# Patient Record
Sex: MALE | Race: WHITE | NOT HISPANIC OR LATINO | Employment: OTHER | URBAN - METROPOLITAN AREA
[De-identification: names, ages, dates, MRNs, and addresses within clinical notes are randomized per-mention and may not be internally consistent; named-entity substitution may affect disease eponyms.]

---

## 2017-01-13 ENCOUNTER — CLINICAL SUPPORT NO REQUIREMENTS (OUTPATIENT)
Dept: CARDIOLOGY | Facility: CLINIC | Age: 67
End: 2017-01-13

## 2017-01-13 DIAGNOSIS — I47.20 VENTRICULAR TACHYCARDIA (HCC): Primary | ICD-10-CM

## 2017-01-13 PROCEDURE — 93283 PRGRMG EVAL IMPLANTABLE DFB: CPT | Performed by: INTERNAL MEDICINE

## 2017-05-26 ENCOUNTER — OFFICE VISIT (OUTPATIENT)
Dept: CARDIOLOGY | Facility: CLINIC | Age: 67
End: 2017-05-26

## 2017-05-26 ENCOUNTER — CLINICAL SUPPORT NO REQUIREMENTS (OUTPATIENT)
Dept: CARDIOLOGY | Facility: CLINIC | Age: 67
End: 2017-05-26

## 2017-05-26 VITALS — WEIGHT: 196 LBS | BODY MASS INDEX: 25.15 KG/M2 | HEIGHT: 74 IN

## 2017-05-26 DIAGNOSIS — I47.20 VENTRICULAR TACHYCARDIA (HCC): Primary | ICD-10-CM

## 2017-05-26 DIAGNOSIS — I25.810 CORONARY ARTERY DISEASE INVOLVING AUTOLOGOUS VEIN CORONARY BYPASS GRAFT WITHOUT ANGINA PECTORIS: Primary | ICD-10-CM

## 2017-05-26 DIAGNOSIS — I10 ESSENTIAL HYPERTENSION: ICD-10-CM

## 2017-05-26 DIAGNOSIS — I25.810 CORONARY ARTERY DISEASE INVOLVING CORONARY BYPASS GRAFT OF NATIVE HEART WITHOUT ANGINA PECTORIS: ICD-10-CM

## 2017-05-26 DIAGNOSIS — Z95.1 S/P CABG (CORONARY ARTERY BYPASS GRAFT): Primary | ICD-10-CM

## 2017-05-26 PROCEDURE — 93290 INTERROG DEV EVAL ICPMS IP: CPT | Performed by: INTERNAL MEDICINE

## 2017-05-26 PROCEDURE — 99214 OFFICE O/P EST MOD 30 MIN: CPT | Performed by: INTERNAL MEDICINE

## 2017-05-26 PROCEDURE — 93283 PRGRMG EVAL IMPLANTABLE DFB: CPT | Performed by: INTERNAL MEDICINE

## 2017-05-26 PROCEDURE — 93000 ELECTROCARDIOGRAM COMPLETE: CPT | Performed by: INTERNAL MEDICINE

## 2017-05-26 RX ORDER — LISINOPRIL 2.5 MG/1
2.5 TABLET ORAL DAILY
Status: ON HOLD | COMMUNITY
Start: 2017-05-26 | End: 2021-05-10 | Stop reason: SDUPTHER

## 2017-05-26 RX ORDER — DICLOFENAC SODIUM 75 MG/1
75 TABLET, DELAYED RELEASE ORAL AS NEEDED
COMMUNITY
End: 2018-10-19 | Stop reason: HOSPADM

## 2017-05-30 ENCOUNTER — CLINICAL SUPPORT NO REQUIREMENTS (OUTPATIENT)
Dept: CARDIOLOGY | Facility: CLINIC | Age: 67
End: 2017-05-30

## 2017-05-30 DIAGNOSIS — I42.9 CARDIOMYOPATHY (HCC): Primary | ICD-10-CM

## 2017-05-30 DIAGNOSIS — I47.20 VENTRICULAR TACHYCARDIA (HCC): ICD-10-CM

## 2017-10-31 ENCOUNTER — CLINICAL SUPPORT NO REQUIREMENTS (OUTPATIENT)
Dept: CARDIOLOGY | Facility: CLINIC | Age: 67
End: 2017-10-31

## 2017-10-31 DIAGNOSIS — I47.20 VENTRICULAR TACHYCARDIA (HCC): Primary | ICD-10-CM

## 2017-10-31 PROCEDURE — 93296 REM INTERROG EVL PM/IDS: CPT | Performed by: INTERNAL MEDICINE

## 2017-10-31 PROCEDURE — 93297 REM INTERROG DEV EVAL ICPMS: CPT | Performed by: INTERNAL MEDICINE

## 2017-10-31 PROCEDURE — 93295 DEV INTERROG REMOTE 1/2/MLT: CPT | Performed by: INTERNAL MEDICINE

## 2018-02-07 ENCOUNTER — CLINICAL SUPPORT NO REQUIREMENTS (OUTPATIENT)
Dept: CARDIOLOGY | Facility: CLINIC | Age: 68
End: 2018-02-07

## 2018-02-07 DIAGNOSIS — I47.20 VENTRICULAR TACHYCARDIA (HCC): Primary | ICD-10-CM

## 2018-02-07 PROCEDURE — 93297 REM INTERROG DEV EVAL ICPMS: CPT | Performed by: INTERNAL MEDICINE

## 2018-02-07 PROCEDURE — 93296 REM INTERROG EVL PM/IDS: CPT | Performed by: INTERNAL MEDICINE

## 2018-02-07 PROCEDURE — 93295 DEV INTERROG REMOTE 1/2/MLT: CPT | Performed by: INTERNAL MEDICINE

## 2018-05-25 DIAGNOSIS — R94.31 ABNORMAL ECG: Primary | ICD-10-CM

## 2018-05-25 DIAGNOSIS — Z95.1 HX OF CABG: ICD-10-CM

## 2018-06-19 ENCOUNTER — OFFICE VISIT (OUTPATIENT)
Dept: CARDIOLOGY | Facility: CLINIC | Age: 68
End: 2018-06-19

## 2018-06-19 ENCOUNTER — CLINICAL SUPPORT NO REQUIREMENTS (OUTPATIENT)
Dept: CARDIOLOGY | Facility: CLINIC | Age: 68
End: 2018-06-19

## 2018-06-19 ENCOUNTER — HOSPITAL ENCOUNTER (OUTPATIENT)
Dept: CARDIOLOGY | Facility: HOSPITAL | Age: 68
Discharge: HOME | End: 2018-06-19
Attending: INTERNAL MEDICINE | Admitting: INTERNAL MEDICINE

## 2018-06-19 VITALS
HEART RATE: 56 BPM | WEIGHT: 199.4 LBS | DIASTOLIC BLOOD PRESSURE: 68 MMHG | SYSTOLIC BLOOD PRESSURE: 120 MMHG | BODY MASS INDEX: 25.59 KG/M2 | HEIGHT: 74 IN

## 2018-06-19 DIAGNOSIS — E78.49 OTHER HYPERLIPIDEMIA: ICD-10-CM

## 2018-06-19 DIAGNOSIS — Z95.1 S/P CABG (CORONARY ARTERY BYPASS GRAFT): Primary | ICD-10-CM

## 2018-06-19 DIAGNOSIS — R94.31 ABNORMAL ECG: ICD-10-CM

## 2018-06-19 DIAGNOSIS — I47.20 VENTRICULAR TACHYCARDIA (HCC): Primary | ICD-10-CM

## 2018-06-19 DIAGNOSIS — I21.11 ST ELEVATION MYOCARDIAL INFARCTION INVOLVING RIGHT CORONARY ARTERY (HCC): ICD-10-CM

## 2018-06-19 DIAGNOSIS — Z95.1 HX OF CABG: ICD-10-CM

## 2018-06-19 LAB
BH CV NUCLEAR PRIOR STUDY: 3
BH CV STRESS BP STAGE 1: NORMAL
BH CV STRESS BP STAGE 2: NORMAL
BH CV STRESS BP STAGE 3: NORMAL
BH CV STRESS DURATION MIN STAGE 1: 3
BH CV STRESS DURATION MIN STAGE 2: 3
BH CV STRESS DURATION MIN STAGE 3: 3
BH CV STRESS DURATION SEC STAGE 1: 0
BH CV STRESS DURATION SEC STAGE 2: 0
BH CV STRESS DURATION SEC STAGE 3: 0
BH CV STRESS GRADE STAGE 1: 10
BH CV STRESS GRADE STAGE 2: 12
BH CV STRESS GRADE STAGE 3: 14
BH CV STRESS HR STAGE 1: 85
BH CV STRESS HR STAGE 2: 118
BH CV STRESS HR STAGE 3: 148
BH CV STRESS METS STAGE 1: 5
BH CV STRESS METS STAGE 2: 7.5
BH CV STRESS METS STAGE 3: 10
BH CV STRESS PROTOCOL 1: NORMAL
BH CV STRESS RECOVERY BP: NORMAL MMHG
BH CV STRESS RECOVERY HR: 78 BPM
BH CV STRESS SPEED STAGE 1: 1.7
BH CV STRESS SPEED STAGE 2: 2.5
BH CV STRESS SPEED STAGE 3: 3.4
BH CV STRESS STAGE 1: 1
BH CV STRESS STAGE 2: 2
BH CV STRESS STAGE 3: 3
LV EF NUC BP: 40 %
MAXIMAL PREDICTED HEART RATE: 155 BPM
PERCENT MAX PREDICTED HR: 95.48 %
STRESS BASELINE BP: NORMAL MMHG
STRESS BASELINE HR: 50 BPM
STRESS PERCENT HR: 112 %
STRESS POST ESTIMATED WORKLOAD: 10 METS
STRESS POST EXERCISE DUR MIN: 9 MIN
STRESS POST EXERCISE DUR SEC: 0 SEC
STRESS POST PEAK BP: NORMAL MMHG
STRESS POST PEAK HR: 148 BPM
STRESS TARGET HR: 132 BPM

## 2018-06-19 PROCEDURE — 93283 PRGRMG EVAL IMPLANTABLE DFB: CPT | Performed by: INTERNAL MEDICINE

## 2018-06-19 PROCEDURE — 0 TECHNETIUM TETROFOSMIN KIT: Performed by: INTERNAL MEDICINE

## 2018-06-19 PROCEDURE — 93016 CV STRESS TEST SUPVJ ONLY: CPT | Performed by: INTERNAL MEDICINE

## 2018-06-19 PROCEDURE — 78452 HT MUSCLE IMAGE SPECT MULT: CPT | Performed by: INTERNAL MEDICINE

## 2018-06-19 PROCEDURE — 93290 INTERROG DEV EVAL ICPMS IP: CPT | Performed by: INTERNAL MEDICINE

## 2018-06-19 PROCEDURE — 93017 CV STRESS TEST TRACING ONLY: CPT

## 2018-06-19 PROCEDURE — 93018 CV STRESS TEST I&R ONLY: CPT | Performed by: INTERNAL MEDICINE

## 2018-06-19 PROCEDURE — 99214 OFFICE O/P EST MOD 30 MIN: CPT | Performed by: INTERNAL MEDICINE

## 2018-06-19 PROCEDURE — 78452 HT MUSCLE IMAGE SPECT MULT: CPT

## 2018-06-19 PROCEDURE — A9502 TC99M TETROFOSMIN: HCPCS | Performed by: INTERNAL MEDICINE

## 2018-06-19 RX ORDER — ATORVASTATIN CALCIUM 80 MG/1
80 TABLET, FILM COATED ORAL DAILY
Qty: 90 TABLET | Refills: 2 | Status: SHIPPED | OUTPATIENT
Start: 2018-06-19 | End: 2019-02-05 | Stop reason: SDUPTHER

## 2018-06-19 RX ORDER — CLOBETASOL PROPIONATE 0.5 MG/G
CREAM TOPICAL 2 TIMES DAILY
COMMUNITY

## 2018-06-19 RX ORDER — PIMECROLIMUS 10 MG/G
CREAM TOPICAL 2 TIMES DAILY
COMMUNITY

## 2018-06-19 RX ADMIN — TETROFOSMIN 1 DOSE: 1.38 INJECTION, POWDER, LYOPHILIZED, FOR SOLUTION INTRAVENOUS at 09:10

## 2018-06-19 RX ADMIN — TETROFOSMIN 1 DOSE: 1.38 INJECTION, POWDER, LYOPHILIZED, FOR SOLUTION INTRAVENOUS at 10:20

## 2018-06-19 NOTE — PROGRESS NOTES
Date of Office Visit: 2017  Encounter Provider: Andrew Ball MD  Place of Service: [facility] CARDIOLOGY  Patient Name: Alden Pratt  :1950  [id number]    Chief Complaint   Patient presents with   • Coronary Artery Disease   :     HPI: Alden Pratt is a 67 y.o. male   Here for followup. He is a gentleman that had an inferior MI in 2006, had stenting, restenosed, had an ischemic myopathy and has had an ICD in. He ended up undergoing CABG, has done very well since then. He is doing quite well he's not had a chest pain, shortness of breath, PND, orthopnea, or edema,.    He has had PVCs in the past with those are much less.  In general he's doing well      Past Medical History:   Diagnosis Date   • CAD (coronary artery disease)     atheroclerosis   • Cancer     prostate   • Chest pain    • GERD (gastroesophageal reflux disease)    • Hyperlipidemia    • Implantable cardioverter-defibrillator (ICD) discharge    • Myocardial infarction     inferior   • PVC (premature ventricular contraction)    • PVD (peripheral vascular disease)    • Status post phlebectomy     varicose veins   • VT (ventricular tachycardia)        Past Surgical History:   Procedure Laterality Date   • CARDIAC DEFIBRILLATOR PLACEMENT     • CORONARY ARTERY BYPASS GRAFT      stents   • CORONARY STENT PLACEMENT     • MICROAMBULATORY PHLEBECTOMY     • THYROID BIOPSY         Social History     Social History   • Marital status:      Spouse name: N/A   • Number of children: N/A   • Years of education: N/A     Occupational History   • Not on file.     Social History Main Topics   • Smoking status: Never Smoker   • Smokeless tobacco: Not on file   • Alcohol use No   • Drug use: No   • Sexual activity: Not on file     Other Topics Concern   • Not on file     Social History Narrative   • No narrative on file       Family History   Problem Relation Age of Onset   • Coronary artery disease Other    •  Stroke Other    • Hypertension Other        Review of Systems   Constitution: Negative for decreased appetite, fever, malaise/fatigue and weight loss.   HENT: Negative for nosebleeds.    Eyes: Negative for double vision.   Cardiovascular: Negative for chest pain, claudication, cyanosis, dyspnea on exertion, irregular heartbeat, leg swelling, near-syncope, orthopnea, palpitations, paroxysmal nocturnal dyspnea and syncope.   Respiratory: Negative for cough, hemoptysis and shortness of breath.    Hematologic/Lymphatic: Negative for bleeding problem.   Skin: Negative for rash.   Musculoskeletal: Negative for falls and myalgias.   Gastrointestinal: Negative for hematochezia, jaundice, melena, nausea and vomiting.   Genitourinary: Negative for hematuria.   Neurological: Negative for dizziness and seizures.   Psychiatric/Behavioral: Negative for altered mental status and memory loss.       Allergies   Allergen Reactions   • Advil Allergy Sinus [Chlorpheniramine-Pse-Ibuprofen]      A medication not an allergy   • Contrast Dye    • Iodinated Diagnostic Agents    • Sudafed 12 Hour [Pseudoephedrine Hcl Er]          Current Outpatient Prescriptions:   •  aspirin 81 MG chewable tablet, Chew 81 mg daily., Disp: , Rfl:   •  atorvastatin (LIPITOR) 80 MG tablet, Take 80 mg by mouth daily., Disp: , Rfl:   •  cetirizine (ZyrTEC) 10 MG tablet, Take 10 mg by mouth daily., Disp: , Rfl:   •  Cholecalciferol (VITAMIN D3) 5000 UNITS capsule capsule, Take 2,000 Units by mouth Daily., Disp: , Rfl:   •  clobetasol (TEMOVATE) 0.05 % cream, Apply  topically 2 (Two) Times a Day., Disp: , Rfl:   •  diclofenac (VOLTAREN) 75 MG EC tablet, Take 75 mg by mouth As Needed., Disp: , Rfl:   •  ezetimibe (ZETIA) 10 MG tablet, Take 10 mg by mouth daily., Disp: , Rfl:   •  lisinopril (PRINIVIL,ZESTRIL) 2.5 MG tablet, Take 2.5 mg by mouth Daily., Disp: , Rfl:   •  metaxalone (SKELAXIN) 800 MG tablet, Take 800 mg by mouth As Needed for Muscle Spasms., Disp: ,  "Rfl:   •  metoprolol succinate XL (TOPROL-XL) 50 MG 24 hr tablet, Take 50 mg by mouth 2 (two) times a day., Disp: , Rfl:   •  nitroglycerin (NITROSTAT) 0.4 MG SL tablet, Place 0.4 mg under the tongue every 5 (five) minutes as needed for chest pain. Take no more than 3 doses in 15 minutes., Disp: , Rfl:   •  Omega-3 Fatty Acids (OMEGA 3 PO), Take  by mouth., Disp: , Rfl:   •  pimecrolimus (ELIDEL) 1 % cream, Apply  topically 2 (Two) Times a Day., Disp: , Rfl:   •  traMADol (ULTRAM) 50 MG tablet, Take 50 mg by mouth every 6 (six) hours as needed for moderate pain (4-6)., Disp: , Rfl:   No current facility-administered medications for this visit.      Objective:     Vitals:    06/19/18 1149   BP: 120/68   Pulse: 56   Weight: 90.4 kg (199 lb 6.4 oz)   Height: 188 cm (74\")     Body mass index is 25.6 kg/m².    Physical Exam   Constitutional: He is oriented to person, place, and time. He appears well-developed and well-nourished.   HENT:   Head: Normocephalic.   Eyes: No scleral icterus.   Neck: No JVD present. No thyromegaly present.   Cardiovascular: Normal rate, regular rhythm and normal heart sounds.  Exam reveals no gallop and no friction rub.    No murmur heard.  Pulmonary/Chest: Effort normal and breath sounds normal. He has no wheezes. He has no rales.       Abdominal: Soft. There is no hepatosplenomegaly. There is no tenderness.   Musculoskeletal: Normal range of motion. He exhibits no edema.   Lymphadenopathy:     He has no cervical adenopathy.   Neurological: He is alert and oriented to person, place, and time.   Skin: Skin is warm and dry. No rash noted.   Psychiatric: He has a normal mood and affect.       Procedures     Assessment:       Diagnosis Plan   1. S/P CABG (coronary artery bypass graft)     2. ST elevation myocardial infarction involving right coronary artery     3. Other hyperlipidemia            Plan:        He is doing well. I am not going to make any major change.  I did look at his stress test " he went 9 minutes on the treadmill no symptoms no EKG change Cardiolite pictures look like there may be a little bit more lsiha-infarct ischemia which is always difficult to interpret but I think in general he looks good I'm not can make any changes to renew his Lipitor will have him come back and see us in a year home sooner if he has trouble he'll see me in 2 years    Coronary Artery Disease  Assessment  • The patient has no angina    Plan  • Lifestyle modifications discussed include adhering to a heart healthy diet, maintenance of a healthy weight, medication compliance, regular exercise and regular monitoring of cholesterol and blood pressure    Subjective - Objective  • There is a history of past MI  • There is a history of previous coronary artery bypass graft  • There has been a previous stent procedure using MONIK  • Current antiplatelet therapy includes aspirin 81 mg        As always, it has been a pleasure to participate in your patient's care.      Sincerely,       Andrew Ball MD

## 2018-10-18 ENCOUNTER — TELEPHONE (OUTPATIENT)
Dept: CARDIOLOGY | Facility: CLINIC | Age: 68
End: 2018-10-18

## 2018-10-18 ENCOUNTER — HOSPITAL ENCOUNTER (OUTPATIENT)
Facility: HOSPITAL | Age: 68
Setting detail: OBSERVATION
LOS: 1 days | Discharge: HOME | End: 2018-10-19
Attending: INTERNAL MEDICINE | Admitting: INTERNAL MEDICINE

## 2018-10-18 ENCOUNTER — CLINICAL SUPPORT NO REQUIREMENTS (OUTPATIENT)
Dept: CARDIOLOGY | Facility: CLINIC | Age: 68
End: 2018-10-18

## 2018-10-18 ENCOUNTER — HOSPITAL ENCOUNTER (OUTPATIENT)
Dept: CARDIOLOGY | Facility: HOSPITAL | Age: 68
Discharge: HOME | End: 2018-10-18
Attending: INTERNAL MEDICINE

## 2018-10-18 ENCOUNTER — HOSPITAL ENCOUNTER (OUTPATIENT)
Dept: CARDIOLOGY | Facility: HOSPITAL | Age: 68
Discharge: HOME | End: 2018-10-18

## 2018-10-18 VITALS
HEART RATE: 94 BPM | OXYGEN SATURATION: 99 % | WEIGHT: 195 LBS | SYSTOLIC BLOOD PRESSURE: 134 MMHG | DIASTOLIC BLOOD PRESSURE: 83 MMHG | HEIGHT: 72 IN | BODY MASS INDEX: 26.41 KG/M2

## 2018-10-18 VITALS
BODY MASS INDEX: 26.41 KG/M2 | SYSTOLIC BLOOD PRESSURE: 134 MMHG | OXYGEN SATURATION: 99 % | DIASTOLIC BLOOD PRESSURE: 83 MMHG | RESPIRATION RATE: 16 BRPM | HEIGHT: 72 IN | WEIGHT: 195 LBS | HEART RATE: 94 BPM

## 2018-10-18 DIAGNOSIS — R06.02 SHORTNESS OF BREATH: ICD-10-CM

## 2018-10-18 DIAGNOSIS — R94.31 ABNORMAL EKG: ICD-10-CM

## 2018-10-18 DIAGNOSIS — R55 VASOVAGAL SYNCOPE: ICD-10-CM

## 2018-10-18 DIAGNOSIS — R00.2 PALPITATIONS: ICD-10-CM

## 2018-10-18 DIAGNOSIS — I47.20 VENTRICULAR TACHYCARDIA (HCC): Primary | ICD-10-CM

## 2018-10-18 PROBLEM — R07.2 CHEST PAIN, PRECORDIAL: Status: ACTIVE | Noted: 2018-10-18

## 2018-10-18 LAB
ALBUMIN SERPL-MCNC: 4.6 G/DL (ref 3.5–5.2)
ALBUMIN/GLOB SERPL: 1.6 G/DL
ALP SERPL-CCNC: 63 U/L (ref 39–117)
ALT SERPL W P-5'-P-CCNC: 24 U/L (ref 1–41)
ANION GAP SERPL CALCULATED.3IONS-SCNC: 9.6 MMOL/L
AST SERPL-CCNC: 30 U/L (ref 1–40)
BASOPHILS # BLD AUTO: 0.01 10*3/MM3 (ref 0–0.2)
BASOPHILS NFR BLD AUTO: 0.2 % (ref 0–1.5)
BILIRUB SERPL-MCNC: 1.1 MG/DL (ref 0.1–1.2)
BUN BLD-MCNC: 9 MG/DL (ref 8–23)
BUN/CREAT SERPL: 10.7 (ref 7–25)
CALCIUM SPEC-SCNC: 9.5 MG/DL (ref 8.6–10.5)
CHLORIDE SERPL-SCNC: 102 MMOL/L (ref 98–107)
CO2 SERPL-SCNC: 28.4 MMOL/L (ref 22–29)
CREAT BLD-MCNC: 0.84 MG/DL (ref 0.76–1.27)
D DIMER PPP FEU-MCNC: <0.27 MCGFEU/ML (ref 0–0.49)
DEPRECATED RDW RBC AUTO: 41.9 FL (ref 37–54)
EOSINOPHIL # BLD AUTO: 0.05 10*3/MM3 (ref 0–0.7)
EOSINOPHIL NFR BLD AUTO: 1 % (ref 0.3–6.2)
ERYTHROCYTE [DISTWIDTH] IN BLOOD BY AUTOMATED COUNT: 12.5 % (ref 11.5–14.5)
GFR SERPL CREATININE-BSD FRML MDRD: 92 ML/MIN/1.73
GLOBULIN UR ELPH-MCNC: 2.9 GM/DL
GLUCOSE BLD-MCNC: 95 MG/DL (ref 65–99)
HCT VFR BLD AUTO: 46.7 % (ref 40.4–52.2)
HGB BLD-MCNC: 14.6 G/DL (ref 13.7–17.6)
IMM GRANULOCYTES # BLD: 0 10*3/MM3 (ref 0–0.03)
IMM GRANULOCYTES NFR BLD: 0 % (ref 0–0.5)
LYMPHOCYTES # BLD AUTO: 1.01 10*3/MM3 (ref 0.9–4.8)
LYMPHOCYTES NFR BLD AUTO: 20.6 % (ref 19.6–45.3)
MAGNESIUM SERPL-MCNC: 2 MG/DL (ref 1.6–2.4)
MCH RBC QN AUTO: 29 PG (ref 27–32.7)
MCHC RBC AUTO-ENTMCNC: 31.3 G/DL (ref 32.6–36.4)
MCV RBC AUTO: 92.8 FL (ref 79.8–96.2)
MONOCYTES # BLD AUTO: 0.43 10*3/MM3 (ref 0.2–1.2)
MONOCYTES NFR BLD AUTO: 8.8 % (ref 5–12)
NEUTROPHILS # BLD AUTO: 3.41 10*3/MM3 (ref 1.9–8.1)
NEUTROPHILS NFR BLD AUTO: 69.4 % (ref 42.7–76)
NT-PROBNP SERPL-MCNC: 1181 PG/ML (ref 0–900)
PLATELET # BLD AUTO: 113 10*3/MM3 (ref 140–500)
PMV BLD AUTO: 11.6 FL (ref 6–12)
POTASSIUM BLD-SCNC: 4.1 MMOL/L (ref 3.5–5.2)
PROT SERPL-MCNC: 7.5 G/DL (ref 6–8.5)
RBC # BLD AUTO: 5.03 10*6/MM3 (ref 4.6–6)
SODIUM BLD-SCNC: 140 MMOL/L (ref 136–145)
T-UPTAKE NFR SERPL: 1.05 TBI (ref 0.8–1.3)
T4 FREE SERPL-MCNC: 1.17 NG/DL (ref 0.93–1.7)
T4 FREE SERPL-MCNC: 1.17 NG/DL (ref 0.93–1.7)
T4 SERPL-MCNC: 5.09 MCG/DL (ref 4.5–11.7)
TROPONIN T SERPL-MCNC: <0.01 NG/ML (ref 0–0.03)
TSH SERPL DL<=0.05 MIU/L-ACNC: 1.12 MIU/ML (ref 0.27–4.2)
TSH SERPL DL<=0.05 MIU/L-ACNC: 1.12 MIU/ML (ref 0.27–4.2)
WBC NRBC COR # BLD: 4.91 10*3/MM3 (ref 4.5–10.7)

## 2018-10-18 PROCEDURE — 84443 ASSAY THYROID STIM HORMONE: CPT | Performed by: INTERNAL MEDICINE

## 2018-10-18 PROCEDURE — 93010 ELECTROCARDIOGRAM REPORT: CPT | Performed by: INTERNAL MEDICINE

## 2018-10-18 PROCEDURE — 84439 ASSAY OF FREE THYROXINE: CPT | Performed by: INTERNAL MEDICINE

## 2018-10-18 PROCEDURE — 84479 ASSAY OF THYROID (T3 OR T4): CPT | Performed by: INTERNAL MEDICINE

## 2018-10-18 PROCEDURE — 83735 ASSAY OF MAGNESIUM: CPT | Performed by: INTERNAL MEDICINE

## 2018-10-18 PROCEDURE — 85025 COMPLETE CBC W/AUTO DIFF WBC: CPT | Performed by: INTERNAL MEDICINE

## 2018-10-18 PROCEDURE — 25010000002 PERFLUTREN (DEFINITY) 8.476 MG IN SODIUM CHLORIDE 0.9 % 10 ML INJECTION: Performed by: INTERNAL MEDICINE

## 2018-10-18 PROCEDURE — 99223 1ST HOSP IP/OBS HIGH 75: CPT | Performed by: INTERNAL MEDICINE

## 2018-10-18 PROCEDURE — 93005 ELECTROCARDIOGRAM TRACING: CPT | Performed by: INTERNAL MEDICINE

## 2018-10-18 PROCEDURE — 94760 N-INVAS EAR/PLS OXIMETRY 1: CPT

## 2018-10-18 PROCEDURE — 84484 ASSAY OF TROPONIN QUANT: CPT | Performed by: INTERNAL MEDICINE

## 2018-10-18 PROCEDURE — 83880 ASSAY OF NATRIURETIC PEPTIDE: CPT | Performed by: INTERNAL MEDICINE

## 2018-10-18 PROCEDURE — 99222 1ST HOSP IP/OBS MODERATE 55: CPT | Performed by: INTERNAL MEDICINE

## 2018-10-18 PROCEDURE — 36415 COLL VENOUS BLD VENIPUNCTURE: CPT

## 2018-10-18 PROCEDURE — 93306 TTE W/DOPPLER COMPLETE: CPT

## 2018-10-18 PROCEDURE — 93306 TTE W/DOPPLER COMPLETE: CPT | Performed by: INTERNAL MEDICINE

## 2018-10-18 PROCEDURE — 80053 COMPREHEN METABOLIC PANEL: CPT | Performed by: INTERNAL MEDICINE

## 2018-10-18 PROCEDURE — 85379 FIBRIN DEGRADATION QUANT: CPT | Performed by: INTERNAL MEDICINE

## 2018-10-18 PROCEDURE — 93295 DEV INTERROG REMOTE 1/2/MLT: CPT | Performed by: INTERNAL MEDICINE

## 2018-10-18 PROCEDURE — 93296 REM INTERROG EVL PM/IDS: CPT | Performed by: INTERNAL MEDICINE

## 2018-10-18 RX ORDER — CLOBETASOL PROPIONATE 0.5 MG/G
CREAM TOPICAL EVERY 12 HOURS SCHEDULED
Status: DISCONTINUED | OUTPATIENT
Start: 2018-10-18 | End: 2018-10-19 | Stop reason: HOSPADM

## 2018-10-18 RX ORDER — METAXALONE 800 MG/1
800 TABLET ORAL 3 TIMES DAILY PRN
Status: DISCONTINUED | OUTPATIENT
Start: 2018-10-18 | End: 2018-10-19 | Stop reason: HOSPADM

## 2018-10-18 RX ORDER — SODIUM CHLORIDE 0.9 % (FLUSH) 0.9 %
10 SYRINGE (ML) INJECTION AS NEEDED
Status: DISCONTINUED | OUTPATIENT
Start: 2018-10-18 | End: 2018-10-19 | Stop reason: HOSPADM

## 2018-10-18 RX ORDER — NITROGLYCERIN 0.4 MG/1
0.4 TABLET SUBLINGUAL
Status: DISCONTINUED | OUTPATIENT
Start: 2018-10-18 | End: 2018-10-19 | Stop reason: HOSPADM

## 2018-10-18 RX ORDER — SODIUM CHLORIDE 0.9 % (FLUSH) 0.9 %
3 SYRINGE (ML) INJECTION EVERY 12 HOURS SCHEDULED
Status: DISCONTINUED | OUTPATIENT
Start: 2018-10-18 | End: 2018-10-19 | Stop reason: HOSPADM

## 2018-10-18 RX ORDER — METOPROLOL SUCCINATE 100 MG/1
100 TABLET, EXTENDED RELEASE ORAL EVERY 12 HOURS SCHEDULED
Status: DISCONTINUED | OUTPATIENT
Start: 2018-10-18 | End: 2018-10-19 | Stop reason: HOSPADM

## 2018-10-18 RX ORDER — ATORVASTATIN CALCIUM 80 MG/1
80 TABLET, FILM COATED ORAL NIGHTLY
Status: DISCONTINUED | OUTPATIENT
Start: 2018-10-18 | End: 2018-10-19 | Stop reason: HOSPADM

## 2018-10-18 RX ORDER — ACETAMINOPHEN 325 MG/1
650 TABLET ORAL EVERY 4 HOURS PRN
Status: DISCONTINUED | OUTPATIENT
Start: 2018-10-18 | End: 2018-10-19 | Stop reason: HOSPADM

## 2018-10-18 RX ORDER — NITROGLYCERIN 0.4 MG/1
0.4 TABLET SUBLINGUAL
Status: DISCONTINUED | OUTPATIENT
Start: 2018-10-18 | End: 2018-11-12

## 2018-10-18 RX ORDER — TRAMADOL HYDROCHLORIDE 50 MG/1
50 TABLET ORAL EVERY 6 HOURS PRN
Status: DISCONTINUED | OUTPATIENT
Start: 2018-10-18 | End: 2018-10-19 | Stop reason: HOSPADM

## 2018-10-18 RX ORDER — CETIRIZINE HYDROCHLORIDE 10 MG/1
10 TABLET ORAL DAILY
Status: DISCONTINUED | OUTPATIENT
Start: 2018-10-18 | End: 2018-10-19 | Stop reason: HOSPADM

## 2018-10-18 RX ORDER — SODIUM CHLORIDE 0.9 % (FLUSH) 0.9 %
3-10 SYRINGE (ML) INJECTION AS NEEDED
Status: DISCONTINUED | OUTPATIENT
Start: 2018-10-18 | End: 2018-10-19 | Stop reason: HOSPADM

## 2018-10-18 RX ORDER — LISINOPRIL 2.5 MG/1
2.5 TABLET ORAL DAILY
Status: DISCONTINUED | OUTPATIENT
Start: 2018-10-18 | End: 2018-10-19 | Stop reason: HOSPADM

## 2018-10-18 RX ORDER — ASPIRIN 81 MG/1
81 TABLET, CHEWABLE ORAL DAILY
Status: DISCONTINUED | OUTPATIENT
Start: 2018-10-18 | End: 2018-10-19 | Stop reason: HOSPADM

## 2018-10-18 RX ADMIN — Medication 3 ML: at 20:11

## 2018-10-18 RX ADMIN — PERFLUTREN 1.5 ML: 6.52 INJECTION, SUSPENSION INTRAVENOUS at 16:21

## 2018-10-18 RX ADMIN — ATORVASTATIN CALCIUM 80 MG: 80 TABLET, FILM COATED ORAL at 20:11

## 2018-10-18 RX ADMIN — LISINOPRIL 2.5 MG: 2.5 TABLET ORAL at 20:11

## 2018-10-18 RX ADMIN — ASPIRIN 81 MG: 81 TABLET, CHEWABLE ORAL at 20:11

## 2018-10-18 RX ADMIN — METOPROLOL SUCCINATE 100 MG: 100 TABLET, FILM COATED, EXTENDED RELEASE ORAL at 20:12

## 2018-10-18 RX ADMIN — CETIRIZINE HYDROCHLORIDE 10 MG: 10 TABLET, FILM COATED ORAL at 20:11

## 2018-10-18 NOTE — CONSULTS
Date of Hospital Visit: [unfilled]ENC@  Encounter Provider: Robby Ngo MD  Place of Service: [facility] CARDIOLOGY  Patient Name: Alden Pratt  :1950  Referral Provider: Andrew Ball MD    Chief complaint  Syncope    History of Present Illness  The patient is a 67-year-old gentleman who had an episode of syncope today at around noon.  He reports that he was working outside and was having a emotional discussion with his brother when he lost consciousness.  He was bending over to pick at tool off the ground, and the next thing he was aware of was his brother coming to his assistance.  He states he had just a brief fluttering in his chest prior to the incident.  He now feels essentially back at baseline though is somewhat fatigued.  He originally had an inferior ST elevation MI  and subsequently developed an ischemic cardiomyopathy and resulting dual-chamber ICD placement.  He has never before received any therapy.    Past Medical History:   Diagnosis Date   • Arthritis    • CAD (coronary artery disease)     atheroclerosis   • Cancer (CMS/HCC)     prostate   • Chest pain    • CHF (congestive heart failure) (CMS/HCC)    • Elevated cholesterol    • GERD (gastroesophageal reflux disease)    • Hyperlipidemia    • Implantable cardioverter-defibrillator (ICD) discharge    • Myocardial infarction (CMS/HCC)     inferior   • PVC (premature ventricular contraction)    • PVD (peripheral vascular disease) (CMS/HCC)    • Status post phlebectomy     varicose veins   • VT (ventricular tachycardia) (CMS/HCC)        Past Surgical History:   Procedure Laterality Date   • CARDIAC CATHETERIZATION     • CARDIAC DEFIBRILLATOR PLACEMENT     • CORONARY ARTERY BYPASS GRAFT      stents   • CORONARY STENT PLACEMENT     • MICROAMBULATORY PHLEBECTOMY     • THYROID BIOPSY         Facility-Administered Medications Prior to Admission   Medication Dose Route Frequency Provider Last Rate Last Dose    • nitroglycerin (NITROSTAT) SL tablet 0.4 mg  0.4 mg Sublingual Q5 Min PRN Ernestina Rose MD       • sodium chloride 0.9 % flush 10 mL  10 mL Intravenous PRN Ernestina Rose MD         Prescriptions Prior to Admission   Medication Sig Dispense Refill Last Dose   • aspirin 81 MG chewable tablet Chew 81 mg daily.   10/17/2018 at Unknown time   • atorvastatin (LIPITOR) 80 MG tablet Take 1 tablet by mouth Daily. 90 tablet 2 10/17/2018 at Unknown time   • cetirizine (ZyrTEC) 10 MG tablet Take 10 mg by mouth daily.   10/17/2018 at Unknown time   • Cholecalciferol (VITAMIN D3) 5000 UNITS capsule capsule Take 2,000 Units by mouth Every Other Day.   10/17/2018 at Unknown time   • clobetasol (TEMOVATE) 0.05 % cream Apply  topically 2 (Two) Times a Day.   10/17/2018 at Unknown time   • diclofenac (VOLTAREN) 75 MG EC tablet Take 75 mg by mouth As Needed.   Past Week at Unknown time   • ezetimibe (ZETIA) 10 MG tablet Take 10 mg by mouth daily.   10/17/2018 at Unknown time   • lisinopril (PRINIVIL,ZESTRIL) 2.5 MG tablet Take 2.5 mg by mouth Daily.   10/17/2018 at Unknown time   • metaxalone (SKELAXIN) 800 MG tablet Take 800 mg by mouth As Needed for Muscle Spasms.   Past Month at Unknown time   • metoprolol succinate XL (TOPROL-XL) 50 MG 24 hr tablet Take 50 mg by mouth 2 (two) times a day.   10/18/2018 at Unknown time   • Omega-3 Fatty Acids (OMEGA 3 PO) Take  by mouth.   10/18/2018 at Unknown time   • pimecrolimus (ELIDEL) 1 % cream Apply  topically 2 (Two) Times a Day.   10/17/2018 at Unknown time   • traMADol (ULTRAM) 50 MG tablet Take 50 mg by mouth every 6 (six) hours as needed for moderate pain (4-6).   Past Week at Unknown time   • nitroglycerin (NITROSTAT) 0.4 MG SL tablet Place 0.4 mg under the tongue every 5 (five) minutes as needed for chest pain. Take no more than 3 doses in 15 minutes.   Taking       Current Meds  Scheduled Meds:  sodium chloride 3 mL Intravenous Q12H     Continuous Infusions:   PRN  "Meds:.•  acetaminophen  •  nitroglycerin  •  sodium chloride    Allergies as of 10/18/2018 - Reviewed 10/18/2018   Allergen Reaction Noted   • Contrast dye  05/09/2016   • Iodinated diagnostic agents  12/20/2013   • Sudafed 12 hour [pseudoephedrine hcl er]  05/09/2016       Social History     Social History   • Marital status:      Spouse name: N/A   • Number of children: N/A   • Years of education: N/A     Occupational History   • Not on file.     Social History Main Topics   • Smoking status: Never Smoker   • Smokeless tobacco: Not on file   • Alcohol use No   • Drug use: No   • Sexual activity: Defer     Other Topics Concern   • Not on file     Social History Narrative   • No narrative on file       Family History   Problem Relation Age of Onset   • Coronary artery disease Other    • Stroke Other    • Hypertension Other    • Heart disease Father    • Heart failure Father    • Stroke Father        REVIEW OF SYSTEMS:   12 point ROS was performed and is negative except as outlined in HPI       Objective:   Temp:  [98.2 °F (36.8 °C)] 98.2 °F (36.8 °C)  Heart Rate:  [] 110  Resp:  [16] 16  BP: (131-134)/(68-83) 131/68  Body mass index is 25.22 kg/m².  Flowsheet Rows      First Filed Value   Admission Height  188 cm (74\") Documented at 10/18/2018 1649   Admission Weight  89.1 kg (196 lb 6.4 oz) Documented at 10/18/2018 1649        Vitals:    10/18/18 1649   BP: 131/68   Pulse: 110   Resp: 16   Temp: 98.2 °F (36.8 °C)   SpO2: 97%       General Appearance:    Alert, cooperative, in no acute distress, appears younger than stated age    Head:    Normocephalic, without obvious abnormality, scrape noted on right temple    Eyes:            Lids and lashes normal, conjunctivae and sclerae normal, no icterus, no pallor, corneas clear   Ears:    Ears appear intact with no abnormalities noted   Throat:   No oral lesions, no thrush, oral mucosa moist   Neck:   No adenopathy, supple, trachea midline, no thyromegaly, no "   carotid bruit, no JVD   Back:     No kyphosis present, no scoliosis present, no skin lesions, erythema or scars, no tenderness to percussion or palpation, range of motion normal   Lungs:     Clear to auscultation,respirations regular, even and unlabored    Heart:   Irregular rate and rhythm, no discernible murmur    Chest Wall:    No abnormalities observed, ICD in place on left chest well healed    Abdomen:     Normal bowel sounds, no masses, no organomegaly, soft        non-tender, non-distended, no guarding, no rebound  tenderness   Extremities:   Moves all extremities well, no edema, no cyanosis, no redness   Pulses:   Pulses palpable and equal bilaterally. Normal radial, carotid, femoral, dorsalis pedis and posterior tibial pulses bilaterally. Normal abdominal aorta   Skin:  Psychiatric:   No bleeding, bruising or rash    Alert and oriented x 3, normal mood and affect                 Lab Review:      Results from last 7 days  Lab Units 10/18/18  1440   SODIUM mmol/L 140   POTASSIUM mmol/L 4.1   CHLORIDE mmol/L 102   CO2 mmol/L 28.4   BUN mg/dL 9   CREATININE mg/dL 0.84   CALCIUM mg/dL 9.5   BILIRUBIN mg/dL 1.1   ALK PHOS U/L 63   ALT (SGPT) U/L 24   AST (SGOT) U/L 30   GLUCOSE mg/dL 95       Results from last 7 days  Lab Units 10/18/18  1440   TROPONIN T ng/mL <0.010     @LABRCNTbnp@    Results from last 7 days  Lab Units 10/18/18  1440   WBC 10*3/mm3 4.91   HEMOGLOBIN g/dL 14.6   HEMATOCRIT % 46.7   PLATELETS 10*3/mm3 113*           Results from last 7 days  Lab Units 10/18/18  1440   MAGNESIUM mg/dL 2.0     @LABRCNTIP(chol,trig,hdl,ldl)    I personally viewed and interpreted the patient's EKG/Telemetry data  )  Patient Active Problem List   Diagnosis   • Other hyperlipidemia   • ST elevation myocardial infarction involving right coronary artery (CMS/HCC)   • S/P CABG (coronary artery bypass graft)   • Chest pain, precordial       Interrogation of the patient's ICD shows that he received one appropriate shock  for ventricular fibrillation.  During ventricular fibrillation and there was onset of atrial fibrillation although this clearly came after the ventricular fibrillation had begun.  The shock successfully terminated the ventricular fibrillation however he remained in atrial fibrillation.  His device history demonstrates that he has had no atrial fibrillation prior to this event.    Assessment and Plan:  1.  Aborted sudden cardiac death  2.  Ventricular fibrillation  3.  Atrial fibrillation  4.  Ischemic cardiomyopathy  5.  Coronary artery disease    The patient now feels back to baseline though he remains in rate-controlled atrial fibrillation.  I think it is reasonable to observe him overnight and uptitrate his beta blocker as tolerated.  If he has no further ventricular events I would not recommend making any dramatic changes to the patient's therapy as this is his first event in over 12 years of having an ICD.  If he has additional events overnight or the near future he will need to be considered to start an antiarrhythmic medication.  In regards to his atrial fibrillation, as this is not been present before, I hope that it may terminate spontaneously  just with observation.  It is not terminated by tomorrow we can start anticoagulation and prepared for cardioversion.  His CHADS VASC score is high enough that long-term anticoagulation should be considered.  We will follow-up with the patient tomorrow to see if he is converted from atrial fibrillation or if there been any other events.    Robby Ngo MD  10/18/18  6:33 PM.  Time spent in reviewing chart, discussion and examination:

## 2018-10-18 NOTE — PLAN OF CARE
Problem: Patient Care Overview  Goal: Plan of Care Review  Outcome: Ongoing (interventions implemented as appropriate)   10/18/18 1754   Coping/Psychosocial   Plan of Care Reviewed With patient   Plan of Care Review   Progress no change   OTHER   Outcome Summary Patient admitted for monitoring of heart rhythm. Patient currently in afib that goes between the 70s to 120s. Denies any pain. Awaiting call back from cardiology for further orders.      Goal: Individualization and Mutuality  Outcome: Ongoing (interventions implemented as appropriate)    Goal: Discharge Needs Assessment  Outcome: Ongoing (interventions implemented as appropriate)    Goal: Interprofessional Rounds/Family Conf  Outcome: Ongoing (interventions implemented as appropriate)      Problem: Fall Risk (Adult)  Goal: Identify Related Risk Factors and Signs and Symptoms  Outcome: Ongoing (interventions implemented as appropriate)    Goal: Absence of Fall  Outcome: Ongoing (interventions implemented as appropriate)   10/18/18 1754   Fall Risk (Adult)   Absence of Fall making progress toward outcome       Problem: Arrhythmia/Dysrhythmia (Symptomatic) (Adult)  Goal: Signs and Symptoms of Listed Potential Problems Will be Absent, Minimized or Managed (Arrhythmia/Dysrhythmia)  Outcome: Ongoing (interventions implemented as appropriate)   10/18/18 1754   Goal/Outcome Evaluation   Problems Assessed (Arrhythmia/Dysrhythmia) all   Problems Present (Dysrhythmia) none

## 2018-10-18 NOTE — PROGRESS NOTES
Date of Office Visit: 10/18/2018  Encounter Provider: Mare Nova RN  Place of Service: [facility] CARDIOLOGY  Patient Name: Alden Pratt  :1950      Patient ID:  Alden Pratt is a 67 y.o. male is here for VT.           History of Present Illness: ICD discharge    This is a patient who patient of Dr. Ball.  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   He has an AICD.  He has a history of frequent PVCs and a history of ventricular tachycardia in the past.  He's been very stable.      He had a stress nuclear perfusion study done in 2018 which showed no evidence of ischemia and ejection fraction is 40%.  He a lot PVCs at that time.  He's been in fair health in been taking his medications as directed.  He was outside working today.  His brother whom he's been estranged from for 5 years came up.  His brother was telling him his story.  It was really stressing Mr. Pratt.  He got off a stepladder and walked to the sidewalk and had a syncopal episode.  He was in ventricular tachycardia by his device and did get defibrillation.  Afterwards, he was in atrial fibrillation which is new for him.  Prior to this, he had no symptoms other than feeling stressed.     He does feel a fullness in his head.  He has some slight chest tightness.  He doesn't feel dizzy.  Doesn't feels heart racing or skipping.  He's had no recent nausea, vomiting or diarrhea.  He's been taking his medications as directed.  He has a normal mag, CMP and CBC.     He does not smoke.     Past Medical History:   Diagnosis Date   • CAD (coronary artery disease)     atheroclerosis   • Cancer (CMS/HCC)     prostate   • Chest pain    • GERD (gastroesophageal reflux disease)    • Hyperlipidemia    • Implantable cardioverter-defibrillator (ICD) discharge    • Myocardial infarction (CMS/HCC)     inferior   • PVC  (premature ventricular contraction)    • PVD (peripheral vascular disease) (CMS/HCC)    • Status post phlebectomy     varicose veins   • VT (ventricular tachycardia) (CMS/HCC)          Past Surgical History:   Procedure Laterality Date   • CARDIAC DEFIBRILLATOR PLACEMENT     • CORONARY ARTERY BYPASS GRAFT      stents   • CORONARY STENT PLACEMENT     • MICROAMBULATORY PHLEBECTOMY     • THYROID BIOPSY         Current Outpatient Prescriptions on File Prior to Encounter   Medication Sig Dispense Refill   • aspirin 81 MG chewable tablet Chew 81 mg daily.     • atorvastatin (LIPITOR) 80 MG tablet Take 1 tablet by mouth Daily. 90 tablet 2   • cetirizine (ZyrTEC) 10 MG tablet Take 10 mg by mouth daily.     • Cholecalciferol (VITAMIN D3) 5000 UNITS capsule capsule Take 2,000 Units by mouth Daily.     • clobetasol (TEMOVATE) 0.05 % cream Apply  topically 2 (Two) Times a Day.     • diclofenac (VOLTAREN) 75 MG EC tablet Take 75 mg by mouth As Needed.     • ezetimibe (ZETIA) 10 MG tablet Take 10 mg by mouth daily.     • lisinopril (PRINIVIL,ZESTRIL) 2.5 MG tablet Take 2.5 mg by mouth Daily.     • metaxalone (SKELAXIN) 800 MG tablet Take 800 mg by mouth As Needed for Muscle Spasms.     • metoprolol succinate XL (TOPROL-XL) 50 MG 24 hr tablet Take 50 mg by mouth 2 (two) times a day.     • nitroglycerin (NITROSTAT) 0.4 MG SL tablet Place 0.4 mg under the tongue every 5 (five) minutes as needed for chest pain. Take no more than 3 doses in 15 minutes.     • Omega-3 Fatty Acids (OMEGA 3 PO) Take  by mouth.     • pimecrolimus (ELIDEL) 1 % cream Apply  topically 2 (Two) Times a Day.     • traMADol (ULTRAM) 50 MG tablet Take 50 mg by mouth every 6 (six) hours as needed for moderate pain (4-6).       No current facility-administered medications on file prior to encounter.        Social History     Social History   • Marital status:      Spouse name: N/A   • Number of children: N/A   • Years of education: N/A     Occupational  "History   • Not on file.     Social History Main Topics   • Smoking status: Never Smoker   • Smokeless tobacco: Not on file   • Alcohol use No   • Drug use: No   • Sexual activity: Defer     Other Topics Concern   • Not on file     Social History Narrative   • No narrative on file           Review of Systems   Constitution: Negative.   HENT: Negative for congestion.    Eyes: Negative for vision loss in left eye and vision loss in right eye.   Respiratory: Negative.  Negative for cough, hemoptysis, shortness of breath, sleep disturbances due to breathing, snoring, sputum production and wheezing.    Endocrine: Negative.    Hematologic/Lymphatic: Negative.    Skin: Negative for poor wound healing and rash.   Musculoskeletal: Negative for falls, gout, muscle cramps and myalgias.   Gastrointestinal: Negative for abdominal pain, diarrhea, dysphagia, hematemesis, melena, nausea and vomiting.   Neurological: Negative for excessive daytime sleepiness, dizziness, headaches, light-headedness, loss of balance, seizures and vertigo.   Psychiatric/Behavioral: Negative for depression and substance abuse. The patient is not nervous/anxious.        Procedures    ECG 12 Lead  Date/Time: 10/18/2018 3:34 PM  Performed by: BAM HERRERA  Authorized by: BAM HERRERA   Comparison: compared with previous ECG   Comparison to previous ECG: Now a fib  Rhythm: atrial fibrillation  T depression: II, III and aVF  Clinical impression: abnormal ECG                Objective:      Vitals:    10/18/18 1442   BP: 134/83   BP Location: Left arm   Patient Position: Sitting   Pulse: 94   Resp: 16   SpO2: 99%   Weight: 88.5 kg (195 lb)   Height: 182.9 cm (72\")     Body mass index is 26.45 kg/m².    Physical Exam   Constitutional: He is oriented to person, place, and time. He appears well-developed and well-nourished. No distress.   HENT:   Head: Normocephalic and atraumatic.   Eyes: Conjunctivae are normal. No scleral icterus.   Neck: Neck " supple. No JVD present. Carotid bruit is not present. No thyromegaly present.   Cardiovascular: Normal rate, regular rhythm, S1 normal, S2 normal, normal heart sounds and intact distal pulses.   No extrasystoles are present. PMI is not displaced.  Exam reveals no gallop.    No murmur heard.  Pulses:       Carotid pulses are 2+ on the right side, and 2+ on the left side.       Radial pulses are 2+ on the right side, and 2+ on the left side.        Dorsalis pedis pulses are 2+ on the right side, and 2+ on the left side.        Posterior tibial pulses are 2+ on the right side, and 2+ on the left side.   Pulmonary/Chest: Effort normal and breath sounds normal. No respiratory distress. He has no wheezes. He has no rhonchi. He has no rales. He exhibits no tenderness.   Abdominal: Soft. Bowel sounds are normal. He exhibits no distension, no abdominal bruit and no mass. There is no tenderness.   Musculoskeletal: He exhibits no edema or deformity.   Lymphadenopathy:     He has no cervical adenopathy.   Neurological: He is alert and oriented to person, place, and time. No cranial nerve deficit.   Skin: Skin is warm and dry. No rash noted. He is not diaphoretic. No cyanosis. No pallor. Nails show no clubbing.   Psychiatric: He has a normal mood and affect. Judgment normal.   Vitals reviewed.      Lab Review:       Assessment:      Diagnosis Plan   1. Abnormal EKG  Cardiac Monitoring    Vital Signs - Once    Vital Signs - As Needed    Pulse Oximetry    Oxygen Therapy- Nasal Cannula; Titrate for SPO2: 92%, equal to or greater than    Insert Peripheral IV    sodium chloride 0.9 % flush 10 mL    nitroglycerin (NITROSTAT) SL tablet 0.4 mg    NPO Diet    Bathroom Privileges With Assistance    CBC & Differential    Comprehensive Metabolic Panel    Troponin T    D-dimer    proBNP    ECG 12 Lead    Thyroid Panel With TSH    TSH+Free T4    T4, Free    Cardiac Monitoring    Vital Signs - Once    Oxygen Therapy- Nasal Cannula; Titrate for  SPO2: 92%, equal to or greater than    Insert Peripheral IV    NPO Diet    Bathroom Privileges With Assistance    Troponin T    Troponin T    D-dimer    D-dimer    proBNP    proBNP    T4, Free    T4, Free    CBC Auto Differential   2. Shortness of breath  Cardiac Monitoring    Vital Signs - Once    Vital Signs - As Needed    Pulse Oximetry    Oxygen Therapy- Nasal Cannula; Titrate for SPO2: 92%, equal to or greater than    Insert Peripheral IV    sodium chloride 0.9 % flush 10 mL    nitroglycerin (NITROSTAT) SL tablet 0.4 mg    NPO Diet    Bathroom Privileges With Assistance    CBC & Differential    Comprehensive Metabolic Panel    Troponin T    D-dimer    proBNP    ECG 12 Lead    Thyroid Panel With TSH    TSH+Free T4    T4, Free    Cardiac Monitoring    Vital Signs - Once    Oxygen Therapy- Nasal Cannula; Titrate for SPO2: 92%, equal to or greater than    Insert Peripheral IV    NPO Diet    Bathroom Privileges With Assistance    Troponin T    Troponin T    D-dimer    D-dimer    proBNP    proBNP    T4, Free    T4, Free    CBC Auto Differential   3. Palpitations  Cardiac Monitoring    Vital Signs - Once    Vital Signs - As Needed    Pulse Oximetry    Oxygen Therapy- Nasal Cannula; Titrate for SPO2: 92%, equal to or greater than    Insert Peripheral IV    sodium chloride 0.9 % flush 10 mL    nitroglycerin (NITROSTAT) SL tablet 0.4 mg    NPO Diet    Bathroom Privileges With Assistance    CBC & Differential    Comprehensive Metabolic Panel    Troponin T    D-dimer    proBNP    ECG 12 Lead    Thyroid Panel With TSH    TSH+Free T4    T4, Free    Cardiac Monitoring    Vital Signs - Once    Oxygen Therapy- Nasal Cannula; Titrate for SPO2: 92%, equal to or greater than    Insert Peripheral IV    NPO Diet    Bathroom Privileges With Assistance    Troponin T    Troponin T    D-dimer    D-dimer    proBNP    proBNP    T4, Free    T4, Free    CBC Auto Differential   4. Vasovagal syncope  Cardiac Monitoring    Vital Signs - Once     Vital Signs - As Needed    Pulse Oximetry    Oxygen Therapy- Nasal Cannula; Titrate for SPO2: 92%, equal to or greater than    Insert Peripheral IV    sodium chloride 0.9 % flush 10 mL    nitroglycerin (NITROSTAT) SL tablet 0.4 mg    NPO Diet    Bathroom Privileges With Assistance    CBC & Differential    Comprehensive Metabolic Panel    Troponin T    D-dimer    proBNP    ECG 12 Lead    Thyroid Panel With TSH    TSH+Free T4    T4, Free    Cardiac Monitoring    Vital Signs - Once    Oxygen Therapy- Nasal Cannula; Titrate for SPO2: 92%, equal to or greater than    Insert Peripheral IV    NPO Diet    Bathroom Privileges With Assistance    Magnesium    Troponin T    Troponin T    D-dimer    D-dimer    proBNP    proBNP    T4, Free    T4, Free    CBC Auto Differential    Magnesium    Magnesium     1. VT with defibrillation after severe stress with brother.  Now atrial fibrillation.  Admit to hospital.  Check echo here.  2. H/o inferior MI  3. H/o cAD, s/p CABG. Nonischemic stress study 6/2018.      Plan:       Admit.        Coronary Artery Disease  Assessment  • The patient has no angina    Subjective - Objective  • There is a history of previous coronary artery bypass graft  • Current antiplatelet therapy includes aspirin 81 mg      Atrial Fibrillation and Atrial Flutter  Assessment  • The patient has atrial fibrillation-initial episode  • This is non-valvular in etiology  • The patient's CHADS2-VASc score is 3  • A [id number] score of 2 or more is considered a high risk for a thromboembolic event    Plan  • Continue beta blocker for rate control

## 2018-10-18 NOTE — TELEPHONE ENCOUNTER
Patient called. States that he was working outside on a ladder earlier. Had been feeling like he was having a lot of PVCs this morning. His estranged brother he hadn't seen in years came up behind him and startled him. Soon after, he felt lightheaded and kneeled down then lost consciousness for about 20 seconds. He now feels a little lightheaded still and has a headache. He sent a remote transmission which shows that he received a shock x1 for an episode in the VF zone. The shock was successful, however patient went into new onset afib midway through the episode and remains in afib at the time of the remote. Presenting rhythm is Afib with -150bpm irregularly. I spoke to Caron Heck in Dr. Ball's absence. She reviewed the strips then spoke to Dr. Rose who is in [facility] today. They would like patient to go to the [facility] to be evaluated. I spoke to patient and he is agreeable. He is aware not to drive himself and states his wife will drive him. He is also aware that if he starts to feel worse before he gets here, should go to the ER instead.  Strips attached.      Shock:        Presenting strip from remote:

## 2018-10-19 ENCOUNTER — CLINICAL SUPPORT NO REQUIREMENTS (OUTPATIENT)
Dept: CARDIOLOGY | Facility: CLINIC | Age: 68
End: 2018-10-19

## 2018-10-19 VITALS
HEART RATE: 67 BPM | WEIGHT: 196.4 LBS | TEMPERATURE: 97.6 F | OXYGEN SATURATION: 97 % | RESPIRATION RATE: 16 BRPM | HEIGHT: 74 IN | DIASTOLIC BLOOD PRESSURE: 62 MMHG | BODY MASS INDEX: 25.21 KG/M2 | SYSTOLIC BLOOD PRESSURE: 95 MMHG

## 2018-10-19 DIAGNOSIS — I47.20 VENTRICULAR TACHYCARDIA (HCC): Primary | ICD-10-CM

## 2018-10-19 PROBLEM — R94.31 ABNORMAL EKG: Status: ACTIVE | Noted: 2018-10-19

## 2018-10-19 LAB
AORTIC ARCH: 2.8 CM
AORTIC DIMENSIONLESS INDEX: 0.8 (DI)
ASCENDING AORTA: 3.3 CM
BH CV ECHO MEAS - ACS: 2.4 CM
BH CV ECHO MEAS - AO MAX PG (FULL): 1.7 MMHG
BH CV ECHO MEAS - AO MAX PG: 4.3 MMHG
BH CV ECHO MEAS - AO MEAN PG (FULL): 1 MMHG
BH CV ECHO MEAS - AO MEAN PG: 2.7 MMHG
BH CV ECHO MEAS - AO ROOT AREA (BSA CORRECTED): 1.4
BH CV ECHO MEAS - AO ROOT AREA: 6.8 CM^2
BH CV ECHO MEAS - AO ROOT DIAM: 2.9 CM
BH CV ECHO MEAS - AO V2 MAX: 104.2 CM/SEC
BH CV ECHO MEAS - AO V2 MEAN: 78 CM/SEC
BH CV ECHO MEAS - AO V2 VTI: 20.1 CM
BH CV ECHO MEAS - ASC AORTA: 3.3 CM
BH CV ECHO MEAS - AVA(I,A): 3.6 CM^2
BH CV ECHO MEAS - AVA(I,D): 3.6 CM^2
BH CV ECHO MEAS - AVA(V,A): 3.7 CM^2
BH CV ECHO MEAS - AVA(V,D): 3.7 CM^2
BH CV ECHO MEAS - BSA(HAYCOCK): 2.1 M^2
BH CV ECHO MEAS - BSA: 2.1 M^2
BH CV ECHO MEAS - BZI_BMI: 26.4 KILOGRAMS/M^2
BH CV ECHO MEAS - BZI_METRIC_HEIGHT: 182.9 CM
BH CV ECHO MEAS - BZI_METRIC_WEIGHT: 88.5 KG
BH CV ECHO MEAS - EDV(CUBED): 345.6 ML
BH CV ECHO MEAS - EDV(MOD-SP2): 181 ML
BH CV ECHO MEAS - EDV(MOD-SP4): 143 ML
BH CV ECHO MEAS - EDV(TEICH): 256.9 ML
BH CV ECHO MEAS - EF(CUBED): 48 %
BH CV ECHO MEAS - EF(MOD-BP): 41 %
BH CV ECHO MEAS - EF(MOD-SP2): 36.5 %
BH CV ECHO MEAS - EF(MOD-SP4): 41.3 %
BH CV ECHO MEAS - EF(TEICH): 39.1 %
BH CV ECHO MEAS - ESV(CUBED): 179.9 ML
BH CV ECHO MEAS - ESV(MOD-SP2): 115 ML
BH CV ECHO MEAS - ESV(MOD-SP4): 84 ML
BH CV ECHO MEAS - ESV(TEICH): 156.5 ML
BH CV ECHO MEAS - FS: 19.6 %
BH CV ECHO MEAS - IVS/LVPW: 1.1
BH CV ECHO MEAS - IVSD: 1.1 CM
BH CV ECHO MEAS - LV DIASTOLIC VOL/BSA (35-75): 67.8 ML/M^2
BH CV ECHO MEAS - LV MASS(C)D: 340.6 GRAMS
BH CV ECHO MEAS - LV MASS(C)DI: 161.6 GRAMS/M^2
BH CV ECHO MEAS - LV MAX PG: 2.7 MMHG
BH CV ECHO MEAS - LV MEAN PG: 1.6 MMHG
BH CV ECHO MEAS - LV SYSTOLIC VOL/BSA (12-30): 39.9 ML/M^2
BH CV ECHO MEAS - LV V1 MAX: 81.5 CM/SEC
BH CV ECHO MEAS - LV V1 MEAN: 60.6 CM/SEC
BH CV ECHO MEAS - LV V1 VTI: 15.6 CM
BH CV ECHO MEAS - LVIDD: 7 CM
BH CV ECHO MEAS - LVIDS: 5.6 CM
BH CV ECHO MEAS - LVLD AP2: 8.8 CM
BH CV ECHO MEAS - LVLD AP4: 8.2 CM
BH CV ECHO MEAS - LVLS AP2: 7.8 CM
BH CV ECHO MEAS - LVLS AP4: 7.7 CM
BH CV ECHO MEAS - LVOT AREA (M): 4.5 CM^2
BH CV ECHO MEAS - LVOT AREA: 4.7 CM^2
BH CV ECHO MEAS - LVOT DIAM: 2.4 CM
BH CV ECHO MEAS - LVPWD: 1 CM
BH CV ECHO MEAS - MV DEC SLOPE: 464.8 CM/SEC^2
BH CV ECHO MEAS - MV DEC TIME: 0.15 SEC
BH CV ECHO MEAS - MV E MAX VEL: 61.1 CM/SEC
BH CV ECHO MEAS - MV MAX PG: 2.2 MMHG
BH CV ECHO MEAS - MV MEAN PG: 1.3 MMHG
BH CV ECHO MEAS - MV P1/2T MAX VEL: 71.2 CM/SEC
BH CV ECHO MEAS - MV P1/2T: 44.9 MSEC
BH CV ECHO MEAS - MV V2 MAX: 74.7 CM/SEC
BH CV ECHO MEAS - MV V2 MEAN: 52.5 CM/SEC
BH CV ECHO MEAS - MV V2 VTI: 12.4 CM
BH CV ECHO MEAS - MVA P1/2T LCG: 3.1 CM^2
BH CV ECHO MEAS - MVA(P1/2T): 4.9 CM^2
BH CV ECHO MEAS - MVA(VTI): 5.9 CM^2
BH CV ECHO MEAS - PA ACC TIME: 0.1 SEC
BH CV ECHO MEAS - PA MAX PG (FULL): 1.2 MMHG
BH CV ECHO MEAS - PA MAX PG: 2.9 MMHG
BH CV ECHO MEAS - PA PR(ACCEL): 34.6 MMHG
BH CV ECHO MEAS - PA V2 MAX: 85.5 CM/SEC
BH CV ECHO MEAS - PI END-D VEL: 152 CM/SEC
BH CV ECHO MEAS - PULM A REVS DUR: 0.08 SEC
BH CV ECHO MEAS - PULM A REVS VEL: 35.7 CM/SEC
BH CV ECHO MEAS - PULM DIAS VEL: 61.9 CM/SEC
BH CV ECHO MEAS - PULM S/D: 0.49
BH CV ECHO MEAS - PULM SYS VEL: 30.5 CM/SEC
BH CV ECHO MEAS - PVA(V,A): 2.7 CM^2
BH CV ECHO MEAS - PVA(V,D): 2.7 CM^2
BH CV ECHO MEAS - QP/QS: 0.74
BH CV ECHO MEAS - RAP SYSTOLE: 3 MMHG
BH CV ECHO MEAS - RV MAX PG: 1.7 MMHG
BH CV ECHO MEAS - RV MEAN PG: 1 MMHG
BH CV ECHO MEAS - RV V1 MAX: 65 CM/SEC
BH CV ECHO MEAS - RV V1 MEAN: 48.1 CM/SEC
BH CV ECHO MEAS - RV V1 VTI: 15.1 CM
BH CV ECHO MEAS - RVOT AREA: 3.6 CM^2
BH CV ECHO MEAS - RVOT DIAM: 2.1 CM
BH CV ECHO MEAS - RVSP: 16 MMHG
BH CV ECHO MEAS - SI(AO): 65 ML/M^2
BH CV ECHO MEAS - SI(CUBED): 78.7 ML/M^2
BH CV ECHO MEAS - SI(LVOT): 34.7 ML/M^2
BH CV ECHO MEAS - SI(MOD-SP2): 31.3 ML/M^2
BH CV ECHO MEAS - SI(MOD-SP4): 28 ML/M^2
BH CV ECHO MEAS - SI(TEICH): 47.6 ML/M^2
BH CV ECHO MEAS - SUP REN AO DIAM: 2 CM
BH CV ECHO MEAS - SV(AO): 137 ML
BH CV ECHO MEAS - SV(CUBED): 165.8 ML
BH CV ECHO MEAS - SV(LVOT): 73.1 ML
BH CV ECHO MEAS - SV(MOD-SP2): 66 ML
BH CV ECHO MEAS - SV(MOD-SP4): 59 ML
BH CV ECHO MEAS - SV(RVOT): 54.2 ML
BH CV ECHO MEAS - SV(TEICH): 100.4 ML
BH CV ECHO MEAS - TAPSE (>1.6): 1.6 CM2
BH CV ECHO MEAS - TR MAX VEL: 180.5 CM/SEC
BH CV VAS BP RIGHT ARM: NORMAL MMHG
BH CV XLRA - RV BASE: 3.28 CM
BH CV XLRA - TDI S': 7.72 CM/SEC
GLUCOSE BLDC GLUCOMTR-MCNC: 75 MG/DL (ref 70–130)
LEFT ATRIUM VOLUME INDEX: 31 ML/M2
MAXIMAL PREDICTED HEART RATE: 155 BPM
SINUS: 3.45 CM
STJ: 3.24 CM
STRESS TARGET HR: 132 BPM

## 2018-10-19 PROCEDURE — 82962 GLUCOSE BLOOD TEST: CPT

## 2018-10-19 PROCEDURE — 99217 PR OBSERVATION CARE DISCHARGE MANAGEMENT: CPT | Performed by: NURSE PRACTITIONER

## 2018-10-19 PROCEDURE — G0378 HOSPITAL OBSERVATION PER HR: HCPCS

## 2018-10-19 RX ORDER — METOPROLOL SUCCINATE 100 MG/1
100 TABLET, EXTENDED RELEASE ORAL EVERY 12 HOURS SCHEDULED
Qty: 60 TABLET | Refills: 5
Start: 2018-10-19 | End: 2019-02-26

## 2018-10-19 RX ADMIN — Medication 3 ML: at 08:30

## 2018-10-19 RX ADMIN — METOPROLOL SUCCINATE 100 MG: 100 TABLET, FILM COATED, EXTENDED RELEASE ORAL at 08:30

## 2018-10-19 NOTE — PLAN OF CARE
Problem: Patient Care Overview  Goal: Individualization and Mutuality  Outcome: Outcome(s) achieved Date Met: 10/19/18    Goal: Discharge Needs Assessment  Outcome: Outcome(s) achieved Date Met: 10/19/18   10/19/18 1700   Discharge Needs Assessment   Readmission Within the Last 30 Days no previous admission in last 30 days   Patient/Family Anticipates Transition to home;home with family   Patient/Family Anticipated Services at Transition none   Transportation Concerns car, none   Anticipated Changes Related to Illness none   Equipment Needed After Discharge none   Offered/Gave Vendor List no   Disability   Equipment Currently Used at Home none     Goal: Interprofessional Rounds/Family Conf  Outcome: Outcome(s) achieved Date Met: 10/19/18

## 2018-10-19 NOTE — DISCHARGE SUMMARY
DISCHARGE NOTE    Patient Name: Alden Pratt  Age/Sex: 67 y.o. male  : 1950  MRN: [id number]    Date of Discharge:  10/19/2018   Date of Admit: 10/18/2018  Encounter Provider: BRANDON Lentz  Place of Service: [facility] CARDIOLOGY  Patient Care Team:  Jhoan Quezada MD as PCP - General    Subjective:     Discharge Diagnosis:    Chest pain, precordial    Abnormal EKG      Hospital Course:     67-year-old patient of Dr. Ball's who had a syncopal episode yesterday afternoon.  He was working outside and was having a emotional discussion with his brother when he lost consciousness.  He was bending over to pick at tool off the ground, and the next thing he was aware of was his brother coming to his assistance.  He states he had just a brief fluttering in his chest prior to the incident. His ICD confirmed and appropriate shock for VF which sent an alert to our pacemaker center and he was noted to be in afib w/CVR after the shock so he was sent to the [facility] for evaluation. He was seen by Dr. Rose there and complained of some slight chest tightness. He had an while in the [facility] and was admitted to the hospital for further evaluation and treatment. He was seen by Dr. Ngo for evaluation of his ICD and treatment of his afib. He has felt fine during his admission but has remained in afib with controlled rate. His metoprolol was increased and he was monitored overnight. He had no further ventricular arrhythmias so he is felt stable for discharge. He has been started on apixaban for AC. He may spontaneously convert to SR but if not should consider cardioversion in the near future so will have him follow up with Dr. Ball or Caron Heck PA-C in 1 week. He is not sure he can tolerate the higher dose of beta blocker so a new prescription was not sent as he says he has  plenty of 50 mg tabs for now.        Vital Signs  Temp:  [97.4 °F (36.3 °C)-98.2 °F (36.8 °C)] 97.6 °F (36.4 °C)  Heart Rate:  [] 67  Resp:  [16] 16  BP: ()/(53-73) 95/62    Intake/Output Summary (Last 24 hours) at 10/19/18 1612  Last data filed at 10/19/18 1245   Gross per 24 hour   Intake              840 ml   Output                0 ml   Net              840 ml       Physical Exam:    General Appearance: No acute distress, well developed and well nourished.   Eyes: Conjunctiva and lids: No erythema, swelling, or discharge. Sclera non-icteric.   HENT: Atraumatic, normocephalic. External eyes, ears, and nose normal.   Respiratory: No signs of respiratory distress. Respiration rhythm and depth normal.   Clear to auscultation. No rales, crackles, rhonchi, or wheezing auscultated.   Cardiovascular:  Jugular Venous Pressure: Normal  Heart Rate and Rhythm: Irregularly, irregular. Heart Sounds: Normal S1 and S2. No S3 or S4 noted.  Murmurs: No murmurs noted. No rubs, thrills, or gallops.   Arterial Pulses: Posterior tibialis and dorsalis pedis pulses normal.   Lower Extremities: No edema noted.  Gastrointestinal:  Abdomen soft, non-distended, non-tender.  Musculoskeletal: Normal movement of extremities  Skin: Warm and dry.   Psychiatric: Patient alert and oriented to person, place, and time. Speech and behavior appropriate. Normal mood and affect.    Labs:     Results from last 7 days  Lab Units 10/18/18  1440   SODIUM mmol/L 140   POTASSIUM mmol/L 4.1   CHLORIDE mmol/L 102   CO2 mmol/L 28.4   BUN mg/dL 9   CREATININE mg/dL 0.84   GLUCOSE mg/dL 95   CALCIUM mg/dL 9.5   AST (SGOT) U/L 30   ALT (SGPT) U/L 24       Results from last 7 days  Lab Units 10/18/18  1440   TROPONIN T ng/mL <0.010       Results from last 7 days  Lab Units 10/18/18  1440   WBC 10*3/mm3 4.91   HEMOGLOBIN g/dL 14.6   HEMATOCRIT % 46.7   PLATELETS 10*3/mm3 113*           Results from last 7 days  Lab Units 10/18/18  1440   MAGNESIUM mg/dL  2.0           Results from last 7 days  Lab Units 10/18/18  1440   PROBNP pg/mL 1,181.0*       Results from last 7 days  Lab Units 10/18/18  1440   TSH mIU/mL 1.120  1.120       Discharge Diet:    Dietary Orders     Start     Ordered    10/18/18 1825  Diet Regular; Cardiac, Consistent Carbohydrate  Diet Effective Now     Question Answer Comment   Diet Texture / Consistency Regular    Common Modifiers Cardiac    Common Modifiers Consistent Carbohydrate        10/18/18 1825            Activity at Discharge:  as tolerated.     Discharge Medications     Discharge Medications      New Medications      Instructions Start Date   apixaban 5 MG tablet tablet  Commonly known as:  ELIQUIS   5 mg, Oral, Every 12 Hours Scheduled      apixaban 5 MG tablet tablet  Commonly known as:  ELIQUIS   5 mg, Oral, Every 12 Hours Scheduled         Changes to Medications      Instructions Start Date   metoprolol succinate  MG 24 hr tablet  Commonly known as:  TOPROL-XL  What changed:  · medication strength  · how much to take  · when to take this   100 mg, Oral, Every 12 Hours Scheduled         Continue These Medications      Instructions Start Date   aspirin 81 MG chewable tablet   81 mg, Oral, Daily      atorvastatin 80 MG tablet  Commonly known as:  LIPITOR   80 mg, Oral, Daily      cetirizine 10 MG tablet  Commonly known as:  zyrTEC   10 mg, Oral, Daily      clobetasol 0.05 % cream  Commonly known as:  TEMOVATE   Topical, 2 Times Daily      lisinopril 2.5 MG tablet  Commonly known as:  PRINIVIL,ZESTRIL   2.5 mg, Oral, Daily      metaxalone 800 MG tablet  Commonly known as:  SKELAXIN   800 mg, Oral, As Needed      nitroglycerin 0.4 MG SL tablet  Commonly known as:  NITROSTAT   0.4 mg, Sublingual, Every 5 Minutes PRN, Take no more than 3 doses in 15 minutes.       OMEGA 3 PO   Oral      pimecrolimus 1 % cream  Commonly known as:  ELIDEL   Topical, 2 Times Daily      traMADol 50 MG tablet  Commonly known as:  ULTRAM   50 mg, Oral,  Every 6 Hours PRN      vitamin D3 5000 units capsule capsule   2,000 Units, Oral, Every Other Day      ZETIA 10 MG tablet  Generic drug:  ezetimibe   10 mg, Oral, Daily         Stop These Medications    diclofenac 75 MG EC tablet  Commonly known as:  VOLTAREN            Discharge disposition: home    Follow-up Appointments  Follow-up Information     Jhoan Quezada MD Follow up.    Specialty:  Family Medicine  Contact information:  [address] [facility] [address] [phone]             Andrew Ball MD Follow up in 1 week(s).    Specialty:  Cardiology  Why:  or Caron Heck PA-C  Contact information:  [address] [facility] [address] [address] [phone]                 Future Appointments  Date Time Provider Department Center   10/26/2018 10:00 AM MANUEL IN OFFICE, AN [facility] None   6/5/2019 11:00 AM Caron Heck PA [facility] None         Linda Adkins, BRANDON  10/19/18  4:12 PM

## 2018-10-19 NOTE — PLAN OF CARE
Problem: Patient Care Overview  Goal: Plan of Care Review  Outcome: Ongoing (interventions implemented as appropriate)   10/19/18 0600   Coping/Psychosocial   Plan of Care Reviewed With patient   Plan of Care Review   Progress improving   OTHER   Outcome Summary Vitals stable. Pt remains in afib, POD. No falls. No c/o pain. Possible d/c this AM. Pt resting comfortably. Monitoring closely.        Problem: Fall Risk (Adult)  Goal: Identify Related Risk Factors and Signs and Symptoms  Outcome: Outcome(s) achieved Date Met: 10/19/18   10/19/18 0600   Fall Risk (Adult)   Related Risk Factors (Fall Risk) sleep pattern alteration;environment unfamiliar;slippery/uneven surfaces   Signs and Symptoms (Fall Risk) presence of risk factors     Goal: Absence of Fall  Outcome: Ongoing (interventions implemented as appropriate)   10/19/18 0600   Fall Risk (Adult)   Absence of Fall making progress toward outcome       Problem: Arrhythmia/Dysrhythmia (Symptomatic) (Adult)  Goal: Signs and Symptoms of Listed Potential Problems Will be Absent, Minimized or Managed (Arrhythmia/Dysrhythmia)  Outcome: Ongoing (interventions implemented as appropriate)   10/19/18 0600   Goal/Outcome Evaluation   Problems Assessed (Arrhythmia/Dysrhythmia) all   Problems Present (Dysrhythmia) electrophysiologic conduction defect;situational response

## 2018-10-30 ENCOUNTER — OFFICE VISIT (OUTPATIENT)
Dept: CARDIOLOGY | Facility: CLINIC | Age: 68
End: 2018-10-30

## 2018-10-30 ENCOUNTER — CLINICAL SUPPORT NO REQUIREMENTS (OUTPATIENT)
Dept: CARDIOLOGY | Facility: CLINIC | Age: 68
End: 2018-10-30

## 2018-10-30 VITALS
HEART RATE: 54 BPM | DIASTOLIC BLOOD PRESSURE: 84 MMHG | SYSTOLIC BLOOD PRESSURE: 120 MMHG | HEIGHT: 74 IN | WEIGHT: 197 LBS | BODY MASS INDEX: 25.28 KG/M2 | OXYGEN SATURATION: 100 %

## 2018-10-30 DIAGNOSIS — I48.19 PERSISTENT ATRIAL FIBRILLATION (HCC): ICD-10-CM

## 2018-10-30 DIAGNOSIS — I25.5 ISCHEMIC CARDIOMYOPATHY: ICD-10-CM

## 2018-10-30 DIAGNOSIS — I47.20 VENTRICULAR TACHYCARDIA (HCC): Primary | ICD-10-CM

## 2018-10-30 DIAGNOSIS — I25.10 CORONARY ARTERY DISEASE INVOLVING NATIVE CORONARY ARTERY OF NATIVE HEART WITHOUT ANGINA PECTORIS: Primary | ICD-10-CM

## 2018-10-30 PROBLEM — E78.5 HYPERLIPIDEMIA WITH TARGET LDL LESS THAN 70: Status: ACTIVE | Noted: 2018-06-19

## 2018-10-30 PROBLEM — Z86.69 HISTORY OF GUILLAIN-BARRE SYNDROME: Status: ACTIVE | Noted: 2018-10-05

## 2018-10-30 PROBLEM — K62.7 RADIATION PROCTITIS: Status: ACTIVE | Noted: 2018-02-22

## 2018-10-30 PROBLEM — K21.9 ACID REFLUX: Status: ACTIVE | Noted: 2018-10-30

## 2018-10-30 PROBLEM — R07.9 CHEST PAIN: Status: ACTIVE | Noted: 2018-10-18

## 2018-10-30 PROCEDURE — 93000 ELECTROCARDIOGRAM COMPLETE: CPT | Performed by: PHYSICIAN ASSISTANT

## 2018-10-30 PROCEDURE — 93283 PRGRMG EVAL IMPLANTABLE DFB: CPT | Performed by: INTERNAL MEDICINE

## 2018-10-30 PROCEDURE — 99214 OFFICE O/P EST MOD 30 MIN: CPT | Performed by: PHYSICIAN ASSISTANT

## 2018-10-30 PROCEDURE — 93290 INTERROG DEV EVAL ICPMS IP: CPT | Performed by: INTERNAL MEDICINE

## 2018-10-30 RX ORDER — OMEPRAZOLE 40 MG/1
40 CAPSULE, DELAYED RELEASE ORAL
COMMUNITY
Start: 2018-10-05

## 2018-10-30 NOTE — PROGRESS NOTES
Date of Office Visit: 10/30/2018  Encounter Provider: BILL Rubio  Place of Service: [facility] CARDIOLOGY  Patient Name: Alden Pratt  :1950    Chief Complaint   Patient presents with   • Loss of Consciousness     1 week hospital follow up   :     HPI: Alden Pratt is a 67 y.o. male, new to me, who presents today for follow-up.  Old records have been obtained and reviewed by me.  He is a patient of Dr. Ball's with a past cardiac history significant for coronary artery disease status post CABG and ischemic cardiomyopathy status post ICD placement.  On 10/18/2018 he was admitted to the hospital after having a syncopal episode the day before.  He called our office on the morning of his admission with complaints of the syncopal episode, and his ICD confirmed that he was shocked for VF.  After his arrest and subsequent shock, he went into atrial fibrillation.  I sent him to our [facility], and he was ultimately admitted to the hospital for further evaluation.  His metoprolol was titrated up and he was started on Eliquis.  He was evaluated by Dr. palmer.  An echocardiogram showed an EF in the range of 26-30%, severely decreased LV systolic function, severely dilated left ventricular cavity size, and no significant valvular abnormalities.  He was discharged home on 10/19/2018 in stable condition and is here today for follow-up.  Recommendations were to consider cardioversion in the future if he did not convert to sinus rhythm on his own.  He did have his pacemaker interrogated today.  He has remained in atrial fibrillation since his shock on 10/18/2018.  His underlying ventricular response is slow in the 30s to 60s, he is ventricularly paced 45% of the time.  There have been no new events.   Since he's been home he's been doing well.  He has noticed some of his PVCs.  He has some shortness of breath when climbing the stairs, but this is improving.  Overall he feels  like he's doing fairly well.  His blood pressure at home has been in the 90s to low 100s systolic, he is asymptomatic with this.  He has not had any bleeding issues on the Eliquis.    Past Medical History:   Diagnosis Date   • Arthritis    • CAD (coronary artery disease)     atheroclerosis   • Cancer (CMS/[facility])     prostate   • Chest pain    • CHF (congestive heart failure) (CMS/[facility])    • Elevated cholesterol    • GERD (gastroesophageal reflux disease)    • Hyperlipidemia    • Implantable cardioverter-defibrillator (ICD) discharge    • Myocardial infarction (CMS/[facility])     inferior   • PVC (premature ventricular contraction)    • PVD (peripheral vascular disease) (CMS/[facility])    • Status post phlebectomy     varicose veins   • Thyroid nodule 7/18/2014   • VT (ventricular tachycardia) (CMS/[facility])        Past Surgical History:   Procedure Laterality Date   • CARDIAC CATHETERIZATION     • CARDIAC DEFIBRILLATOR PLACEMENT     • CORONARY ARTERY BYPASS GRAFT      stents   • CORONARY STENT PLACEMENT     • MICROAMBULATORY PHLEBECTOMY     • THYROID BIOPSY         Social History     Social History   • Marital status:      Spouse name: N/A   • Number of children: N/A   • Years of education: N/A     Occupational History   • Not on file.     Social History Main Topics   • Smoking status: Never Smoker   • Smokeless tobacco: Never Used   • Alcohol use No   • Drug use: No   • Sexual activity: Defer     Other Topics Concern   • Not on file     Social History Narrative   • No narrative on file       Family History   Problem Relation Age of Onset   • Coronary artery disease Other    • Stroke Other    • Hypertension Other    • Heart disease Father    • Heart failure Father    • Stroke Father    • No Known Problems Maternal Grandmother    • No Known Problems Maternal Grandfather    • No Known Problems Paternal Grandmother    • No Known Problems Paternal Grandfather        Review of Systems   Constitution: Negative for chills, fever and  malaise/fatigue.   Cardiovascular: Positive for dyspnea on exertion and palpitations. Negative for chest pain, leg swelling, near-syncope, orthopnea, paroxysmal nocturnal dyspnea and syncope.   Respiratory: Negative for cough and shortness of breath.    Musculoskeletal: Negative for joint pain, joint swelling and myalgias.   Gastrointestinal: Negative for abdominal pain, diarrhea, melena, nausea and vomiting.   Genitourinary: Negative for frequency and hematuria.   Neurological: Negative for light-headedness, numbness, paresthesias and seizures.   Allergic/Immunologic: Negative.    All other systems reviewed and are negative.      Allergies   Allergen Reactions   • Contrast Dye    • Iodinated Diagnostic Agents    • Sudafed 12 Hour [Pseudoephedrine Hcl Er]    • Pseudoephedrine Palpitations         Current Outpatient Prescriptions:   •  apixaban (ELIQUIS) 5 MG tablet tablet, Take 1 tablet by mouth Every 12 (Twelve) Hours., Disp: 180 tablet, Rfl: 3  •  aspirin 81 MG chewable tablet, Chew 81 mg daily., Disp: , Rfl:   •  atorvastatin (LIPITOR) 80 MG tablet, Take 1 tablet by mouth Daily., Disp: 90 tablet, Rfl: 2  •  cetirizine (ZyrTEC) 10 MG tablet, Take 10 mg by mouth As Needed., Disp: , Rfl:   •  Cholecalciferol (VITAMIN D3) 5000 UNITS capsule capsule, Take 2,000 Units by mouth Every Other Day., Disp: , Rfl:   •  clobetasol (TEMOVATE) 0.05 % cream, Apply  topically 2 (Two) Times a Day., Disp: , Rfl:   •  ezetimibe (ZETIA) 10 MG tablet, Take 10 mg by mouth daily., Disp: , Rfl:   •  lisinopril (PRINIVIL,ZESTRIL) 2.5 MG tablet, Take 2.5 mg by mouth Daily., Disp: , Rfl:   •  metaxalone (SKELAXIN) 800 MG tablet, Take 800 mg by mouth As Needed for Muscle Spasms., Disp: , Rfl:   •  metoprolol succinate XL (TOPROL-XL) 100 MG 24 hr tablet, Take 1 tablet by mouth Every 12 (Twelve) Hours., Disp: 60 tablet, Rfl: 5  •  omeprazole (priLOSEC) 40 MG capsule, Take 40 mg by mouth., Disp: , Rfl:   •  pimecrolimus (ELIDEL) 1 % cream, Apply  " topically 2 (Two) Times a Day., Disp: , Rfl:   •  traMADol (ULTRAM) 50 MG tablet, Take 50 mg by mouth every 6 (six) hours as needed for moderate pain (4-6)., Disp: , Rfl:     Current Facility-Administered Medications:   •  nitroglycerin (NITROSTAT) SL tablet 0.4 mg, 0.4 mg, Sublingual, Q5 Min PRN, Ernestina Rose MD      Objective:     Vitals:    10/30/18 0924 10/30/18 1007   BP: 114/80 120/84   BP Location: Right arm Left arm   Pulse: 54    SpO2: 100%    Weight: 89.4 kg (197 lb)    Height: 188 cm (74\")      Body mass index is 25.29 kg/m².    PHYSICAL EXAM:    Physical Exam   Constitutional: He is oriented to person, place, and time. He appears well-developed and well-nourished. No distress.   HENT:   Head: Normocephalic and atraumatic.   Eyes: Pupils are equal, round, and reactive to light.   Neck: No JVD present. No thyromegaly present.   Cardiovascular: Normal rate, normal heart sounds and intact distal pulses.  An irregular rhythm present.   No murmur heard.  Pulmonary/Chest: Effort normal and breath sounds normal. No respiratory distress. He has no rales.   Abdominal: Soft. Bowel sounds are normal. He exhibits no distension and no ascites. There is no splenomegaly or hepatomegaly. There is no tenderness.   Musculoskeletal: Normal range of motion. He exhibits no edema.   Neurological: He is alert and oriented to person, place, and time.   Skin: Skin is warm and dry. He is not diaphoretic. No erythema.   Psychiatric: He has a normal mood and affect. His behavior is normal. Judgment normal.         ECG 12 Lead  Date/Time: 10/30/2018 10:21 AM  Performed by: JOSLYN WEBER  Authorized by: JOSLYN WEBER   Comparison: compared with previous ECG from 10/18/2018  Similar to previous ECG  Rhythm: atrial fibrillation  Ectopy: PVCs  BPM: 67  Clinical impression: abnormal ECG  Comments: Indication: Atrial fibrillation.  He did have his ICD interrogated today.  He has remained in atrial fibrillation            "   Assessment:       Diagnosis Plan   1. Coronary artery disease involving native coronary artery of native heart without angina pectoris  ECG 12 Lead   2. Ischemic cardiomyopathy  ECG 12 Lead   3. Persistent atrial fibrillation (CMS/HCC)  ECG 12 Lead    Cardioversion External in Cardiology Department     Orders Placed This Encounter   Procedures   • Cardioversion External in Cardiology Department     Standing Status:   Future     Standing Expiration Date:   10/30/2019     Order Specific Question:   What type of sedation does the patient require?     Answer:   Moderate Sedation     Order Specific Question:   At which hospital location will this be performed?     Answer:   [place]     Order Specific Question:   Is this procedure to be performed in the PACU?     Answer:   Yes     Order Specific Question:   Reason for Exam:     Answer:   [email or web address]   • ECG 12 Lead     This order was created via procedure documentation          Plan:   Overall I think he's stable and doing well.  He is in atrial fibrillation today.  He is anticoagulated with Eliquis and not having any bleeding issues.  He is currently taking Toprol-XL 75 mg in the morning and 100 mg in the evening.  He is going to increase this to 100 mg twice daily, which is what we have recommended.  He has been in atrial fibrillation since his shock on 10/18/2018.  I did discuss the plan of care with Dr. Britton,  I am going to refer him for a cardioversion after 3 weeks of anticoagulation.  His EF was down a little bit when he was in the hospital, and I think that this could be from the atrial fibrillation as well as the shock that he received the day prior.  We will need to recheck this at some point in the future, preferably when he is in sinus rhythm.  He is on a good medical regimen.  I'm going to have him go ahead and make an appointment to come and see Dr. Ball in the middle to end of November.         1.  Coronary Artery Disease  Assessment  • The patient has  no angina    Plan  • Lifestyle modifications discussed include adhering to a heart healthy diet, medication compliance and regular exercise    Subjective - Objective  • There is a history of previous coronary artery bypass graft  • Current antiplatelet therapy includes aspirin 81 mg    2.  Atrial Fibrillation and Atrial Flutter  Assessment  • The patient has persistent atrial fibrillation  • This is non-valvular in etiology  • The patient's CHADS2-VASc score is 3  • A [id number] score of 2 or more is considered a high risk for a thromboembolic event  • Apixaban prescribed    Plan  • Continue in atrial fibrillation with rate control  • Continue apixaban for antithrombotic therapy, bleeding issues discussed  • Continue beta blocker for rhythm control  • Continue beta blocker for rate control    3.  Heart Failure  Assessment  • NYHA class I - There is no limitation of physical activity. Physical activity does not cause fatigue, palpitations or shortness of breath.  • ACE inhibitor prescribed  • Beta blocker prescribed  • The most recent ejection fraction is 36%  • Left ventricular function is moderately reduced by qualitative assessment    Plan  • The patient has received heart failure education on the following topics: dietary sodium restriction, medication instructions, physical activity and weight monitoring  • The heart failure care plan was discussed with the patient today including: continuing the current program    Subjective/Objective    • Physical exam findings negative for rales, peripheral edema, elevated JVP, hepatomegaly and ascites.  • The patient has an ICD implant        As always, it has been a pleasure to participate in your patient's care.      Sincerely,         Caron Heck PA-C

## 2018-10-31 ENCOUNTER — CLINICAL SUPPORT NO REQUIREMENTS (OUTPATIENT)
Dept: CARDIOLOGY | Facility: CLINIC | Age: 68
End: 2018-10-31

## 2018-10-31 DIAGNOSIS — I48.19 PERSISTENT ATRIAL FIBRILLATION (HCC): ICD-10-CM

## 2018-10-31 DIAGNOSIS — I47.20 VENTRICULAR TACHYCARDIA (HCC): ICD-10-CM

## 2018-10-31 DIAGNOSIS — I25.5 ISCHEMIC CARDIOMYOPATHY: Primary | ICD-10-CM

## 2018-11-12 ENCOUNTER — ANESTHESIA EVENT (OUTPATIENT)
Dept: POSTOP/PACU | Facility: HOSPITAL | Age: 68
End: 2018-11-12

## 2018-11-12 ENCOUNTER — HOSPITAL ENCOUNTER (OUTPATIENT)
Dept: POSTOP/PACU | Facility: HOSPITAL | Age: 68
Discharge: HOME | End: 2018-11-12
Admitting: INTERNAL MEDICINE

## 2018-11-12 ENCOUNTER — ANESTHESIA (OUTPATIENT)
Dept: POSTOP/PACU | Facility: HOSPITAL | Age: 68
End: 2018-11-12

## 2018-11-12 VITALS
OXYGEN SATURATION: 98 % | TEMPERATURE: 98 F | SYSTOLIC BLOOD PRESSURE: 119 MMHG | RESPIRATION RATE: 16 BRPM | DIASTOLIC BLOOD PRESSURE: 79 MMHG | HEART RATE: 71 BPM

## 2018-11-12 VITALS — SYSTOLIC BLOOD PRESSURE: 143 MMHG | OXYGEN SATURATION: 100 % | DIASTOLIC BLOOD PRESSURE: 88 MMHG

## 2018-11-12 DIAGNOSIS — I48.19 PERSISTENT ATRIAL FIBRILLATION (HCC): ICD-10-CM

## 2018-11-12 LAB
ANION GAP SERPL CALCULATED.3IONS-SCNC: 10.4 MMOL/L
BUN BLD-MCNC: 10 MG/DL (ref 8–23)
BUN/CREAT SERPL: 13.9 (ref 7–25)
CALCIUM SPEC-SCNC: 8.9 MG/DL (ref 8.6–10.5)
CHLORIDE SERPL-SCNC: 106 MMOL/L (ref 98–107)
CO2 SERPL-SCNC: 26.6 MMOL/L (ref 22–29)
CREAT BLD-MCNC: 0.72 MG/DL (ref 0.76–1.27)
GFR SERPL CREATININE-BSD FRML MDRD: 109 ML/MIN/1.73
GLUCOSE BLD-MCNC: 83 MG/DL (ref 65–99)
POTASSIUM BLD-SCNC: 4.2 MMOL/L (ref 3.5–5.2)
SODIUM BLD-SCNC: 143 MMOL/L (ref 136–145)

## 2018-11-12 PROCEDURE — 93010 ELECTROCARDIOGRAM REPORT: CPT | Performed by: INTERNAL MEDICINE

## 2018-11-12 PROCEDURE — S0260 H&P FOR SURGERY: HCPCS | Performed by: INTERNAL MEDICINE

## 2018-11-12 PROCEDURE — 92960 CARDIOVERSION ELECTRIC EXT: CPT

## 2018-11-12 PROCEDURE — 93005 ELECTROCARDIOGRAM TRACING: CPT | Performed by: INTERNAL MEDICINE

## 2018-11-12 PROCEDURE — 80048 BASIC METABOLIC PNL TOTAL CA: CPT | Performed by: INTERNAL MEDICINE

## 2018-11-12 PROCEDURE — 92960 CARDIOVERSION ELECTRIC EXT: CPT | Performed by: INTERNAL MEDICINE

## 2018-11-12 PROCEDURE — 25010000002 PROPOFOL 10 MG/ML EMULSION: Performed by: NURSE ANESTHETIST, CERTIFIED REGISTERED

## 2018-11-12 RX ORDER — MIDAZOLAM HYDROCHLORIDE 1 MG/ML
1 INJECTION INTRAMUSCULAR; INTRAVENOUS
Status: CANCELLED | OUTPATIENT
Start: 2019-01-26

## 2018-11-12 RX ORDER — MIDAZOLAM HYDROCHLORIDE 1 MG/ML
2 INJECTION INTRAMUSCULAR; INTRAVENOUS
Status: CANCELLED | OUTPATIENT
Start: 2019-01-26

## 2018-11-12 RX ORDER — PROMETHAZINE HYDROCHLORIDE 25 MG/ML
12.5 INJECTION, SOLUTION INTRAMUSCULAR; INTRAVENOUS ONCE AS NEEDED
Status: DISCONTINUED | OUTPATIENT
Start: 2018-11-12 | End: 2018-11-13 | Stop reason: HOSPADM

## 2018-11-12 RX ORDER — SODIUM CHLORIDE 0.9 % (FLUSH) 0.9 %
3 SYRINGE (ML) INJECTION EVERY 12 HOURS SCHEDULED
Status: CANCELLED | OUTPATIENT
Start: 2019-01-26

## 2018-11-12 RX ORDER — SODIUM CHLORIDE, SODIUM LACTATE, POTASSIUM CHLORIDE, CALCIUM CHLORIDE 600; 310; 30; 20 MG/100ML; MG/100ML; MG/100ML; MG/100ML
INJECTION, SOLUTION INTRAVENOUS CONTINUOUS PRN
Status: DISCONTINUED | OUTPATIENT
Start: 2018-11-12 | End: 2018-11-12 | Stop reason: SURG

## 2018-11-12 RX ORDER — LIDOCAINE HYDROCHLORIDE 10 MG/ML
INJECTION, SOLUTION INFILTRATION; PERINEURAL AS NEEDED
Status: DISCONTINUED | OUTPATIENT
Start: 2018-11-12 | End: 2018-11-12 | Stop reason: SURG

## 2018-11-12 RX ORDER — SODIUM CHLORIDE, SODIUM LACTATE, POTASSIUM CHLORIDE, CALCIUM CHLORIDE 600; 310; 30; 20 MG/100ML; MG/100ML; MG/100ML; MG/100ML
9 INJECTION, SOLUTION INTRAVENOUS CONTINUOUS PRN
Status: CANCELLED | OUTPATIENT
Start: 2019-01-26

## 2018-11-12 RX ORDER — PROPOFOL 10 MG/ML
VIAL (ML) INTRAVENOUS AS NEEDED
Status: DISCONTINUED | OUTPATIENT
Start: 2018-11-12 | End: 2018-11-12 | Stop reason: SURG

## 2018-11-12 RX ORDER — PROMETHAZINE HYDROCHLORIDE 25 MG/1
25 TABLET ORAL ONCE AS NEEDED
Status: DISCONTINUED | OUTPATIENT
Start: 2018-11-12 | End: 2018-11-13 | Stop reason: HOSPADM

## 2018-11-12 RX ORDER — FAMOTIDINE 10 MG/ML
20 INJECTION, SOLUTION INTRAVENOUS
Status: CANCELLED | OUTPATIENT
Start: 2019-01-26

## 2018-11-12 RX ORDER — SODIUM CHLORIDE 0.9 % (FLUSH) 0.9 %
3-10 SYRINGE (ML) INJECTION AS NEEDED
Status: CANCELLED | OUTPATIENT
Start: 2019-01-26

## 2018-11-12 RX ORDER — PROMETHAZINE HYDROCHLORIDE 25 MG/1
25 SUPPOSITORY RECTAL ONCE AS NEEDED
Status: DISCONTINUED | OUTPATIENT
Start: 2018-11-12 | End: 2018-11-13 | Stop reason: HOSPADM

## 2018-11-12 RX ADMIN — SODIUM CHLORIDE, POTASSIUM CHLORIDE, SODIUM LACTATE AND CALCIUM CHLORIDE: 600; 310; 30; 20 INJECTION, SOLUTION INTRAVENOUS at 07:52

## 2018-11-12 RX ADMIN — PROPOFOL 10 MG: 10 INJECTION, EMULSION INTRAVENOUS at 07:56

## 2018-11-12 RX ADMIN — PROPOFOL 50 MG: 10 INJECTION, EMULSION INTRAVENOUS at 07:53

## 2018-11-12 RX ADMIN — LIDOCAINE HYDROCHLORIDE 80 ML: 10 INJECTION, SOLUTION INFILTRATION; PERINEURAL at 07:53

## 2018-11-12 NOTE — ANESTHESIA POSTPROCEDURE EVALUATION
Patient: Alden Pratt    Procedure Summary     Date:  11/12/18 Room / Location:  [facility] PACU    Anesthesia Start:  0751 Anesthesia Stop:  0800    Procedure:  CARDIOVERSION EXTERNAL IN CARDIOLOGY DEPARTMENT Diagnosis:       Persistent atrial fibrillation (CMS/HCC)      (a.fib)    Scheduled Providers:  Andrew Ball MD Provider:  Shreyas Curtis MD    Anesthesia Type:  MAC ASA Status:  3          Anesthesia Type: MAC  Last vitals  BP   119/79 (11/12/18 0912)   Temp   36.7 °C (98 °F) (11/12/18 0628)   Pulse   71 (11/12/18 0912)   Resp   16 (11/12/18 0912)     SpO2   98 % (11/12/18 0912)     Post Anesthesia Care and Evaluation    Patient location during evaluation: PACU  Patient participation: complete - patient participated  Level of consciousness: awake  Pain score: 1  Pain management: adequate  Airway patency: patent  Anesthetic complications: No anesthetic complications  PONV Status: none  Cardiovascular status: acceptable  Respiratory status: acceptable  Hydration status: acceptable

## 2018-11-12 NOTE — ANESTHESIA PREPROCEDURE EVALUATION
Anesthesia Evaluation     Patient summary reviewed   NPO Solid Status: > 8 hours             Airway   Mallampati: I  No difficulty expected  Dental      Pulmonary    Cardiovascular     ECG reviewed  Rhythm: irregular    (+) pacemaker ICD, CAD, CABG, dysrhythmias, hyperlipidemia,       Neuro/Psych  GI/Hepatic/Renal/Endo      Musculoskeletal     Abdominal    Substance History      OB/GYN          Other                        Anesthesia Plan    ASA 3     MAC     Anesthetic plan, all risks, benefits, and alternatives have been provided, discussed and informed consent has been obtained with: patient.  Use of blood products discussed with patient .

## 2018-11-12 NOTE — H&P
Date of Hospital Visit: 18  Encounter Provider: Andrew Ball MD  Place of Service: [facility] CARDIOLOGY  Patient Name: Alden Pratt  :1950  [id number]      Chief complaint: Atrial fibrillation      History of Present Illness:This is a longstanding patient of mine with severe ischemic cardiomyopathy who has had an ICD in place a number of years ago.  He never has had any issues.  About a month ago, he had an episode of sudden cardiac death where his defibrillator appropriately discharged.  After that, he was in atrial fibrillation and has persisted in that.  We have anticoagulated him and he has been therapeutic now for a month and is electively brought in for cardioversion.  He otherwise has been doing well.  There has not really been any big change.  He had seen the electrophysiology service.          Past Medical History:   Diagnosis Date   • Arthritis    • CAD (coronary artery disease)     atheroclerosis   • Cancer (CMS/HCC)     prostate   • Chest pain    • CHF (congestive heart failure) (CMS/HCC)    • Elevated cholesterol    • GERD (gastroesophageal reflux disease)    • Hyperlipidemia    • Implantable cardioverter-defibrillator (ICD) discharge    • Myocardial infarction (CMS/HCC)     inferior   • PVC (premature ventricular contraction)    • PVD (peripheral vascular disease) (CMS/HCC)    • Status post phlebectomy     varicose veins   • Thyroid nodule 2014   • VT (ventricular tachycardia) (CMS/HCC)          Past Surgical History:   Procedure Laterality Date   • CARDIAC CATHETERIZATION     • CARDIAC DEFIBRILLATOR PLACEMENT     • CORONARY ARTERY BYPASS GRAFT      stents   • CORONARY STENT PLACEMENT     • MICROAMBULATORY PHLEBECTOMY     • THYROID BIOPSY           (Not in a hospital admission)    Current Meds  Current Outpatient Medications on File Prior to Encounter   Medication Sig Dispense Refill   • apixaban (ELIQUIS) 5 MG tablet tablet Take 1 tablet by  mouth Every 12 (Twelve) Hours. 180 tablet 3   • aspirin 81 MG chewable tablet Chew 81 mg daily.     • atorvastatin (LIPITOR) 80 MG tablet Take 1 tablet by mouth Daily. 90 tablet 2   • cetirizine (ZyrTEC) 10 MG tablet Take 10 mg by mouth As Needed.     • Cholecalciferol (VITAMIN D3) 5000 UNITS capsule capsule Take 2,000 Units by mouth Every Other Day.     • clobetasol (TEMOVATE) 0.05 % cream Apply  topically 2 (Two) Times a Day.     • ezetimibe (ZETIA) 10 MG tablet Take 10 mg by mouth daily.     • lisinopril (PRINIVIL,ZESTRIL) 2.5 MG tablet Take 2.5 mg by mouth Daily.     • metaxalone (SKELAXIN) 800 MG tablet Take 800 mg by mouth As Needed for Muscle Spasms.     • metoprolol succinate XL (TOPROL-XL) 100 MG 24 hr tablet Take 1 tablet by mouth Every 12 (Twelve) Hours. 60 tablet 5   • omeprazole (priLOSEC) 40 MG capsule Take 40 mg by mouth.     • pimecrolimus (ELIDEL) 1 % cream Apply  topically 2 (Two) Times a Day.     • traMADol (ULTRAM) 50 MG tablet Take 50 mg by mouth every 6 (six) hours as needed for moderate pain (4-6).       Current Facility-Administered Medications on File Prior to Encounter   Medication Dose Route Frequency Provider Last Rate Last Dose   • [DISCONTINUED] nitroglycerin (NITROSTAT) SL tablet 0.4 mg  0.4 mg Sublingual Q5 Min PRN Ernestina Rose MD             Social History     Socioeconomic History   • Marital status:      Spouse name: Not on file   • Number of children: Not on file   • Years of education: Not on file   • Highest education level: Not on file   Social Needs   • Financial resource strain: Not on file   • Food insecurity - worry: Not on file   • Food insecurity - inability: Not on file   • Transportation needs - medical: Not on file   • Transportation needs - non-medical: Not on file   Occupational History   • Not on file   Tobacco Use   • Smoking status: Never Smoker   • Smokeless tobacco: Never Used   Substance and Sexual Activity   • Alcohol use: No   • Drug use: No    • Sexual activity: Defer   Other Topics Concern   • Not on file   Social History Narrative   • Not on file       Family Hx: Non-contributory      REVIEW OF SYSTEMS:   ROS was performed and is negative except as outlined in HPI     REVIEW OF SYSTEMS:   CONSTITUTIONAL: No weight loss, fever, chills, weakness or fatigue.   HEENT: Eyes: No visual loss, blurred vision, double vision or yellow sclerae. Ears, Nose, Throat: No hearing loss, sneezing, congestion, runny nose or sore throat.   SKIN: No rash or itching.     RESPIRATORY: No shortness of breath, hemoptysis, cough or sputum.   GASTROINTESTINAL: No anorexia, nausea, vomiting or diarrhea. No abdominal pain, bright red blood per rectum or melena.  NEUROLOGICAL: No headache, dizziness, syncope, paralysis, numbness or tingling in the extremities.  MUSCULOSKELETAL: No muscle, back pain, joint pain or stiffness.   HEMATOLOGIC: No anemia, bleeding or bruising.   LYMPHATICS: No enlarged nodes.  PSYCHIATRIC: No history of depression, anxiety, hallucinations.   ENDOCRINOLOGIC: No reports of sweating, cold or heat intolerance. No polyuria or polydipsia.        Objective:     Vitals:    11/12/18 0628   BP: 121/82   BP Location: Left arm   Patient Position: Lying   Pulse: 57   Resp: 16   Temp: 98 °F (36.7 °C)   TempSrc: Oral   SpO2: 100%     There is no height or weight on file to calculate BMI.      General Appearance:    Alert, oriented x 3, in no acute distress   Head:    Normocephalic, without obvious abnormality, atraumatic   Ears:    Ears appear intact with no abnormalities noted   Throat:   No oral lesions, dentition good   Neck:   No adenopathy, supple, trachea midline, no thyromegaly, no carotid bruit, no JVD   Lungs:    Breath sounds are equal and  clear to auscultation    Heart:   Normal S1 and S2, RRR, no murmur/gallop or rub   Abdomen:    Normal bowel sounds, obese, soft non-tender, non-distended, no organomegaly, no guarding   Extremities:   Moves all  extremities well, no edema, no cyanosis, no redness   Pulses:   Pulses palpable and equal bilaterally. Normal radial pulses   Skin:   No bleeding, bruising or rash   Lymph nodes:   No palpable adenopathy     I personally viewed and interpreted the patient's EKG/Telemetry data    Assessment:  There are no hospital problems to display for this patient.        Plan:He is now being brought in for elective cardioversion of his atrial fibrillation to sinus rhythm.  We are going to do this internally through his device and he has been therapeutic on his anticoagulation.  I discussed this with the patient and his family and they agree and understand.

## 2018-11-21 ENCOUNTER — OFFICE VISIT (OUTPATIENT)
Dept: CARDIOLOGY | Facility: CLINIC | Age: 68
End: 2018-11-21

## 2018-11-21 VITALS
BODY MASS INDEX: 25.03 KG/M2 | HEIGHT: 74 IN | SYSTOLIC BLOOD PRESSURE: 118 MMHG | WEIGHT: 195 LBS | OXYGEN SATURATION: 100 % | DIASTOLIC BLOOD PRESSURE: 80 MMHG | HEART RATE: 69 BPM

## 2018-11-21 DIAGNOSIS — I25.5 ISCHEMIC CARDIOMYOPATHY: ICD-10-CM

## 2018-11-21 DIAGNOSIS — I25.10 CORONARY ARTERY DISEASE INVOLVING NATIVE CORONARY ARTERY OF NATIVE HEART WITHOUT ANGINA PECTORIS: Primary | ICD-10-CM

## 2018-11-21 DIAGNOSIS — I48.19 PERSISTENT ATRIAL FIBRILLATION (HCC): ICD-10-CM

## 2018-11-21 PROCEDURE — 99213 OFFICE O/P EST LOW 20 MIN: CPT | Performed by: PHYSICIAN ASSISTANT

## 2018-11-21 PROCEDURE — 93000 ELECTROCARDIOGRAM COMPLETE: CPT | Performed by: PHYSICIAN ASSISTANT

## 2018-11-21 NOTE — PROGRESS NOTES
Date of Office Visit: 2018  Encounter Provider: BILL Rubio  Place of Service: [facility] CARDIOLOGY  Patient Name: Alden Pratt  :1950    Chief Complaint   Patient presents with   • Atrial Fibrillation     1 week hospital follow up   :     HPI: Alden Pratt is a 68 y.o. male who presents today for follow-up.  Old records have been obtained and reviewed by me.   He is a patient of Dr. Ball's with a past cardiac history significant for coronary artery disease status post CABG and ischemic cardiomyopathy status post ICD placement.  On 10/18/2018 he was admitted to the hospital after having a syncopal episode the day before.  He called our office on the morning of his admission with complaints of the syncopal episode, and his ICD confirmed that he was shocked for VF.  After his arrest and subsequent shock, he went into atrial fibrillation.  I sent him to our [facility], and he was ultimately admitted to the hospital for further evaluation.  His metoprolol was titrated up and he was started on Eliquis.  He was evaluated by Dr. Britton.  An echocardiogram showed an EF in the range of 26-30%, severely decreased LV systolic function, severely dilated left ventricular cavity size, and no significant valvular abnormalities.  He was discharged home on 10/19/2018 in stable condition.  Recommendations were to consider cardioversion in the future if he did not convert to sinus rhythm on his own.  He did have his pacemaker interrogated when he followed up with me after his hospital discharge.  He had remained in atrial fibrillation since his shock on 10/18/2018.  His underlying ventricular response was slow in the 30s to 60s, he is ventricularly paced 45% of the time.  There had been no new events.  When I saw him on 10/30/2018 he was doing well.  I set him up for cardioversion, which was successful on 2018.  He is here today for follow-up.   Since his  cardioversion he has slowly been doing better.  He was a little fatigued and short of breath the first day after, however now he is much better.  He is no longer short of breath when he climbs the stairs.  He denies any chest pain, edema, dizziness, or syncope.  He has some lightheadedness, as well as some occasional palpitations from PVCs.      Past Medical History:   Diagnosis Date   • Arthritis    • CAD (coronary artery disease)     atheroclerosis   • Cancer (CMS/[facility])     prostate   • Chest pain    • CHF (congestive heart failure) (CMS/[facility])    • Elevated cholesterol    • GERD (gastroesophageal reflux disease)    • Hyperlipidemia    • Implantable cardioverter-defibrillator (ICD) discharge    • Myocardial infarction (CMS/[facility])     inferior   • PVC (premature ventricular contraction)    • PVD (peripheral vascular disease) (CMS/[facility])    • Status post phlebectomy     varicose veins   • Thyroid nodule 7/18/2014   • VT (ventricular tachycardia) (CMS/[facility])        Past Surgical History:   Procedure Laterality Date   • CARDIAC CATHETERIZATION     • CARDIAC DEFIBRILLATOR PLACEMENT     • CORONARY ARTERY BYPASS GRAFT      stents   • CORONARY STENT PLACEMENT     • MICROAMBULATORY PHLEBECTOMY     • THYROID BIOPSY         Social History     Socioeconomic History   • Marital status:      Spouse name: Not on file   • Number of children: Not on file   • Years of education: Not on file   • Highest education level: Not on file   Social Needs   • Financial resource strain: Not on file   • Food insecurity - worry: Not on file   • Food insecurity - inability: Not on file   • Transportation needs - medical: Not on file   • Transportation needs - non-medical: Not on file   Occupational History   • Not on file   Tobacco Use   • Smoking status: Never Smoker   • Smokeless tobacco: Never Used   Substance and Sexual Activity   • Alcohol use: No   • Drug use: No   • Sexual activity: Defer   Other Topics Concern   • Not on file   Social  History Narrative   • Not on file       Family History   Problem Relation Age of Onset   • Coronary artery disease Other    • Stroke Other    • Hypertension Other    • Heart disease Father    • Heart failure Father    • Stroke Father    • No Known Problems Maternal Grandmother    • No Known Problems Maternal Grandfather    • No Known Problems Paternal Grandmother    • No Known Problems Paternal Grandfather        Review of Systems   Constitution: Negative for chills, fever and malaise/fatigue.   Cardiovascular: Positive for palpitations. Negative for chest pain, dyspnea on exertion, leg swelling, near-syncope, orthopnea, paroxysmal nocturnal dyspnea and syncope.   Respiratory: Negative for cough and shortness of breath.    Musculoskeletal: Negative for joint pain, joint swelling and myalgias.   Gastrointestinal: Negative for abdominal pain, diarrhea, melena, nausea and vomiting.   Genitourinary: Negative for frequency and hematuria.   Neurological: Positive for light-headedness. Negative for numbness, paresthesias and seizures.   Allergic/Immunologic: Negative.    All other systems reviewed and are negative.      Allergies   Allergen Reactions   • Contrast Dye    • Iodinated Diagnostic Agents    • Sudafed 12 Hour [Pseudoephedrine Hcl Er]    • Pseudoephedrine Palpitations         Current Outpatient Medications:   •  apixaban (ELIQUIS) 5 MG tablet tablet, Take 1 tablet by mouth Every 12 (Twelve) Hours., Disp: 180 tablet, Rfl: 3  •  atorvastatin (LIPITOR) 80 MG tablet, Take 1 tablet by mouth Daily., Disp: 90 tablet, Rfl: 2  •  cetirizine (ZyrTEC) 10 MG tablet, Take 10 mg by mouth As Needed., Disp: , Rfl:   •  Cholecalciferol (VITAMIN D3) 5000 UNITS capsule capsule, Take 2,000 Units by mouth Every Other Day., Disp: , Rfl:   •  clobetasol (TEMOVATE) 0.05 % cream, Apply  topically 2 (Two) Times a Day., Disp: , Rfl:   •  ezetimibe (ZETIA) 10 MG tablet, Take 10 mg by mouth daily., Disp: , Rfl:   •  lisinopril  "(PRINIVIL,ZESTRIL) 2.5 MG tablet, Take 2.5 mg by mouth Daily., Disp: , Rfl:   •  metaxalone (SKELAXIN) 800 MG tablet, Take 800 mg by mouth As Needed for Muscle Spasms., Disp: , Rfl:   •  metoprolol succinate XL (TOPROL-XL) 100 MG 24 hr tablet, Take 1 tablet by mouth Every 12 (Twelve) Hours., Disp: 60 tablet, Rfl: 5  •  omeprazole (priLOSEC) 40 MG capsule, Take 40 mg by mouth., Disp: , Rfl:   •  pimecrolimus (ELIDEL) 1 % cream, Apply  topically 2 (Two) Times a Day., Disp: , Rfl:   •  traMADol (ULTRAM) 50 MG tablet, Take 50 mg by mouth every 6 (six) hours as needed for moderate pain (4-6)., Disp: , Rfl:       Objective:     Vitals:    11/21/18 1029 11/21/18 1032   BP: 118/62 118/80   BP Location: Right arm Left arm   Pulse: 69    SpO2: 100%    Weight: 88.5 kg (195 lb)    Height: 188 cm (74\")      Body mass index is 25.04 kg/m².    PHYSICAL EXAM:    Physical Exam   Constitutional: He is oriented to person, place, and time. He appears well-developed and well-nourished. No distress.   HENT:   Head: Normocephalic and atraumatic.   Eyes: Pupils are equal, round, and reactive to light.   Neck: No JVD present. No thyromegaly present.   Cardiovascular: Normal rate, regular rhythm, normal heart sounds and intact distal pulses.   No murmur heard.  Pulmonary/Chest: Effort normal and breath sounds normal. No respiratory distress. He has no rales.   Abdominal: Soft. Bowel sounds are normal. He exhibits no distension and no ascites. There is no splenomegaly or hepatomegaly. There is no tenderness.   Musculoskeletal: Normal range of motion. He exhibits no edema.   Neurological: He is alert and oriented to person, place, and time.   Skin: Skin is warm and dry. He is not diaphoretic. No erythema.   Psychiatric: He has a normal mood and affect. His behavior is normal. Judgment normal.         ECG 12 Lead  Date/Time: 11/21/2018 10:44 AM  Performed by: Caron Heck PA  Authorized by: Caron Heck PA   Comparison: compared with " previous ECG from 11/12/2018  Similar to previous ECG  Rhythm: paced  Ectopy: PVCs  T depression: III and aVF  Clinical impression: abnormal ECG  Comments: Indication: Paroxysmal atrial fibrillation.              Assessment:       Diagnosis Plan   1. Coronary artery disease involving native coronary artery of native heart without angina pectoris  ECG 12 Lead   2. Ischemic cardiomyopathy  ECG 12 Lead    Adult Transthoracic Echo Complete W/ Cont if Necessary Per Protocol   3. Persistent atrial fibrillation (CMS/HCC)  ECG 12 Lead     Orders Placed This Encounter   Procedures   • ECG 12 Lead     This order was created via procedure documentation   • Adult Transthoracic Echo Complete W/ Cont if Necessary Per Protocol     Standing Status:   Future     Order Specific Question:   Reason for exam?     Answer:   Heart Failure, Cardiomyopathy, or Sytemic or Pulmonary Hypertension     Order Specific Question:   Hypertension, Heart Failure, or Cardiomyopathy specification?     Answer:   Known Cardiomyopathy     Order Specific Question:   Change in clinical status, cardiac exam, or medical therapy?     Answer:   Yes          Plan:       1.  Coronary Artery Disease  Assessment  • The patient has no angina    Plan  • Lifestyle modifications discussed include adhering to a heart healthy diet, medication compliance, regular exercise and regular monitoring of cholesterol and blood pressure    Subjective - Objective  • There is a history of previous coronary artery bypass graft  • He has no chest pain.  He is doing well.  Continue current medical regimen.    2.  Atrial Fibrillation and Atrial Flutter  Assessment  • The patient has paroxysmal atrial fibrillation  • This is non-valvular in etiology  • The patient's CHADS2-VASc score is 3  • A [id number] score of 2 or more is considered a high risk for a thromboembolic event  • Apixaban prescribed    Plan  • Attempt to maintain sinus rhythm  • Continue apixaban for antithrombotic  therapy, bleeding issues discussed  • Continue beta blocker for rhythm control  • Continue beta blocker for rate control    Subjective - Objective  • The patient underwent cardioversion   • He went into atrial fibrillation after his ICD shocked him.  We started him on Eliquis and then cardioverted him last week.  He is currently being atrially paced.  He feels much better.  I'm going to keep him on Eliquis.  He is having some lightheadedness, and I'm going to decrease his Toprol-XL down to 75 mg twice daily.    3.  Heart Failure  Assessment  • NYHA class I - There is no limitation of physical activity. Physical activity does not cause fatigue, palpitations or shortness of breath.  • ACE inhibitor prescribed  • Beta blocker prescribed  • The most recent ejection fraction is 30%  • Left ventricular function is moderately reduced by qualitative assessment    Plan  • The patient has received heart failure education on the following topics: dietary sodium restriction, medication instructions and physical activity  • The heart failure care plan was discussed with the patient today including: continuing the current program    Subjective/Objective    • Physical exam findings negative for rales, peripheral edema, elevated JVP, hepatomegaly and ascites.  • The patient has an ICD implant  • Overall he's doing well.  Back in 2014 his EF was around 40-45%.  He had an ICD shock and went into atrial fibrillation, an echocardiogram at that time showed his EF to be around 26-30%.  We have felt that this reduction in his EF was because of his atrial fibrillation and his ICD shock.  He is back in sinus rhythm and feeling better.  I am going to recheck an echocardiogram in 3 months when he comes to see Dr. Ball.  I've asked him to get back and exercising.  He is on a good medical regimen and actually a little lightheaded, his blood pressures at home have been in the low 100s.  I'm going to decrease his Toprol-XL from 100 mg twice  daily down to 75 mg twice daily.    He will follow-up with Dr. Ball in 3 months with an echocardiogram the same day.  As always, it has been a pleasure to participate in your patient's care.      Sincerely,         Caron Heck PA-C

## 2019-02-06 RX ORDER — ATORVASTATIN CALCIUM 80 MG/1
TABLET, FILM COATED ORAL
Qty: 90 TABLET | Refills: 1 | Status: SHIPPED | OUTPATIENT
Start: 2019-02-06 | End: 2019-02-26 | Stop reason: SDUPTHER

## 2019-02-19 ENCOUNTER — CLINICAL SUPPORT NO REQUIREMENTS (OUTPATIENT)
Dept: CARDIOLOGY | Facility: CLINIC | Age: 69
End: 2019-02-19

## 2019-02-19 ENCOUNTER — TELEPHONE (OUTPATIENT)
Dept: CARDIOLOGY | Facility: CLINIC | Age: 69
End: 2019-02-19

## 2019-02-19 DIAGNOSIS — I25.5 ISCHEMIC CARDIOMYOPATHY: Primary | ICD-10-CM

## 2019-02-19 PROCEDURE — 93295 DEV INTERROG REMOTE 1/2/MLT: CPT | Performed by: INTERNAL MEDICINE

## 2019-02-19 PROCEDURE — 93296 REM INTERROG EVL PM/IDS: CPT | Performed by: INTERNAL MEDICINE

## 2019-02-19 NOTE — TELEPHONE ENCOUNTER
Remote dual chamber ICD check received. Since patient's cardioversion on 11/12/18, atrial pacing percentage 85%. He does not have rate response on. Current mode is MVP (AAI <-->DDD) at a lower rate limit of 70. Ok to turn on rate response at next clinic?

## 2019-02-26 ENCOUNTER — OFFICE VISIT (OUTPATIENT)
Dept: CARDIOLOGY | Facility: CLINIC | Age: 69
End: 2019-02-26

## 2019-02-26 ENCOUNTER — HOSPITAL ENCOUNTER (OUTPATIENT)
Dept: CARDIOLOGY | Facility: HOSPITAL | Age: 69
Discharge: HOME | End: 2019-02-26
Admitting: PHYSICIAN ASSISTANT

## 2019-02-26 VITALS
HEIGHT: 73 IN | DIASTOLIC BLOOD PRESSURE: 82 MMHG | HEART RATE: 70 BPM | BODY MASS INDEX: 25.84 KG/M2 | WEIGHT: 195 LBS | SYSTOLIC BLOOD PRESSURE: 116 MMHG

## 2019-02-26 VITALS
BODY MASS INDEX: 25.28 KG/M2 | WEIGHT: 197 LBS | SYSTOLIC BLOOD PRESSURE: 118 MMHG | HEART RATE: 74 BPM | DIASTOLIC BLOOD PRESSURE: 80 MMHG | HEIGHT: 74 IN

## 2019-02-26 DIAGNOSIS — I25.5 ISCHEMIC CARDIOMYOPATHY: ICD-10-CM

## 2019-02-26 DIAGNOSIS — I21.11 ST ELEVATION MYOCARDIAL INFARCTION INVOLVING RIGHT CORONARY ARTERY (HCC): Primary | ICD-10-CM

## 2019-02-26 DIAGNOSIS — I25.10 CORONARY ARTERY DISEASE INVOLVING NATIVE CORONARY ARTERY OF NATIVE HEART WITHOUT ANGINA PECTORIS: ICD-10-CM

## 2019-02-26 DIAGNOSIS — I49.3 PVC (PREMATURE VENTRICULAR CONTRACTION): ICD-10-CM

## 2019-02-26 PROBLEM — I48.19 PERSISTENT ATRIAL FIBRILLATION (HCC): Status: RESOLVED | Noted: 2018-10-30 | Resolved: 2019-02-26

## 2019-02-26 PROCEDURE — 93306 TTE W/DOPPLER COMPLETE: CPT

## 2019-02-26 PROCEDURE — 25010000002 PERFLUTREN (DEFINITY) 8.476 MG IN SODIUM CHLORIDE 0.9 % 10 ML INJECTION: Performed by: PHYSICIAN ASSISTANT

## 2019-02-26 PROCEDURE — 93000 ELECTROCARDIOGRAM COMPLETE: CPT | Performed by: INTERNAL MEDICINE

## 2019-02-26 PROCEDURE — 93306 TTE W/DOPPLER COMPLETE: CPT | Performed by: INTERNAL MEDICINE

## 2019-02-26 PROCEDURE — 99214 OFFICE O/P EST MOD 30 MIN: CPT | Performed by: INTERNAL MEDICINE

## 2019-02-26 RX ORDER — METOPROLOL SUCCINATE 25 MG/1
25 TABLET, EXTENDED RELEASE ORAL 2 TIMES DAILY
Qty: 180 TABLET | Refills: 2 | Status: SHIPPED | OUTPATIENT
Start: 2019-02-26 | End: 2021-03-08

## 2019-02-26 RX ORDER — METOPROLOL SUCCINATE 50 MG/1
50 TABLET, EXTENDED RELEASE ORAL 2 TIMES DAILY
Qty: 180 TABLET | Refills: 2 | Status: SHIPPED | OUTPATIENT
Start: 2019-02-26 | End: 2021-03-08

## 2019-02-26 RX ORDER — ATORVASTATIN CALCIUM 80 MG/1
80 TABLET, FILM COATED ORAL DAILY
Qty: 90 TABLET | Refills: 2 | Status: SHIPPED | OUTPATIENT
Start: 2019-02-26

## 2019-02-26 RX ORDER — METOPROLOL SUCCINATE 50 MG/1
50 TABLET, EXTENDED RELEASE ORAL 2 TIMES DAILY
COMMUNITY
End: 2019-02-26 | Stop reason: SDUPTHER

## 2019-02-26 RX ADMIN — PERFLUTREN 1.5 ML: 6.52 INJECTION, SUSPENSION INTRAVENOUS at 14:32

## 2019-02-26 NOTE — PROGRESS NOTES
Date of Office Visit: 2017  Encounter Provider: Andrew Ball MD  Place of Service: [facility] CARDIOLOGY  Patient Name: Alden Pratt  :1950  [id number]    Chief Complaint   Patient presents with   • Atrial Fibrillation   :     HPI: Alden Pratt is a 68 y.o. male   Here for followup. He is a gentleman that had an inferior MI in , had stenting, restenosed, had an ischemic myopathy and has had an ICD in. He ended up undergoing CABG. he is here and doing well he does not have any chest pain shortness of breath PND orthopnea edema.  He had a reunion with his estranged brother in the fall was a real shock to him and actually it led to him being shocked by his ICD for the first time ever and it was appropriate however it shocked him in the A. fib and so we had to deal with that we anticoagulate him get him cardioverted he has been doing fine since then no other symptoms and is back to his normal self    Past Medical History:   Diagnosis Date   • Arthritis    • CAD (coronary artery disease)     atheroclerosis   • Cancer (CMS/HCC)     prostate   • Chest pain    • CHF (congestive heart failure) (CMS/HCC)    • Elevated cholesterol    • GERD (gastroesophageal reflux disease)    • Hyperlipidemia    • Implantable cardioverter-defibrillator (ICD) discharge    • Myocardial infarction (CMS/HCC)     inferior   • PVC (premature ventricular contraction)    • PVD (peripheral vascular disease) (CMS/HCC)    • Status post phlebectomy     varicose veins   • Thyroid nodule 2014   • VT (ventricular tachycardia) (CMS/HCC)        Past Surgical History:   Procedure Laterality Date   • CARDIAC CATHETERIZATION     • CARDIAC DEFIBRILLATOR PLACEMENT     • CORONARY ARTERY BYPASS GRAFT      stents   • CORONARY STENT PLACEMENT     • MICROAMBULATORY PHLEBECTOMY     • THYROID BIOPSY         Social History     Socioeconomic History   • Marital status:      Spouse name: Not on file    • Number of children: Not on file   • Years of education: Not on file   • Highest education level: Not on file   Social Needs   • Financial resource strain: Not on file   • Food insecurity - worry: Not on file   • Food insecurity - inability: Not on file   • Transportation needs - medical: Not on file   • Transportation needs - non-medical: Not on file   Occupational History   • Not on file   Tobacco Use   • Smoking status: Never Smoker   • Smokeless tobacco: Never Used   Substance and Sexual Activity   • Alcohol use: No   • Drug use: No   • Sexual activity: Defer   Other Topics Concern   • Not on file   Social History Narrative   • Not on file       Family History   Problem Relation Age of Onset   • Coronary artery disease Other    • Stroke Other    • Hypertension Other    • Heart disease Father    • Heart failure Father    • Stroke Father    • No Known Problems Maternal Grandmother    • No Known Problems Maternal Grandfather    • No Known Problems Paternal Grandmother    • No Known Problems Paternal Grandfather        Review of Systems   Constitution: Negative for decreased appetite, fever, malaise/fatigue and weight loss.   HENT: Negative for nosebleeds.    Eyes: Negative for double vision.   Cardiovascular: Negative for chest pain, claudication, cyanosis, dyspnea on exertion, irregular heartbeat, leg swelling, near-syncope, orthopnea, palpitations, paroxysmal nocturnal dyspnea and syncope.   Respiratory: Negative for cough, hemoptysis and shortness of breath.    Hematologic/Lymphatic: Negative for bleeding problem.   Skin: Negative for rash.   Musculoskeletal: Negative for falls and myalgias.   Gastrointestinal: Negative for hematochezia, jaundice, melena, nausea and vomiting.   Genitourinary: Negative for hematuria.   Neurological: Negative for dizziness and seizures.   Psychiatric/Behavioral: Negative for altered mental status and memory loss.       Allergies   Allergen Reactions   • Contrast Dye    •  "Iodinated Diagnostic Agents    • Sudafed 12 Hour [Pseudoephedrine Hcl Er]    • Pseudoephedrine Palpitations         Current Outpatient Medications:   •  cetirizine (ZyrTEC) 10 MG tablet, Take 10 mg by mouth As Needed., Disp: , Rfl:   •  Cholecalciferol (VITAMIN D3) 5000 UNITS capsule capsule, Take 2,000 Units by mouth Every Other Day., Disp: , Rfl:   •  clobetasol (TEMOVATE) 0.05 % cream, Apply  topically 2 (Two) Times a Day., Disp: , Rfl:   •  ezetimibe (ZETIA) 10 MG tablet, Take 10 mg by mouth daily., Disp: , Rfl:   •  lisinopril (PRINIVIL,ZESTRIL) 2.5 MG tablet, Take 2.5 mg by mouth Daily., Disp: , Rfl:   •  metaxalone (SKELAXIN) 800 MG tablet, Take 800 mg by mouth As Needed for Muscle Spasms., Disp: , Rfl:   •  Metoprolol Succinate 25 MG capsule extended-release 24 hour sprinkle, Take  by mouth 2 (Two) Times a Day., Disp: , Rfl:   •  metoprolol succinate XL (TOPROL-XL) 50 MG 24 hr tablet, Take 50 mg by mouth 2 (Two) Times a Day., Disp: , Rfl:   •  omeprazole (priLOSEC) 40 MG capsule, Take 40 mg by mouth., Disp: , Rfl:   •  pimecrolimus (ELIDEL) 1 % cream, Apply  topically 2 (Two) Times a Day., Disp: , Rfl:   •  traMADol (ULTRAM) 50 MG tablet, Take 50 mg by mouth every 6 (six) hours as needed for moderate pain (4-6)., Disp: , Rfl:   •  aspirin 81 MG tablet, Take 1 tablet by mouth Daily., Disp: 30 tablet, Rfl: 11  •  atorvastatin (LIPITOR) 80 MG tablet, Take 1 tablet by mouth Daily. 200001, Disp: 90 tablet, Rfl: 2  No current facility-administered medications for this visit.      Objective:     Vitals:    02/26/19 1432   BP: 118/80   Pulse: 74   Weight: 89.4 kg (197 lb)   Height: 188 cm (74\")     Body mass index is 25.29 kg/m².    Physical Exam   Constitutional: He is oriented to person, place, and time. He appears well-developed and well-nourished.   HENT:   Head: Normocephalic.   Eyes: No scleral icterus.   Neck: No JVD present. No thyromegaly present.   Cardiovascular: Normal rate, regular rhythm and normal " heart sounds. Exam reveals no gallop and no friction rub.   No murmur heard.  Pulmonary/Chest: Effort normal and breath sounds normal. He has no wheezes. He has no rales.       Abdominal: Soft. There is no hepatosplenomegaly. There is no tenderness.   Musculoskeletal: Normal range of motion. He exhibits no edema.   Lymphadenopathy:     He has no cervical adenopathy.   Neurological: He is alert and oriented to person, place, and time.   Skin: Skin is warm and dry. No rash noted.   Psychiatric: He has a normal mood and affect.         ECG 12 Lead  Date/Time: 2/26/2019 3:12 PM  Performed by: Andrew Ball MD  Authorized by: Andrew Ball MD   Rhythm: paced  Ectopy: unifocal PVCs  Other findings: non-specific ST-T wave changes    Clinical impression: abnormal EKG             Assessment:       Diagnosis Plan   1. ST elevation myocardial infarction involving right coronary artery (CMS/HCC)     2. Coronary artery disease involving native coronary artery of native heart without angina pectoris     3. PVC (premature ventricular contraction)     4. Ischemic cardiomyopathy            Plan:       He is doing well. His echo here today, to me, looks about like it used to. He has an inferior wall motion abnormality, his EF is down a little bit probably in the 40s. He is on the right medications for this. I am not going to make any changes. I am going to stop his Eliquis, I do not think he needs to take it as it is related to his ICD shock. I am going to have him come back and see me in a year and see BILL Mcdonough, in 06/2019.        Coronary Artery Disease  Assessment  • The patient has no angina    Plan  • Lifestyle modifications discussed include adhering to a heart healthy diet, maintenance of a healthy weight, medication compliance, regular exercise and regular monitoring of cholesterol and blood pressure    Subjective - Objective  • There is a history of past MI  • There is a history of previous coronary artery  bypass graft  • There has been a previous stent procedure using MONIK  • Current antiplatelet therapy includes aspirin 81 mg        As always, it has been a pleasure to participate in your patient's care.      Sincerely,       Andrew Ball MD

## 2019-02-27 LAB
ASCENDING AORTA: 3.5 CM
BH CV ECHO MEAS - ACS: 2.1 CM
BH CV ECHO MEAS - AO MAX PG (FULL): 2 MMHG
BH CV ECHO MEAS - AO MAX PG: 4.1 MMHG
BH CV ECHO MEAS - AO MEAN PG (FULL): 1.4 MMHG
BH CV ECHO MEAS - AO MEAN PG: 2.6 MMHG
BH CV ECHO MEAS - AO ROOT AREA (BSA CORRECTED): 1.7
BH CV ECHO MEAS - AO ROOT AREA: 10.7 CM^2
BH CV ECHO MEAS - AO ROOT DIAM: 3.7 CM
BH CV ECHO MEAS - AO V2 MAX: 100.8 CM/SEC
BH CV ECHO MEAS - AO V2 MEAN: 77.1 CM/SEC
BH CV ECHO MEAS - AO V2 VTI: 22.8 CM
BH CV ECHO MEAS - AVA(I,A): 2.1 CM^2
BH CV ECHO MEAS - AVA(I,D): 2.1 CM^2
BH CV ECHO MEAS - AVA(V,A): 2.2 CM^2
BH CV ECHO MEAS - AVA(V,D): 2.2 CM^2
BH CV ECHO MEAS - BSA(HAYCOCK): 2.2 M^2
BH CV ECHO MEAS - BSA: 2.1 M^2
BH CV ECHO MEAS - BZI_BMI: 25 KILOGRAMS/M^2
BH CV ECHO MEAS - BZI_METRIC_HEIGHT: 188 CM
BH CV ECHO MEAS - BZI_METRIC_WEIGHT: 88.5 KG
BH CV ECHO MEAS - EDV(MOD-SP2): 155 ML
BH CV ECHO MEAS - EDV(MOD-SP4): 171 ML
BH CV ECHO MEAS - EDV(TEICH): 165.1 ML
BH CV ECHO MEAS - EF(CUBED): 53.5 %
BH CV ECHO MEAS - EF(MOD-BP): 49 %
BH CV ECHO MEAS - EF(MOD-SP2): 41.9 %
BH CV ECHO MEAS - EF(MOD-SP4): 56.7 %
BH CV ECHO MEAS - EF(TEICH): 44.7 %
BH CV ECHO MEAS - ESV(MOD-SP2): 90 ML
BH CV ECHO MEAS - ESV(MOD-SP4): 74 ML
BH CV ECHO MEAS - ESV(TEICH): 91.3 ML
BH CV ECHO MEAS - FS: 22.5 %
BH CV ECHO MEAS - IVS/LVPW: 0.87
BH CV ECHO MEAS - IVSD: 0.81 CM
BH CV ECHO MEAS - LAT PEAK E' VEL: 9 CM/SEC
BH CV ECHO MEAS - LV DIASTOLIC VOL/BSA (35-75): 79.5 ML/M^2
BH CV ECHO MEAS - LV MASS(C)D: 194.9 GRAMS
BH CV ECHO MEAS - LV MASS(C)DI: 90.7 GRAMS/M^2
BH CV ECHO MEAS - LV MAX PG: 2.1 MMHG
BH CV ECHO MEAS - LV MEAN PG: 1.1 MMHG
BH CV ECHO MEAS - LV SYSTOLIC VOL/BSA (12-30): 34.4 ML/M^2
BH CV ECHO MEAS - LV V1 MAX: 72.3 CM/SEC
BH CV ECHO MEAS - LV V1 MEAN: 50.5 CM/SEC
BH CV ECHO MEAS - LV V1 VTI: 15.8 CM
BH CV ECHO MEAS - LVIDD: 5.8 CM
BH CV ECHO MEAS - LVIDS: 4.5 CM
BH CV ECHO MEAS - LVLD AP2: 7.4 CM
BH CV ECHO MEAS - LVLD AP4: 8.5 CM
BH CV ECHO MEAS - LVLS AP2: 7.2 CM
BH CV ECHO MEAS - LVLS AP4: 7 CM
BH CV ECHO MEAS - LVOT AREA (M): 3.1 CM^2
BH CV ECHO MEAS - LVOT AREA: 3.1 CM^2
BH CV ECHO MEAS - LVOT DIAM: 2 CM
BH CV ECHO MEAS - LVPWD: 0.94 CM
BH CV ECHO MEAS - MED PEAK E' VEL: 8 CM/SEC
BH CV ECHO MEAS - MR MAX PG: 39.3 MMHG
BH CV ECHO MEAS - MR MAX VEL: 313.6 CM/SEC
BH CV ECHO MEAS - MV A DUR: 0.15 SEC
BH CV ECHO MEAS - MV A MAX VEL: 78.6 CM/SEC
BH CV ECHO MEAS - MV DEC SLOPE: 140.6 CM/SEC^2
BH CV ECHO MEAS - MV DEC TIME: 0.33 SEC
BH CV ECHO MEAS - MV E MAX VEL: 46.6 CM/SEC
BH CV ECHO MEAS - MV E/A: 0.59
BH CV ECHO MEAS - MV MAX PG: 1.8 MMHG
BH CV ECHO MEAS - MV MEAN PG: 0.78 MMHG
BH CV ECHO MEAS - MV P1/2T MAX VEL: 47.5 CM/SEC
BH CV ECHO MEAS - MV P1/2T: 99 MSEC
BH CV ECHO MEAS - MV V2 MAX: 67 CM/SEC
BH CV ECHO MEAS - MV V2 MEAN: 41.7 CM/SEC
BH CV ECHO MEAS - MV V2 VTI: 17.2 CM
BH CV ECHO MEAS - MVA P1/2T LCG: 4.6 CM^2
BH CV ECHO MEAS - MVA(P1/2T): 2.2 CM^2
BH CV ECHO MEAS - MVA(VTI): 2.8 CM^2
BH CV ECHO MEAS - PA ACC TIME: 0.19 SEC
BH CV ECHO MEAS - PA MAX PG (FULL): 2.6 MMHG
BH CV ECHO MEAS - PA MAX PG: 3.2 MMHG
BH CV ECHO MEAS - PA PR(ACCEL): -5 MMHG
BH CV ECHO MEAS - PA V2 MAX: 89.6 CM/SEC
BH CV ECHO MEAS - PULM A REVS DUR: 0.11 SEC
BH CV ECHO MEAS - PULM A REVS VEL: 19.5 CM/SEC
BH CV ECHO MEAS - PULM DIAS VEL: 45.2 CM/SEC
BH CV ECHO MEAS - PULM S/D: 0.82
BH CV ECHO MEAS - PULM SYS VEL: 37.1 CM/SEC
BH CV ECHO MEAS - PVA(V,A): 2.1 CM^2
BH CV ECHO MEAS - PVA(V,D): 2.1 CM^2
BH CV ECHO MEAS - QP/QS: 1
BH CV ECHO MEAS - RAP SYSTOLE: 3 MMHG
BH CV ECHO MEAS - RV MAX PG: 0.6 MMHG
BH CV ECHO MEAS - RV MEAN PG: 0.36 MMHG
BH CV ECHO MEAS - RV V1 MAX: 38.8 CM/SEC
BH CV ECHO MEAS - RV V1 MEAN: 28.4 CM/SEC
BH CV ECHO MEAS - RV V1 VTI: 10.4 CM
BH CV ECHO MEAS - RVOT AREA: 4.8 CM^2
BH CV ECHO MEAS - RVOT DIAM: 2.5 CM
BH CV ECHO MEAS - SI(AO): 113.8 ML/M^2
BH CV ECHO MEAS - SI(CUBED): 48 ML/M^2
BH CV ECHO MEAS - SI(LVOT): 22.7 ML/M^2
BH CV ECHO MEAS - SI(MOD-SP2): 30.2 ML/M^2
BH CV ECHO MEAS - SI(MOD-SP4): 45.1 ML/M^2
BH CV ECHO MEAS - SI(TEICH): 34.3 ML/M^2
BH CV ECHO MEAS - SUP REN AO DIAM: 2 CM
BH CV ECHO MEAS - SV(AO): 244.7 ML
BH CV ECHO MEAS - SV(CUBED): 103.2 ML
BH CV ECHO MEAS - SV(LVOT): 48.8 ML
BH CV ECHO MEAS - SV(MOD-SP2): 65 ML
BH CV ECHO MEAS - SV(MOD-SP4): 97 ML
BH CV ECHO MEAS - SV(RVOT): 49.9 ML
BH CV ECHO MEAS - SV(TEICH): 73.8 ML
BH CV ECHO MEAS - TAPSE (>1.6): 1.9 CM2
BH CV ECHO MEASUREMENTS AVERAGE E/E' RATIO: 5.48
BH CV XLRA - RV BASE: 3.8 CM
BH CV XLRA - TDI S': 9 CM/SEC
LEFT ATRIUM VOLUME INDEX: 39 ML/M2
MAXIMAL PREDICTED HEART RATE: 154 BPM
SINUS: 3.4 CM
STJ: 2.6 CM
STRESS TARGET HR: 131 BPM

## 2019-06-05 ENCOUNTER — OFFICE VISIT (OUTPATIENT)
Dept: CARDIOLOGY | Facility: CLINIC | Age: 69
End: 2019-06-05

## 2019-06-05 ENCOUNTER — CLINICAL SUPPORT NO REQUIREMENTS (OUTPATIENT)
Dept: CARDIOLOGY | Facility: CLINIC | Age: 69
End: 2019-06-05

## 2019-06-05 VITALS
WEIGHT: 193 LBS | HEART RATE: 71 BPM | SYSTOLIC BLOOD PRESSURE: 128 MMHG | DIASTOLIC BLOOD PRESSURE: 78 MMHG | BODY MASS INDEX: 24.77 KG/M2 | OXYGEN SATURATION: 98 % | HEIGHT: 74 IN

## 2019-06-05 DIAGNOSIS — I25.5 ISCHEMIC CARDIOMYOPATHY: ICD-10-CM

## 2019-06-05 DIAGNOSIS — I25.10 CORONARY ARTERY DISEASE INVOLVING NATIVE CORONARY ARTERY OF NATIVE HEART WITHOUT ANGINA PECTORIS: Primary | ICD-10-CM

## 2019-06-05 DIAGNOSIS — I25.5 ISCHEMIC CARDIOMYOPATHY: Primary | ICD-10-CM

## 2019-06-05 DIAGNOSIS — I47.20 VENTRICULAR TACHYCARDIA (HCC): ICD-10-CM

## 2019-06-05 PROCEDURE — 93283 PRGRMG EVAL IMPLANTABLE DFB: CPT | Performed by: INTERNAL MEDICINE

## 2019-06-05 PROCEDURE — 93000 ELECTROCARDIOGRAM COMPLETE: CPT | Performed by: PHYSICIAN ASSISTANT

## 2019-06-05 PROCEDURE — 99213 OFFICE O/P EST LOW 20 MIN: CPT | Performed by: PHYSICIAN ASSISTANT

## 2019-06-05 PROCEDURE — 93290 INTERROG DEV EVAL ICPMS IP: CPT | Performed by: INTERNAL MEDICINE

## 2019-06-05 RX ORDER — FLUTICASONE PROPIONATE 50 MCG
1 SPRAY, SUSPENSION (ML) NASAL DAILY
COMMUNITY
Start: 2019-02-20 | End: 2020-02-20

## 2019-06-05 RX ORDER — CHLORAL HYDRATE 500 MG
1000 CAPSULE ORAL DAILY
COMMUNITY
End: 2021-04-08 | Stop reason: HOSPADM

## 2019-06-05 NOTE — PROGRESS NOTES
Date of Office Visit: 2019  Encounter Provider: BILL Rubio  Place of Service: [facility] CARDIOLOGY  Patient Name: Alden Pratt  :1950    Chief Complaint   Patient presents with   • Coronary Artery Disease     1 year follow up   :     HPI: Alden Pratt is a 68 y.o. male who presents today for follow-up.  Old records have been obtained and reviewed by me.  He is a patient of Dr. Gardner with an extensive cardiac history.  He has coronary disease and is status post CABG.  He has ischemic cardiomyopathy and has an ICD placed.  He had a VF arrest and was shocked by his ICD, he went into atrial fibrillation.  This was in 2018.  At that time his EF was in the range of 26 to 30%.  We ended up cardioverting him to sinus rhythm.  We had him on Eliquis but this was discontinued in February of this year by Dr. Ball.  His last echocardiogram was also in February of this year, it showed improvement of his LV function up to 41 to 45%.  When he saw Dr. Ball on 2019 he was doing well and back to his baseline.  Recommendations were to follow-up in 1 year.  He did have his pacemaker interrogated today.  There have been no events and no atrial fibrillation.   Since he was last in our office he is been doing well.  He did have 2 episodes of chest pain.  On the second episode he took a Tums and the pain was relieved within a few minutes.  He has not had any since.  He was able to cut the grass yesterday by walking behind a self propelled lawnmower without any difficulty.  This took at least 2 hours.  He denies any anginal pain, shortness of breath, edema, dizziness, or syncope.  He does have a history of reflux and has taken Prilosec in the past, prior to his most recent episode of chest pain he had not had his Prilosec for several days.      Past Medical History:   Diagnosis Date   • Arthritis    • CAD (coronary artery disease)     atheroclerosis   •  Cancer (CMS/HCC)     prostate   • Chest pain    • CHF (congestive heart failure) (CMS/HCC)    • Elevated cholesterol    • GERD (gastroesophageal reflux disease)    • Hyperlipidemia    • Implantable cardioverter-defibrillator (ICD) discharge    • Myocardial infarction (CMS/HCC)     inferior   • PVC (premature ventricular contraction)    • PVD (peripheral vascular disease) (CMS/HCC)    • Status post phlebectomy     varicose veins   • Thyroid nodule 7/18/2014   • VT (ventricular tachycardia) (CMS/[facility])        Past Surgical History:   Procedure Laterality Date   • CARDIAC CATHETERIZATION     • CARDIAC DEFIBRILLATOR PLACEMENT     • CORONARY ARTERY BYPASS GRAFT      stents   • CORONARY STENT PLACEMENT     • MICROAMBULATORY PHLEBECTOMY     • THYROID BIOPSY         Social History     Socioeconomic History   • Marital status:      Spouse name: Not on file   • Number of children: Not on file   • Years of education: Not on file   • Highest education level: Not on file   Tobacco Use   • Smoking status: Never Smoker   • Smokeless tobacco: Never Used   Substance and Sexual Activity   • Alcohol use: No   • Drug use: No   • Sexual activity: Defer       Family History   Problem Relation Age of Onset   • Coronary artery disease Other    • Stroke Other    • Hypertension Other    • Heart disease Father    • Heart failure Father    • Stroke Father    • No Known Problems Maternal Grandmother    • No Known Problems Maternal Grandfather    • No Known Problems Paternal Grandmother    • No Known Problems Paternal Grandfather        Review of Systems   Constitution: Negative for chills, fever and malaise/fatigue.   Cardiovascular: Negative for chest pain, dyspnea on exertion, leg swelling, near-syncope, orthopnea, palpitations, paroxysmal nocturnal dyspnea and syncope.   Respiratory: Negative for cough and shortness of breath.    Musculoskeletal: Negative for joint pain, joint swelling and myalgias.   Gastrointestinal: Positive for  heartburn. Negative for abdominal pain, diarrhea, melena, nausea and vomiting.   Genitourinary: Negative for frequency and hematuria.   Neurological: Negative for light-headedness, numbness, paresthesias and seizures.   Allergic/Immunologic: Negative.    All other systems reviewed and are negative.      Allergies   Allergen Reactions   • Contrast Dye    • Iodinated Diagnostic Agents    • Sudafed 12 Hour [Pseudoephedrine Hcl Er]    • Pseudoephedrine Palpitations         Current Outpatient Medications:   •  aspirin 81 MG tablet, Take 1 tablet by mouth Daily., Disp: 30 tablet, Rfl: 11  •  atorvastatin (LIPITOR) 80 MG tablet, Take 1 tablet by mouth Daily. 200001, Disp: 90 tablet, Rfl: 2  •  cetirizine (ZyrTEC) 10 MG tablet, Take 10 mg by mouth As Needed., Disp: , Rfl:   •  Cholecalciferol (VITAMIN D3) 5000 UNITS capsule capsule, Take 2,000 Units by mouth Every Other Day., Disp: , Rfl:   •  clobetasol (TEMOVATE) 0.05 % cream, Apply  topically 2 (Two) Times a Day., Disp: , Rfl:   •  ezetimibe (ZETIA) 10 MG tablet, Take 10 mg by mouth daily., Disp: , Rfl:   •  fluticasone (FLONASE) 50 MCG/ACT nasal spray, 1 spray into the nostril(s) as directed by provider Daily., Disp: , Rfl:   •  lisinopril (PRINIVIL,ZESTRIL) 2.5 MG tablet, Take 2.5 mg by mouth Daily., Disp: , Rfl:   •  metaxalone (SKELAXIN) 800 MG tablet, Take 800 mg by mouth As Needed for Muscle Spasms., Disp: , Rfl:   •  metoprolol succinate XL (TOPROL-XL) 25 MG 24 hr tablet, Take 1 tablet by mouth 2 (Two) Times a Day., Disp: 180 tablet, Rfl: 2  •  metoprolol succinate XL (TOPROL-XL) 50 MG 24 hr tablet, Take 1 tablet by mouth 2 (Two) Times a Day., Disp: 180 tablet, Rfl: 2  •  Minoxidil 5 % foam, Apply  topically., Disp: , Rfl:   •  Omega-3 Fatty Acids (FISH OIL) 1000 MG capsule capsule, Take 1,000 mg by mouth Daily., Disp: , Rfl:   •  omeprazole (priLOSEC) 40 MG capsule, Take 40 mg by mouth., Disp: , Rfl:   •  pimecrolimus (ELIDEL) 1 % cream, Apply  topically 2 (Two)  "Times a Day., Disp: , Rfl:       Objective:     Vitals:    06/05/19 1114 06/05/19 1127   BP: 122/78 128/78   BP Location: Right arm Left arm   Pulse: 71    SpO2: 98%    Weight: 87.5 kg (193 lb)    Height: 188 cm (74\")      Body mass index is 24.78 kg/m².    PHYSICAL EXAM:    Physical Exam   Constitutional: He is oriented to person, place, and time. He appears well-developed and well-nourished. No distress.   HENT:   Head: Normocephalic and atraumatic.   Eyes: Pupils are equal, round, and reactive to light.   Neck: No JVD present. No thyromegaly present.   Cardiovascular: Normal rate, regular rhythm, normal heart sounds and intact distal pulses.   No murmur heard.  Pulmonary/Chest: Effort normal and breath sounds normal. No respiratory distress. He has no rales.   Abdominal: Soft. Bowel sounds are normal. He exhibits no distension and no ascites. There is no splenomegaly or hepatomegaly. There is no tenderness.   Musculoskeletal: Normal range of motion. He exhibits no edema.   Neurological: He is alert and oriented to person, place, and time.   Skin: Skin is warm and dry. He is not diaphoretic. No erythema.   Psychiatric: He has a normal mood and affect. His behavior is normal. Judgment normal.         ECG 12 Lead  Date/Time: 6/5/2019 11:35 AM  Performed by: Caron Heck PA  Authorized by: Caron Heck PA   Comparison: compared with previous ECG from 2/26/2019  Similar to previous ECG  Rhythm: paced  BPM: 73  Conduction: non-specific intraventricular conduction delay  T inversion: II, III and aVF    Clinical impression: abnormal EKG  Comments: Indication: Coronary disease              Assessment:       Diagnosis Plan   1. Coronary artery disease involving native coronary artery of native heart without angina pectoris  ECG 12 Lead   2. Ischemic cardiomyopathy       Orders Placed This Encounter   Procedures   • ECG 12 Lead     This order was created via procedure documentation          Plan:       Overall he " is doing well.  I think his episode of chest pain yesterday was his reflux as it was quickly relieved with Tums.  He is able to do a lot of yard work without any difficulty.  He is not really exercising like he should and have asked him to start walking.  He does eat a heart healthy diet.  He is on a good regimen for his heart failure.  I am not going to make any changes, and he will follow-up with Dr. Ball for his appointment in March.    Coronary Artery Disease  Assessment  • The patient has no angina    Plan  • Lifestyle modifications discussed include adhering to a heart healthy diet, regular exercise and regular monitoring of cholesterol and blood pressure    Subjective - Objective  • There is a history of past MI  • There is a history of previous coronary artery bypass graft  • Current antiplatelet therapy includes aspirin 81 mg    Heart Failure  Assessment  • NYHA class I - There is no limitation of physical activity. Physical activity does not cause fatigue, palpitations or shortness of breath.  • ACE inhibitor prescribed  • Beta blocker prescribed  • The most recent ejection fraction is 45%  • Left ventricular function is mildly reduced by qualitative assessment    Plan  • The patient has received heart failure education on the following topics: physical activity  • The heart failure care plan was discussed with the patient today including: continuing the current program    Subjective/Objective    • Physical exam findings negative for rales, peripheral edema, elevated JVP, hepatomegaly and ascites.  • The patient has an ICD implant            As always, it has been a pleasure to participate in your patient's care.      Sincerely,         Caron Heck PA-C

## 2019-09-11 ENCOUNTER — CLINICAL SUPPORT NO REQUIREMENTS (OUTPATIENT)
Dept: CARDIOLOGY | Facility: CLINIC | Age: 69
End: 2019-09-11

## 2019-09-11 DIAGNOSIS — I25.5 ISCHEMIC CARDIOMYOPATHY: Primary | ICD-10-CM

## 2019-09-11 PROCEDURE — 93296 REM INTERROG EVL PM/IDS: CPT | Performed by: INTERNAL MEDICINE

## 2019-09-11 PROCEDURE — 93295 DEV INTERROG REMOTE 1/2/MLT: CPT | Performed by: INTERNAL MEDICINE

## 2019-12-19 ENCOUNTER — CLINICAL SUPPORT NO REQUIREMENTS (OUTPATIENT)
Dept: CARDIOLOGY | Facility: CLINIC | Age: 69
End: 2019-12-19

## 2019-12-19 DIAGNOSIS — I25.5 ISCHEMIC CARDIOMYOPATHY: Primary | ICD-10-CM

## 2019-12-19 PROCEDURE — 93290 INTERROG DEV EVAL ICPMS IP: CPT | Performed by: INTERNAL MEDICINE

## 2019-12-19 PROCEDURE — 93283 PRGRMG EVAL IMPLANTABLE DFB: CPT | Performed by: INTERNAL MEDICINE

## 2020-03-25 ENCOUNTER — CLINICAL SUPPORT NO REQUIREMENTS (OUTPATIENT)
Dept: CARDIOLOGY | Facility: CLINIC | Age: 70
End: 2020-03-25

## 2020-03-25 DIAGNOSIS — I25.5 ISCHEMIC CARDIOMYOPATHY: Primary | ICD-10-CM

## 2020-03-25 PROCEDURE — 93295 DEV INTERROG REMOTE 1/2/MLT: CPT | Performed by: INTERNAL MEDICINE

## 2020-03-25 PROCEDURE — 93296 REM INTERROG EVL PM/IDS: CPT | Performed by: INTERNAL MEDICINE

## 2020-03-25 PROCEDURE — 93297 REM INTERROG DEV EVAL ICPMS: CPT | Performed by: INTERNAL MEDICINE

## 2020-03-31 ENCOUNTER — TELEPHONE (OUTPATIENT)
Dept: CARDIOLOGY | Facility: CLINIC | Age: 70
End: 2020-03-31

## 2020-03-31 NOTE — TELEPHONE ENCOUNTER
Patient calling concerned about taking lisinopril and chances of him with the coronavirus    [phone]

## 2020-04-06 ENCOUNTER — OFFICE VISIT (OUTPATIENT)
Dept: CARDIOLOGY | Facility: CLINIC | Age: 70
End: 2020-04-06

## 2020-04-06 VITALS
WEIGHT: 195 LBS | HEIGHT: 74 IN | DIASTOLIC BLOOD PRESSURE: 72 MMHG | SYSTOLIC BLOOD PRESSURE: 118 MMHG | BODY MASS INDEX: 25.03 KG/M2

## 2020-04-06 DIAGNOSIS — I25.2 HISTORY OF MI (MYOCARDIAL INFARCTION): Primary | ICD-10-CM

## 2020-04-06 DIAGNOSIS — I25.5 ISCHEMIC CARDIOMYOPATHY: ICD-10-CM

## 2020-04-06 DIAGNOSIS — Z95.1 S/P CABG (CORONARY ARTERY BYPASS GRAFT): ICD-10-CM

## 2020-04-06 DIAGNOSIS — I49.3 PVC (PREMATURE VENTRICULAR CONTRACTION): ICD-10-CM

## 2020-04-06 PROCEDURE — 99213 OFFICE O/P EST LOW 20 MIN: CPT | Performed by: INTERNAL MEDICINE

## 2020-04-06 RX ORDER — NITROGLYCERIN 0.4 MG/1
TABLET SUBLINGUAL
Qty: 100 TABLET | Refills: 11 | Status: SHIPPED | OUTPATIENT
Start: 2020-04-06

## 2020-04-06 NOTE — PROGRESS NOTES
Jonathan is a 69-year-old gentleman he had an MI in 2006.  At that time we did an angioplasty and stenting to his RCA but he had other significant disease his stents ended up renarrowing down he had an ischemic myopathy also and had an ICD placed we did a bypass on him and a LIMA to his LAD a vein to his distal LAD a vein to an OM 1 a vein to the PDA vein to the KIERAN a vein to the RV branch.  He is done really pretty well he has had chronic PVCs even before the infarct that persisted some.  He had pretty emotionally dramatic found's fall with his brother he ended up having VF and got a shock from his defibrillator.  The shocked knocked him into A. fib with anticoagulate him and then cardiovert him out a lot and has not had any more A. fib since then and has not had any more shocks since then we did increase his metoprolol little bit to 75 mg twice a day.  He has been doing very well since then    He has not had any shortness of breath PND orthopnea edema.  He does describe occasionally the sensation on the right side of his chest like in his right 2 or 3 inches over from his sternal they can last for 5 to 20 minutes not necessarily related to activity sometimes related to stress.  He has some nitro at home but they are old and he has not taken any for this.  Otherwise he is been doing well he is cutting the grass and doing everything without limitation.  Little bit lightheaded on the increased dose of metoprolol sometimes when he stands up.    Not sure if what he is describing is angina or not I am been a sending him a new prescription for nitro to have home I have asked him to pay attention to it.  He is an  and is pretty in tune with what is going on with his body.  I am to have him come back immediately months and see how he doing I told him if things get worse to let us know before that.    I did do gentle coronavirus precaution recommendations.  This was a telemedicine visit I have spent 20 minutes on the  phone with him and 10 minutes of charting.

## 2020-07-20 RX ORDER — DICLOFENAC SODIUM 75 MG/1
75 TABLET, DELAYED RELEASE ORAL 2 TIMES DAILY
COMMUNITY
End: 2021-06-29

## 2020-07-22 ENCOUNTER — CLINICAL SUPPORT NO REQUIREMENTS (OUTPATIENT)
Dept: CARDIOLOGY | Facility: CLINIC | Age: 70
End: 2020-07-22

## 2020-07-22 ENCOUNTER — OFFICE VISIT (OUTPATIENT)
Dept: CARDIOLOGY | Facility: CLINIC | Age: 70
End: 2020-07-22

## 2020-07-22 VITALS
HEIGHT: 74 IN | SYSTOLIC BLOOD PRESSURE: 118 MMHG | DIASTOLIC BLOOD PRESSURE: 80 MMHG | BODY MASS INDEX: 25.1 KG/M2 | WEIGHT: 195.6 LBS | HEART RATE: 74 BPM

## 2020-07-22 DIAGNOSIS — I21.11 ST ELEVATION MYOCARDIAL INFARCTION INVOLVING RIGHT CORONARY ARTERY (HCC): Primary | ICD-10-CM

## 2020-07-22 DIAGNOSIS — I25.10 CORONARY ARTERY DISEASE INVOLVING NATIVE CORONARY ARTERY OF NATIVE HEART WITHOUT ANGINA PECTORIS: ICD-10-CM

## 2020-07-22 DIAGNOSIS — Z95.1 S/P CABG (CORONARY ARTERY BYPASS GRAFT): ICD-10-CM

## 2020-07-22 DIAGNOSIS — I47.20 VENTRICULAR TACHYCARDIA (HCC): Primary | ICD-10-CM

## 2020-07-22 PROCEDURE — 99214 OFFICE O/P EST MOD 30 MIN: CPT | Performed by: INTERNAL MEDICINE

## 2020-07-22 PROCEDURE — 93000 ELECTROCARDIOGRAM COMPLETE: CPT | Performed by: INTERNAL MEDICINE

## 2020-07-22 PROCEDURE — 93283 PRGRMG EVAL IMPLANTABLE DFB: CPT | Performed by: INTERNAL MEDICINE

## 2020-07-22 NOTE — PROGRESS NOTES
Date of Office Visit: 20  Encounter Provider: Andrew Ball MD  Place of Service: [facility] CARDIOLOGY  Patient Name: Alden Pratt  :1950  [id number]    Chief Complaint   Patient presents with   • Coronary Artery Disease   :     HPI: Alden Pratt is a 69 y.o. male  he had an MI in .  At that time we did an angioplasty and stenting to his RCA but he had other significant disease his stents ended up renarrowing down he had an ischemic myopathy also and had an ICD placed we did a bypass on him and a LIMA to his LAD a vein to his distal LAD a vein to an OM 1 a vein to the PDA vein to the KIERAN a vein to the RV branch.  He is done really pretty well he has had chronic PVCs even before the infarct that persisted some.  He had pretty emotionally dramatic found's fall with his brother he ended up having VF and got a shock from his defibrillator.  The shocked knocked him into A. fib with anticoagulate him and then cardiovert him out a lot and has not had any more A. fib since then and has not had any more shocks since then we did increase his metoprolol little bit to 75 mg twice a day.  He has been doing well centralized did a video visit with him  Chest pain shortness of breath PND orthopnea edema syncope or palpitations he was having some atypical chest pain and that went away    Past Medical History:   Diagnosis Date   • Arthritis    • CAD (coronary artery disease)     atheroclerosis   • Cancer (CMS/HCC)     prostate   • Chest pain    • CHF (congestive heart failure) (CMS/[facility])    • Elevated cholesterol    • GERD (gastroesophageal reflux disease)    • Hyperlipidemia    • Implantable cardioverter-defibrillator (ICD) discharge    • Myocardial infarction (CMS/HCC)     inferior   • PVC (premature ventricular contraction)    • PVD (peripheral vascular disease) (CMS/[facility])    • Status post phlebectomy     varicose veins   • Thyroid nodule 2014   • VT (ventricular  tachycardia) (CMS/HCC)        Past Surgical History:   Procedure Laterality Date   • CARDIAC CATHETERIZATION     • CARDIAC DEFIBRILLATOR PLACEMENT     • CORONARY ARTERY BYPASS GRAFT      stents   • CORONARY STENT PLACEMENT     • MICROAMBULATORY PHLEBECTOMY     • THYROID BIOPSY         Social History     Socioeconomic History   • Marital status:      Spouse name: Not on file   • Number of children: Not on file   • Years of education: Not on file   • Highest education level: Not on file   Tobacco Use   • Smoking status: Never Smoker   • Smokeless tobacco: Never Used   Substance and Sexual Activity   • Alcohol use: Yes     Comment: rare   • Drug use: No   • Sexual activity: Defer       Family History   Problem Relation Age of Onset   • Coronary artery disease Other    • Stroke Other    • Hypertension Other    • Heart disease Father    • Heart failure Father    • Stroke Father    • No Known Problems Maternal Grandmother    • No Known Problems Maternal Grandfather    • No Known Problems Paternal Grandmother    • No Known Problems Paternal Grandfather        Review of Systems   Constitution: Negative for decreased appetite, fever, malaise/fatigue and weight loss.   HENT: Negative for nosebleeds.    Eyes: Negative for double vision.   Cardiovascular: Negative for chest pain, claudication, cyanosis, dyspnea on exertion, irregular heartbeat, leg swelling, near-syncope, orthopnea, palpitations, paroxysmal nocturnal dyspnea and syncope.   Respiratory: Negative for cough, hemoptysis and shortness of breath.    Hematologic/Lymphatic: Negative for bleeding problem.   Skin: Negative for rash.   Musculoskeletal: Negative for falls and myalgias.   Gastrointestinal: Negative for hematochezia, jaundice, melena, nausea and vomiting.   Genitourinary: Negative for hematuria.   Neurological: Negative for dizziness and seizures.   Psychiatric/Behavioral: Negative for altered mental status and memory loss.       Allergies   Allergen  Reactions   • Contrast Dye    • Iodinated Diagnostic Agents    • Sudafed 12 Hour [Pseudoephedrine Hcl Er]    • Pseudoephedrine Palpitations         Current Outpatient Medications:   •  aspirin 81 MG tablet, Take 1 tablet by mouth Daily., Disp: 30 tablet, Rfl: 11  •  atorvastatin (LIPITOR) 80 MG tablet, Take 1 tablet by mouth Daily. 200001, Disp: 90 tablet, Rfl: 2  •  cetirizine (ZyrTEC) 10 MG tablet, Take 10 mg by mouth As Needed., Disp: , Rfl:   •  Cholecalciferol (VITAMIN D3) 5000 UNITS capsule capsule, Take 2,000 Units by mouth Every Other Day., Disp: , Rfl:   •  clobetasol (TEMOVATE) 0.05 % cream, Apply  topically 2 (Two) Times a Day., Disp: , Rfl:   •  diclofenac (VOLTAREN) 75 MG EC tablet, Take 75 mg by mouth 2 (Two) Times a Day., Disp: , Rfl:   •  ezetimibe (ZETIA) 10 MG tablet, Take 10 mg by mouth daily., Disp: , Rfl:   •  fluocinonide (LIDEX) 0.05 % cream, Apply  topically to the appropriate area as directed 2 (Two) Times a Day., Disp: , Rfl:   •  Fluticasone Furoate 50 MCG/ACT aerosol powder , Inhale., Disp: , Rfl:   •  lisinopril (PRINIVIL,ZESTRIL) 2.5 MG tablet, Take 2.5 mg by mouth Daily., Disp: , Rfl:   •  metaxalone (SKELAXIN) 800 MG tablet, Take 800 mg by mouth As Needed for Muscle Spasms., Disp: , Rfl:   •  metoprolol succinate XL (TOPROL-XL) 25 MG 24 hr tablet, Take 1 tablet by mouth 2 (Two) Times a Day., Disp: 180 tablet, Rfl: 2  •  metoprolol succinate XL (TOPROL-XL) 50 MG 24 hr tablet, Take 1 tablet by mouth 2 (Two) Times a Day., Disp: 180 tablet, Rfl: 2  •  Minoxidil 5 % foam, Apply  topically., Disp: , Rfl:   •  nitroglycerin (NITROSTAT) 0.4 MG SL tablet, 1 under the tongue as needed for angina, may repeat q5mins for up three doses, Disp: 100 tablet, Rfl: 11  •  Omega-3 Fatty Acids (FISH OIL) 1000 MG capsule capsule, Take 1,000 mg by mouth Daily., Disp: , Rfl:   •  omeprazole (priLOSEC) 40 MG capsule, Take 40 mg by mouth., Disp: , Rfl:   •  pimecrolimus (ELIDEL) 1 % cream, Apply  topically 2  "(Two) Times a Day., Disp: , Rfl:       Objective:     Vitals:    07/22/20 1339   BP: 118/80   BP Location: Left arm   Pulse: 74   Weight: 88.7 kg (195 lb 9.6 oz)   Height: 188 cm (74\")     Body mass index is 25.11 kg/m².    Physical Exam   Constitutional: He is oriented to person, place, and time. He appears well-developed and well-nourished.   HENT:   Head: Normocephalic.   Eyes: No scleral icterus.   Neck: No JVD present. No thyromegaly present.   Cardiovascular: Normal rate, regular rhythm and normal heart sounds. Exam reveals no gallop and no friction rub.   No murmur heard.  Pulmonary/Chest: Effort normal and breath sounds normal. He has no wheezes. He has no rales.       Abdominal: Soft. There is no hepatosplenomegaly. There is no tenderness.   Musculoskeletal: Normal range of motion. He exhibits no edema.   Lymphadenopathy:     He has no cervical adenopathy.   Neurological: He is alert and oriented to person, place, and time.   Skin: Skin is warm and dry. No rash noted.   Psychiatric: He has a normal mood and affect.         ECG 12 Lead  Date/Time: 7/22/2020 2:23 PM  Performed by: Andrew Ball MD  Authorized by: Andrew Ball MD   Comparison: compared with previous ECG   Similar to previous ECG  Rhythm: sinus rhythm  Q waves: II, III and aVF    T inversion: II, III and aVF    Clinical impression: abnormal EKG             Assessment:       Diagnosis Plan   1. ST elevation myocardial infarction involving right coronary artery (CMS/HCC)     2. Coronary artery disease involving native coronary artery of native heart without angina pectoris     3. S/P CABG (coronary artery bypass graft)            Plan:       He is doing well his blood pressure is good he is on good medical therapy is asymptomatic we will keep him on his same medications plan on having him come back and see me in a year sooner if he has trouble    As always, it has been a pleasure to participate in your patient's care.      Sincerely,       "       Andrew Ball MD

## 2020-09-10 ENCOUNTER — OFFICE VISIT (OUTPATIENT)
Dept: ORTHOPEDIC SURGERY | Facility: CLINIC | Age: 70
End: 2020-09-10

## 2020-09-10 VITALS — WEIGHT: 195 LBS | HEIGHT: 74 IN | TEMPERATURE: 98.6 F | BODY MASS INDEX: 25.03 KG/M2

## 2020-09-10 DIAGNOSIS — M79.671 RIGHT FOOT PAIN: Primary | ICD-10-CM

## 2020-09-10 DIAGNOSIS — M77.41 METATARSALGIA OF RIGHT FOOT: ICD-10-CM

## 2020-09-10 DIAGNOSIS — M19.071 ARTHRITIS OF FIRST METATARSOPHALANGEAL (MTP) JOINT OF RIGHT FOOT: ICD-10-CM

## 2020-09-10 DIAGNOSIS — S93.401S SPRAIN OF RIGHT ANKLE, UNSPECIFIED LIGAMENT, SEQUELA: ICD-10-CM

## 2020-09-10 PROCEDURE — 73630 X-RAY EXAM OF FOOT: CPT | Performed by: ORTHOPAEDIC SURGERY

## 2020-09-10 PROCEDURE — 73600 X-RAY EXAM OF ANKLE: CPT | Performed by: ORTHOPAEDIC SURGERY

## 2020-09-10 PROCEDURE — 99204 OFFICE O/P NEW MOD 45 MIN: CPT | Performed by: ORTHOPAEDIC SURGERY

## 2020-09-10 RX ORDER — FLUTICASONE PROPIONATE 50 MCG
1 SPRAY, SUSPENSION (ML) NASAL 2 TIMES DAILY PRN
COMMUNITY
Start: 2019-06-10

## 2020-09-10 NOTE — PROGRESS NOTES
"   New Patient Complaint      Patient: Alden Pratt  YOB: 1950 69 y.o. male  Medical Record Number: [id number]    Chief Complaints: My foot and ankle hurt    History of Present Illness: Patient sustained an injury on 7/3/2020 when he missed a step at the bottom of his stairs came down and flexed his toes and injured his ankle.  Also injured both knees which are now better.  He subsequently saw Dr. Kelly and x-rays made marked so there was no fracture.  He now reports moderate to severe intermittent stabbing aching pain mainly in the forefoot around the first second and third toes.  He gets intermittent discomfort in the plantar medial aspect of the great toe as well as a persistent area of intermittent discomfort and fullness in the plantar aspect of the second more so than third toe with some intermittent burning feelings.    Overall he said the pain is more under the plantar medial aspect of the great toe more so than any pain in the toes and overall does seem to be improving.    Also gets mild aching pain in the anterolateral aspect of the ankle and had a history 50 years ago of a severe sprain and has always been somewhat chronically larger than the other but was without complaints of pain in the ankle prior to this most recent injury.        He has been using Voltaren 75 mg \"infrequently\"    HPI    Allergies:   Allergies   Allergen Reactions   • Contrast Dye    • Iodinated Diagnostic Agents    • Sudafed 12 Hour [Pseudoephedrine Hcl Er]    • Pseudoephedrine Palpitations       Medications:   Current Outpatient Medications on File Prior to Visit   Medication Sig   • aspirin 81 MG tablet Take 1 tablet by mouth Daily.   • atorvastatin (LIPITOR) 80 MG tablet Take 1 tablet by mouth Daily. 200001   • cetirizine (ZyrTEC) 10 MG tablet Take 10 mg by mouth As Needed.   • Cholecalciferol (VITAMIN D3) 5000 UNITS capsule capsule Take 2,000 Units by mouth Every Other Day.   • clobetasol (TEMOVATE) 0.05 % " cream Apply  topically 2 (Two) Times a Day.   • diclofenac (VOLTAREN) 75 MG EC tablet Take 75 mg by mouth 2 (Two) Times a Day.   • ezetimibe (ZETIA) 10 MG tablet Take 10 mg by mouth daily.   • fluocinonide (LIDEX) 0.05 % cream Apply  topically to the appropriate area as directed 2 (Two) Times a Day.   • fluticasone (FLONASE) 50 MCG/ACT nasal spray 1 spray into the nostril(s) as directed by provider Daily.   • Fluticasone Furoate 50 MCG/ACT aerosol powder  Inhale.   • lisinopril (PRINIVIL,ZESTRIL) 2.5 MG tablet Take 2.5 mg by mouth Daily.   • metaxalone (SKELAXIN) 800 MG tablet Take 800 mg by mouth As Needed for Muscle Spasms.   • metoprolol succinate XL (TOPROL-XL) 25 MG 24 hr tablet Take 1 tablet by mouth 2 (Two) Times a Day.   • metoprolol succinate XL (TOPROL-XL) 50 MG 24 hr tablet Take 1 tablet by mouth 2 (Two) Times a Day.   • Minoxidil 5 % foam Apply  topically.   • nitroglycerin (NITROSTAT) 0.4 MG SL tablet 1 under the tongue as needed for angina, may repeat q5mins for up three doses   • Omega-3 Fatty Acids (FISH OIL) 1000 MG capsule capsule Take 1,000 mg by mouth Daily.   • omeprazole (priLOSEC) 40 MG capsule Take 40 mg by mouth.   • pimecrolimus (ELIDEL) 1 % cream Apply  topically 2 (Two) Times a Day.     No current facility-administered medications on file prior to visit.        Past Medical History:   Diagnosis Date   • Arthritis    • CAD (coronary artery disease)     atheroclerosis   • Cancer (CMS/HCC)     prostate   • Chest pain    • CHF (congestive heart failure) (CMS/HCC)    • Elevated cholesterol    • GERD (gastroesophageal reflux disease)    • Hyperlipidemia    • Implantable cardioverter-defibrillator (ICD) discharge    • Myocardial infarction (CMS/HCC)     inferior   • PVC (premature ventricular contraction)    • PVD (peripheral vascular disease) (CMS/HCC)    • Status post phlebectomy     varicose veins   • Thyroid nodule 7/18/2014   • VT (ventricular tachycardia) (CMS/HCC)      Past Surgical  "History:   Procedure Laterality Date   • CARDIAC CATHETERIZATION     • CARDIAC DEFIBRILLATOR PLACEMENT     • CORONARY ARTERY BYPASS GRAFT      stents   • CORONARY STENT PLACEMENT     • MICROAMBULATORY PHLEBECTOMY     • THYROID BIOPSY       Social History     Occupational History   • Not on file   Tobacco Use   • Smoking status: Never Smoker   • Smokeless tobacco: Never Used   Substance and Sexual Activity   • Alcohol use: Yes     Comment: rare   • Drug use: No   • Sexual activity: Defer      Social History     Social History Narrative   • Not on file     Family History   Problem Relation Age of Onset   • Coronary artery disease Other    • Stroke Other    • Hypertension Other    • Heart disease Father    • Heart failure Father    • Stroke Father    • No Known Problems Maternal Grandmother    • No Known Problems Maternal Grandfather    • No Known Problems Paternal Grandmother    • No Known Problems Paternal Grandfather        Review of Systems: 14 point review of systems performed, positive pertinent findings identified in HPI. All remaining systems negative except ringing in the ears and palpitations    Review of Systems      Physical Exam:   Vitals:    09/10/20 1326   Temp: 98.6 °F (37 °C)   TempSrc: Temporal   Weight: 88.5 kg (195 lb)   Height: 188 cm (74.02\")   PainSc:   6   PainLoc: Foot     Physical Exam   Constitutional: pleasant, well developed   Eyes: sclera non icteric  Hearing : adequate for exam  Cardiovascular: palpable pulses in right foot, right calf/ thigh NT without sign of DVT  Respiratoy: breathing unlabored   Neurological: grossly sensate to LT throughout right LE  Psychiatric: oriented with normal mood and affect.   Lymphatic: No palpable popliteal lymphadenopathy right LE  Skin: intact throughout right leg/foot  Musculoskeletal: Right ankle shows mild discomfort with anterolateral ligamentous structures but no gross instability or exacerbation of pain with range of motion.  Right foot shows very " slight discomfort at most 2-3 out of 10 on the plantar medial aspect of the great toe.  There is some area of fullness under the second more so than third toe with minimal if any discomfort to palpation there or at the metatarsal heads..  He was able to flex and extend the toes  well but had some chronic appearing cock-up deformity.  No irritability or instability to the second or third MTP joint.  Physical Exam  Ortho Exam    Radiology: 3 views the right foot including lateral view of the ankle as well as 2 views of the right ankle ordered evaluate pain and alignment reviewed and compared to x-rays of the foot the patient brought in from 7/9/2020.  Ankle x-rays show talus to be well-seated within the mortise there is chronic appearing ossification around the syndesmosis consistent with prior injury but no acute findings.  Foot x-rays do not show any obvious fracture along the toes or metatarsals there is arthritic change at the first MTP joint with a small cyst in the lateral aspect of the first metatarsal head.    Assessment/Plan: 1.  Right forefoot injury with metatarsalgia and some residual fullness under the plantar aspect of the second and third toes with some mild discomfort in the plantar medial great toe.  2.  Right ankle previous sprain with slight exacerbation  3.  Right first MTP arthritis    We discussed treatment options and he cannot have an MRI and we discussed various pad such as a dancer's pad and/or hammertoe splints but he decided to hold off on as it does seem to be improving and was encouraged that we did not see any obvious fractures and seems to be mainly soft tissue injury but no see any damage to the flexor mechanisms    He will give this some more time and if anything persist or worsen let me know and we could try some different shoe inserts straps etc. but nothing I see that I would do from an operative standpoint.  He appreciated the visit and I will see him back as needed

## 2021-03-08 ENCOUNTER — TELEPHONE (OUTPATIENT)
Dept: CARDIOLOGY | Facility: CLINIC | Age: 71
End: 2021-03-08

## 2021-03-08 DIAGNOSIS — C61 PROSTATE CANCER (HCC): ICD-10-CM

## 2021-03-08 RX ORDER — METOPROLOL SUCCINATE 100 MG/1
100 TABLET, EXTENDED RELEASE ORAL DAILY
Qty: 60 TABLET | Refills: 11 | Status: SHIPPED | OUTPATIENT
Start: 2021-03-08 | End: 2021-03-24

## 2021-03-08 NOTE — TELEPHONE ENCOUNTER
From remote check today. AT/AF burden 0.5%. 6 AT/AF on counters, 5 of them <1min, the other one is in progress since 3/7/21 @ 23:34. I called pt. He woke up feeling flushed this morning, ran errands then felt palpitation and nausea after that, also some SOB with exertion. Presenting rhythm AF/ 60's. Strip below. Pt wants to know if he needs to be back on the Eliquis. He currently only takes baby ASA.    Also, 1 NST-VT on 2/4/21, 7 beats, avg V 171bpm

## 2021-03-08 NOTE — TELEPHONE ENCOUNTER
Called patient about his device interrogation.  He is in A. fib he thinks he has been in it for few days.  He had some Eliquis at home and took it I sent him a new prescription to his pharmacy and I am getting increase his metoprolol to 100 mg twice a day.  He has had to come in and get an appointment with us this week.

## 2021-03-09 ENCOUNTER — OFFICE VISIT (OUTPATIENT)
Dept: CARDIOLOGY | Facility: CLINIC | Age: 71
End: 2021-03-09

## 2021-03-09 VITALS — HEIGHT: 74 IN | BODY MASS INDEX: 25.04 KG/M2

## 2021-03-09 DIAGNOSIS — E78.5 HYPERLIPIDEMIA WITH TARGET LDL LESS THAN 70: ICD-10-CM

## 2021-03-09 DIAGNOSIS — R06.83 SNORING: Primary | ICD-10-CM

## 2021-03-09 DIAGNOSIS — I49.3 PVC (PREMATURE VENTRICULAR CONTRACTION): ICD-10-CM

## 2021-03-09 DIAGNOSIS — I25.5 ISCHEMIC CARDIOMYOPATHY: ICD-10-CM

## 2021-03-09 DIAGNOSIS — Z95.1 S/P CABG (CORONARY ARTERY BYPASS GRAFT): ICD-10-CM

## 2021-03-09 DIAGNOSIS — I25.10 CORONARY ARTERY DISEASE INVOLVING NATIVE CORONARY ARTERY OF NATIVE HEART WITHOUT ANGINA PECTORIS: ICD-10-CM

## 2021-03-09 DIAGNOSIS — I25.2 HISTORY OF MI (MYOCARDIAL INFARCTION): ICD-10-CM

## 2021-03-09 DIAGNOSIS — I48.0 PAROXYSMAL A-FIB (HCC): ICD-10-CM

## 2021-03-09 PROCEDURE — 99214 OFFICE O/P EST MOD 30 MIN: CPT | Performed by: NURSE PRACTITIONER

## 2021-03-09 PROCEDURE — 93000 ELECTROCARDIOGRAM COMPLETE: CPT | Performed by: NURSE PRACTITIONER

## 2021-03-09 NOTE — PROGRESS NOTES
Date of Office Visit: 2021  Encounter Provider: BRANDON Pizarro  Place of Service: [facility]  Patient Name: Alden Pratt  :1950    Chief Complaint   Patient presents with   • Atrial Fibrillation   :     HPI: Alden Haider is a 70-year-old male who is a patient of Dr. Ball and is new to me today.  He had an MI in  and had a stent to his RCA.  He had other significant disease his stents ended up renarrowing and he has an ischemic cardiomyopathy and had an ICD placed.  He then ended up having bypass with a LIMA to the LAD, vein graft to distal LAD, vein graft to OM1, vein graft to PDA, vein graft to posterior lateral artery and vein graft to the RV branch.  He is done really well and had some PVCs.  He had a pretty emotional dramatic fall he ended up having V. fib and got shocks from his defibrillator which shocked him into A. fib in the past.  He was put on anticoagulation and got cardioverted.  Since then he had not had any more A. fib he was maintained on metoprolol 75 mg twice a day and his anticoagulation had been stopped.  He had a video visit with Dr. Ball in July of last year and was doing well.  Patient called last week stating that he felt like he had gone back into A. fib I increase his beta-blockers to 100 mg metoprolol succinate and put him back on Eliquis.  He comes in today for evaluation.    His heart rate is much better controlled he is having some palpitations.  But for the most part it is much better.  He also is a little short of breath with activity.  He denies any chest pain.  He had an echo in  that was unchanged from prior, his stress test in 2018 showed an old infarct but no new areas of ischemia, his recent thyroid testing in 2020 was stable.  His blood pressures are well controlled.  He does admit to some snoring at night and afternoon napping.    Previous testing and notes have been reviewed  by me.   Past Medical History:   Diagnosis Date   • Arthritis    • CAD (coronary artery disease)     atheroclerosis   • Cancer (CMS/HCC)     prostate   • Chest pain    • CHF (congestive heart failure) (CMS/HCC)    • Elevated cholesterol    • GERD (gastroesophageal reflux disease)    • Hyperlipidemia    • Implantable cardioverter-defibrillator (ICD) discharge    • Myocardial infarction (CMS/HCC)     inferior   • PVC (premature ventricular contraction)    • PVD (peripheral vascular disease) (CMS/HCC)    • Status post phlebectomy     varicose veins   • Thyroid nodule 7/18/2014   • VT (ventricular tachycardia) (CMS/[facility])        Past Surgical History:   Procedure Laterality Date   • CARDIAC CATHETERIZATION     • CARDIAC DEFIBRILLATOR PLACEMENT     • CORONARY ARTERY BYPASS GRAFT      stents   • CORONARY STENT PLACEMENT     • MICROAMBULATORY PHLEBECTOMY     • THYROID BIOPSY         Social History     Socioeconomic History   • Marital status:      Spouse name: Not on file   • Number of children: Not on file   • Years of education: Not on file   • Highest education level: Not on file   Tobacco Use   • Smoking status: Never Smoker   • Smokeless tobacco: Never Used   Substance and Sexual Activity   • Alcohol use: Yes     Comment: rare   • Drug use: No   • Sexual activity: Defer       Family History   Problem Relation Age of Onset   • Coronary artery disease Other    • Stroke Other    • Hypertension Other    • Heart disease Father    • Heart failure Father    • Stroke Father    • No Known Problems Maternal Grandmother    • No Known Problems Maternal Grandfather    • No Known Problems Paternal Grandmother    • No Known Problems Paternal Grandfather        Review of Systems   Constitutional: Negative for diaphoresis and malaise/fatigue.   Cardiovascular: Positive for dyspnea on exertion and palpitations. Negative for chest pain, claudication, irregular heartbeat, leg swelling, near-syncope, orthopnea, paroxysmal nocturnal  dyspnea and syncope.   Respiratory: Negative for cough, shortness of breath and sleep disturbances due to breathing.    Musculoskeletal: Negative for falls.   Neurological: Negative for dizziness and weakness.   Psychiatric/Behavioral: Negative for altered mental status and substance abuse.       Allergies   Allergen Reactions   • Contrast Dye    • Iodinated Diagnostic Agents    • Sudafed 12 Hour [Pseudoephedrine Hcl Er]    • Pseudoephedrine Palpitations         Current Outpatient Medications:   •  apixaban (ELIQUIS) 5 MG tablet tablet, Take 1 tablet by mouth Every 12 (Twelve) Hours., Disp: 60 tablet, Rfl: 11  •  aspirin 81 MG tablet, Take 1 tablet by mouth Daily., Disp: 30 tablet, Rfl: 11  •  atorvastatin (LIPITOR) 80 MG tablet, Take 1 tablet by mouth Daily. 200001, Disp: 90 tablet, Rfl: 2  •  Cholecalciferol (VITAMIN D3) 5000 UNITS capsule capsule, Take 2,000 Units by mouth Every Other Day., Disp: , Rfl:   •  clobetasol (TEMOVATE) 0.05 % cream, Apply  topically 2 (Two) Times a Day., Disp: , Rfl:   •  diclofenac (VOLTAREN) 75 MG EC tablet, Take 75 mg by mouth 2 (Two) Times a Day., Disp: , Rfl:   •  ezetimibe (ZETIA) 10 MG tablet, Take 10 mg by mouth daily., Disp: , Rfl:   •  fluocinonide (LIDEX) 0.05 % cream, Apply  topically to the appropriate area as directed 2 (Two) Times a Day., Disp: , Rfl:   •  fluticasone (FLONASE) 50 MCG/ACT nasal spray, 1 spray into the nostril(s) as directed by provider Daily., Disp: , Rfl:   •  Fluticasone Furoate 50 MCG/ACT aerosol powder , Inhale., Disp: , Rfl:   •  lisinopril (PRINIVIL,ZESTRIL) 2.5 MG tablet, Take 2.5 mg by mouth Daily., Disp: , Rfl:   •  metaxalone (SKELAXIN) 800 MG tablet, Take 800 mg by mouth As Needed for Muscle Spasms., Disp: , Rfl:   •  metoprolol succinate XL (Toprol XL) 100 MG 24 hr tablet, Take 1 tablet by mouth Daily., Disp: 60 tablet, Rfl: 11  •  Minoxidil 5 % foam, Apply  topically., Disp: , Rfl:   •  nitroglycerin (NITROSTAT) 0.4 MG SL tablet, 1 under the  "tongue as needed for angina, may repeat q5mins for up three doses, Disp: 100 tablet, Rfl: 11  •  Omega-3 Fatty Acids (FISH OIL) 1000 MG capsule capsule, Take 1,000 mg by mouth Daily., Disp: , Rfl:   •  omeprazole (priLOSEC) 40 MG capsule, Take 40 mg by mouth., Disp: , Rfl:   •  pimecrolimus (ELIDEL) 1 % cream, Apply  topically 2 (Two) Times a Day., Disp: , Rfl:   •  Triamcinolone Acetonide (KENALOG IJ), Inject  as directed., Disp: , Rfl:   •  cetirizine (ZyrTEC) 10 MG tablet, Take 10 mg by mouth As Needed., Disp: , Rfl:       Objective:     Vitals:    03/09/21 1432   Height: 188 cm (74\")     Body mass index is 25.04 kg/m².    PHYSICAL EXAM:    Constitutional:       General: Not in acute distress.     Appearance: Normal appearance. Well-developed.   Eyes:      Pupils: Pupils are equal, round, and reactive to light.   HENT:      Head: Normocephalic.   Neck:      Vascular: No carotid bruit or JVD.   Pulmonary:      Effort: Pulmonary effort is normal. No tachypnea.      Breath sounds: Normal breath sounds. No wheezing. No rales.   Cardiovascular:      Normal rate. Irregularly irregular rhythm.      No gallop.   Pulses:     Intact distal pulses.   Abdominal:      General: Bowel sounds are normal.      Palpations: Abdomen is soft.      Tenderness: There is no abdominal tenderness.   Musculoskeletal: Normal range of motion.      Cervical back: Normal range of motion and neck supple. No edema. Skin:     General: Skin is warm and dry.   Neurological:      Mental Status: Alert and oriented to person, place, and time.           ECG 12 Lead    Date/Time: 3/9/2021 3:47 PM  Performed by: Vanessa Echavarria APRN  Authorized by: Vanessa Echavarria APRN   Comparison: compared with previous ECG   Rhythm: atrial fibrillation  Rate: normal  QRS axis: normal  Pacing: ventricular pacing  Clinical impression: abnormal EKG              Assessment:       Diagnosis Plan   1. Snoring  Ambulatory Referral to Sleep Medicine   2. History of MI " (myocardial infarction)     3. S/P CABG (coronary artery bypass graft)     4. Ischemic cardiomyopathy     5. Hyperlipidemia with target LDL less than 70     6. Coronary artery disease involving native coronary artery of native heart without angina pectoris     7. Paroxysmal A-fib (CMS/HCC)  Cardioversion External in Cardiology Department    ECG 12 Lead   8. PVC (premature ventricular contraction)       Orders Placed This Encounter   Procedures   • Ambulatory Referral to Sleep Medicine     Referral Priority:   Routine     Referral Type:   Consultation     Referral Reason:   Specialty Services Required     Referred to Provider:   Prakash Dooley MD     Requested Specialty:   Sleep Medicine     Number of Visits Requested:   1   • Cardioversion External in Cardiology Department     Standing Status:   Future     Standing Expiration Date:   3/9/2022     Order Specific Question:   What type of sedation does the patient require?     Answer:   Moderate Sedation     Order Specific Question:   At which hospital location will this be performed?     Answer:   [place]     Order Specific Question:   Reason for Exam:     Answer:   atrial fibrillation   • ECG 12 Lead     This order was created via procedure documentation          Plan:       Working to keep him rate controlled on metoprolol succinate 100 mg twice a day.  He is anticoagulated with Eliquis.  He has had some paroxysmal episodes over the last week and has been in A. fib for the last 48 hours.  Organ to do is scheduled to come back in a month after being anticoagulated for cardioversion.  I am also going to refer him to a sleep specialist for sleep study.    I spent 30minutes preparing, obtaining and reviewing patient's history and previous testing, seeing the patient and examination, counseling and educating and coordinating care.       Your medication list          Accurate as of March 9, 2021  3:58 PM. If you have any questions, ask your nurse or doctor.             CONTINUE taking these medications      Instructions Last Dose Given Next Dose Due   apixaban 5 MG tablet tablet  Commonly known as: ELIQUIS      Take 1 tablet by mouth Every 12 (Twelve) Hours.       aspirin 81 MG tablet      Take 1 tablet by mouth Daily.       atorvastatin 80 MG tablet  Commonly known as: LIPITOR      Take 1 tablet by mouth Daily. 200001       cetirizine 10 MG tablet  Commonly known as: zyrTEC      Take 10 mg by mouth As Needed.       clobetasol 0.05 % cream  Commonly known as: TEMOVATE      Apply  topically 2 (Two) Times a Day.       diclofenac 75 MG EC tablet  Commonly known as: VOLTAREN      Take 75 mg by mouth 2 (Two) Times a Day.       fish oil 1000 MG capsule capsule      Take 1,000 mg by mouth Daily.       fluocinonide 0.05 % cream  Commonly known as: LIDEX      Apply  topically to the appropriate area as directed 2 (Two) Times a Day.       fluticasone 50 MCG/ACT nasal spray  Commonly known as: FLONASE      1 spray into the nostril(s) as directed by provider Daily.       Fluticasone Furoate 50 MCG/ACT aerosol powder       Inhale.       KENALOG IJ      Inject  as directed.       lisinopril 2.5 MG tablet  Commonly known as: PRINIVIL,ZESTRIL      Take 2.5 mg by mouth Daily.       metaxalone 800 MG tablet  Commonly known as: SKELAXIN      Take 800 mg by mouth As Needed for Muscle Spasms.       metoprolol succinate  MG 24 hr tablet  Commonly known as: Toprol XL      Take 1 tablet by mouth Daily.       Minoxidil 5 % foam      Apply  topically.       nitroglycerin 0.4 MG SL tablet  Commonly known as: NITROSTAT      1 under the tongue as needed for angina, may repeat q5mins for up three doses       omeprazole 40 MG capsule  Commonly known as: priLOSEC      Take 40 mg by mouth.       pimecrolimus 1 % cream  Commonly known as: ELIDEL      Apply  topically 2 (Two) Times a Day.       vitamin D3 125 MCG (5000 UT) capsule capsule      Take 2,000 Units by mouth Every Other Day.       Zetia 10 MG  tablet  Generic drug: ezetimibe      Take 10 mg by mouth daily.                As always, it has been a pleasure to participate in your patient's care.      Sincerely,     Vanessa TAYLOR

## 2021-03-10 ENCOUNTER — TRANSCRIBE ORDERS (OUTPATIENT)
Dept: SLEEP MEDICINE | Facility: HOSPITAL | Age: 71
End: 2021-03-10

## 2021-03-10 DIAGNOSIS — Z01.818 OTHER SPECIFIED PRE-OPERATIVE EXAMINATION: Primary | ICD-10-CM

## 2021-03-23 ENCOUNTER — TELEPHONE (OUTPATIENT)
Dept: CARDIOLOGY | Facility: CLINIC | Age: 71
End: 2021-03-23

## 2021-03-23 NOTE — TELEPHONE ENCOUNTER
Roman calling from pharmacy to verify if metoprolol was supposed to be qd or bid. Please resend.    DA

## 2021-03-24 RX ORDER — METOPROLOL SUCCINATE 100 MG/1
100 TABLET, EXTENDED RELEASE ORAL 2 TIMES DAILY
Qty: 60 TABLET | Refills: 11 | Status: SHIPPED | OUTPATIENT
Start: 2021-03-24 | End: 2022-04-06 | Stop reason: DRUGHIGH

## 2021-04-03 ENCOUNTER — LAB (OUTPATIENT)
Dept: LAB | Facility: HOSPITAL | Age: 71
End: 2021-04-03

## 2021-04-03 DIAGNOSIS — Z01.818 OTHER SPECIFIED PRE-OPERATIVE EXAMINATION: ICD-10-CM

## 2021-04-03 PROCEDURE — U0005 INFEC AGEN DETEC AMPLI PROBE: HCPCS

## 2021-04-03 PROCEDURE — C9803 HOPD COVID-19 SPEC COLLECT: HCPCS

## 2021-04-03 PROCEDURE — U0004 COV-19 TEST NON-CDC HGH THRU: HCPCS

## 2021-04-05 ENCOUNTER — TRANSCRIBE ORDERS (OUTPATIENT)
Dept: SLEEP MEDICINE | Facility: HOSPITAL | Age: 71
End: 2021-04-05

## 2021-04-05 LAB — SARS-COV-2 RNA RESP QL NAA+PROBE: NOT DETECTED

## 2021-04-06 ENCOUNTER — APPOINTMENT (OUTPATIENT)
Dept: POSTOP/PACU | Facility: HOSPITAL | Age: 71
End: 2021-04-06

## 2021-04-08 ENCOUNTER — HOSPITAL ENCOUNTER (OUTPATIENT)
Dept: POSTOP/PACU | Facility: HOSPITAL | Age: 71
Discharge: HOME | End: 2021-04-08

## 2021-04-08 ENCOUNTER — ANESTHESIA EVENT (OUTPATIENT)
Dept: POSTOP/PACU | Facility: HOSPITAL | Age: 71
End: 2021-04-08

## 2021-04-08 ENCOUNTER — ANESTHESIA (OUTPATIENT)
Dept: POSTOP/PACU | Facility: HOSPITAL | Age: 71
End: 2021-04-08

## 2021-04-08 VITALS
HEART RATE: 67 BPM | RESPIRATION RATE: 18 BRPM | OXYGEN SATURATION: 100 % | DIASTOLIC BLOOD PRESSURE: 71 MMHG | SYSTOLIC BLOOD PRESSURE: 104 MMHG | TEMPERATURE: 97.6 F

## 2021-04-08 VITALS — OXYGEN SATURATION: 100 % | SYSTOLIC BLOOD PRESSURE: 98 MMHG | DIASTOLIC BLOOD PRESSURE: 67 MMHG

## 2021-04-08 DIAGNOSIS — I48.0 PAROXYSMAL A-FIB (HCC): ICD-10-CM

## 2021-04-08 LAB
ANION GAP SERPL CALCULATED.3IONS-SCNC: 6.5 MMOL/L (ref 5–15)
BUN SERPL-MCNC: 11 MG/DL (ref 8–23)
BUN/CREAT SERPL: 16.4 (ref 7–25)
CALCIUM SPEC-SCNC: 8.5 MG/DL (ref 8.6–10.5)
CHLORIDE SERPL-SCNC: 107 MMOL/L (ref 98–107)
CO2 SERPL-SCNC: 27.5 MMOL/L (ref 22–29)
CREAT SERPL-MCNC: 0.67 MG/DL (ref 0.76–1.27)
GFR SERPL CREATININE-BSD FRML MDRD: 118 ML/MIN/1.73
GLUCOSE SERPL-MCNC: 80 MG/DL (ref 65–99)
POTASSIUM SERPL-SCNC: 3.8 MMOL/L (ref 3.5–5.2)
QT INTERVAL: 408 MS
QT INTERVAL: 464 MS
SARS-COV-2 RNA RESP QL NAA+PROBE: NOT DETECTED
SODIUM SERPL-SCNC: 141 MMOL/L (ref 136–145)

## 2021-04-08 PROCEDURE — U0005 INFEC AGEN DETEC AMPLI PROBE: HCPCS | Performed by: INTERNAL MEDICINE

## 2021-04-08 PROCEDURE — S0260 H&P FOR SURGERY: HCPCS | Performed by: INTERNAL MEDICINE

## 2021-04-08 PROCEDURE — 92960 CARDIOVERSION ELECTRIC EXT: CPT | Performed by: INTERNAL MEDICINE

## 2021-04-08 PROCEDURE — 93010 ELECTROCARDIOGRAM REPORT: CPT | Performed by: INTERNAL MEDICINE

## 2021-04-08 PROCEDURE — 93005 ELECTROCARDIOGRAM TRACING: CPT | Performed by: INTERNAL MEDICINE

## 2021-04-08 PROCEDURE — 92960 CARDIOVERSION ELECTRIC EXT: CPT

## 2021-04-08 PROCEDURE — 25010000002 PROPOFOL 10 MG/ML EMULSION: Performed by: NURSE ANESTHETIST, CERTIFIED REGISTERED

## 2021-04-08 PROCEDURE — C9803 HOPD COVID-19 SPEC COLLECT: HCPCS | Performed by: INTERNAL MEDICINE

## 2021-04-08 PROCEDURE — U0003 INFECTIOUS AGENT DETECTION BY NUCLEIC ACID (DNA OR RNA); SEVERE ACUTE RESPIRATORY SYNDROME CORONAVIRUS 2 (SARS-COV-2) (CORONAVIRUS DISEASE [COVID-19]), AMPLIFIED PROBE TECHNIQUE, MAKING USE OF HIGH THROUGHPUT TECHNOLOGIES AS DESCRIBED BY CMS-2020-01-R: HCPCS | Performed by: INTERNAL MEDICINE

## 2021-04-08 PROCEDURE — 80048 BASIC METABOLIC PNL TOTAL CA: CPT | Performed by: INTERNAL MEDICINE

## 2021-04-08 RX ORDER — MIDAZOLAM HYDROCHLORIDE 1 MG/ML
1 INJECTION INTRAMUSCULAR; INTRAVENOUS
Status: CANCELLED | OUTPATIENT
Start: 2021-04-08

## 2021-04-08 RX ORDER — SODIUM CHLORIDE 9 MG/ML
INJECTION, SOLUTION INTRAVENOUS CONTINUOUS PRN
Status: DISCONTINUED | OUTPATIENT
Start: 2021-04-08 | End: 2021-04-08 | Stop reason: SURG

## 2021-04-08 RX ORDER — SODIUM CHLORIDE 0.9 % (FLUSH) 0.9 %
10 SYRINGE (ML) INJECTION EVERY 12 HOURS SCHEDULED
Status: CANCELLED | OUTPATIENT
Start: 2021-04-08

## 2021-04-08 RX ORDER — FAMOTIDINE 10 MG/ML
20 INJECTION, SOLUTION INTRAVENOUS
Status: CANCELLED | OUTPATIENT
Start: 2021-04-08

## 2021-04-08 RX ORDER — PROPOFOL 10 MG/ML
VIAL (ML) INTRAVENOUS AS NEEDED
Status: DISCONTINUED | OUTPATIENT
Start: 2021-04-08 | End: 2021-04-08 | Stop reason: SURG

## 2021-04-08 RX ORDER — SODIUM CHLORIDE, SODIUM LACTATE, POTASSIUM CHLORIDE, CALCIUM CHLORIDE 600; 310; 30; 20 MG/100ML; MG/100ML; MG/100ML; MG/100ML
9 INJECTION, SOLUTION INTRAVENOUS CONTINUOUS PRN
Status: CANCELLED | OUTPATIENT
Start: 2021-04-08

## 2021-04-08 RX ORDER — SODIUM CHLORIDE 0.9 % (FLUSH) 0.9 %
10 SYRINGE (ML) INJECTION AS NEEDED
Status: CANCELLED | OUTPATIENT
Start: 2021-04-08

## 2021-04-08 RX ORDER — SODIUM CHLORIDE, SODIUM LACTATE, POTASSIUM CHLORIDE, CALCIUM CHLORIDE 600; 310; 30; 20 MG/100ML; MG/100ML; MG/100ML; MG/100ML
INJECTION, SOLUTION INTRAVENOUS CONTINUOUS PRN
Status: DISCONTINUED | OUTPATIENT
Start: 2021-04-08 | End: 2021-04-08 | Stop reason: SURG

## 2021-04-08 RX ORDER — MIDAZOLAM HYDROCHLORIDE 1 MG/ML
0.5 INJECTION INTRAMUSCULAR; INTRAVENOUS
Status: CANCELLED | OUTPATIENT
Start: 2021-04-08

## 2021-04-08 RX ADMIN — PROPOFOL 20 MG: 10 INJECTION, EMULSION INTRAVENOUS at 07:25

## 2021-04-08 RX ADMIN — PROPOFOL 100 MG: 10 INJECTION, EMULSION INTRAVENOUS at 07:20

## 2021-04-08 RX ADMIN — SODIUM CHLORIDE: 9 INJECTION, SOLUTION INTRAVENOUS at 07:17

## 2021-04-08 RX ADMIN — PROPOFOL 30 MG: 10 INJECTION, EMULSION INTRAVENOUS at 07:21

## 2021-04-08 NOTE — H&P
Date of Office Visit: 2021  Encounter Provider: BRANDON Pizarro  Place of Service: [facility] CARDIOLOGY  Patient Name: Alden Pratt  :1950         Chief Complaint   Patient presents with   • Atrial Fibrillation   :      HPI: Alden Haider is a 70-year-old male who is a patient of Dr. Ball and is new to me today.  He had an MI in  and had a stent to his RCA.  He had other significant disease his stents ended up renarrowing and he has an ischemic cardiomyopathy and had an ICD placed.  He then ended up having bypass with a LIMA to the LAD, vein graft to distal LAD, vein graft to OM1, vein graft to PDA, vein graft to posterior lateral artery and vein graft to the RV branch.  He is done really well and had some PVCs.  He had a pretty emotional dramatic fall he ended up having V. fib and got shocks from his defibrillator which shocked him into A. fib in the past.  He was put on anticoagulation and got cardioverted.  Since then he had not had any more A. fib he was maintained on metoprolol 75 mg twice a day and his anticoagulation had been stopped.  He had a video visit with Dr. Ball in July of last year and was doing well.  Patient called last week stating that he felt like he had gone back into A. fib I increase his beta-blockers to 100 mg metoprolol succinate and put him back on Eliquis.  He comes in today for evaluation.     His heart rate is much better controlled he is having some palpitations.  But for the most part it is much better.  He also is a little short of breath with activity.  He denies any chest pain.  He had an echo in  that was unchanged from prior, his stress test in 2018 showed an old infarct but no new areas of ischemia, his recent thyroid testing in 2020 was stable.  His blood pressures are well controlled.  He does admit to some snoring at night and afternoon napping.     Previous testing and notes have been  reviewed by me.   Medical History        Past Medical History:   Diagnosis Date   • Arthritis     • CAD (coronary artery disease)       atheroclerosis   • Cancer (CMS/HCC)       prostate   • Chest pain     • CHF (congestive heart failure) (CMS/HCC)     • Elevated cholesterol     • GERD (gastroesophageal reflux disease)     • Hyperlipidemia     • Implantable cardioverter-defibrillator (ICD) discharge     • Myocardial infarction (CMS/HCC)       inferior   • PVC (premature ventricular contraction)     • PVD (peripheral vascular disease) (CMS/HCC)     • Status post phlebectomy       varicose veins   • Thyroid nodule 7/18/2014   • VT (ventricular tachycardia) (CMS/[facility])              Surgical History         Past Surgical History:   Procedure Laterality Date   • CARDIAC CATHETERIZATION       • CARDIAC DEFIBRILLATOR PLACEMENT       • CORONARY ARTERY BYPASS GRAFT         stents   • CORONARY STENT PLACEMENT       • MICROAMBULATORY PHLEBECTOMY       • THYROID BIOPSY                Social History   Social History            Socioeconomic History   • Marital status:        Spouse name: Not on file   • Number of children: Not on file   • Years of education: Not on file   • Highest education level: Not on file   Tobacco Use   • Smoking status: Never Smoker   • Smokeless tobacco: Never Used   Substance and Sexual Activity   • Alcohol use: Yes       Comment: rare   • Drug use: No   • Sexual activity: Defer                  Family History   Problem Relation Age of Onset   • Coronary artery disease Other     • Stroke Other     • Hypertension Other     • Heart disease Father     • Heart failure Father     • Stroke Father     • No Known Problems Maternal Grandmother     • No Known Problems Maternal Grandfather     • No Known Problems Paternal Grandmother     • No Known Problems Paternal Grandfather           Review of Systems   Constitutional: Negative for diaphoresis and malaise/fatigue.   Cardiovascular: Positive for dyspnea on  exertion and palpitations. Negative for chest pain, claudication, irregular heartbeat, leg swelling, near-syncope, orthopnea, paroxysmal nocturnal dyspnea and syncope.   Respiratory: Negative for cough, shortness of breath and sleep disturbances due to breathing.    Musculoskeletal: Negative for falls.   Neurological: Negative for dizziness and weakness.   Psychiatric/Behavioral: Negative for altered mental status and substance abuse.              Allergies   Allergen Reactions   • Contrast Dye     • Iodinated Diagnostic Agents     • Sudafed 12 Hour [Pseudoephedrine Hcl Er]     • Pseudoephedrine Palpitations            Current Outpatient Medications:   •  apixaban (ELIQUIS) 5 MG tablet tablet, Take 1 tablet by mouth Every 12 (Twelve) Hours., Disp: 60 tablet, Rfl: 11  •  aspirin 81 MG tablet, Take 1 tablet by mouth Daily., Disp: 30 tablet, Rfl: 11  •  atorvastatin (LIPITOR) 80 MG tablet, Take 1 tablet by mouth Daily. 200001, Disp: 90 tablet, Rfl: 2  •  Cholecalciferol (VITAMIN D3) 5000 UNITS capsule capsule, Take 2,000 Units by mouth Every Other Day., Disp: , Rfl:   •  clobetasol (TEMOVATE) 0.05 % cream, Apply  topically 2 (Two) Times a Day., Disp: , Rfl:   •  diclofenac (VOLTAREN) 75 MG EC tablet, Take 75 mg by mouth 2 (Two) Times a Day., Disp: , Rfl:   •  ezetimibe (ZETIA) 10 MG tablet, Take 10 mg by mouth daily., Disp: , Rfl:   •  fluocinonide (LIDEX) 0.05 % cream, Apply  topically to the appropriate area as directed 2 (Two) Times a Day., Disp: , Rfl:   •  fluticasone (FLONASE) 50 MCG/ACT nasal spray, 1 spray into the nostril(s) as directed by provider Daily., Disp: , Rfl:   •  Fluticasone Furoate 50 MCG/ACT aerosol powder , Inhale., Disp: , Rfl:   •  lisinopril (PRINIVIL,ZESTRIL) 2.5 MG tablet, Take 2.5 mg by mouth Daily., Disp: , Rfl:   •  metaxalone (SKELAXIN) 800 MG tablet, Take 800 mg by mouth As Needed for Muscle Spasms., Disp: , Rfl:   •  metoprolol succinate XL (Toprol XL) 100 MG 24 hr tablet, Take 1  "tablet by mouth Daily., Disp: 60 tablet, Rfl: 11  •  Minoxidil 5 % foam, Apply  topically., Disp: , Rfl:   •  nitroglycerin (NITROSTAT) 0.4 MG SL tablet, 1 under the tongue as needed for angina, may repeat q5mins for up three doses, Disp: 100 tablet, Rfl: 11  •  Omega-3 Fatty Acids (FISH OIL) 1000 MG capsule capsule, Take 1,000 mg by mouth Daily., Disp: , Rfl:   •  omeprazole (priLOSEC) 40 MG capsule, Take 40 mg by mouth., Disp: , Rfl:   •  pimecrolimus (ELIDEL) 1 % cream, Apply  topically 2 (Two) Times a Day., Disp: , Rfl:   •  Triamcinolone Acetonide (KENALOG IJ), Inject  as directed., Disp: , Rfl:   •  cetirizine (ZyrTEC) 10 MG tablet, Take 10 mg by mouth As Needed., Disp: , Rfl:       Objective:      Vitals       Vitals:     03/09/21 1432   Height: 188 cm (74\")         Body mass index is 25.04 kg/m².     PHYSICAL EXAM:     Constitutional:       General: Not in acute distress.     Appearance: Normal appearance. Well-developed.   Eyes:      Pupils: Pupils are equal, round, and reactive to light.   HENT:      Head: Normocephalic.   Neck:      Vascular: No carotid bruit or JVD.   Pulmonary:      Effort: Pulmonary effort is normal. No tachypnea.      Breath sounds: Normal breath sounds. No wheezing. No rales.   Cardiovascular:      Normal rate. Irregularly irregular rhythm.      No gallop.   Pulses:     Intact distal pulses.   Abdominal:      General: Bowel sounds are normal.      Palpations: Abdomen is soft.      Tenderness: There is no abdominal tenderness.   Musculoskeletal: Normal range of motion.      Cervical back: Normal range of motion and neck supple. No edema. Skin:     General: Skin is warm and dry.   Neurological:      Mental Status: Alert and oriented to person, place, and time.             ECG 12 Lead     Date/Time: 3/9/2021 3:47 PM  Performed by: Vanessa Echavarria APRN  Authorized by: Vanessa Echavarria APRN   Comparison: compared with previous ECG   Rhythm: atrial fibrillation  Rate: normal  QRS " axis: normal  Pacing: ventricular pacing  Clinical impression: abnormal EKG                Assessment:        Diagnosis Plan   1. Snoring  Ambulatory Referral to Sleep Medicine   2. History of MI (myocardial infarction)      3. S/P CABG (coronary artery bypass graft)      4. Ischemic cardiomyopathy      5. Hyperlipidemia with target LDL less than 70      6. Coronary artery disease involving native coronary artery of native heart without angina pectoris      7. Paroxysmal A-fib (CMS/HCC)  Cardioversion External in Cardiology Department     ECG 12 Lead   8. PVC (premature ventricular contraction)               Orders Placed This Encounter   Procedures   • Ambulatory Referral to Sleep Medicine       Referral Priority:   Routine       Referral Type:   Consultation       Referral Reason:   Specialty Services Required       Referred to Provider:   Prakash Dooley MD       Requested Specialty:   Sleep Medicine       Number of Visits Requested:   1   • Cardioversion External in Cardiology Department       Standing Status:   Future       Standing Expiration Date:   3/9/2022       Order Specific Question:   What type of sedation does the patient require?       Answer:   Moderate Sedation       Order Specific Question:   At which hospital location will this be performed?       Answer:   [place]       Order Specific Question:   Reason for Exam:       Answer:   atrial fibrillation   • ECG 12 Lead       This order was created via procedure documentation            Plan:       Working to keep him rate controlled on metoprolol succinate 100 mg twice a day.  He is anticoagulated with Eliquis.  He has had some paroxysmal episodes over the last week and has been in A. fib for the last 48 hours.  Organ to do is scheduled to come back in a month after being anticoagulated for cardioversion.  I am also going to refer him to a sleep specialist for sleep study.     He came in with symptomatic paroxysmal A. fib a month ago has been  anticoagulated and compliant with his medications now brought for elective cardioversion we will have to interrogate his device afterward.  I think we may asked the arrhythmia service to see him he is too young for amiodarone and may need sotalol.  We will cardiovert him today and see how he does I have spoken with him and his family about it they agree and understand

## 2021-04-08 NOTE — ANESTHESIA POSTPROCEDURE EVALUATION
Patient: Alden Pratt    Procedure Summary     Date: 04/08/21 Room / Location: [facility] PACU    Anesthesia Start: 0717 Anesthesia Stop: 0727    Procedure: CARDIOVERSION EXTERNAL IN CARDIOLOGY DEPARTMENT Diagnosis:       Paroxysmal A-fib (CMS/HCC)      (atrial fibrillation)    Scheduled Providers: Andrew Ball MD Provider: Shreyas Curtis MD    Anesthesia Type: MAC ASA Status: 3          Anesthesia Type: MAC    Vitals  Vitals Value Taken Time   /67 04/08/21 0750   Temp     Pulse 70 04/08/21 0755   Resp 16 04/08/21 0750   SpO2 96 % 04/08/21 0755   Vitals shown include unvalidated device data.        Post Anesthesia Care and Evaluation    Patient location during evaluation: bedside  Patient participation: complete - patient participated  Level of consciousness: awake and alert  Pain management: adequate  Airway patency: patent  Anesthetic complications: No anesthetic complications    Cardiovascular status: acceptable  Respiratory status: acceptable  Hydration status: acceptable    Comments: /71   Pulse 67   Temp 36.4 °C (97.6 °F) (Oral)   Resp 18   SpO2 100%

## 2021-04-08 NOTE — ANESTHESIA PREPROCEDURE EVALUATION
Anesthesia Evaluation     Patient summary reviewed   NPO Solid Status: > 8 hours             Airway   Mallampati: I  Neck ROM: full  No difficulty expected  Dental      Pulmonary    Cardiovascular     ECG reviewed  Rhythm: irregular    (+) pacemaker ICD, pacemaker, CABG 3-6 Months, dysrhythmias Atrial Fib,       Neuro/Psych  GI/Hepatic/Renal/Endo      Musculoskeletal     Abdominal    Substance History      OB/GYN          Other                        Anesthesia Plan    ASA 3     MAC       Anesthetic plan, all risks, benefits, and alternatives have been provided, discussed and informed consent has been obtained with: patient.  Use of blood products discussed with patient .

## 2021-04-20 PROCEDURE — 93295 DEV INTERROG REMOTE 1/2/MLT: CPT | Performed by: INTERNAL MEDICINE

## 2021-04-20 PROCEDURE — 93296 REM INTERROG EVL PM/IDS: CPT | Performed by: INTERNAL MEDICINE

## 2021-04-22 ENCOUNTER — OFFICE VISIT (OUTPATIENT)
Dept: CARDIOLOGY | Facility: CLINIC | Age: 71
End: 2021-04-22

## 2021-04-22 VITALS
RESPIRATION RATE: 16 BRPM | WEIGHT: 196 LBS | HEART RATE: 84 BPM | HEIGHT: 74 IN | OXYGEN SATURATION: 99 % | SYSTOLIC BLOOD PRESSURE: 116 MMHG | BODY MASS INDEX: 25.15 KG/M2 | DIASTOLIC BLOOD PRESSURE: 78 MMHG

## 2021-04-22 DIAGNOSIS — I48.19 PERSISTENT ATRIAL FIBRILLATION (HCC): Primary | ICD-10-CM

## 2021-04-22 PROBLEM — I48.0 PAROXYSMAL A-FIB (HCC): Status: RESOLVED | Noted: 2021-03-09 | Resolved: 2021-04-22

## 2021-04-22 PROCEDURE — 93000 ELECTROCARDIOGRAM COMPLETE: CPT | Performed by: INTERNAL MEDICINE

## 2021-04-22 PROCEDURE — 99214 OFFICE O/P EST MOD 30 MIN: CPT | Performed by: INTERNAL MEDICINE

## 2021-04-22 NOTE — PROGRESS NOTES
Date of Office Visit: 2021  Encounter Provider: Robby Ngo MD  Place of Service: [facility] CARDIOLOGY  Patient Name: Alden Pratt  :1950    Chief Complaint   Patient presents with   • Atrial Fibrillation     2 week follow up from [facility] CABG, MI, PAF, PVC, CAD   :     HPI: Alden Pratt is a 70 y.o. male who presents today for follow-up for persistent atrial fibrillation.     Ischemic cardiomyopathy, dual chamber ICD implanted in .     The patient has persistent atrial fibrillation with previous episode in 2018. Interestingly, at that time he also had SCD from VT.  He was in atrial fibrillation for about month before cardioversion.     He had not any more episode until 3/7.  He work-up with severe palpitations.  He had associated fatigue.  Device interrogation confirmed persistent fib.  He was cardioverted again on .     He has felt fine since cardioversion.  No events.      He is very worried about stroke, as his father had a stroke, due to atrial fibrillation.             Past Medical History:   Diagnosis Date   • Arthritis    • CAD (coronary artery disease)     atheroclerosis   • Cancer (CMS/HCC)     prostate   • Chest pain    • CHF (congestive heart failure) (CMS/HCC)    • Elevated cholesterol    • GERD (gastroesophageal reflux disease)    • Hyperlipidemia    • Implantable cardioverter-defibrillator (ICD) discharge    • Myocardial infarction (CMS/HCC)     inferior   • PVC (premature ventricular contraction)    • PVD (peripheral vascular disease) (CMS/HCC)    • Status post phlebectomy     varicose veins   • Thyroid nodule 2014   • VT (ventricular tachycardia) (CMS/HCC)        Past Surgical History:   Procedure Laterality Date   • CARDIAC CATHETERIZATION     • CARDIAC DEFIBRILLATOR PLACEMENT     • CORONARY ARTERY BYPASS GRAFT      stents   • CORONARY STENT PLACEMENT     • MICROAMBULATORY PHLEBECTOMY     • THYROID BIOPSY         Social History      Socioeconomic History   • Marital status:      Spouse name: Not on file   • Number of children: Not on file   • Years of education: Not on file   • Highest education level: Not on file   Tobacco Use   • Smoking status: Never Smoker   • Smokeless tobacco: Never Used   Substance and Sexual Activity   • Alcohol use: Yes     Comment: rare   • Drug use: No   • Sexual activity: Defer       Family History   Problem Relation Age of Onset   • Coronary artery disease Other    • Stroke Other    • Hypertension Other    • Heart disease Father    • Heart failure Father    • Stroke Father    • No Known Problems Maternal Grandmother    • No Known Problems Maternal Grandfather    • No Known Problems Paternal Grandmother    • No Known Problems Paternal Grandfather        Review of Systems   Constitutional: Negative.   Cardiovascular: Positive for palpitations.   Respiratory: Negative.    Gastrointestinal: Negative.        Allergies   Allergen Reactions   • Contrast Dye    • Iodinated Diagnostic Agents    • Sudafed 12 Hour [Pseudoephedrine Hcl Er]    • Pseudoephedrine Palpitations         Current Outpatient Medications:   •  apixaban (ELIQUIS) 5 MG tablet tablet, Take 1 tablet by mouth Every 12 (Twelve) Hours., Disp: 60 tablet, Rfl: 11  •  atorvastatin (LIPITOR) 80 MG tablet, Take 1 tablet by mouth Daily. 200001, Disp: 90 tablet, Rfl: 2  •  cetirizine (ZyrTEC) 10 MG tablet, Take 10 mg by mouth As Needed. Only Occasionally will use, Disp: , Rfl:   •  Cholecalciferol (VITAMIN D3) 5000 UNITS capsule capsule, Take 2,000 Units by mouth Every Other Day., Disp: , Rfl:   •  clobetasol (TEMOVATE) 0.05 % cream, Apply  topically 2 (Two) Times a Day., Disp: , Rfl:   •  diclofenac (VOLTAREN) 75 MG EC tablet, Take 75 mg by mouth 2 (Two) Times a Day. Takes very rarely -- maybe once a week., Disp: , Rfl:   •  ezetimibe (ZETIA) 10 MG tablet, Take 10 mg by mouth daily., Disp: , Rfl:   •  fluocinonide (LIDEX) 0.05 % cream, Apply  topically to  "the appropriate area as directed 2 (Two) Times a Day., Disp: , Rfl:   •  fluticasone (FLONASE) 50 MCG/ACT nasal spray, 1 spray into the nostril(s) as directed by provider Daily., Disp: , Rfl:   •  Fluticasone Furoate 50 MCG/ACT aerosol powder , Inhale., Disp: , Rfl:   •  lisinopril (PRINIVIL,ZESTRIL) 2.5 MG tablet, Take 2.5 mg by mouth Daily., Disp: , Rfl:   •  metoprolol succinate XL (Toprol XL) 100 MG 24 hr tablet, Take 1 tablet by mouth 2 (two) times a day., Disp: 60 tablet, Rfl: 11  •  Minoxidil 5 % foam, Apply  topically., Disp: , Rfl:   •  nitroglycerin (NITROSTAT) 0.4 MG SL tablet, 1 under the tongue as needed for angina, may repeat q5mins for up three doses, Disp: 100 tablet, Rfl: 11  •  omeprazole (priLOSEC) 40 MG capsule, Take 40 mg by mouth., Disp: , Rfl:   •  pimecrolimus (ELIDEL) 1 % cream, Apply  topically 2 (Two) Times a Day., Disp: , Rfl:   •  dronedarone (MULTAQ) 400 MG tablet, Take 1 tablet by mouth 2 (Two) Times a Day With Meals., Disp: 60 tablet, Rfl: 3  •  metaxalone (SKELAXIN) 800 MG tablet, Take 800 mg by mouth As Needed for Muscle Spasms., Disp: , Rfl:   •  Triamcinolone Acetonide (KENALOG IJ), Inject  as directed., Disp: , Rfl:       Objective:     Vitals:    04/22/21 1342   BP: 116/78   BP Location: Right arm   Patient Position: Lying   Cuff Size: Adult   Pulse: 84   Resp: 16   SpO2: 99%   Weight: 88.9 kg (196 lb)   Height: 188 cm (74\")     Body mass index is 25.16 kg/m².    PHYSICAL EXAM:    Vitals and nursing note reviewed.   Constitutional:       Appearance: Healthy appearance.   HENT:      Head: Normocephalic.    Mouth/Throat:      Pharynx: Oropharynx is clear.   Pulmonary:      Effort: Pulmonary effort is normal.   Cardiovascular:      Normal rate. Regular rhythm.   Neurological:      Mental Status: Alert, oriented to person, place, and time and oriented to person, place and time.   Psychiatric:         Attention and Perception: Attention and perception normal.         Mood and Affect: " Mood normal.         Behavior: Behavior is cooperative.             ECG 12 Lead    Date/Time: 4/22/2021 3:25 PM  Performed by: Robby Ngo MD  Authorized by: Robby Ngo MD   Comparison: compared with previous ECG from 4/8/2021  Comparison to previous ECG: Atrial paced rhythm has replaced atrial fibrillation.   Rhythm: paced  Ectopy: infrequent PVCs        I reviewed his 4/8 BMP.  His Cr was normal.     I reviewed 3/21 office note       Assessment:       Diagnosis Plan   1. Persistent atrial fibrillation (CMS/HCC)            Plan:       The patient has recurrent moderate to severely symptomatic persistent atrial fibrillation with two prior cardioversion. I think we should pursue rhythm control.  History of CAD, so can't use 1c.  Will start dronedarone 400 mg BID.  If he has further episodes, will consider catheter ablation.  Return in 3 months for follow-up of atrial fibrillation.      Continue eliquis 5 mg BID.     As always, it has been a pleasure to participate in your patient's care.      Sincerely,         Robby Ngo MD

## 2021-04-29 ENCOUNTER — APPOINTMENT (OUTPATIENT)
Dept: SLEEP MEDICINE | Facility: HOSPITAL | Age: 71
End: 2021-04-29

## 2021-05-05 ENCOUNTER — TRANSCRIBE ORDERS (OUTPATIENT)
Dept: CARDIOLOGY | Facility: CLINIC | Age: 71
End: 2021-05-05

## 2021-05-05 ENCOUNTER — LAB (OUTPATIENT)
Dept: LAB | Facility: HOSPITAL | Age: 71
End: 2021-05-05

## 2021-05-05 ENCOUNTER — OFFICE VISIT (OUTPATIENT)
Dept: CARDIOLOGY | Facility: CLINIC | Age: 71
End: 2021-05-05

## 2021-05-05 ENCOUNTER — TRANSCRIBE ORDERS (OUTPATIENT)
Dept: SLEEP MEDICINE | Facility: HOSPITAL | Age: 71
End: 2021-05-05

## 2021-05-05 VITALS
BODY MASS INDEX: 24.9 KG/M2 | DIASTOLIC BLOOD PRESSURE: 78 MMHG | HEIGHT: 74 IN | WEIGHT: 194 LBS | SYSTOLIC BLOOD PRESSURE: 118 MMHG | HEART RATE: 78 BPM

## 2021-05-05 DIAGNOSIS — I20.0 UNSTABLE ANGINA PECTORIS (HCC): ICD-10-CM

## 2021-05-05 DIAGNOSIS — Z13.6 SCREENING FOR CARDIOVASCULAR CONDITION: ICD-10-CM

## 2021-05-05 DIAGNOSIS — I21.11 ST ELEVATION MYOCARDIAL INFARCTION INVOLVING RIGHT CORONARY ARTERY (HCC): Primary | ICD-10-CM

## 2021-05-05 DIAGNOSIS — Z01.818 OTHER SPECIFIED PRE-OPERATIVE EXAMINATION: Primary | ICD-10-CM

## 2021-05-05 DIAGNOSIS — I48.0 PAROXYSMAL ATRIAL FIBRILLATION (HCC): ICD-10-CM

## 2021-05-05 DIAGNOSIS — I25.5 ISCHEMIC CARDIOMYOPATHY: ICD-10-CM

## 2021-05-05 DIAGNOSIS — Z01.810 PREPROCEDURAL CARDIOVASCULAR EXAMINATION: ICD-10-CM

## 2021-05-05 DIAGNOSIS — R06.09 DYSPNEA ON EXERTION: ICD-10-CM

## 2021-05-05 DIAGNOSIS — Z13.6 SCREENING FOR CARDIOVASCULAR CONDITION: Primary | ICD-10-CM

## 2021-05-05 DIAGNOSIS — Z95.1 S/P CABG (CORONARY ARTERY BYPASS GRAFT): ICD-10-CM

## 2021-05-05 LAB
ANION GAP SERPL CALCULATED.3IONS-SCNC: 7.1 MMOL/L (ref 5–15)
BASOPHILS # BLD AUTO: 0.02 10*3/MM3 (ref 0–0.2)
BASOPHILS NFR BLD AUTO: 0.5 % (ref 0–1.5)
BUN SERPL-MCNC: 10 MG/DL (ref 8–23)
BUN/CREAT SERPL: 13.5 (ref 7–25)
CALCIUM SPEC-SCNC: 8.7 MG/DL (ref 8.6–10.5)
CHLORIDE SERPL-SCNC: 105 MMOL/L (ref 98–107)
CO2 SERPL-SCNC: 27.9 MMOL/L (ref 22–29)
CREAT SERPL-MCNC: 0.74 MG/DL (ref 0.76–1.27)
DEPRECATED RDW RBC AUTO: 40.3 FL (ref 37–54)
EOSINOPHIL # BLD AUTO: 0.05 10*3/MM3 (ref 0–0.4)
EOSINOPHIL NFR BLD AUTO: 1.2 % (ref 0.3–6.2)
ERYTHROCYTE [DISTWIDTH] IN BLOOD BY AUTOMATED COUNT: 13.7 % (ref 12.3–15.4)
GFR SERPL CREATININE-BSD FRML MDRD: 105 ML/MIN/1.73
GLUCOSE SERPL-MCNC: 84 MG/DL (ref 65–99)
HCT VFR BLD AUTO: 38.1 % (ref 37.5–51)
HGB BLD-MCNC: 11.9 G/DL (ref 13–17.7)
IMM GRANULOCYTES # BLD AUTO: 0.01 10*3/MM3 (ref 0–0.05)
IMM GRANULOCYTES NFR BLD AUTO: 0.2 % (ref 0–0.5)
LYMPHOCYTES # BLD AUTO: 1.26 10*3/MM3 (ref 0.7–3.1)
LYMPHOCYTES NFR BLD AUTO: 29.8 % (ref 19.6–45.3)
MCH RBC QN AUTO: 25.4 PG (ref 26.6–33)
MCHC RBC AUTO-ENTMCNC: 31.2 G/DL (ref 31.5–35.7)
MCV RBC AUTO: 81.2 FL (ref 79–97)
MONOCYTES # BLD AUTO: 0.42 10*3/MM3 (ref 0.1–0.9)
MONOCYTES NFR BLD AUTO: 9.9 % (ref 5–12)
NEUTROPHILS NFR BLD AUTO: 2.47 10*3/MM3 (ref 1.7–7)
NEUTROPHILS NFR BLD AUTO: 58.4 % (ref 42.7–76)
NRBC BLD AUTO-RTO: 0 /100 WBC (ref 0–0.2)
PLATELET # BLD AUTO: 143 10*3/MM3 (ref 140–450)
PMV BLD AUTO: 11.9 FL (ref 6–12)
POTASSIUM SERPL-SCNC: 4.5 MMOL/L (ref 3.5–5.2)
RBC # BLD AUTO: 4.69 10*6/MM3 (ref 4.14–5.8)
SODIUM SERPL-SCNC: 140 MMOL/L (ref 136–145)
WBC # BLD AUTO: 4.23 10*3/MM3 (ref 3.4–10.8)

## 2021-05-05 PROCEDURE — 93000 ELECTROCARDIOGRAM COMPLETE: CPT | Performed by: INTERNAL MEDICINE

## 2021-05-05 PROCEDURE — 85025 COMPLETE CBC W/AUTO DIFF WBC: CPT

## 2021-05-05 PROCEDURE — 36415 COLL VENOUS BLD VENIPUNCTURE: CPT

## 2021-05-05 PROCEDURE — 80048 BASIC METABOLIC PNL TOTAL CA: CPT

## 2021-05-05 PROCEDURE — 99214 OFFICE O/P EST MOD 30 MIN: CPT | Performed by: INTERNAL MEDICINE

## 2021-05-05 RX ORDER — CIMETIDINE 300 MG/1
TABLET, FILM COATED ORAL
Qty: 3 TABLET | Refills: 0 | Status: ON HOLD | OUTPATIENT
Start: 2021-05-05 | End: 2021-08-02

## 2021-05-05 RX ORDER — PREDNISONE 20 MG/1
TABLET ORAL
Qty: 6 TABLET | Refills: 0 | Status: SHIPPED | OUTPATIENT
Start: 2021-05-05 | End: 2021-06-29

## 2021-05-05 NOTE — PROGRESS NOTES
Date of Office Visit: 21  Encounter Provider: Andrew Ball MD  Place of Service: [facility] CARDIOLOGY  Patient Name: Alden Pratt  :1950  [id number]    Chief Complaint   Patient presents with   • Coronary Artery Disease   :     HPI: Alden Pratt is a 70 y.o. male  he had an MI in .  At that time we did an angioplasty and stenting to his RCA but he had other significant disease his stents ended up renarrowing down he had an ischemic myopathy also and had an ICD placed we did a bypass on him and a LIMA to his LAD a vein to his distal LAD a vein to an OM 1 a vein to the PDA vein to the KIERAN a vein to the RV branch.  He is done really pretty well he has had chronic PVCs even before the infarct that persisted some.  He had pretty emotionally dramatic reunion with his brother he ended up having VF and got a shock from his defibrillator.  The shocked knocked him into A. fib with anticoagulate him and then cardiovert him out.  So earlier in the month he went into atrial fibrillation again and did not feel good we cardioverted him out of it he is actually seen the arrhythmia service they put him on Multaq.  He does not feel any better he felt a little bit better after he was cardioverted but really he is describing that if he tries to exert himself he has heaviness in his chest and shortness of breath he denies PND orthopnea edema syncope palpitations.  He is not having bleeding difficulty.  He is a little bit nauseated since the Multaq but it really sounds like he is having angina    Past Medical History:   Diagnosis Date   • Arthritis    • CAD (coronary artery disease)     atheroclerosis   • Cancer (CMS/[facility])     prostate   • Chest pain    • CHF (congestive heart failure) (CMS/[facility])    • Elevated cholesterol    • GERD (gastroesophageal reflux disease)    • Hyperlipidemia    • Implantable cardioverter-defibrillator (ICD) discharge    • Myocardial infarction  (CMS/HCC)     inferior   • PVC (premature ventricular contraction)    • PVD (peripheral vascular disease) (CMS/HCC)    • Status post phlebectomy     varicose veins   • Thyroid nodule 7/18/2014   • VT (ventricular tachycardia) (CMS/HCC)        Past Surgical History:   Procedure Laterality Date   • CARDIAC CATHETERIZATION     • CARDIAC DEFIBRILLATOR PLACEMENT     • CORONARY ARTERY BYPASS GRAFT      stents   • CORONARY STENT PLACEMENT     • MICROAMBULATORY PHLEBECTOMY     • THYROID BIOPSY         Social History     Socioeconomic History   • Marital status:      Spouse name: Not on file   • Number of children: Not on file   • Years of education: Not on file   • Highest education level: Not on file   Tobacco Use   • Smoking status: Never Smoker   • Smokeless tobacco: Never Used   Substance and Sexual Activity   • Alcohol use: Yes     Comment: rare   • Drug use: No   • Sexual activity: Defer       Family History   Problem Relation Age of Onset   • Coronary artery disease Other    • Stroke Other    • Hypertension Other    • Heart disease Father    • Heart failure Father    • Stroke Father    • No Known Problems Maternal Grandmother    • No Known Problems Maternal Grandfather    • No Known Problems Paternal Grandmother    • No Known Problems Paternal Grandfather        Review of Systems   Constitutional: Negative for decreased appetite, fever, malaise/fatigue and weight loss.   HENT: Negative for nosebleeds.    Eyes: Negative for double vision.   Cardiovascular: Negative for chest pain, claudication, cyanosis, dyspnea on exertion, irregular heartbeat, leg swelling, near-syncope, orthopnea, palpitations, paroxysmal nocturnal dyspnea and syncope.   Respiratory: Negative for cough, hemoptysis and shortness of breath.    Hematologic/Lymphatic: Negative for bleeding problem.   Skin: Negative for rash.   Musculoskeletal: Negative for falls and myalgias.   Gastrointestinal: Negative for hematochezia, jaundice, melena,  nausea and vomiting.   Genitourinary: Negative for hematuria.   Neurological: Negative for dizziness and seizures.   Psychiatric/Behavioral: Negative for altered mental status and memory loss.       Allergies   Allergen Reactions   • Contrast Dye    • Iodinated Diagnostic Agents    • Sudafed 12 Hour [Pseudoephedrine Hcl Er]    • Pseudoephedrine Palpitations         Current Outpatient Medications:   •  apixaban (ELIQUIS) 5 MG tablet tablet, Take 1 tablet by mouth Every 12 (Twelve) Hours., Disp: 60 tablet, Rfl: 11  •  atorvastatin (LIPITOR) 80 MG tablet, Take 1 tablet by mouth Daily. 200001, Disp: 90 tablet, Rfl: 2  •  cetirizine (ZyrTEC) 10 MG tablet, Take 10 mg by mouth As Needed. Only Occasionally will use, Disp: , Rfl:   •  Cholecalciferol (VITAMIN D3) 5000 UNITS capsule capsule, Take 2,000 Units by mouth Every Other Day., Disp: , Rfl:   •  clobetasol (TEMOVATE) 0.05 % cream, Apply  topically 2 (Two) Times a Day., Disp: , Rfl:   •  diclofenac (VOLTAREN) 75 MG EC tablet, Take 75 mg by mouth 2 (Two) Times a Day. Takes very rarely -- maybe once a week., Disp: , Rfl:   •  dronedarone (MULTAQ) 400 MG tablet, Take 1 tablet by mouth 2 (Two) Times a Day With Meals., Disp: 180 tablet, Rfl: 0  •  ezetimibe (ZETIA) 10 MG tablet, Take 10 mg by mouth daily., Disp: , Rfl:   •  fluocinonide (LIDEX) 0.05 % cream, Apply  topically to the appropriate area as directed 2 (Two) Times a Day., Disp: , Rfl:   •  fluticasone (FLONASE) 50 MCG/ACT nasal spray, 1 spray into the nostril(s) as directed by provider Daily., Disp: , Rfl:   •  lisinopril (PRINIVIL,ZESTRIL) 2.5 MG tablet, Take 2.5 mg by mouth Daily., Disp: , Rfl:   •  metaxalone (SKELAXIN) 800 MG tablet, Take 800 mg by mouth As Needed for Muscle Spasms., Disp: , Rfl:   •  metoprolol succinate XL (Toprol XL) 100 MG 24 hr tablet, Take 1 tablet by mouth 2 (two) times a day., Disp: 60 tablet, Rfl: 11  •  Minoxidil 5 % foam, Apply  topically., Disp: , Rfl:   •  nitroglycerin (NITROSTAT)  "0.4 MG SL tablet, 1 under the tongue as needed for angina, may repeat q5mins for up three doses, Disp: 100 tablet, Rfl: 11  •  omeprazole (priLOSEC) 40 MG capsule, Take 40 mg by mouth., Disp: , Rfl:   •  pimecrolimus (ELIDEL) 1 % cream, Apply  topically 2 (Two) Times a Day., Disp: , Rfl:   •  Fluticasone Furoate 50 MCG/ACT aerosol powder , Inhale., Disp: , Rfl:   •  Triamcinolone Acetonide (KENALOG IJ), Inject  as directed., Disp: , Rfl:       Objective:     Vitals:    05/05/21 1422   BP: 118/78   Pulse: 78   Weight: 88 kg (194 lb)   Height: 188 cm (74\")     Body mass index is 24.91 kg/m².    Physical Exam  Constitutional:       Appearance: He is well-developed.   HENT:      Head: Normocephalic.   Eyes:      General: No scleral icterus.  Neck:      Thyroid: No thyromegaly.      Vascular: No JVD.   Cardiovascular:      Rate and Rhythm: Normal rate and regular rhythm.      Heart sounds: Normal heart sounds. No murmur heard.   No friction rub. No gallop.    Pulmonary:      Effort: Pulmonary effort is normal.      Breath sounds: Normal breath sounds. No wheezing or rales.   Chest:       Abdominal:      Palpations: Abdomen is soft.      Tenderness: There is no abdominal tenderness.   Musculoskeletal:         General: Normal range of motion.   Lymphadenopathy:      Cervical: No cervical adenopathy.   Skin:     General: Skin is warm and dry.      Findings: No rash.   Neurological:      Mental Status: He is alert and oriented to person, place, and time.           ECG 12 Lead    Date/Time: 5/5/2021 3:35 PM  Performed by: Andrew Ball MD  Authorized by: Andrew Ball MD   Comparison: compared with previous ECG   Similar to previous ECG  Rhythm: paced  Ectopy: unifocal PVCs  Other findings: non-specific ST-T wave changes    Clinical impression: abnormal EKG             Assessment:       Diagnosis Plan   1. ST elevation myocardial infarction involving right coronary artery (CMS/HCC)  Case Request Cath Lab: Left Heart " Cath   2. S/P CABG (coronary artery bypass graft)  Case Request Cath Lab: Left Heart Cath   3. Ischemic cardiomyopathy  Case Request Cath Lab: Left Heart Cath   4. Paroxysmal atrial fibrillation (CMS/HCC)  Case Request Cath Lab: Left Heart Cath   5. Dyspnea on exertion  Case Request Cath Lab: Left Heart Cath   6. Unstable angina pectoris (CMS/HCC)  Case Request Cath Lab: Left Heart Cath          Plan:       So I am concerned that he is got new onset of angina and that since he is not any better after we put him in sinus rhythm and he is in sinus rhythm today I think we need to look at other things going on with him I am going to set him up to get a left heart catheter be from his left wrist he had a CABG 15 years ago we have never looked at it since further decisions will be based on the findings at the time of the cath but this would be considered in a unstable angina.  His medical therapy is pretty good we will get a hold his Eliquis 2 days before the cath and then after the cath will get it discussed with the arrhythmia service about further treatment    As always, it has been a pleasure to participate in your patient's care.      Sincerely,       Andrew Ball MD

## 2021-05-07 ENCOUNTER — LAB (OUTPATIENT)
Dept: LAB | Facility: HOSPITAL | Age: 71
End: 2021-05-07

## 2021-05-07 DIAGNOSIS — Z01.818 OTHER SPECIFIED PRE-OPERATIVE EXAMINATION: ICD-10-CM

## 2021-05-07 PROCEDURE — U0004 COV-19 TEST NON-CDC HGH THRU: HCPCS

## 2021-05-07 PROCEDURE — U0005 INFEC AGEN DETEC AMPLI PROBE: HCPCS

## 2021-05-07 PROCEDURE — C9803 HOPD COVID-19 SPEC COLLECT: HCPCS

## 2021-05-08 LAB — SARS-COV-2 ORF1AB RESP QL NAA+PROBE: NOT DETECTED

## 2021-05-10 ENCOUNTER — HOSPITAL ENCOUNTER (OUTPATIENT)
Facility: HOSPITAL | Age: 71
Setting detail: HOSPITAL OUTPATIENT SURGERY
Discharge: HOME | End: 2021-05-10
Attending: INTERNAL MEDICINE | Admitting: INTERNAL MEDICINE

## 2021-05-10 VITALS
TEMPERATURE: 98.2 F | DIASTOLIC BLOOD PRESSURE: 62 MMHG | RESPIRATION RATE: 18 BRPM | HEART RATE: 76 BPM | OXYGEN SATURATION: 96 % | WEIGHT: 190 LBS | SYSTOLIC BLOOD PRESSURE: 114 MMHG | HEIGHT: 74 IN | BODY MASS INDEX: 24.38 KG/M2

## 2021-05-10 DIAGNOSIS — R06.09 DYSPNEA ON EXERTION: ICD-10-CM

## 2021-05-10 DIAGNOSIS — I21.11 ST ELEVATION MYOCARDIAL INFARCTION INVOLVING RIGHT CORONARY ARTERY (HCC): ICD-10-CM

## 2021-05-10 DIAGNOSIS — I20.0 UNSTABLE ANGINA PECTORIS (HCC): ICD-10-CM

## 2021-05-10 DIAGNOSIS — I48.0 PAROXYSMAL ATRIAL FIBRILLATION (HCC): ICD-10-CM

## 2021-05-10 DIAGNOSIS — Z95.1 S/P CABG (CORONARY ARTERY BYPASS GRAFT): ICD-10-CM

## 2021-05-10 DIAGNOSIS — I25.5 ISCHEMIC CARDIOMYOPATHY: ICD-10-CM

## 2021-05-10 PROCEDURE — C1769 GUIDE WIRE: HCPCS | Performed by: INTERNAL MEDICINE

## 2021-05-10 PROCEDURE — 25010000002 FENTANYL CITRATE (PF) 100 MCG/2ML SOLUTION: Performed by: INTERNAL MEDICINE

## 2021-05-10 PROCEDURE — 25010000002 HEPARIN (PORCINE) PER 1000 UNITS: Performed by: INTERNAL MEDICINE

## 2021-05-10 PROCEDURE — 0 IOPAMIDOL PER 1 ML: Performed by: INTERNAL MEDICINE

## 2021-05-10 PROCEDURE — 25010000002 MIDAZOLAM PER 1 MG: Performed by: INTERNAL MEDICINE

## 2021-05-10 PROCEDURE — 99153 MOD SED SAME PHYS/QHP EA: CPT | Performed by: INTERNAL MEDICINE

## 2021-05-10 PROCEDURE — C1894 INTRO/SHEATH, NON-LASER: HCPCS | Performed by: INTERNAL MEDICINE

## 2021-05-10 PROCEDURE — 93459 L HRT ART/GRFT ANGIO: CPT | Performed by: INTERNAL MEDICINE

## 2021-05-10 PROCEDURE — 99152 MOD SED SAME PHYS/QHP 5/>YRS: CPT | Performed by: INTERNAL MEDICINE

## 2021-05-10 RX ORDER — SODIUM CHLORIDE 0.9 % (FLUSH) 0.9 %
3 SYRINGE (ML) INJECTION EVERY 12 HOURS SCHEDULED
Status: DISCONTINUED | OUTPATIENT
Start: 2021-05-10 | End: 2021-05-10 | Stop reason: HOSPADM

## 2021-05-10 RX ORDER — FENTANYL CITRATE 50 UG/ML
INJECTION, SOLUTION INTRAMUSCULAR; INTRAVENOUS AS NEEDED
Status: DISCONTINUED | OUTPATIENT
Start: 2021-05-10 | End: 2021-05-10 | Stop reason: HOSPADM

## 2021-05-10 RX ORDER — SODIUM CHLORIDE 9 MG/ML
75 INJECTION, SOLUTION INTRAVENOUS CONTINUOUS
Status: DISCONTINUED | OUTPATIENT
Start: 2021-05-10 | End: 2021-05-10 | Stop reason: HOSPADM

## 2021-05-10 RX ORDER — SODIUM CHLORIDE 0.9 % (FLUSH) 0.9 %
10 SYRINGE (ML) INJECTION AS NEEDED
Status: DISCONTINUED | OUTPATIENT
Start: 2021-05-10 | End: 2021-05-10 | Stop reason: HOSPADM

## 2021-05-10 RX ORDER — EMPAGLIFLOZIN 10 MG/1
10 TABLET, FILM COATED ORAL DAILY
Qty: 30 TABLET | Refills: 6 | Status: SHIPPED | OUTPATIENT
Start: 2021-05-10 | End: 2021-06-18 | Stop reason: SDUPTHER

## 2021-05-10 RX ORDER — LIDOCAINE HYDROCHLORIDE 20 MG/ML
INJECTION, SOLUTION INFILTRATION; PERINEURAL AS NEEDED
Status: DISCONTINUED | OUTPATIENT
Start: 2021-05-10 | End: 2021-05-10 | Stop reason: HOSPADM

## 2021-05-10 RX ORDER — ACETAMINOPHEN 325 MG/1
650 TABLET ORAL EVERY 4 HOURS PRN
Status: DISCONTINUED | OUTPATIENT
Start: 2021-05-10 | End: 2021-05-10 | Stop reason: HOSPADM

## 2021-05-10 RX ORDER — LISINOPRIL 5 MG/1
5 TABLET ORAL DAILY
Qty: 90 TABLET | Refills: 2 | Status: SHIPPED | OUTPATIENT
Start: 2021-05-10 | End: 2021-06-29 | Stop reason: ALTCHOICE

## 2021-05-10 RX ORDER — MIDAZOLAM HYDROCHLORIDE 1 MG/ML
INJECTION INTRAMUSCULAR; INTRAVENOUS AS NEEDED
Status: DISCONTINUED | OUTPATIENT
Start: 2021-05-10 | End: 2021-05-10 | Stop reason: HOSPADM

## 2021-05-10 RX ADMIN — SODIUM CHLORIDE 75 ML/HR: 9 INJECTION, SOLUTION INTRAVENOUS at 07:39

## 2021-05-11 ENCOUNTER — TELEPHONE (OUTPATIENT)
Dept: CARDIOLOGY | Facility: CLINIC | Age: 71
End: 2021-05-11

## 2021-05-11 NOTE — TELEPHONE ENCOUNTER
Please call patient with lab results from 5/5, per patient request. [phone]     Thank you    Kostas DENISE

## 2021-05-18 ENCOUNTER — TELEPHONE (OUTPATIENT)
Dept: CARDIAC REHAB | Facility: HOSPITAL | Age: 71
End: 2021-05-18

## 2021-05-18 NOTE — TELEPHONE ENCOUNTER
Second phone call to pt to schedule cardiac rehab. Pt says he is trying to decide whether to attend our program or to attend at [facility]. Pt says he went [facility]. Pt related his heart history. Says once he has toured both programs he will make a decision and schedule his appt. I reviewed program benefits, goals, and length of time in program. He has our contact number and  Shanika's number.

## 2021-05-25 ENCOUNTER — TELEPHONE (OUTPATIENT)
Dept: CARDIOLOGY | Facility: HOSPITAL | Age: 71
End: 2021-05-25

## 2021-05-25 NOTE — TELEPHONE ENCOUNTER
Pt called to cancel his appt. tomorrow.  He  said he has decided to go to the [facility] and [facility] as they are closer to where he lives. He asked if he should let Dr. Ball know.  I told him we can pass it along, but he can also let Dr. Ball know so the [facility] can keep Dr. Ball informed of his progress.  I asked him to please call us if he changes his mind or we can be of service in the future.  Understanding and agreement verbalized.    Luciana Moise RN  [facility]

## 2021-06-07 ENCOUNTER — TRANSCRIBE ORDERS (OUTPATIENT)
Dept: CARDIAC REHAB | Facility: HOSPITAL | Age: 71
End: 2021-06-07

## 2021-06-07 DIAGNOSIS — I21.11 STEMI INVOLVING RIGHT CORONARY ARTERY (HCC): Primary | ICD-10-CM

## 2021-06-11 ENCOUNTER — OFFICE VISIT (OUTPATIENT)
Dept: CARDIOLOGY | Facility: CLINIC | Age: 71
End: 2021-06-11

## 2021-06-11 VITALS
DIASTOLIC BLOOD PRESSURE: 68 MMHG | HEART RATE: 70 BPM | BODY MASS INDEX: 24.77 KG/M2 | WEIGHT: 193 LBS | HEIGHT: 74 IN | SYSTOLIC BLOOD PRESSURE: 110 MMHG

## 2021-06-11 DIAGNOSIS — I21.11 ST ELEVATION MYOCARDIAL INFARCTION INVOLVING RIGHT CORONARY ARTERY (HCC): Primary | ICD-10-CM

## 2021-06-11 DIAGNOSIS — I49.3 PVC (PREMATURE VENTRICULAR CONTRACTION): ICD-10-CM

## 2021-06-11 DIAGNOSIS — Z95.1 S/P CABG (CORONARY ARTERY BYPASS GRAFT): ICD-10-CM

## 2021-06-11 DIAGNOSIS — I25.5 ISCHEMIC CARDIOMYOPATHY: ICD-10-CM

## 2021-06-11 PROCEDURE — 99214 OFFICE O/P EST MOD 30 MIN: CPT | Performed by: INTERNAL MEDICINE

## 2021-06-11 PROCEDURE — 93000 ELECTROCARDIOGRAM COMPLETE: CPT | Performed by: INTERNAL MEDICINE

## 2021-06-11 RX ORDER — ASPIRIN 81 MG/1
81 TABLET ORAL DAILY
COMMUNITY

## 2021-06-11 NOTE — PROGRESS NOTES
Date of Office Visit: 21  Encounter Provider: Andrew Ball MD  Place of Service: [facility] CARDIOLOGY  Patient Name: Alden Pratt  :1950  [id number]    Chief Complaint   Patient presents with   • Coronary Artery Disease   :     HPI: Alden Pratt is a 70 y.o. male  he had an MI in .  At that time we did an angioplasty and stenting to his RCA but he had other significant disease his stents ended up renarrowing down he had an ischemic myopathy also and had an ICD placed we did a bypass on him and a LIMA to his LAD a vein to his distal LAD a vein to an OM 1 a vein to the PDA vein to the KIERAN a vein to the RV branch.  He is done really pretty well he has had chronic PVCs even before the infarct that persisted some.  He had pretty emotionally dramatic reunion with his brother he ended up having VF and got a shock from his defibrillator.  The shocked knocked him into A. fib with anticoagulate him and then cardiovert him out.  So earlier in the month he went into atrial fibrillation again and did not feel good we cardioverted him out of it he is actually seen the arrhythmia service they put him on Multaq.  He does not feel any better.    We were concerned that he was having angina and so we cathed him and May 2021 and this showed 6 of 6 bypasses were patent severe cardiomyopathy we added some Jardiance to his regimen now he is here for follow-up.  He has followed up with the [facility] heart failure clinic.  This is recommended to him by his daughter and son-in-law.  They gave him some Farxiga samples and also doubled his lisinopril and then he had a hypotensive episode and had to go back to his regular dose of lisinopril and they stopped the Farxiga so we really have not moved forward.  He otherwise does not really change his chest pain that he was having is almost evaporated he supposed to start rehab soon.      Past Medical History:   Diagnosis Date    • Arthritis    • CAD (coronary artery disease)     atheroclerosis   • Cancer (CMS/HCC)     prostate   • Chest pain    • CHF (congestive heart failure) (CMS/[facility])    • Elevated cholesterol    • GERD (gastroesophageal reflux disease)    • Hyperlipidemia    • Implantable cardioverter-defibrillator (ICD) discharge    • Myocardial infarction (CMS/HCC)     inferior   • PVC (premature ventricular contraction)    • PVD (peripheral vascular disease) (CMS/[facility])    • Status post phlebectomy     varicose veins   • Thyroid nodule 7/18/2014   • VT (ventricular tachycardia) (CMS/[facility])        Past Surgical History:   Procedure Laterality Date   • CARDIAC CATHETERIZATION     • CARDIAC CATHETERIZATION Left 5/10/2021    Procedure: Left Heart Cath;  Surgeon: Andrew Ball MD;  [facility];  Service: Cardiology;  Laterality: Left;   • CARDIAC CATHETERIZATION N/A 5/10/2021    Procedure: Coronary angiography;  Surgeon: Andrew Ball MD;  [facility];  Service: Cardiology;  Laterality: N/A;   • CARDIAC CATHETERIZATION N/A 5/10/2021    Procedure: Left ventriculography;  Surgeon: Andrew Ball MD;  [facility];  Service: Cardiology;  Laterality: N/A;   • CARDIAC CATHETERIZATION  5/10/2021    Procedure: Saphenous Vein Graft;  Surgeon: Andrew Ball MD;  [facility];  Service: Cardiology;;   • CARDIAC DEFIBRILLATOR PLACEMENT     • CORONARY ARTERY BYPASS GRAFT      stents   • CORONARY STENT PLACEMENT     • HAND SURGERY Left    • KNEE SURGERY Left    • MICROAMBULATORY PHLEBECTOMY     • SHOULDER SURGERY Right    • THYROID BIOPSY     • VARICOSE VEIN SURGERY         Social History     Socioeconomic History   • Marital status:      Spouse name: Not on file   • Number of children: Not on file   • Years of education: Not on file   • Highest education level: Not on file   Tobacco Use   • Smoking status: Never Smoker   •  Smokeless tobacco: Never Used   Substance and Sexual Activity   • Alcohol use: Yes     Comment: rare   • Drug use: No   • Sexual activity: Defer       Family History   Problem Relation Age of Onset   • Coronary artery disease Other    • Stroke Other    • Hypertension Other    • Heart disease Father    • Heart failure Father    • Stroke Father    • No Known Problems Maternal Grandmother    • No Known Problems Maternal Grandfather    • No Known Problems Paternal Grandmother    • No Known Problems Paternal Grandfather        Review of Systems   Constitutional: Negative for decreased appetite, fever, malaise/fatigue and weight loss.   HENT: Negative for nosebleeds.    Eyes: Negative for double vision.   Cardiovascular: Negative for chest pain, claudication, cyanosis, dyspnea on exertion, irregular heartbeat, leg swelling, near-syncope, orthopnea, palpitations, paroxysmal nocturnal dyspnea and syncope.   Respiratory: Negative for cough, hemoptysis and shortness of breath.    Hematologic/Lymphatic: Negative for bleeding problem.   Skin: Negative for rash.   Musculoskeletal: Negative for falls and myalgias.   Gastrointestinal: Negative for hematochezia, jaundice, melena, nausea and vomiting.   Genitourinary: Negative for hematuria.   Neurological: Negative for dizziness and seizures.   Psychiatric/Behavioral: Negative for altered mental status and memory loss.       Allergies   Allergen Reactions   • Contrast Dye Hives   • Iodinated Diagnostic Agents    • Sudafed 12 Hour [Pseudoephedrine Hcl Er]    • Pseudoephedrine Palpitations         Current Outpatient Medications:   •  apixaban (ELIQUIS) 5 MG tablet tablet, Take 1 tablet by mouth Every 12 (Twelve) Hours., Disp: 60 tablet, Rfl: 11  •  aspirin 81 MG EC tablet, Take 81 mg by mouth Daily., Disp: , Rfl:   •  atorvastatin (LIPITOR) 80 MG tablet, Take 1 tablet by mouth Daily. 200001, Disp: 90 tablet, Rfl: 2  •  cetirizine (ZyrTEC) 10 MG tablet, Take 10 mg by mouth As Needed.  Only Occasionally will use, Disp: , Rfl:   •  Cholecalciferol (VITAMIN D3) 5000 UNITS capsule capsule, Take 2,000 Units by mouth Every Other Day., Disp: , Rfl:   •  clobetasol (TEMOVATE) 0.05 % cream, Apply  topically 2 (Two) Times a Day., Disp: , Rfl:   •  diclofenac (VOLTAREN) 75 MG EC tablet, Take 75 mg by mouth 2 (Two) Times a Day. Takes very rarely -- maybe once a week., Disp: , Rfl:   •  Empagliflozin (Jardiance) 10 MG tablet, Take 10 mg by mouth Daily., Disp: 30 tablet, Rfl: 6  •  ezetimibe (ZETIA) 10 MG tablet, Take 10 mg by mouth daily., Disp: , Rfl:   •  fluocinonide (LIDEX) 0.05 % cream, Apply  topically to the appropriate area as directed 2 (Two) Times a Day., Disp: , Rfl:   •  fluticasone (FLONASE) 50 MCG/ACT nasal spray, 1 spray into the nostril(s) as directed by provider Daily., Disp: , Rfl:   •  lisinopril (PRINIVIL,ZESTRIL) 5 MG tablet, Take 1 tablet by mouth Daily., Disp: 90 tablet, Rfl: 2  •  metaxalone (SKELAXIN) 800 MG tablet, Take 800 mg by mouth As Needed for Muscle Spasms., Disp: , Rfl:   •  metoprolol succinate XL (Toprol XL) 100 MG 24 hr tablet, Take 1 tablet by mouth 2 (two) times a day., Disp: 60 tablet, Rfl: 11  •  Minoxidil 5 % foam, Apply  topically 2 (two) times a day., Disp: , Rfl:   •  nitroglycerin (NITROSTAT) 0.4 MG SL tablet, 1 under the tongue as needed for angina, may repeat q5mins for up three doses, Disp: 100 tablet, Rfl: 11  •  omeprazole (priLOSEC) 40 MG capsule, Take 40 mg by mouth., Disp: , Rfl:   •  pimecrolimus (ELIDEL) 1 % cream, Apply  topically 2 (Two) Times a Day., Disp: , Rfl:   •  cimetidine (TAGAMET) 300 MG tablet, On the day before your procedure - Take 1 tablet at 9am, take 1 tablet at 3pm, and 1 tablet at 9pm, Disp: 3 tablet, Rfl: 0  •  diphenhydrAMINE (BENADRYL) 50 MG tablet, On the day before your procedure - Take 1 tablet at 9am, take 1 tablet at 3pm, and 1 tablet at 9pm, Disp: 3 tablet, Rfl: 0  •  predniSONE (DELTASONE) 20 MG tablet, On the day before your  "procedure - Take 2 tablets at 9am, take 2 tablets at 3pm, and 2 tablets at 9pm, Disp: 6 tablet, Rfl: 0  •  Triamcinolone Acetonide (KENALOG IJ), Inject  as directed., Disp: , Rfl:       Objective:     Vitals:    06/11/21 1449   BP: 110/68   Pulse: 70   Weight: 87.5 kg (193 lb)   Height: 188 cm (74\")     Body mass index is 24.78 kg/m².    Physical Exam  Constitutional:       Appearance: He is well-developed.   HENT:      Head: Normocephalic.   Eyes:      General: No scleral icterus.  Neck:      Thyroid: No thyromegaly.      Vascular: No JVD.   Cardiovascular:      Rate and Rhythm: Normal rate and regular rhythm.      Heart sounds: Normal heart sounds. No murmur heard.   No friction rub. No gallop.    Pulmonary:      Effort: Pulmonary effort is normal.      Breath sounds: Normal breath sounds. No wheezing or rales.   Chest:       Abdominal:      Palpations: Abdomen is soft.      Tenderness: There is no abdominal tenderness.   Musculoskeletal:         General: Normal range of motion.   Lymphadenopathy:      Cervical: No cervical adenopathy.   Skin:     General: Skin is warm and dry.      Findings: No rash.   Neurological:      Mental Status: He is alert and oriented to person, place, and time.           ECG 12 Lead    Date/Time: 6/11/2021 3:38 PM  Performed by: Andrew Ball MD  Authorized by: Andrew Ball MD   Rhythm: paced  Other findings: non-specific ST-T wave changes    Clinical impression: abnormal EKG             Assessment:       Diagnosis Plan   1. ST elevation myocardial infarction involving right coronary artery (CMS/HCC)     2. S/P CABG (coronary artery bypass graft)     3. Ischemic cardiomyopathy     4. PVC (premature ventricular contraction)            Plan:       Well has a little bit complex the [facility] heart failure docs increase his lisinopril he got orthostatic with that sore back on the regular dose the pharmacy could cause him a lot of money but Jardiance does not for some reason I " guess they felt Farxiga was better but not really commenced and certainly there have been no head-to-head trials.  At this point he is about to start cardiac rehab I would start rehab I would start on the Jardiance because [facility] can [facility] and then I would follow-up with the heart failure docs and then I would reecho his heart.  I would have him come back and see me in 3 months sooner if he has trouble    As always, it has been a pleasure to participate in your patient's care.      Sincerely,       Andrew Ball MD

## 2021-06-14 ENCOUNTER — TELEPHONE (OUTPATIENT)
Dept: CARDIOLOGY | Facility: CLINIC | Age: 71
End: 2021-06-14

## 2021-06-14 NOTE — TELEPHONE ENCOUNTER
Patient sent a remote pacemaker check. He was just seen in office Friday. Said he woke up today feeling very poor.  Sent check and shows he is back in Afib.      Please advise.

## 2021-06-15 NOTE — TELEPHONE ENCOUNTER
Pt notified and verbalized understanding    He would feel better if he came and for a bp machine check.  He is going to call back to schedule.    thanks  Esther Langston RN  Triage nurse

## 2021-06-15 NOTE — TELEPHONE ENCOUNTER
Dr Ball,  Pt is calling in to let you know that his am his bp prior to his meds was 88/51 hr 72 this is per his machine.  His wife took a manual bp and got 88/50.  He said he is SOA and lightheaded.    Cardiac meds  Lisinopril 5mg daily- he held due to low bp  Metoprolol succinate 100mg BID- he took 75mg instead of the full 100mg this am.  I have advised him to hold for sbp <90 and call our office next time.      Pt bp now 93/48 hr 72    Looks like you are trying to set up an ablation for this man.      He is insistent that his machine is accurate,  It has never been checked in our office and stated he has checked his machine against his wife's manual checks.    Does he need a medication adjustment?  Thanks  Esther Langston RN  Triage nurse

## 2021-06-16 ENCOUNTER — TELEPHONE (OUTPATIENT)
Dept: CARDIOLOGY | Facility: CLINIC | Age: 71
End: 2021-06-16

## 2021-06-16 NOTE — TELEPHONE ENCOUNTER
Per Bethanie Cope. [phone], PA for Jardiance 10mg qd is approved. She is faxing approval to me.   I called the pharmacy [phone], opt 1 to let them know.  They need the approval # and expiration date before they can fill.    I will call them back once I get the approval.      lamberto

## 2021-06-17 ENCOUNTER — OFFICE VISIT (OUTPATIENT)
Dept: CARDIAC REHAB | Facility: HOSPITAL | Age: 71
End: 2021-06-17

## 2021-06-17 DIAGNOSIS — I50.22 CHRONIC SYSTOLIC CONGESTIVE HEART FAILURE (HCC): Primary | ICD-10-CM

## 2021-06-17 PROCEDURE — 93797 PHYS/QHP OP CAR RHAB WO ECG: CPT

## 2021-06-17 NOTE — TELEPHONE ENCOUNTER
Tatiana,    I faxed the PA approval from [employer] to [facility]. I spoke w/ the pharmacy and they gave me two fax #'s, both of which I sent the PA to. [phone] & [phone].    I called the pt and left a vm letting him know approval was faxed to [facility] and he should be able to call them and make arrangements for pick-up.    Thank you,    Kiana Corrales, CMA

## 2021-06-18 RX ORDER — EMPAGLIFLOZIN 10 MG/1
10 TABLET, FILM COATED ORAL DAILY
Qty: 90 TABLET | Refills: 2 | Status: SHIPPED | OUTPATIENT
Start: 2021-06-18

## 2021-06-21 ENCOUNTER — TREATMENT (OUTPATIENT)
Dept: CARDIAC REHAB | Facility: HOSPITAL | Age: 71
End: 2021-06-21

## 2021-06-21 DIAGNOSIS — I50.22 CHRONIC SYSTOLIC CONGESTIVE HEART FAILURE (HCC): Primary | ICD-10-CM

## 2021-06-21 PROCEDURE — 93798 PHYS/QHP OP CAR RHAB W/ECG: CPT

## 2021-06-23 ENCOUNTER — TREATMENT (OUTPATIENT)
Dept: CARDIAC REHAB | Facility: HOSPITAL | Age: 71
End: 2021-06-23

## 2021-06-23 DIAGNOSIS — I50.22 CHRONIC SYSTOLIC CONGESTIVE HEART FAILURE (HCC): Primary | ICD-10-CM

## 2021-06-23 PROCEDURE — 93798 PHYS/QHP OP CAR RHAB W/ECG: CPT

## 2021-06-24 ENCOUNTER — APPOINTMENT (OUTPATIENT)
Dept: SLEEP MEDICINE | Facility: HOSPITAL | Age: 71
End: 2021-06-24

## 2021-06-25 ENCOUNTER — TREATMENT (OUTPATIENT)
Dept: CARDIAC REHAB | Facility: HOSPITAL | Age: 71
End: 2021-06-25

## 2021-06-25 DIAGNOSIS — I50.22 CHRONIC SYSTOLIC CONGESTIVE HEART FAILURE (HCC): Primary | ICD-10-CM

## 2021-06-25 PROCEDURE — 93798 PHYS/QHP OP CAR RHAB W/ECG: CPT

## 2021-06-28 ENCOUNTER — TREATMENT (OUTPATIENT)
Dept: CARDIAC REHAB | Facility: HOSPITAL | Age: 71
End: 2021-06-28

## 2021-06-28 DIAGNOSIS — I50.22 CHRONIC SYSTOLIC CONGESTIVE HEART FAILURE (HCC): Primary | ICD-10-CM

## 2021-06-28 PROCEDURE — 93798 PHYS/QHP OP CAR RHAB W/ECG: CPT

## 2021-06-29 ENCOUNTER — CLINICAL SUPPORT NO REQUIREMENTS (OUTPATIENT)
Dept: CARDIOLOGY | Facility: CLINIC | Age: 71
End: 2021-06-29

## 2021-06-29 ENCOUNTER — OFFICE VISIT (OUTPATIENT)
Dept: CARDIOLOGY | Facility: CLINIC | Age: 71
End: 2021-06-29

## 2021-06-29 VITALS
SYSTOLIC BLOOD PRESSURE: 118 MMHG | HEART RATE: 71 BPM | WEIGHT: 194.4 LBS | BODY MASS INDEX: 24.95 KG/M2 | HEIGHT: 74 IN | DIASTOLIC BLOOD PRESSURE: 74 MMHG

## 2021-06-29 DIAGNOSIS — I47.20 VENTRICULAR TACHYCARDIA (HCC): ICD-10-CM

## 2021-06-29 DIAGNOSIS — I48.0 PAROXYSMAL ATRIAL FIBRILLATION (HCC): Primary | ICD-10-CM

## 2021-06-29 DIAGNOSIS — I21.11 ST ELEVATION MYOCARDIAL INFARCTION INVOLVING RIGHT CORONARY ARTERY (HCC): ICD-10-CM

## 2021-06-29 DIAGNOSIS — I25.5 ISCHEMIC CARDIOMYOPATHY: Primary | ICD-10-CM

## 2021-06-29 DIAGNOSIS — I48.0 PAROXYSMAL ATRIAL FIBRILLATION (HCC): ICD-10-CM

## 2021-06-29 PROCEDURE — 99214 OFFICE O/P EST MOD 30 MIN: CPT | Performed by: INTERNAL MEDICINE

## 2021-06-29 PROCEDURE — 93283 PRGRMG EVAL IMPLANTABLE DFB: CPT | Performed by: INTERNAL MEDICINE

## 2021-06-29 PROCEDURE — 93290 INTERROG DEV EVAL ICPMS IP: CPT | Performed by: INTERNAL MEDICINE

## 2021-06-29 PROCEDURE — 93000 ELECTROCARDIOGRAM COMPLETE: CPT | Performed by: INTERNAL MEDICINE

## 2021-06-29 NOTE — PROGRESS NOTES
Date of Office Visit: 2021  Encounter Provider: Robby Ngo MD  Place of Service: [facility] CARDIOLOGY  Patient Name: Alden Pratt III  :1950    Chief Complaint   Patient presents with   • persistent AFIB   :     HPI: Alden Pratt III is a 70 y.o. male who presents today for persistent atrial fibrillation.    Patient with a history of coronary artery disease, prior VF with a appropriate ICD shock, persistent atrial fibrillation.  He has been previously cardioverted twice.  He was treated with dronedarone, but had nausea, and his EF seem to have declined so we stopped the therapy.    He developed recurrent atrial fibrillation after a few weeks.  He can identify exactly when it started.  By ICD interrogation he has been in atrial fibrillation since 4.    He has associated symptoms of increased fatigue.          Past Medical History:   Diagnosis Date   • Arthritis    • CAD (coronary artery disease)     atheroclerosis   • Cancer (CMS/HCC)     prostate   • Chest pain    • CHF (congestive heart failure) (CMS/HCC)    • Elevated cholesterol    • GERD (gastroesophageal reflux disease)    • Hyperlipidemia    • Implantable cardioverter-defibrillator (ICD) discharge    • Myocardial infarction (CMS/HCC)     inferior   • PVC (premature ventricular contraction)    • PVD (peripheral vascular disease) (CMS/HCC)    • Status post phlebectomy     varicose veins   • Thyroid nodule 2014   • VT (ventricular tachycardia) (CMS/HCC)        Past Surgical History:   Procedure Laterality Date   • CARDIAC CATHETERIZATION     • CARDIAC CATHETERIZATION Left 5/10/2021    Procedure: Left Heart Cath;  Surgeon: Andrew Ball MD;  Location:  [facility];  Service: Cardiology;  Laterality: Left;   • CARDIAC CATHETERIZATION N/A 5/10/2021    Procedure: Coronary angiography;  Surgeon: Andrew Ball MD;  Location:  [facility];  Service: Cardiology;   Laterality: N/A;   • CARDIAC CATHETERIZATION N/A 5/10/2021    Procedure: Left ventriculography;  Surgeon: Andrew Ball MD;  Location:  [facility];  Service: Cardiology;  Laterality: N/A;   • CARDIAC CATHETERIZATION  5/10/2021    Procedure: Saphenous Vein Graft;  Surgeon: Andrew Ball MD;  Location:  [facility];  Service: Cardiology;;   • CARDIAC DEFIBRILLATOR PLACEMENT     • CORONARY ARTERY BYPASS GRAFT      stents   • CORONARY STENT PLACEMENT     • HAND SURGERY Left    • KNEE SURGERY Left    • MICROAMBULATORY PHLEBECTOMY     • SHOULDER SURGERY Right    • THYROID BIOPSY     • VARICOSE VEIN SURGERY         Social History     Socioeconomic History   • Marital status:      Spouse name: Not on file   • Number of children: Not on file   • Years of education: Not on file   • Highest education level: Not on file   Tobacco Use   • Smoking status: Never Smoker   • Smokeless tobacco: Never Used   Vaping Use   • Vaping Use: Never used   Substance and Sexual Activity   • Alcohol use: Yes     Comment: rare   • Drug use: No   • Sexual activity: Defer       Family History   Problem Relation Age of Onset   • Coronary artery disease Other    • Stroke Other    • Hypertension Other    • Heart disease Father    • Heart failure Father    • Stroke Father    • No Known Problems Maternal Grandmother    • No Known Problems Maternal Grandfather    • No Known Problems Paternal Grandmother    • No Known Problems Paternal Grandfather        Review of Systems   Constitutional: Positive for malaise/fatigue.   Cardiovascular: Negative.    Respiratory: Negative.    Gastrointestinal: Negative.        Allergies   Allergen Reactions   • Contrast Dye Hives   • Iodinated Diagnostic Agents    • Sudafed 12 Hour [Pseudoephedrine Hcl Er]    • Pseudoephedrine Palpitations         Current Outpatient Medications:   •  apixaban (ELIQUIS) 5 MG tablet tablet, Take 1 tablet by mouth Every 12 (Twelve) Hours.,  Disp: 60 tablet, Rfl: 11  •  aspirin 81 MG EC tablet, Take 81 mg by mouth Daily., Disp: , Rfl:   •  atorvastatin (LIPITOR) 80 MG tablet, Take 1 tablet by mouth Daily. 200001, Disp: 90 tablet, Rfl: 2  •  cetirizine (ZyrTEC) 10 MG tablet, Take 10 mg by mouth As Needed. Only Occasionally will use, Disp: , Rfl:   •  Cholecalciferol (VITAMIN D3) 5000 UNITS capsule capsule, Take 2,000 Units by mouth Every Other Day., Disp: , Rfl:   •  cimetidine (TAGAMET) 300 MG tablet, On the day before your procedure - Take 1 tablet at 9am, take 1 tablet at 3pm, and 1 tablet at 9pm, Disp: 3 tablet, Rfl: 0  •  clobetasol (TEMOVATE) 0.05 % cream, Apply  topically 2 (Two) Times a Day., Disp: , Rfl:   •  diphenhydrAMINE (BENADRYL) 50 MG tablet, On the day before your procedure - Take 1 tablet at 9am, take 1 tablet at 3pm, and 1 tablet at 9pm, Disp: 3 tablet, Rfl: 0  •  Empagliflozin (Jardiance) 10 MG tablet, Take 10 mg by mouth Daily., Disp: 90 tablet, Rfl: 2  •  ezetimibe (ZETIA) 10 MG tablet, Take 10 mg by mouth daily., Disp: , Rfl:   •  fluocinonide (LIDEX) 0.05 % cream, Apply  topically to the appropriate area as directed 2 (Two) Times a Day., Disp: , Rfl:   •  fluticasone (FLONASE) 50 MCG/ACT nasal spray, 1 spray into the nostril(s) as directed by provider Daily., Disp: , Rfl:   •  metaxalone (SKELAXIN) 800 MG tablet, Take 800 mg by mouth As Needed for Muscle Spasms., Disp: , Rfl:   •  metoprolol succinate XL (Toprol XL) 100 MG 24 hr tablet, Take 1 tablet by mouth 2 (two) times a day., Disp: 60 tablet, Rfl: 11  •  Minoxidil 5 % foam, Apply  topically 2 (two) times a day., Disp: , Rfl:   •  nitroglycerin (NITROSTAT) 0.4 MG SL tablet, 1 under the tongue as needed for angina, may repeat q5mins for up three doses, Disp: 100 tablet, Rfl: 11  •  omeprazole (priLOSEC) 40 MG capsule, Take 40 mg by mouth., Disp: , Rfl:   •  pimecrolimus (ELIDEL) 1 % cream, Apply  topically 2 (Two) Times a Day., Disp: , Rfl:   •  Triamcinolone Acetonide (KENALOG  "IJ), Inject  as directed., Disp: , Rfl:   •  diclofenac (VOLTAREN) 75 MG EC tablet, Take 75 mg by mouth 2 (Two) Times a Day. Takes very rarely -- maybe once a week., Disp: , Rfl:       Objective:     Vitals:    06/29/21 1118   BP: 118/74   Pulse: 71   Weight: 88.2 kg (194 lb 6.4 oz)   Height: 188 cm (74\")     Body mass index is 24.96 kg/m².    PHYSICAL EXAM:    Vitals and nursing note reviewed.   Constitutional:       Appearance: Normal and healthy appearance.   Cardiovascular:      PMI at left midclavicular line. Normal rate.   Edema:     Peripheral edema absent.   Neurological:      Mental Status: Alert, oriented to person, place, and time and oriented to person, place and time.   Psychiatric:         Attention and Perception: Attention and perception normal.             ECG 12 Lead    Date/Time: 6/29/2021 4:24 PM  Performed by: Robby Ngo MD  Authorized by: Robby Ngo MD   Comparison: compared with previous ECG from 6/11/2021  Rhythm: atrial fibrillation and paced        I reviewed his 5/21 lab work.  His creatinine was normal.    I reviewed his 5/10/2021 heart catheterization.  No interview notable coronary disease.      Assessment:       Diagnosis Plan   1. Paroxysmal atrial fibrillation (CMS/HCC)     2. ST elevation myocardial infarction involving right coronary artery (CMS/HCC)            Plan:       Patient has symptomatic persistent atrial fibrillation.  He had nausea with dronedarone, as well as heart failure.  However, soon after stopping that he relapsed into atrial fibrillation.  I discussed with him that my recommendation was to proceed with pulmonary vein isolation procedure.  We discussed the risk versus benefit of the procedure.  He is reluctant due to the risk of stroke.  We discussed the potential benefit in patients with heart failure with reduced ejection fraction.  He is going to consider the procedure.  As an alternative we discussed treatment with amiodarone.  We " discussed the risks associated with long-term amiodarone treatment, the need for monitoring, and inferior efficacy compared to ablation.    He is going to consider it, and let me know after he completes cardiac rehab.    As always, it has been a pleasure to participate in your patient's care.      Sincerely,         Robby Ngo MD

## 2021-06-30 ENCOUNTER — TREATMENT (OUTPATIENT)
Dept: CARDIAC REHAB | Facility: HOSPITAL | Age: 71
End: 2021-06-30

## 2021-06-30 DIAGNOSIS — I50.22 CHRONIC SYSTOLIC CONGESTIVE HEART FAILURE (HCC): Primary | ICD-10-CM

## 2021-06-30 PROCEDURE — 93798 PHYS/QHP OP CAR RHAB W/ECG: CPT

## 2021-07-02 ENCOUNTER — TREATMENT (OUTPATIENT)
Dept: CARDIAC REHAB | Facility: HOSPITAL | Age: 71
End: 2021-07-02

## 2021-07-02 DIAGNOSIS — I50.22 CHRONIC SYSTOLIC CONGESTIVE HEART FAILURE (HCC): Primary | ICD-10-CM

## 2021-07-02 PROCEDURE — 93798 PHYS/QHP OP CAR RHAB W/ECG: CPT

## 2021-07-06 ENCOUNTER — TELEPHONE (OUTPATIENT)
Dept: CARDIOLOGY | Facility: CLINIC | Age: 71
End: 2021-07-06

## 2021-07-06 DIAGNOSIS — I48.19 ATRIAL FIBRILLATION, PERSISTENT (HCC): Primary | ICD-10-CM

## 2021-07-07 ENCOUNTER — TELEPHONE (OUTPATIENT)
Dept: CARDIAC REHAB | Facility: HOSPITAL | Age: 71
End: 2021-07-07

## 2021-07-07 ENCOUNTER — TREATMENT (OUTPATIENT)
Dept: CARDIAC REHAB | Facility: HOSPITAL | Age: 71
End: 2021-07-07

## 2021-07-07 DIAGNOSIS — I50.22 CHRONIC SYSTOLIC CONGESTIVE HEART FAILURE (HCC): Primary | ICD-10-CM

## 2021-07-07 PROCEDURE — 93798 PHYS/QHP OP CAR RHAB W/ECG: CPT

## 2021-07-07 NOTE — TELEPHONE ENCOUNTER
Patient had his first couplet while in Cardiac Rehab today. Occurred while walking on the treadmill; pt was asymptomatic and reported taking all medications as prescribed. Pt notified of ectopy. Thank you.

## 2021-07-08 PROBLEM — I48.19 ATRIAL FIBRILLATION, PERSISTENT: Status: ACTIVE | Noted: 2021-07-08

## 2021-07-09 ENCOUNTER — TRANSCRIBE ORDERS (OUTPATIENT)
Dept: CARDIOLOGY | Facility: CLINIC | Age: 71
End: 2021-07-09

## 2021-07-09 ENCOUNTER — APPOINTMENT (OUTPATIENT)
Dept: CARDIAC REHAB | Facility: HOSPITAL | Age: 71
End: 2021-07-09

## 2021-07-09 DIAGNOSIS — Z01.810 PREPROCEDURAL CARDIOVASCULAR EXAMINATION: ICD-10-CM

## 2021-07-09 DIAGNOSIS — Z01.818 OTHER SPECIFIED PRE-OPERATIVE EXAMINATION: Primary | ICD-10-CM

## 2021-07-09 DIAGNOSIS — Z13.6 SCREENING FOR CARDIOVASCULAR CONDITION: Primary | ICD-10-CM

## 2021-07-09 DIAGNOSIS — I48.19 ATRIAL FIBRILLATION, PERSISTENT (HCC): ICD-10-CM

## 2021-07-12 ENCOUNTER — TREATMENT (OUTPATIENT)
Dept: CARDIAC REHAB | Facility: HOSPITAL | Age: 71
End: 2021-07-12

## 2021-07-12 DIAGNOSIS — I50.22 CHRONIC SYSTOLIC CONGESTIVE HEART FAILURE (HCC): Primary | ICD-10-CM

## 2021-07-12 PROCEDURE — 93798 PHYS/QHP OP CAR RHAB W/ECG: CPT

## 2021-07-14 ENCOUNTER — TREATMENT (OUTPATIENT)
Dept: CARDIAC REHAB | Facility: HOSPITAL | Age: 71
End: 2021-07-14

## 2021-07-14 DIAGNOSIS — I50.22 CHRONIC SYSTOLIC CONGESTIVE HEART FAILURE (HCC): Primary | ICD-10-CM

## 2021-07-14 PROCEDURE — 93798 PHYS/QHP OP CAR RHAB W/ECG: CPT

## 2021-07-16 ENCOUNTER — TREATMENT (OUTPATIENT)
Dept: CARDIAC REHAB | Facility: HOSPITAL | Age: 71
End: 2021-07-16

## 2021-07-16 DIAGNOSIS — I50.22 CHRONIC SYSTOLIC CONGESTIVE HEART FAILURE (HCC): Primary | ICD-10-CM

## 2021-07-16 PROCEDURE — 93798 PHYS/QHP OP CAR RHAB W/ECG: CPT

## 2021-07-19 ENCOUNTER — TREATMENT (OUTPATIENT)
Dept: CARDIAC REHAB | Facility: HOSPITAL | Age: 71
End: 2021-07-19

## 2021-07-19 DIAGNOSIS — I50.22 CHRONIC SYSTOLIC CONGESTIVE HEART FAILURE (HCC): Primary | ICD-10-CM

## 2021-07-19 PROCEDURE — 93798 PHYS/QHP OP CAR RHAB W/ECG: CPT

## 2021-07-20 PROCEDURE — 93296 REM INTERROG EVL PM/IDS: CPT | Performed by: INTERNAL MEDICINE

## 2021-07-20 PROCEDURE — 93295 DEV INTERROG REMOTE 1/2/MLT: CPT | Performed by: INTERNAL MEDICINE

## 2021-07-21 ENCOUNTER — TREATMENT (OUTPATIENT)
Dept: CARDIAC REHAB | Facility: HOSPITAL | Age: 71
End: 2021-07-21

## 2021-07-21 DIAGNOSIS — I50.22 CHRONIC SYSTOLIC CONGESTIVE HEART FAILURE (HCC): Primary | ICD-10-CM

## 2021-07-21 PROCEDURE — 93798 PHYS/QHP OP CAR RHAB W/ECG: CPT

## 2021-07-21 NOTE — PROGRESS NOTES
In the next few weeks you may receive a Press Ganey survey regarding your most recent clinic visit with us.  Please take a few moments out of your day to accurately evaluate your visit.  We strive to provide you with the best medical care and hope you are happy with our services 100% of the time.  We consider any rating lower than a 9/10 unacceptable. If you believe you would rate our clinic less than this please let us know how we can improve.  Again, thank you for your time and we look forward to your next visit.           We want to give the best care possible. If you receive a Press Ganey survey from our office, please take the time to fill out the survey and return it in the envelope provided. Your feedback helps us know how we are doing and we really appreciate it. Thank you.       See session report

## 2021-07-23 ENCOUNTER — TREATMENT (OUTPATIENT)
Dept: CARDIAC REHAB | Facility: HOSPITAL | Age: 71
End: 2021-07-23

## 2021-07-23 DIAGNOSIS — I50.22 CHRONIC SYSTOLIC CONGESTIVE HEART FAILURE (HCC): Primary | ICD-10-CM

## 2021-07-23 PROCEDURE — 93798 PHYS/QHP OP CAR RHAB W/ECG: CPT

## 2021-07-23 NOTE — CONSULTS
Met with patient and discussed stress and stress mgt. Defined stress and discussed why it can be a problem for the body. Discussed ways pt is managing stress and explored ways for pt to manage stress.

## 2021-07-26 ENCOUNTER — TREATMENT (OUTPATIENT)
Dept: CARDIAC REHAB | Facility: HOSPITAL | Age: 71
End: 2021-07-26

## 2021-07-26 DIAGNOSIS — I50.22 CHRONIC SYSTOLIC CONGESTIVE HEART FAILURE (HCC): Primary | ICD-10-CM

## 2021-07-26 PROCEDURE — 93798 PHYS/QHP OP CAR RHAB W/ECG: CPT

## 2021-07-28 ENCOUNTER — TREATMENT (OUTPATIENT)
Dept: CARDIAC REHAB | Facility: HOSPITAL | Age: 71
End: 2021-07-28

## 2021-07-28 DIAGNOSIS — I50.22 CHRONIC SYSTOLIC CONGESTIVE HEART FAILURE (HCC): ICD-10-CM

## 2021-07-28 DIAGNOSIS — I48.19 ATRIAL FIBRILLATION, PERSISTENT (HCC): Primary | ICD-10-CM

## 2021-07-28 PROCEDURE — 93798 PHYS/QHP OP CAR RHAB W/ECG: CPT

## 2021-07-28 PROCEDURE — 93797 PHYS/QHP OP CAR RHAB WO ECG: CPT

## 2021-07-30 ENCOUNTER — LAB (OUTPATIENT)
Dept: LAB | Facility: HOSPITAL | Age: 71
End: 2021-07-30

## 2021-07-30 ENCOUNTER — TRANSCRIBE ORDERS (OUTPATIENT)
Dept: SLEEP MEDICINE | Facility: HOSPITAL | Age: 71
End: 2021-07-30

## 2021-07-30 ENCOUNTER — TREATMENT (OUTPATIENT)
Dept: CARDIAC REHAB | Facility: HOSPITAL | Age: 71
End: 2021-07-30

## 2021-07-30 DIAGNOSIS — I50.22 CHRONIC SYSTOLIC CONGESTIVE HEART FAILURE (HCC): Primary | ICD-10-CM

## 2021-07-30 DIAGNOSIS — Z13.6 SCREENING FOR CARDIOVASCULAR CONDITION: ICD-10-CM

## 2021-07-30 DIAGNOSIS — Z01.810 PREPROCEDURAL CARDIOVASCULAR EXAMINATION: ICD-10-CM

## 2021-07-30 DIAGNOSIS — Z01.818 PRE-OP TESTING: Primary | ICD-10-CM

## 2021-07-30 DIAGNOSIS — I48.19 ATRIAL FIBRILLATION, PERSISTENT (HCC): ICD-10-CM

## 2021-07-30 DIAGNOSIS — Z01.818 PRE-OP TESTING: ICD-10-CM

## 2021-07-30 LAB
ANION GAP SERPL CALCULATED.3IONS-SCNC: 7.9 MMOL/L (ref 5–15)
BASOPHILS # BLD AUTO: 0.03 10*3/MM3 (ref 0–0.2)
BASOPHILS NFR BLD AUTO: 0.6 % (ref 0–1.5)
BUN SERPL-MCNC: 12 MG/DL (ref 8–23)
BUN/CREAT SERPL: 14.3 (ref 7–25)
CALCIUM SPEC-SCNC: 9.1 MG/DL (ref 8.6–10.5)
CHLORIDE SERPL-SCNC: 104 MMOL/L (ref 98–107)
CO2 SERPL-SCNC: 27.1 MMOL/L (ref 22–29)
CREAT SERPL-MCNC: 0.84 MG/DL (ref 0.76–1.27)
DEPRECATED RDW RBC AUTO: 43 FL (ref 37–54)
EOSINOPHIL # BLD AUTO: 0.09 10*3/MM3 (ref 0–0.4)
EOSINOPHIL NFR BLD AUTO: 1.7 % (ref 0.3–6.2)
ERYTHROCYTE [DISTWIDTH] IN BLOOD BY AUTOMATED COUNT: 14.3 % (ref 12.3–15.4)
GFR SERPL CREATININE-BSD FRML MDRD: 91 ML/MIN/1.73
GLUCOSE SERPL-MCNC: 85 MG/DL (ref 65–99)
HCT VFR BLD AUTO: 41.3 % (ref 37.5–51)
HGB BLD-MCNC: 11.9 G/DL (ref 13–17.7)
IMM GRANULOCYTES # BLD AUTO: 0.01 10*3/MM3 (ref 0–0.05)
IMM GRANULOCYTES NFR BLD AUTO: 0.2 % (ref 0–0.5)
LYMPHOCYTES # BLD AUTO: 1 10*3/MM3 (ref 0.7–3.1)
LYMPHOCYTES NFR BLD AUTO: 18.6 % (ref 19.6–45.3)
MCH RBC QN AUTO: 23.8 PG (ref 26.6–33)
MCHC RBC AUTO-ENTMCNC: 28.8 G/DL (ref 31.5–35.7)
MCV RBC AUTO: 82.8 FL (ref 79–97)
MONOCYTES # BLD AUTO: 0.46 10*3/MM3 (ref 0.1–0.9)
MONOCYTES NFR BLD AUTO: 8.5 % (ref 5–12)
NEUTROPHILS NFR BLD AUTO: 3.8 10*3/MM3 (ref 1.7–7)
NEUTROPHILS NFR BLD AUTO: 70.4 % (ref 42.7–76)
NRBC BLD AUTO-RTO: 0 /100 WBC (ref 0–0.2)
PLATELET # BLD AUTO: 127 10*3/MM3 (ref 140–450)
PMV BLD AUTO: 12 FL (ref 6–12)
POTASSIUM SERPL-SCNC: 5 MMOL/L (ref 3.5–5.2)
RBC # BLD AUTO: 4.99 10*6/MM3 (ref 4.14–5.8)
SARS-COV-2 ORF1AB RESP QL NAA+PROBE: NOT DETECTED
SODIUM SERPL-SCNC: 139 MMOL/L (ref 136–145)
WBC # BLD AUTO: 5.39 10*3/MM3 (ref 3.4–10.8)

## 2021-07-30 PROCEDURE — 36415 COLL VENOUS BLD VENIPUNCTURE: CPT

## 2021-07-30 PROCEDURE — U0005 INFEC AGEN DETEC AMPLI PROBE: HCPCS

## 2021-07-30 PROCEDURE — 93798 PHYS/QHP OP CAR RHAB W/ECG: CPT

## 2021-07-30 PROCEDURE — 80048 BASIC METABOLIC PNL TOTAL CA: CPT

## 2021-07-30 PROCEDURE — U0004 COV-19 TEST NON-CDC HGH THRU: HCPCS

## 2021-07-30 PROCEDURE — 85025 COMPLETE CBC W/AUTO DIFF WBC: CPT

## 2021-07-30 PROCEDURE — C9803 HOPD COVID-19 SPEC COLLECT: HCPCS

## 2021-07-31 PROCEDURE — G2066 INTER DEVC REMOTE 30D: HCPCS | Performed by: INTERNAL MEDICINE

## 2021-07-31 PROCEDURE — 93297 REM INTERROG DEV EVAL ICPMS: CPT | Performed by: INTERNAL MEDICINE

## 2021-08-02 ENCOUNTER — ANESTHESIA EVENT (OUTPATIENT)
Dept: CARDIOLOGY | Facility: HOSPITAL | Age: 71
End: 2021-08-02

## 2021-08-02 ENCOUNTER — ANESTHESIA (OUTPATIENT)
Dept: CARDIOLOGY | Facility: HOSPITAL | Age: 71
End: 2021-08-02

## 2021-08-02 ENCOUNTER — HOSPITAL ENCOUNTER (OUTPATIENT)
Facility: HOSPITAL | Age: 71
Discharge: HOME | End: 2021-08-03
Attending: INTERNAL MEDICINE | Admitting: INTERNAL MEDICINE

## 2021-08-02 ENCOUNTER — APPOINTMENT (OUTPATIENT)
Dept: CARDIAC REHAB | Facility: HOSPITAL | Age: 71
End: 2021-08-02

## 2021-08-02 DIAGNOSIS — I48.19 ATRIAL FIBRILLATION, PERSISTENT (HCC): ICD-10-CM

## 2021-08-02 LAB
ACT BLD: 268 SECONDS (ref 82–152)
ACT BLD: 290 SECONDS (ref 82–152)
ACT BLD: 312 SECONDS (ref 82–152)
ACT BLD: 334 SECONDS (ref 82–152)
ACT BLD: 367 SECONDS (ref 82–152)
GLUCOSE BLDC GLUCOMTR-MCNC: 160 MG/DL (ref 70–130)
GLUCOSE BLDC GLUCOMTR-MCNC: 78 MG/DL (ref 70–130)
QT INTERVAL: 448 MS
QT INTERVAL: 460 MS

## 2021-08-02 PROCEDURE — 25010000002 PROTAMINE SULFATE PER 10 MG: Performed by: INTERNAL MEDICINE

## 2021-08-02 PROCEDURE — 25010000002 HEPARIN (PORCINE) PER 1000 UNITS: Performed by: INTERNAL MEDICINE

## 2021-08-02 PROCEDURE — C1759 CATH, INTRA ECHOCARDIOGRAPHY: HCPCS | Performed by: INTERNAL MEDICINE

## 2021-08-02 PROCEDURE — 93656 COMPRE EP EVAL ABLTJ ATR FIB: CPT | Performed by: INTERNAL MEDICINE

## 2021-08-02 PROCEDURE — 25010000002 ONDANSETRON PER 1 MG: Performed by: NURSE ANESTHETIST, CERTIFIED REGISTERED

## 2021-08-02 PROCEDURE — 93623 PRGRMD STIMJ&PACG IV RX NFS: CPT | Performed by: INTERNAL MEDICINE

## 2021-08-02 PROCEDURE — 93005 ELECTROCARDIOGRAM TRACING: CPT | Performed by: INTERNAL MEDICINE

## 2021-08-02 PROCEDURE — 25010000002 MIDAZOLAM PER 1 MG: Performed by: ANESTHESIOLOGY

## 2021-08-02 PROCEDURE — C1730 CATH, EP, 19 OR FEW ELECT: HCPCS | Performed by: INTERNAL MEDICINE

## 2021-08-02 PROCEDURE — C1893 INTRO/SHEATH, FIXED,NON-PEEL: HCPCS | Performed by: INTERNAL MEDICINE

## 2021-08-02 PROCEDURE — 93010 ELECTROCARDIOGRAM REPORT: CPT | Performed by: INTERNAL MEDICINE

## 2021-08-02 PROCEDURE — C1732 CATH, EP, DIAG/ABL, 3D/VECT: HCPCS | Performed by: INTERNAL MEDICINE

## 2021-08-02 PROCEDURE — 93662 INTRACARDIAC ECG (ICE): CPT | Performed by: INTERNAL MEDICINE

## 2021-08-02 PROCEDURE — 85347 COAGULATION TIME ACTIVATED: CPT

## 2021-08-02 PROCEDURE — 25010000002 PROPOFOL 10 MG/ML EMULSION: Performed by: NURSE ANESTHETIST, CERTIFIED REGISTERED

## 2021-08-02 PROCEDURE — C1760 CLOSURE DEV, VASC: HCPCS | Performed by: INTERNAL MEDICINE

## 2021-08-02 PROCEDURE — C1894 INTRO/SHEATH, NON-LASER: HCPCS | Performed by: INTERNAL MEDICINE

## 2021-08-02 PROCEDURE — 93613 INTRACARDIAC EPHYS 3D MAPG: CPT | Performed by: INTERNAL MEDICINE

## 2021-08-02 PROCEDURE — 25010000002 PHENYLEPHRINE PER 1 ML: Performed by: NURSE ANESTHETIST, CERTIFIED REGISTERED

## 2021-08-02 PROCEDURE — 25010000002 DEXAMETHASONE PER 1 MG: Performed by: NURSE ANESTHETIST, CERTIFIED REGISTERED

## 2021-08-02 PROCEDURE — 25010000002 SUCCINYLCHOLINE PER 20 MG: Performed by: NURSE ANESTHETIST, CERTIFIED REGISTERED

## 2021-08-02 PROCEDURE — 82962 GLUCOSE BLOOD TEST: CPT

## 2021-08-02 RX ORDER — DIPHENHYDRAMINE HCL 25 MG
25 CAPSULE ORAL
Status: DISCONTINUED | OUTPATIENT
Start: 2021-08-02 | End: 2021-08-02 | Stop reason: HOSPADM

## 2021-08-02 RX ORDER — SODIUM CHLORIDE 9 MG/ML
75 INJECTION, SOLUTION INTRAVENOUS CONTINUOUS
Status: DISCONTINUED | OUTPATIENT
Start: 2021-08-02 | End: 2021-08-03 | Stop reason: HOSPADM

## 2021-08-02 RX ORDER — PROMETHAZINE HYDROCHLORIDE 25 MG/1
25 SUPPOSITORY RECTAL ONCE AS NEEDED
Status: DISCONTINUED | OUTPATIENT
Start: 2021-08-02 | End: 2021-08-02 | Stop reason: HOSPADM

## 2021-08-02 RX ORDER — ONDANSETRON 2 MG/ML
4 INJECTION INTRAMUSCULAR; INTRAVENOUS ONCE AS NEEDED
Status: DISCONTINUED | OUTPATIENT
Start: 2021-08-02 | End: 2021-08-02 | Stop reason: HOSPADM

## 2021-08-02 RX ORDER — LISINOPRIL 2.5 MG/1
2.5 TABLET ORAL DAILY
Status: DISCONTINUED | OUTPATIENT
Start: 2021-08-02 | End: 2021-08-03 | Stop reason: HOSPADM

## 2021-08-02 RX ORDER — HYDRALAZINE HYDROCHLORIDE 20 MG/ML
5 INJECTION INTRAMUSCULAR; INTRAVENOUS
Status: DISCONTINUED | OUTPATIENT
Start: 2021-08-02 | End: 2021-08-02 | Stop reason: HOSPADM

## 2021-08-02 RX ORDER — NALOXONE HCL 0.4 MG/ML
0.4 VIAL (ML) INJECTION
Status: DISCONTINUED | OUTPATIENT
Start: 2021-08-02 | End: 2021-08-03 | Stop reason: HOSPADM

## 2021-08-02 RX ORDER — PROPOFOL 10 MG/ML
VIAL (ML) INTRAVENOUS AS NEEDED
Status: DISCONTINUED | OUTPATIENT
Start: 2021-08-02 | End: 2021-08-02 | Stop reason: SURG

## 2021-08-02 RX ORDER — FENTANYL CITRATE 50 UG/ML
50 INJECTION, SOLUTION INTRAMUSCULAR; INTRAVENOUS
Status: DISCONTINUED | OUTPATIENT
Start: 2021-08-02 | End: 2021-08-02 | Stop reason: HOSPADM

## 2021-08-02 RX ORDER — HYDROCODONE BITARTRATE AND ACETAMINOPHEN 7.5; 325 MG/1; MG/1
1 TABLET ORAL ONCE AS NEEDED
Status: DISCONTINUED | OUTPATIENT
Start: 2021-08-02 | End: 2021-08-02 | Stop reason: HOSPADM

## 2021-08-02 RX ORDER — ONDANSETRON 2 MG/ML
INJECTION INTRAMUSCULAR; INTRAVENOUS AS NEEDED
Status: DISCONTINUED | OUTPATIENT
Start: 2021-08-02 | End: 2021-08-02 | Stop reason: SURG

## 2021-08-02 RX ORDER — LISINOPRIL 2.5 MG/1
2.5 TABLET ORAL 2 TIMES DAILY
COMMUNITY
End: 2022-05-05

## 2021-08-02 RX ORDER — ASPIRIN 81 MG/1
81 TABLET ORAL DAILY
Status: DISCONTINUED | OUTPATIENT
Start: 2021-08-02 | End: 2021-08-03 | Stop reason: HOSPADM

## 2021-08-02 RX ORDER — IBUPROFEN 600 MG/1
600 TABLET ORAL ONCE AS NEEDED
Status: DISCONTINUED | OUTPATIENT
Start: 2021-08-02 | End: 2021-08-02 | Stop reason: HOSPADM

## 2021-08-02 RX ORDER — SUCCINYLCHOLINE CHLORIDE 20 MG/ML
INJECTION INTRAMUSCULAR; INTRAVENOUS AS NEEDED
Status: DISCONTINUED | OUTPATIENT
Start: 2021-08-02 | End: 2021-08-02 | Stop reason: SURG

## 2021-08-02 RX ORDER — FUROSEMIDE 20 MG/1
20 TABLET ORAL DAILY PRN
COMMUNITY

## 2021-08-02 RX ORDER — LIDOCAINE HYDROCHLORIDE 10 MG/ML
0.5 INJECTION, SOLUTION EPIDURAL; INFILTRATION; INTRACAUDAL; PERINEURAL ONCE AS NEEDED
Status: DISCONTINUED | OUTPATIENT
Start: 2021-08-02 | End: 2021-08-03 | Stop reason: HOSPADM

## 2021-08-02 RX ORDER — SODIUM CHLORIDE, SODIUM LACTATE, POTASSIUM CHLORIDE, CALCIUM CHLORIDE 600; 310; 30; 20 MG/100ML; MG/100ML; MG/100ML; MG/100ML
9 INJECTION, SOLUTION INTRAVENOUS CONTINUOUS
Status: DISCONTINUED | OUTPATIENT
Start: 2021-08-02 | End: 2021-08-03 | Stop reason: HOSPADM

## 2021-08-02 RX ORDER — PROMETHAZINE HYDROCHLORIDE 12.5 MG/1
25 TABLET ORAL ONCE AS NEEDED
Status: DISCONTINUED | OUTPATIENT
Start: 2021-08-02 | End: 2021-08-02 | Stop reason: HOSPADM

## 2021-08-02 RX ORDER — FUROSEMIDE 20 MG/1
20 TABLET ORAL DAILY
Status: DISCONTINUED | OUTPATIENT
Start: 2021-08-02 | End: 2021-08-03 | Stop reason: HOSPADM

## 2021-08-02 RX ORDER — FLUTICASONE PROPIONATE 50 MCG
1 SPRAY, SUSPENSION (ML) NASAL DAILY
Status: DISCONTINUED | OUTPATIENT
Start: 2021-08-02 | End: 2021-08-03 | Stop reason: HOSPADM

## 2021-08-02 RX ORDER — MIDAZOLAM HYDROCHLORIDE 1 MG/ML
0.5 INJECTION INTRAMUSCULAR; INTRAVENOUS
Status: DISCONTINUED | OUTPATIENT
Start: 2021-08-02 | End: 2021-08-02 | Stop reason: HOSPADM

## 2021-08-02 RX ORDER — PROTAMINE SULFATE 10 MG/ML
INJECTION, SOLUTION INTRAVENOUS AS NEEDED
Status: DISCONTINUED | OUTPATIENT
Start: 2021-08-02 | End: 2021-08-02 | Stop reason: HOSPADM

## 2021-08-02 RX ORDER — ALBUTEROL SULFATE 2.5 MG/3ML
2.5 SOLUTION RESPIRATORY (INHALATION) ONCE AS NEEDED
Status: DISCONTINUED | OUTPATIENT
Start: 2021-08-02 | End: 2021-08-03 | Stop reason: HOSPADM

## 2021-08-02 RX ORDER — DEXAMETHASONE SODIUM PHOSPHATE 10 MG/ML
INJECTION INTRAMUSCULAR; INTRAVENOUS AS NEEDED
Status: DISCONTINUED | OUTPATIENT
Start: 2021-08-02 | End: 2021-08-02 | Stop reason: SURG

## 2021-08-02 RX ORDER — FLUMAZENIL 0.1 MG/ML
0.2 INJECTION INTRAVENOUS AS NEEDED
Status: DISCONTINUED | OUTPATIENT
Start: 2021-08-02 | End: 2021-08-02 | Stop reason: HOSPADM

## 2021-08-02 RX ORDER — METOPROLOL SUCCINATE 50 MG/1
100 TABLET, EXTENDED RELEASE ORAL
Status: DISCONTINUED | OUTPATIENT
Start: 2021-08-02 | End: 2021-08-03 | Stop reason: HOSPADM

## 2021-08-02 RX ORDER — SODIUM CHLORIDE 0.9 % (FLUSH) 0.9 %
3-10 SYRINGE (ML) INJECTION AS NEEDED
Status: DISCONTINUED | OUTPATIENT
Start: 2021-08-02 | End: 2021-08-03 | Stop reason: HOSPADM

## 2021-08-02 RX ORDER — PANTOPRAZOLE SODIUM 40 MG/1
40 TABLET, DELAYED RELEASE ORAL EVERY MORNING
Status: DISCONTINUED | OUTPATIENT
Start: 2021-08-03 | End: 2021-08-03 | Stop reason: HOSPADM

## 2021-08-02 RX ORDER — SODIUM CHLORIDE 0.9 % (FLUSH) 0.9 %
3 SYRINGE (ML) INJECTION EVERY 12 HOURS SCHEDULED
Status: DISCONTINUED | OUTPATIENT
Start: 2021-08-02 | End: 2021-08-02

## 2021-08-02 RX ORDER — NALOXONE HCL 0.4 MG/ML
0.2 VIAL (ML) INJECTION AS NEEDED
Status: DISCONTINUED | OUTPATIENT
Start: 2021-08-02 | End: 2021-08-02 | Stop reason: HOSPADM

## 2021-08-02 RX ORDER — FAMOTIDINE 10 MG/ML
20 INJECTION, SOLUTION INTRAVENOUS ONCE
Status: COMPLETED | OUTPATIENT
Start: 2021-08-02 | End: 2021-08-02

## 2021-08-02 RX ORDER — HYDROMORPHONE HYDROCHLORIDE 1 MG/ML
0.5 INJECTION, SOLUTION INTRAMUSCULAR; INTRAVENOUS; SUBCUTANEOUS
Status: DISCONTINUED | OUTPATIENT
Start: 2021-08-02 | End: 2021-08-03 | Stop reason: HOSPADM

## 2021-08-02 RX ORDER — HEPARIN SODIUM 1000 [USP'U]/ML
INJECTION, SOLUTION INTRAVENOUS; SUBCUTANEOUS AS NEEDED
Status: DISCONTINUED | OUTPATIENT
Start: 2021-08-02 | End: 2021-08-02 | Stop reason: HOSPADM

## 2021-08-02 RX ORDER — GLYCOPYRROLATE 0.2 MG/ML
INJECTION INTRAMUSCULAR; INTRAVENOUS AS NEEDED
Status: DISCONTINUED | OUTPATIENT
Start: 2021-08-02 | End: 2021-08-02 | Stop reason: SURG

## 2021-08-02 RX ORDER — FENTANYL CITRATE 50 UG/ML
50 INJECTION, SOLUTION INTRAMUSCULAR; INTRAVENOUS
Status: DISCONTINUED | OUTPATIENT
Start: 2021-08-02 | End: 2021-08-03 | Stop reason: HOSPADM

## 2021-08-02 RX ORDER — SODIUM CHLORIDE 0.9 % (FLUSH) 0.9 %
10 SYRINGE (ML) INJECTION AS NEEDED
Status: DISCONTINUED | OUTPATIENT
Start: 2021-08-02 | End: 2021-08-02

## 2021-08-02 RX ORDER — ACETAMINOPHEN 325 MG/1
650 TABLET ORAL EVERY 4 HOURS PRN
Status: DISCONTINUED | OUTPATIENT
Start: 2021-08-02 | End: 2021-08-03 | Stop reason: HOSPADM

## 2021-08-02 RX ORDER — OXYCODONE AND ACETAMINOPHEN 10; 325 MG/1; MG/1
1 TABLET ORAL EVERY 4 HOURS PRN
Status: DISCONTINUED | OUTPATIENT
Start: 2021-08-02 | End: 2021-08-02 | Stop reason: HOSPADM

## 2021-08-02 RX ORDER — ROCURONIUM BROMIDE 10 MG/ML
INJECTION, SOLUTION INTRAVENOUS AS NEEDED
Status: DISCONTINUED | OUTPATIENT
Start: 2021-08-02 | End: 2021-08-02 | Stop reason: SURG

## 2021-08-02 RX ORDER — HEPARIN SODIUM 10000 [USP'U]/100ML
INJECTION, SOLUTION INTRAVENOUS CONTINUOUS PRN
Status: DISCONTINUED | OUTPATIENT
Start: 2021-08-02 | End: 2021-08-02 | Stop reason: HOSPADM

## 2021-08-02 RX ORDER — DIPHENHYDRAMINE HYDROCHLORIDE 50 MG/ML
12.5 INJECTION INTRAMUSCULAR; INTRAVENOUS
Status: DISCONTINUED | OUTPATIENT
Start: 2021-08-02 | End: 2021-08-02 | Stop reason: HOSPADM

## 2021-08-02 RX ORDER — HYDROCODONE BITARTRATE AND ACETAMINOPHEN 5; 325 MG/1; MG/1
1 TABLET ORAL EVERY 4 HOURS PRN
Status: DISCONTINUED | OUTPATIENT
Start: 2021-08-02 | End: 2021-08-03 | Stop reason: HOSPADM

## 2021-08-02 RX ORDER — HYDROMORPHONE HYDROCHLORIDE 1 MG/ML
0.25 INJECTION, SOLUTION INTRAMUSCULAR; INTRAVENOUS; SUBCUTANEOUS
Status: DISCONTINUED | OUTPATIENT
Start: 2021-08-02 | End: 2021-08-02 | Stop reason: HOSPADM

## 2021-08-02 RX ORDER — METAXALONE 800 MG/1
800 TABLET ORAL AS NEEDED
Status: DISCONTINUED | OUTPATIENT
Start: 2021-08-02 | End: 2021-08-03 | Stop reason: HOSPADM

## 2021-08-02 RX ORDER — ATORVASTATIN CALCIUM 80 MG/1
80 TABLET, FILM COATED ORAL DAILY
Status: DISCONTINUED | OUTPATIENT
Start: 2021-08-02 | End: 2021-08-03 | Stop reason: HOSPADM

## 2021-08-02 RX ORDER — LIDOCAINE HYDROCHLORIDE 20 MG/ML
INJECTION, SOLUTION INFILTRATION; PERINEURAL AS NEEDED
Status: DISCONTINUED | OUTPATIENT
Start: 2021-08-02 | End: 2021-08-02 | Stop reason: SURG

## 2021-08-02 RX ORDER — LABETALOL HYDROCHLORIDE 5 MG/ML
5 INJECTION, SOLUTION INTRAVENOUS
Status: DISCONTINUED | OUTPATIENT
Start: 2021-08-02 | End: 2021-08-02 | Stop reason: HOSPADM

## 2021-08-02 RX ORDER — SODIUM CHLORIDE 0.9 % (FLUSH) 0.9 %
3 SYRINGE (ML) INJECTION EVERY 12 HOURS SCHEDULED
Status: DISCONTINUED | OUTPATIENT
Start: 2021-08-02 | End: 2021-08-03 | Stop reason: HOSPADM

## 2021-08-02 RX ORDER — ACETAMINOPHEN 650 MG/1
650 SUPPOSITORY RECTAL EVERY 4 HOURS PRN
Status: DISCONTINUED | OUTPATIENT
Start: 2021-08-02 | End: 2021-08-03 | Stop reason: HOSPADM

## 2021-08-02 RX ADMIN — PHENYLEPHRINE HYDROCHLORIDE 100 MCG: 10 INJECTION INTRAVENOUS at 15:30

## 2021-08-02 RX ADMIN — SODIUM CHLORIDE, PRESERVATIVE FREE 3 ML: 5 INJECTION INTRAVENOUS at 21:19

## 2021-08-02 RX ADMIN — ATORVASTATIN CALCIUM 80 MG: 80 TABLET, FILM COATED ORAL at 21:19

## 2021-08-02 RX ADMIN — PHENYLEPHRINE HYDROCHLORIDE 100 MCG: 10 INJECTION INTRAVENOUS at 13:02

## 2021-08-02 RX ADMIN — METOPROLOL SUCCINATE 100 MG: 50 TABLET, EXTENDED RELEASE ORAL at 21:19

## 2021-08-02 RX ADMIN — PHENYLEPHRINE HYDROCHLORIDE 100 MCG: 10 INJECTION INTRAVENOUS at 14:04

## 2021-08-02 RX ADMIN — SUCCINYLCHOLINE CHLORIDE 140 MG: 20 INJECTION, SOLUTION INTRAMUSCULAR; INTRAVENOUS; PARENTERAL at 12:42

## 2021-08-02 RX ADMIN — MIDAZOLAM 0.5 MG: 1 INJECTION INTRAMUSCULAR; INTRAVENOUS at 12:22

## 2021-08-02 RX ADMIN — SODIUM CHLORIDE 75 ML/HR: 9 INJECTION, SOLUTION INTRAVENOUS at 10:02

## 2021-08-02 RX ADMIN — LISINOPRIL 2.5 MG: 2.5 TABLET ORAL at 21:19

## 2021-08-02 RX ADMIN — FAMOTIDINE 20 MG: 10 INJECTION INTRAVENOUS at 12:22

## 2021-08-02 RX ADMIN — ROCURONIUM BROMIDE 15 MG: 50 INJECTION INTRAVENOUS at 15:06

## 2021-08-02 RX ADMIN — SUGAMMADEX 200 MG: 100 INJECTION, SOLUTION INTRAVENOUS at 15:59

## 2021-08-02 RX ADMIN — DEXAMETHASONE SODIUM PHOSPHATE 8 MG: 10 INJECTION INTRAMUSCULAR; INTRAVENOUS at 13:19

## 2021-08-02 RX ADMIN — APIXABAN 5 MG: 5 TABLET, FILM COATED ORAL at 21:18

## 2021-08-02 RX ADMIN — FUROSEMIDE 20 MG: 20 TABLET ORAL at 21:18

## 2021-08-02 RX ADMIN — GLYCOPYRROLATE 0.2 MG: 0.2 INJECTION INTRAMUSCULAR; INTRAVENOUS at 12:40

## 2021-08-02 RX ADMIN — ROCURONIUM BROMIDE 5 MG: 50 INJECTION INTRAVENOUS at 12:42

## 2021-08-02 RX ADMIN — PHENYLEPHRINE HYDROCHLORIDE 100 MCG: 10 INJECTION INTRAVENOUS at 13:45

## 2021-08-02 RX ADMIN — ASPIRIN 81 MG: 81 TABLET, COATED ORAL at 21:19

## 2021-08-02 RX ADMIN — ONDANSETRON 4 MG: 2 INJECTION INTRAMUSCULAR; INTRAVENOUS at 15:50

## 2021-08-02 RX ADMIN — PHENYLEPHRINE HYDROCHLORIDE 100 MCG: 10 INJECTION INTRAVENOUS at 13:31

## 2021-08-02 RX ADMIN — PHENYLEPHRINE HYDROCHLORIDE 100 MCG: 10 INJECTION INTRAVENOUS at 15:36

## 2021-08-02 RX ADMIN — ROCURONIUM BROMIDE 45 MG: 50 INJECTION INTRAVENOUS at 12:50

## 2021-08-02 RX ADMIN — LIDOCAINE HYDROCHLORIDE 80 MG: 20 INJECTION, SOLUTION INFILTRATION; PERINEURAL at 12:42

## 2021-08-02 RX ADMIN — PHENYLEPHRINE HYDROCHLORIDE 100 MCG: 10 INJECTION INTRAVENOUS at 12:53

## 2021-08-02 RX ADMIN — EMPAGLIFLOZIN 10 MG: 10 TABLET, FILM COATED ORAL at 21:18

## 2021-08-02 RX ADMIN — PHENYLEPHRINE HYDROCHLORIDE 100 MCG: 10 INJECTION INTRAVENOUS at 13:54

## 2021-08-02 RX ADMIN — PROPOFOL 150 MG: 10 INJECTION, EMULSION INTRAVENOUS at 12:42

## 2021-08-02 RX ADMIN — PHENYLEPHRINE HYDROCHLORIDE 100 MCG: 10 INJECTION INTRAVENOUS at 15:24

## 2021-08-02 RX ADMIN — PHENYLEPHRINE HYDROCHLORIDE 100 MCG: 10 INJECTION INTRAVENOUS at 13:08

## 2021-08-02 RX ADMIN — ROCURONIUM BROMIDE 20 MG: 50 INJECTION INTRAVENOUS at 14:22

## 2021-08-02 RX ADMIN — FLUTICASONE PROPIONATE 1 SPRAY: 50 SPRAY, METERED NASAL at 21:19

## 2021-08-02 NOTE — ANESTHESIA POSTPROCEDURE EVALUATION
"Patient: Alden Pratt III    Procedure Summary     Date: 08/02/21 Room / Location: [facility] /  [facility]    Anesthesia Start: 1234 Anesthesia Stop: 1620    Procedures:       Ablation atrial fibrillation (N/A )      3D MAPPING CARTO EP (N/A ) Diagnosis:       Atrial fibrillation, persistent (CMS/HCC)      (atrial fibrillation)    Providers: Robby Ngo MD Provider: Agapito Cannon MD    Anesthesia Type: general ASA Status: 3          Anesthesia Type: general    Vitals  Vitals Value Taken Time   /62 08/02/21 1730   Temp 36.5 °C (97.7 °F) 08/02/21 1617   Pulse 70 08/02/21 1730   Resp 16 08/02/21 1730   SpO2 98 % 08/02/21 1730           Post Anesthesia Care and Evaluation    Patient location during evaluation: bedside  Patient participation: complete - patient participated  Level of consciousness: sleepy but conscious  Pain score: 0  Pain management: adequate  Airway patency: patent  Anesthetic complications: No anesthetic complications    Cardiovascular status: acceptable  Respiratory status: acceptable  Hydration status: acceptable    Comments: /84 (BP Location: Right arm, Patient Position: Lying)   Pulse 74   Temp 36.8 °C (98.3 °F) (Oral)   Resp 18   Ht 188 cm (74\")   Wt 83.9 kg (185 lb)   SpO2 96%   BMI 23.75 kg/m²         "

## 2021-08-02 NOTE — ANESTHESIA PROCEDURE NOTES
Airway  Urgency: elective    Date/Time: 8/2/2021 12:44 PM  Airway not difficult    General Information and Staff    Patient location during procedure: OR  Anesthesiologist: Agapito Cannon MD  CRNA: Jhoan Olsen CRNA    Indications and Patient Condition  Indications for airway management: airway protection    Preoxygenated: yes  Mask difficulty assessment: 1 - vent by mask    Final Airway Details  Final airway type: endotracheal airway      Successful airway: ETT  Cuffed: yes   Successful intubation technique: direct laryngoscopy  Endotracheal tube insertion site: oral  Blade: Brianna  Blade size: 4  ETT size (mm): 8.0  Cormack-Lehane Classification: grade I - full view of glottis  Placement verified by: chest auscultation and capnometry   Inital cuff pressure (cm H2O): 22  Cuff volume (mL): 9  Measured from: lips  ETT/EBT  to lips (cm): 24  Number of attempts at approach: 1

## 2021-08-03 VITALS
BODY MASS INDEX: 23.74 KG/M2 | HEIGHT: 74 IN | HEART RATE: 72 BPM | OXYGEN SATURATION: 97 % | WEIGHT: 185 LBS | DIASTOLIC BLOOD PRESSURE: 66 MMHG | RESPIRATION RATE: 17 BRPM | TEMPERATURE: 98 F | SYSTOLIC BLOOD PRESSURE: 117 MMHG

## 2021-08-03 LAB — QT INTERVAL: 457 MS

## 2021-08-03 PROCEDURE — 99217 PR OBSERVATION CARE DISCHARGE MANAGEMENT: CPT | Performed by: NURSE PRACTITIONER

## 2021-08-03 PROCEDURE — S0260 H&P FOR SURGERY: HCPCS | Performed by: INTERNAL MEDICINE

## 2021-08-03 PROCEDURE — 93005 ELECTROCARDIOGRAM TRACING: CPT | Performed by: NURSE PRACTITIONER

## 2021-08-03 RX ADMIN — FUROSEMIDE 20 MG: 20 TABLET ORAL at 08:33

## 2021-08-03 RX ADMIN — METOPROLOL SUCCINATE 100 MG: 50 TABLET, EXTENDED RELEASE ORAL at 08:32

## 2021-08-03 RX ADMIN — APIXABAN 5 MG: 5 TABLET, FILM COATED ORAL at 08:33

## 2021-08-03 RX ADMIN — SODIUM CHLORIDE, PRESERVATIVE FREE 3 ML: 5 INJECTION INTRAVENOUS at 11:43

## 2021-08-03 NOTE — PLAN OF CARE
Goal Outcome Evaluation:  Plan of Care Reviewed With: patient         0630 pt vss; no c/o pain; nsr throughout 7p-7a shift; femoral sites soft & dry

## 2021-08-03 NOTE — DISCHARGE SUMMARY
DISCHARGE NOTE    Patient Name: Alden Pratt III  Age/Sex: 70 y.o. male  : 1950  MRN: [id number]    Date of Discharge:  8/3/2021   Date of Admit: 2021  Encounter Provider: BRANDON Lentz  Place of Service: [facility] CARDIOLOGY  Patient Care Team:  Jhoan Quezada MD as PCP - General    Subjective:     Discharge Diagnosis:    Atrial fibrillation, persistent (CMS/HCC)      Hospital Course:     70-year-old patient of Dr. Ball's with a history of myocardial infarction, PCI's, ischemic cardiomyopathy, status post ICD.  He also underwent CABG.  He did have VF and got shocked by his defibrillator and then had A. fib after that.  Treated with dronedarone, had some side effects, and continued to be symptomatic.  He had been cardioverted twice and developed recurrent AF so it was recommended that he undergo ablation.    He underwent successful pulmonary vein isolation yesterday, he tolerated the procedure well and has maintained normal rhythm post procedure.    He is felt stable for discharge home.    He has a follow up appt .     Vital Signs  Temp:  [97.3 °F (36.3 °C)-98.3 °F (36.8 °C)] 98 °F (36.7 °C)  Heart Rate:  [68-82] 72  Resp:  [16-20] 17  BP: ()/(58-84) 117/66    Intake/Output Summary (Last 24 hours) at 8/3/2021 0827  Last data filed at 2021 1729  Gross per 24 hour   Intake 1479 ml   Output 10 ml   Net 1469 ml       Physical Exam:    General Appearance: No acute distress, well developed and well nourished.   Eyes: Conjunctiva and lids: No erythema, swelling, or discharge. Sclera non-icteric.   HENT: Atraumatic, normocephalic. External eyes, ears, and nose normal.   Respiratory: No signs of respiratory distress. Respiration rhythm and depth normal.   Clear to auscultation. No rales, crackles, rhonchi, or wheezing auscultated.    Cardiovascular:  Heart Rate and Rhythm: Normal, Heart Sounds: Normal S1 and S2.   Murmurs: No murmurs noted. No rubs, thrills, or gallops.   Arterial Pulses: Posterior tibialis and dorsalis pedis pulses normal.   Lower Extremities: No edema noted.  Gastrointestinal:  Abdomen soft, non-distended, non-tender.  Musculoskeletal: Normal movement of extremities  Skin: Warm and dry.   Psychiatric: Patient alert and oriented to person, place, and time. Speech and behavior appropriate. Normal mood and affect.    Labs:   Results from last 7 days   Lab Units 07/30/21  1048   SODIUM mmol/L 139   POTASSIUM mmol/L 5.0   CHLORIDE mmol/L 104   CO2 mmol/L 27.1   BUN mg/dL 12   CREATININE mg/dL 0.84   GLUCOSE mg/dL 85   CALCIUM mg/dL 9.1         Results from last 7 days   Lab Units 07/30/21  1048   WBC 10*3/mm3 5.39   HEMOGLOBIN g/dL 11.9*   HEMATOCRIT % 41.3   PLATELETS 10*3/mm3 127*       Discharge Diet:    Dietary Orders (From admission, onward)     Start     Ordered    08/02/21 1809  Diet Regular; Cardiac  Diet Effective Now     Question Answer Comment   Diet Texture / Consistency Regular    Common Modifiers Cardiac        08/02/21 1808                Activity at Discharge:  Instructions given to patient    Discharge Medications     Discharge Medications      ASK your doctor about these medications      Instructions Start Date   apixaban 5 MG tablet tablet  Commonly known as: ELIQUIS   5 mg, Oral, Every 12 Hours Scheduled      aspirin 81 MG EC tablet   81 mg, Oral, Daily      atorvastatin 80 MG tablet  Commonly known as: LIPITOR   80 mg, Oral, Daily, 200001      cetirizine 10 MG tablet  Commonly known as: zyrTEC   10 mg, Oral, As Needed, Only Occasionally will use      clobetasol 0.05 % cream  Commonly known as: TEMOVATE   Topical, 2 Times Daily      fluocinonide 0.05 % cream  Commonly known as: LIDEX   Topical, 2 Times Daily      fluticasone 50 MCG/ACT nasal spray  Commonly known as: FLONASE   1 spray, Nasal, Daily       furosemide 20 MG tablet  Commonly known as: LASIX   20 mg, Oral, Daily      Jardiance 10 MG tablet tablet  Generic drug: empagliflozin   10 mg, Oral, Daily      lisinopril 2.5 MG tablet  Commonly known as: PRINIVIL,ZESTRIL   2.5 mg, Oral, Daily      metaxalone 800 MG tablet  Commonly known as: SKELAXIN   800 mg, Oral, As Needed      metoprolol succinate  MG 24 hr tablet  Commonly known as: Toprol XL   100 mg, Oral, 2 times daily      Minoxidil 5 % foam   Apply externally, 2 times daily      nitroglycerin 0.4 MG SL tablet  Commonly known as: NITROSTAT   1 under the tongue as needed for angina, may repeat q5mins for up three doses      omeprazole 40 MG capsule  Commonly known as: priLOSEC   40 mg, Oral      pimecrolimus 1 % cream  Commonly known as: ELIDEL   Topical, 2 Times Daily      vitamin D3 125 MCG (5000 UT) capsule capsule   2,000 Units, Oral, Every Other Day      Zetia 10 MG tablet  Generic drug: ezetimibe   10 mg, Oral, Daily             Discharge disposition: home    Follow-up Appointments  Follow-up Information     Robby Ngo MD Follow up.    Specialties: Cardiology, Cardiac Electrophysiology  Why: Keep appt 8/17  Contact information:  [address] [place] [address]  [phone]                 Future Appointments   Date Time Provider Department Center   8/4/2021 10:00 AM TELEMETRY MONITOR - [facility]   8/6/2021 10:00 AM TELEMETRY MONITOR - [facility]   8/9/2021 10:00 AM TELEMETRY MONITOR - [facility]   8/11/2021 10:00 AM TELEMETRY MONITOR - [facility]   8/13/2021 10:00 AM TELEMETRY MONITOR - [facility]   8/16/2021 10:00 AM TELEMETRY MONITOR - [facility]   8/17/2021  2:00 PM Robby Ngo MD [facility]   8/18/2021 10:00 AM TELEMETRY MONITOR - [facility]   8/20/2021 10:00 AM TELEMETRY MONITOR - [facility]   8/23/2021 10:00 AM  TELEMETRY MONITOR -  [facility]   8/25/2021 10:00 AM TELEMETRY MONITOR -  [facility]   8/27/2021 10:00 AM TELEMETRY MONITOR -  [facility]   8/30/2021 10:00 AM TELEMETRY MONITOR -  [facility]   9/1/2021 10:00 AM TELEMETRY MONITOR -  [facility]   9/3/2021 10:00 AM TELEMETRY MONITOR -  [facility]   9/8/2021 10:00 AM TELEMETRY MONITOR -  [facility]   9/10/2021 10:00 AM TELEMETRY MONITOR -  [facility]   9/13/2021 10:00 AM TELEMETRY MONITOR -  [facility]   9/15/2021 10:00 AM TELEMETRY MONITOR -  [facility]   9/17/2021 10:00 AM TELEMETRY MONITOR -  [facility]   9/21/2021  2:00 PM Andrew Ball MD [facility] BRANDON Rai  08/03/21  08:27 EDT

## 2021-08-04 ENCOUNTER — APPOINTMENT (OUTPATIENT)
Dept: CARDIAC REHAB | Facility: HOSPITAL | Age: 71
End: 2021-08-04

## 2021-08-06 ENCOUNTER — APPOINTMENT (OUTPATIENT)
Dept: CARDIAC REHAB | Facility: HOSPITAL | Age: 71
End: 2021-08-06

## 2021-08-06 NOTE — PROGRESS NOTES
Query Response:       Chronic Systolic Heart Failure       If applicable, please update the problem list.         Patient: Alden Pratt Iii        : 1950  Account: [id number]           Admit Date: 2021        Options to Respond to Query:    1. Access the Encounter     a. From the To-Do Side bar, click Respond With Note.     b. Click New Note     c. Answer query within the yellow box.                d. Update the Problem List if applicable.     Dr. Ngo,    The patient was admitted with persistent atrial fibrillation that underwent ablation. The patient has documented history of congestive heart failure. Echo in [facility] from 2019 showed EF 41-45%. The patient is being treated with Lasix 20mg daily, Lisinopril, Toprol XL, ASA, Eliquis and Lipitor.    Can the congestive heart failure be further specified as :    - Chronic heart failure with reduced ejection fraction  - Chronic systolic heart failure   - Chronic combined systolic & diastolic heart failure  - Other (please specify) __________________  - Unable to determine    By submitting this query, we are merely seeking further clarification of documentation to accurately reflect all conditions that you are monitoring, evaluating, treating or that extend the hospitalization or utilize additional resources of care. Please utilize your independent clinical judgment when addressing the question(s) above.     This query and your response, once completed, will be entered into the legal medical record.    Sincerely,  [email or web address] [email or web address]  Clinical Documentation Integrity Program

## 2021-08-06 NOTE — H&P
[facility]   HISTORY AND PHYSICAL    Patient Name: Alden Pratt III  : 1950  MRN: [id number]  Primary Care Physician:  Jhoan Quezada MD  Date of admission: 2021    Subjective   Subjective     Chief Complaint:   Atrial Fibrillation    History of Present Illness     Patient with persistent symptomatic atrial fibrillation.  He failed treatment with Midrin.  He has a history of CAD, and prior ventricular tachycardia treated with ICD shock.  He is symptomatic with his atrial fibrillation with increased fatigue.  After discussion of the risk versus benefits we decided to proceed with catheter ablation.  He has been appropriately anticoagulated prior to the procedure.    Review of Systems   Constitutional: Negative for activity change and fatigue.   Eyes: Negative.    Respiratory: Negative for chest tightness and shortness of breath.    Cardiovascular: Negative for chest pain, palpitations and leg swelling.   Gastrointestinal: Negative.    Endocrine: Negative.    Genitourinary: Negative.    Musculoskeletal: Negative.    Skin: Negative.    Neurological: Negative for dizziness and syncope.   Hematological: Negative.    Psychiatric/Behavioral: Negative.         Personal History     Past Medical History:   Diagnosis Date   • Arthritis    • CAD (coronary artery disease)     atheroclerosis   • Cancer (CMS/HCC)     prostate   • Chest pain    • CHF (congestive heart failure) (CMS/HCC)    • Elevated cholesterol    • GERD (gastroesophageal reflux disease)    • Hyperlipidemia    • Implantable cardioverter-defibrillator (ICD) discharge    • Myocardial infarction (CMS/HCC)     inferior   • PVC (premature ventricular contraction)    • PVD (peripheral vascular disease) (CMS/HCC)    • Status post phlebectomy     varicose veins   • Thyroid nodule 2014   • VT (ventricular tachycardia) (CMS/HCC)        Past Surgical History:   Procedure Laterality Date   • CARDIAC CATHETERIZATION     • CARDIAC CATHETERIZATION  Left 5/10/2021    Procedure: Left Heart Cath;  Surgeon: Andrew Ball MD;  Location:  [facility] CATH INVASIVE LOCATION;  Service: Cardiology;  Laterality: Left;   • CARDIAC CATHETERIZATION N/A 5/10/2021    Procedure: Coronary angiography;  Surgeon: Andrew Ball MD;  Location:  [facility] CATH INVASIVE LOCATION;  Service: Cardiology;  Laterality: N/A;   • CARDIAC CATHETERIZATION N/A 5/10/2021    Procedure: Left ventriculography;  Surgeon: Andrew Ball MD;  Location:  [facility] CATH INVASIVE LOCATION;  Service: Cardiology;  Laterality: N/A;   • CARDIAC CATHETERIZATION  5/10/2021    Procedure: Saphenous Vein Graft;  Surgeon: Andrew Ball MD;  Location[facility];  Service: Cardiology;;   • CARDIAC DEFIBRILLATOR PLACEMENT     • CARDIAC ELECTROPHYSIOLOGY PROCEDURE N/A 8/2/2021    Procedure: Ablation atrial fibrillation;  Surgeon: Robby Ngo MD;  Location: [facility] CATH INVASIVE LOCATION;  Service: Cardiovascular;  Laterality: N/A;   • CORONARY ARTERY BYPASS GRAFT      stents   • CORONARY STENT PLACEMENT     • HAND SURGERY Left    • KNEE SURGERY Left    • MICROAMBULATORY PHLEBECTOMY     • SHOULDER SURGERY Right    • THYROID BIOPSY     • VARICOSE VEIN SURGERY         Family History: family history includes Coronary artery disease in an other family member; Heart disease in his father; Heart failure in his father; Hypertension in an other family member; No Known Problems in his maternal grandfather, maternal grandmother, paternal grandfather, and paternal grandmother; Stroke in his father and another family member. Otherwise pertinent FHx was reviewed and not pertinent to current issue.    Social History:  reports that he has never smoked. He has never used smokeless tobacco. He reports current alcohol use. He reports that he does not use drugs.    Home Medications:  Minoxidil, apixaban, aspirin, atorvastatin, cetirizine, clobetasol, empagliflozin, ezetimibe, fluocinonide, fluticasone,  furosemide, lisinopril, metaxalone, metoprolol succinate XL, nitroglycerin, omeprazole, pimecrolimus, and vitamin D3    Allergies:  Allergies   Allergen Reactions   • Contrast Dye Hives   • Iodinated Diagnostic Agents    • Sudafed 12 Hour [Pseudoephedrine Hcl Er]    • Pseudoephedrine Palpitations       Objective    Objective     Vitals:        Physical Exam  Vitals and nursing note reviewed.   Constitutional:       Appearance: Normal appearance.   HENT:      Head: Normocephalic and atraumatic.   Pulmonary:      Effort: Pulmonary effort is normal.   Skin:     General: Skin is warm.   Neurological:      General: No focal deficit present.      Mental Status: He is alert.   Psychiatric:         Mood and Affect: Mood normal.         Behavior: Behavior normal.         Result Review    Result Review:  I have personally reviewed the results from the time of this admission to 8/6/2021 12:51 EDT and agree with these findings:  []  Laboratory  []  Microbiology  []  Radiology  [x]  EKG/Telemetry   []  Cardiology/Vascular   []  Pathology  []  Old records  []  Other:  Most notable findings include: Atrial fibrillation    Assessment/Plan   Assessment / Plan     Brief Patient Summary:  Alden Pratt III is a 70 y.o. male who has persistent atrial fibrillation.  He failed treatment with dronedarone.  After discussion we have decided to proceed with catheter ablation.  The risk and benefits of the procedure were again reviewed.  He still wished to proceed.

## 2021-08-09 ENCOUNTER — TREATMENT (OUTPATIENT)
Dept: CARDIAC REHAB | Facility: HOSPITAL | Age: 71
End: 2021-08-09

## 2021-08-09 DIAGNOSIS — I48.19 ATRIAL FIBRILLATION, PERSISTENT (HCC): ICD-10-CM

## 2021-08-09 DIAGNOSIS — I50.22 CHRONIC SYSTOLIC CONGESTIVE HEART FAILURE (HCC): Primary | ICD-10-CM

## 2021-08-09 PROCEDURE — 93798 PHYS/QHP OP CAR RHAB W/ECG: CPT

## 2021-08-11 ENCOUNTER — TREATMENT (OUTPATIENT)
Dept: CARDIAC REHAB | Facility: HOSPITAL | Age: 71
End: 2021-08-11

## 2021-08-11 DIAGNOSIS — I48.19 ATRIAL FIBRILLATION, PERSISTENT (HCC): ICD-10-CM

## 2021-08-11 DIAGNOSIS — I50.22 CHRONIC SYSTOLIC CONGESTIVE HEART FAILURE (HCC): Primary | ICD-10-CM

## 2021-08-11 PROCEDURE — 93798 PHYS/QHP OP CAR RHAB W/ECG: CPT

## 2021-08-13 ENCOUNTER — TREATMENT (OUTPATIENT)
Dept: CARDIAC REHAB | Facility: HOSPITAL | Age: 71
End: 2021-08-13

## 2021-08-13 DIAGNOSIS — I50.22 CHRONIC SYSTOLIC CONGESTIVE HEART FAILURE (HCC): Primary | ICD-10-CM

## 2021-08-13 DIAGNOSIS — I48.19 ATRIAL FIBRILLATION, PERSISTENT (HCC): ICD-10-CM

## 2021-08-13 PROCEDURE — 93798 PHYS/QHP OP CAR RHAB W/ECG: CPT

## 2021-08-16 ENCOUNTER — TREATMENT (OUTPATIENT)
Dept: CARDIAC REHAB | Facility: HOSPITAL | Age: 71
End: 2021-08-16

## 2021-08-16 DIAGNOSIS — I50.22 CHRONIC SYSTOLIC CONGESTIVE HEART FAILURE (HCC): Primary | ICD-10-CM

## 2021-08-16 PROCEDURE — 93798 PHYS/QHP OP CAR RHAB W/ECG: CPT

## 2021-08-17 ENCOUNTER — OFFICE VISIT (OUTPATIENT)
Dept: CARDIOLOGY | Facility: CLINIC | Age: 71
End: 2021-08-17

## 2021-08-17 ENCOUNTER — CLINICAL SUPPORT NO REQUIREMENTS (OUTPATIENT)
Dept: CARDIOLOGY | Facility: CLINIC | Age: 71
End: 2021-08-17

## 2021-08-17 VITALS
BODY MASS INDEX: 24 KG/M2 | WEIGHT: 187 LBS | HEIGHT: 74 IN | SYSTOLIC BLOOD PRESSURE: 120 MMHG | HEART RATE: 71 BPM | DIASTOLIC BLOOD PRESSURE: 74 MMHG

## 2021-08-17 DIAGNOSIS — I47.20 VENTRICULAR TACHYCARDIA (HCC): ICD-10-CM

## 2021-08-17 DIAGNOSIS — I48.0 PAROXYSMAL ATRIAL FIBRILLATION (HCC): ICD-10-CM

## 2021-08-17 DIAGNOSIS — I48.19 ATRIAL FIBRILLATION, PERSISTENT (HCC): Primary | ICD-10-CM

## 2021-08-17 DIAGNOSIS — I25.5 ISCHEMIC CARDIOMYOPATHY: ICD-10-CM

## 2021-08-17 DIAGNOSIS — I25.5 ISCHEMIC CARDIOMYOPATHY: Primary | ICD-10-CM

## 2021-08-17 DIAGNOSIS — Z95.1 S/P CABG (CORONARY ARTERY BYPASS GRAFT): ICD-10-CM

## 2021-08-17 PROCEDURE — 93000 ELECTROCARDIOGRAM COMPLETE: CPT | Performed by: INTERNAL MEDICINE

## 2021-08-17 PROCEDURE — 93290 INTERROG DEV EVAL ICPMS IP: CPT | Performed by: INTERNAL MEDICINE

## 2021-08-17 PROCEDURE — 93283 PRGRMG EVAL IMPLANTABLE DFB: CPT | Performed by: INTERNAL MEDICINE

## 2021-08-17 PROCEDURE — 99213 OFFICE O/P EST LOW 20 MIN: CPT | Performed by: INTERNAL MEDICINE

## 2021-08-17 RX ORDER — SPIRONOLACTONE 25 MG/1
12.5 TABLET ORAL DAILY
COMMUNITY
Start: 2021-08-16

## 2021-08-17 NOTE — PROGRESS NOTES
Date of Office Visit: 2021  Encounter Provider: Robby Ngo MD  Place of Service: [facility] CARDIOLOGY  Patient Name: Alden Pratt III  :1950    Chief Complaint   Patient presents with   • Atrial Fibrillation     2 mnth follow up   :     HPI: Alden Pratt III is a 70 y.o. male who presents today for follow-up of persistent atrial fibrillation.    He has felt well since ablation except for headaches.  He has not noticed any atrial fibrillation which he typically noticed with palpitations and tachycardia.    Interrogation of his device shows no atrial fibrillation since that ablation.       Past Medical History:   Diagnosis Date   • Arthritis    • CAD (coronary artery disease)     atheroclerosis   • Cancer (CMS/HCC)     prostate   • Chest pain    • CHF (congestive heart failure) (CMS/HCC)    • Elevated cholesterol    • GERD (gastroesophageal reflux disease)    • Hyperlipidemia    • Implantable cardioverter-defibrillator (ICD) discharge    • Myocardial infarction (CMS/HCC)     inferior   • PVC (premature ventricular contraction)    • PVD (peripheral vascular disease) (CMS/HCC)    • Status post phlebectomy     varicose veins   • Thyroid nodule 2014   • VT (ventricular tachycardia) (CMS/HCC)        Past Surgical History:   Procedure Laterality Date   • CARDIAC CATHETERIZATION     • CARDIAC CATHETERIZATION Left 5/10/2021    Procedure: Left Heart Cath;  Surgeon: Andrew Ball MD;  Location: [facility] CATH INVASIVE LOCATION;  Service: Cardiology;  Laterality: Left;   • CARDIAC CATHETERIZATION N/A 5/10/2021    Procedure: Coronary angiography;  Surgeon: Andrew Ball MD;  [facility];  Service: Cardiology;  Laterality: N/A;   • CARDIAC CATHETERIZATION N/A 5/10/2021    Procedure: Left ventriculography;  Surgeon: Andrew Ball MD;  Location:  [facility] CATH INVASIVE LOCATION;  Service: Cardiology;  Laterality: N/A;   • CARDIAC  CATHETERIZATION  5/10/2021    Procedure: Saphenous Vein Graft;  Surgeon: Andrew Ball MD;  Location:  [facility] CATH INVASIVE LOCATION;  Service: Cardiology;;   • CARDIAC DEFIBRILLATOR PLACEMENT     • CARDIAC ELECTROPHYSIOLOGY PROCEDURE N/A 8/2/2021    Procedure: Ablation atrial fibrillation;  Surgeon: Robby Ngo MD;  Location:  [facility] CATH INVASIVE LOCATION;  Service: Cardiovascular;  Laterality: N/A;   • CORONARY ARTERY BYPASS GRAFT      stents   • CORONARY STENT PLACEMENT     • HAND SURGERY Left    • KNEE SURGERY Left    • MICROAMBULATORY PHLEBECTOMY     • SHOULDER SURGERY Right    • THYROID BIOPSY     • VARICOSE VEIN SURGERY         Social History     Socioeconomic History   • Marital status:      Spouse name: Not on file   • Number of children: Not on file   • Years of education: Not on file   • Highest education level: Not on file   Tobacco Use   • Smoking status: Never Smoker   • Smokeless tobacco: Never Used   Vaping Use   • Vaping Use: Never used   Substance and Sexual Activity   • Alcohol use: Yes     Comment: rareLY   • Drug use: No   • Sexual activity: Defer       Family History   Problem Relation Age of Onset   • Coronary artery disease Other    • Stroke Other    • Hypertension Other    • Heart disease Father    • Heart failure Father    • Stroke Father    • No Known Problems Maternal Grandmother    • No Known Problems Maternal Grandfather    • No Known Problems Paternal Grandmother    • No Known Problems Paternal Grandfather        Review of Systems   Constitutional: Negative.   Cardiovascular: Negative.    Respiratory: Negative.    Gastrointestinal: Negative.        Allergies   Allergen Reactions   • Contrast Dye Hives   • Iodinated Diagnostic Agents    • Sudafed 12 Hour [Pseudoephedrine Hcl Er]    • Pseudoephedrine Palpitations         Current Outpatient Medications:   •  apixaban (ELIQUIS) 5 MG tablet tablet, Take 1 tablet by mouth Every 12 (Twelve) Hours., Disp: 60 tablet, Rfl:  11  •  aspirin 81 MG EC tablet, Take 81 mg by mouth Daily., Disp: , Rfl:   •  atorvastatin (LIPITOR) 80 MG tablet, Take 1 tablet by mouth Daily. 200001, Disp: 90 tablet, Rfl: 2  •  cetirizine (ZyrTEC) 10 MG tablet, Take 10 mg by mouth As Needed. Only Occasionally will use, Disp: , Rfl:   •  Cholecalciferol (VITAMIN D3) 5000 UNITS capsule capsule, Take 2,000 Units by mouth Every Other Day., Disp: , Rfl:   •  clobetasol (TEMOVATE) 0.05 % cream, Apply  topically 2 (Two) Times a Day., Disp: , Rfl:   •  Empagliflozin (Jardiance) 10 MG tablet, Take 10 mg by mouth Daily., Disp: 90 tablet, Rfl: 2  •  ezetimibe (ZETIA) 10 MG tablet, Take 10 mg by mouth daily., Disp: , Rfl:   •  fluocinonide (LIDEX) 0.05 % cream, Apply  topically to the appropriate area as directed 2 (Two) Times a Day., Disp: , Rfl:   •  fluticasone (FLONASE) 50 MCG/ACT nasal spray, 1 spray into the nostril(s) as directed by provider Daily., Disp: , Rfl:   •  furosemide (LASIX) 20 MG tablet, Take 20 mg by mouth Daily., Disp: , Rfl:   •  lisinopril (PRINIVIL,ZESTRIL) 2.5 MG tablet, Take 2.5 mg by mouth Daily., Disp: , Rfl:   •  metaxalone (SKELAXIN) 800 MG tablet, Take 800 mg by mouth As Needed for Muscle Spasms., Disp: , Rfl:   •  metoprolol succinate XL (Toprol XL) 100 MG 24 hr tablet, Take 1 tablet by mouth 2 (two) times a day. (Patient taking differently: Take 100 mg by mouth 2 (two) times a day. 50 mg in the am 100 mg in the pm), Disp: 60 tablet, Rfl: 11  •  Minoxidil 5 % foam, Apply  topically 2 (two) times a day., Disp: , Rfl:   •  nitroglycerin (NITROSTAT) 0.4 MG SL tablet, 1 under the tongue as needed for angina, may repeat q5mins for up three doses, Disp: 100 tablet, Rfl: 11  •  omeprazole (priLOSEC) 40 MG capsule, Take 40 mg by mouth., Disp: , Rfl:   •  pimecrolimus (ELIDEL) 1 % cream, Apply  topically 2 (Two) Times a Day., Disp: , Rfl:   •  spironolactone (ALDACTONE) 25 MG tablet, Take 12.5 mg by mouth Daily., Disp: , Rfl:       Objective:  "    Vitals:    08/17/21 1410   BP: 120/74   Pulse: 71   Weight: 84.8 kg (187 lb)   Height: 188 cm (74\")     Body mass index is 24.01 kg/m².    PHYSICAL EXAM:    Vitals and nursing note reviewed.   Constitutional:       Appearance: Healthy appearance.   HENT:      Head: Normocephalic and atraumatic.   Pulmonary:      Effort: Pulmonary effort is normal.   Chest:      Comments: Pacemaker pocket appears intact  Cardiovascular:      Normal rate. Regular rhythm.   Edema:     Peripheral edema absent.   Skin:     General: Skin is warm.   Neurological:      Mental Status: Alert, oriented to person, place, and time and oriented to person, place and time.   Psychiatric:         Attention and Perception: Attention and perception normal.         Mood and Affect: Mood and affect normal.             ECG 12 Lead    Date/Time: 8/17/2021 5:36 PM  Performed by: Robby Ngo MD  Authorized by: Robby Ngo MD   Comparison: compared with previous ECG from 8/3/2021  Similar to previous ECG  Rhythm: sinus rhythm              Assessment:       Diagnosis Plan   1. Atrial fibrillation, persistent (CMS/HCC)     2. S/P CABG (coronary artery bypass graft)     3. Ischemic cardiomyopathy            Plan:       Doing well since ablation.  No evidence of complication, no recurrence of atrial fibrillation.  We discussed the importance of remaining compliant with anticoagulation, even though he does not feel like he is having atrial fibrillation.  We also discussed it was still early after the ablation, and it would not be uncommon to still have some atrial fibrillation and to not worry.  He will follow-up in 2 months.    As always, it has been a pleasure to participate in your patient's care.      Sincerely,         Robby Ngo MD  "

## 2021-08-18 ENCOUNTER — TREATMENT (OUTPATIENT)
Dept: CARDIAC REHAB | Facility: HOSPITAL | Age: 71
End: 2021-08-18

## 2021-08-18 DIAGNOSIS — I50.22 CHRONIC SYSTOLIC CONGESTIVE HEART FAILURE (HCC): Primary | ICD-10-CM

## 2021-08-18 PROCEDURE — 93798 PHYS/QHP OP CAR RHAB W/ECG: CPT

## 2021-08-20 ENCOUNTER — TREATMENT (OUTPATIENT)
Dept: CARDIAC REHAB | Facility: HOSPITAL | Age: 71
End: 2021-08-20

## 2021-08-20 ENCOUNTER — TELEPHONE (OUTPATIENT)
Dept: CARDIAC REHAB | Facility: HOSPITAL | Age: 71
End: 2021-08-20

## 2021-08-20 DIAGNOSIS — I48.19 ATRIAL FIBRILLATION, PERSISTENT (HCC): ICD-10-CM

## 2021-08-20 DIAGNOSIS — I50.22 CHRONIC SYSTOLIC CONGESTIVE HEART FAILURE (HCC): Primary | ICD-10-CM

## 2021-08-20 PROCEDURE — 93798 PHYS/QHP OP CAR RHAB W/ECG: CPT

## 2021-08-20 NOTE — TELEPHONE ENCOUNTER
Faxed over today's session report on Bill in [facility] II Cardiac Rehab. Would appreciate your review and opinion.  Thank you.

## 2021-08-23 ENCOUNTER — TREATMENT (OUTPATIENT)
Dept: CARDIAC REHAB | Facility: HOSPITAL | Age: 71
End: 2021-08-23

## 2021-08-23 DIAGNOSIS — I50.22 CHRONIC SYSTOLIC CONGESTIVE HEART FAILURE (HCC): Primary | ICD-10-CM

## 2021-08-23 PROCEDURE — 93798 PHYS/QHP OP CAR RHAB W/ECG: CPT

## 2021-08-25 ENCOUNTER — TREATMENT (OUTPATIENT)
Dept: CARDIAC REHAB | Facility: HOSPITAL | Age: 71
End: 2021-08-25

## 2021-08-25 DIAGNOSIS — I50.22 CHRONIC SYSTOLIC CONGESTIVE HEART FAILURE (HCC): Primary | ICD-10-CM

## 2021-08-25 PROCEDURE — 93798 PHYS/QHP OP CAR RHAB W/ECG: CPT

## 2021-08-27 ENCOUNTER — TREATMENT (OUTPATIENT)
Dept: CARDIAC REHAB | Facility: HOSPITAL | Age: 71
End: 2021-08-27

## 2021-08-27 DIAGNOSIS — I50.22 CHRONIC SYSTOLIC CONGESTIVE HEART FAILURE (HCC): Primary | ICD-10-CM

## 2021-08-27 PROCEDURE — 93798 PHYS/QHP OP CAR RHAB W/ECG: CPT

## 2021-08-30 ENCOUNTER — TREATMENT (OUTPATIENT)
Dept: CARDIAC REHAB | Facility: HOSPITAL | Age: 71
End: 2021-08-30

## 2021-08-30 DIAGNOSIS — I50.22 CHRONIC SYSTOLIC CONGESTIVE HEART FAILURE (HCC): Primary | ICD-10-CM

## 2021-08-30 PROCEDURE — 93798 PHYS/QHP OP CAR RHAB W/ECG: CPT

## 2021-09-01 ENCOUNTER — TREATMENT (OUTPATIENT)
Dept: CARDIAC REHAB | Facility: HOSPITAL | Age: 71
End: 2021-09-01

## 2021-09-01 DIAGNOSIS — I50.22 CHRONIC SYSTOLIC CONGESTIVE HEART FAILURE (HCC): Primary | ICD-10-CM

## 2021-09-01 PROCEDURE — 93798 PHYS/QHP OP CAR RHAB W/ECG: CPT

## 2021-09-03 ENCOUNTER — TREATMENT (OUTPATIENT)
Dept: CARDIAC REHAB | Facility: HOSPITAL | Age: 71
End: 2021-09-03

## 2021-09-03 DIAGNOSIS — I50.22 CHRONIC SYSTOLIC CONGESTIVE HEART FAILURE (HCC): Primary | ICD-10-CM

## 2021-09-03 PROCEDURE — 93798 PHYS/QHP OP CAR RHAB W/ECG: CPT

## 2021-09-08 ENCOUNTER — APPOINTMENT (OUTPATIENT)
Dept: CARDIAC REHAB | Facility: HOSPITAL | Age: 71
End: 2021-09-08

## 2021-09-10 ENCOUNTER — TREATMENT (OUTPATIENT)
Dept: CARDIAC REHAB | Facility: HOSPITAL | Age: 71
End: 2021-09-10

## 2021-09-10 DIAGNOSIS — I50.22 CHRONIC SYSTOLIC CONGESTIVE HEART FAILURE (HCC): Primary | ICD-10-CM

## 2021-09-10 PROCEDURE — 93798 PHYS/QHP OP CAR RHAB W/ECG: CPT

## 2021-09-13 ENCOUNTER — TREATMENT (OUTPATIENT)
Dept: CARDIAC REHAB | Facility: HOSPITAL | Age: 71
End: 2021-09-13

## 2021-09-13 DIAGNOSIS — I50.22 CHRONIC SYSTOLIC CONGESTIVE HEART FAILURE (HCC): Primary | ICD-10-CM

## 2021-09-13 DIAGNOSIS — I48.19 ATRIAL FIBRILLATION, PERSISTENT (HCC): ICD-10-CM

## 2021-09-13 PROCEDURE — 93798 PHYS/QHP OP CAR RHAB W/ECG: CPT

## 2021-09-15 ENCOUNTER — APPOINTMENT (OUTPATIENT)
Dept: CARDIAC REHAB | Facility: HOSPITAL | Age: 71
End: 2021-09-15

## 2021-09-17 ENCOUNTER — TREATMENT (OUTPATIENT)
Dept: CARDIAC REHAB | Facility: HOSPITAL | Age: 71
End: 2021-09-17

## 2021-09-17 DIAGNOSIS — I48.19 ATRIAL FIBRILLATION, PERSISTENT (HCC): ICD-10-CM

## 2021-09-17 DIAGNOSIS — I50.22 CHRONIC SYSTOLIC CONGESTIVE HEART FAILURE (HCC): Primary | ICD-10-CM

## 2021-09-17 PROCEDURE — 93798 PHYS/QHP OP CAR RHAB W/ECG: CPT

## 2021-09-20 ENCOUNTER — TREATMENT (OUTPATIENT)
Dept: CARDIAC REHAB | Facility: HOSPITAL | Age: 71
End: 2021-09-20

## 2021-09-20 DIAGNOSIS — I48.19 ATRIAL FIBRILLATION, PERSISTENT (HCC): ICD-10-CM

## 2021-09-20 DIAGNOSIS — I50.22 CHRONIC SYSTOLIC CONGESTIVE HEART FAILURE (HCC): Primary | ICD-10-CM

## 2021-09-20 PROCEDURE — 93798 PHYS/QHP OP CAR RHAB W/ECG: CPT

## 2021-09-21 ENCOUNTER — OFFICE VISIT (OUTPATIENT)
Dept: CARDIOLOGY | Facility: CLINIC | Age: 71
End: 2021-09-21

## 2021-09-21 VITALS
HEIGHT: 74 IN | WEIGHT: 184 LBS | SYSTOLIC BLOOD PRESSURE: 120 MMHG | BODY MASS INDEX: 23.61 KG/M2 | HEART RATE: 70 BPM | DIASTOLIC BLOOD PRESSURE: 70 MMHG

## 2021-09-21 DIAGNOSIS — Z95.1 S/P CABG (CORONARY ARTERY BYPASS GRAFT): ICD-10-CM

## 2021-09-21 DIAGNOSIS — I48.0 PAROXYSMAL ATRIAL FIBRILLATION (HCC): ICD-10-CM

## 2021-09-21 DIAGNOSIS — I21.11 ST ELEVATION MYOCARDIAL INFARCTION INVOLVING RIGHT CORONARY ARTERY (HCC): Primary | ICD-10-CM

## 2021-09-21 DIAGNOSIS — I25.5 ISCHEMIC CARDIOMYOPATHY: ICD-10-CM

## 2021-09-21 DIAGNOSIS — I49.3 PVC (PREMATURE VENTRICULAR CONTRACTION): ICD-10-CM

## 2021-09-21 PROCEDURE — 93000 ELECTROCARDIOGRAM COMPLETE: CPT | Performed by: INTERNAL MEDICINE

## 2021-09-21 PROCEDURE — 99214 OFFICE O/P EST MOD 30 MIN: CPT | Performed by: INTERNAL MEDICINE

## 2021-09-21 RX ORDER — METOPROLOL SUCCINATE 50 MG/1
50 TABLET, EXTENDED RELEASE ORAL DAILY
COMMUNITY
End: 2022-04-06 | Stop reason: DRUGHIGH

## 2021-09-21 NOTE — PROGRESS NOTES
Date of Office Visit: 21  Encounter Provider: Andrew Ball MD  Place of Service: [facility] CARDIOLOGY  Patient Name: Alden Pratt III  :1950  [id number]    Chief Complaint   Patient presents with   • Coronary Artery Disease   • Atrial Fibrillation   :     HPI: Alden Pratt III is a 70 y.o. male  he had an MI in .  At that time we did an angioplasty and stenting to his RCA but he had other significant disease his stents ended up renarrowing down he had an ischemic myopathy also and had an ICD placed we did a bypass on him and a LIMA to his LAD a vein to his distal LAD a vein to an OM 1 a vein to the PDA vein to the KIERAN a vein to the RV branch.  He is done really pretty well he has had chronic PVCs even before the infarct that persisted some.  He had pretty emotionally dramatic reunion with his brother he ended up having VF and got a shock from his defibrillator.  The shocked knocked him into A. fib with anticoagulate him and then cardiovert him out.  So earlier in the month he went into atrial fibrillation again and did not feel good we cardioverted him out of it he is actually seen the arrhythmia service they put him on Multaq.  He does not feel any better.    We were concerned that he was having angina and so we cathed him and May 2021 and this showed 6 of 6 bypasses were patent severe cardiomyopathy we added some Jardiance to his regimen now he is here for follow-up.  He has followed up with the [facility] heart failure clinic.  This is recommended to him by his daughter and son-in-law.  They gave him some Farxiga samples and also doubled his lisinopril and then he had a hypotensive episode and had to go back to his regular dose of lisinopril     He is just about done with cardiac rehab he feels like it helped him he also had an A. fib ablation since we saw him last that has been a big boost for him he has not had any arrhythmia that he knows  of and he has not had any palpitations he.  He has been following up with the heart failure docs at [facility] but we have not been able to move forward on any of his lisinopril mean every time we try giving him some more he gets hypotensive.  He has not had any chest pain and in general is doing pretty well he had an echo done at [facility] and she said his EF was up to 35 to 40%    Past Medical History:   Diagnosis Date   • Arthritis    • CAD (coronary artery disease)     atheroclerosis   • Cancer (CMS/[facility])     prostate   • Chest pain    • CHF (congestive heart failure) (CMS/[facility])    • Elevated cholesterol    • GERD (gastroesophageal reflux disease)    • Hyperlipidemia    • Implantable cardioverter-defibrillator (ICD) discharge    • Myocardial infarction (CMS/[facility])     inferior   • PVC (premature ventricular contraction)    • PVD (peripheral vascular disease) (CMS/[facility])    • Status post phlebectomy     varicose veins   • Thyroid nodule 7/18/2014   • VT (ventricular tachycardia) (CMS/[facility])        Past Surgical History:   Procedure Laterality Date   • CARDIAC CATHETERIZATION     • CARDIAC CATHETERIZATION Left 5/10/2021    Procedure: Left Heart Cath;  Surgeon: Andrew Ball MD;  Location: [facility] CATH INVASIVE LOCATION;  Service: Cardiology;  Laterality: Left;   • CARDIAC CATHETERIZATION N/A 5/10/2021    Procedure: Coronary angiography;  Surgeon: Andrew Ball MD;  [facility];  Service: Cardiology;  Laterality: N/A;   • CARDIAC CATHETERIZATION N/A 5/10/2021    Procedure: Left ventriculography;  Surgeon: Andrew Ball MD;  [facility];  Service: Cardiology;  Laterality: N/A;   • CARDIAC CATHETERIZATION  5/10/2021    Procedure: Saphenous Vein Graft;  Surgeon: Andrew Ball MD;  Location: [facility] CATH INVASIVE LOCATION;  Service: Cardiology;;   • CARDIAC DEFIBRILLATOR PLACEMENT     • CARDIAC ELECTROPHYSIOLOGY PROCEDURE N/A 8/2/2021    Procedure: Ablation atrial  fibrillation;  Surgeon: Robby Ngo MD;  Location: [facility] INVASIVE LOCATION;  Service: Cardiovascular;  Laterality: N/A;   • CORONARY ARTERY BYPASS GRAFT      stents   • CORONARY STENT PLACEMENT     • HAND SURGERY Left    • KNEE SURGERY Left    • MICROAMBULATORY PHLEBECTOMY     • SHOULDER SURGERY Right    • THYROID BIOPSY     • VARICOSE VEIN SURGERY         Social History     Socioeconomic History   • Marital status:      Spouse name: Not on file   • Number of children: Not on file   • Years of education: Not on file   • Highest education level: Not on file   Tobacco Use   • Smoking status: Never Smoker   • Smokeless tobacco: Never Used   Vaping Use   • Vaping Use: Never used   Substance and Sexual Activity   • Alcohol use: Yes     Comment: rareLY   • Drug use: No   • Sexual activity: Defer       Family History   Problem Relation Age of Onset   • Coronary artery disease Other    • Stroke Other    • Hypertension Other    • Heart disease Father    • Heart failure Father    • Stroke Father    • No Known Problems Maternal Grandmother    • No Known Problems Maternal Grandfather    • No Known Problems Paternal Grandmother    • No Known Problems Paternal Grandfather        Review of Systems   Constitutional: Negative for decreased appetite, fever, malaise/fatigue and weight loss.   HENT: Negative for nosebleeds.    Eyes: Negative for double vision.   Cardiovascular: Negative for chest pain, claudication, cyanosis, dyspnea on exertion, irregular heartbeat, leg swelling, near-syncope, orthopnea, palpitations, paroxysmal nocturnal dyspnea and syncope.   Respiratory: Negative for cough, hemoptysis and shortness of breath.    Hematologic/Lymphatic: Negative for bleeding problem.   Skin: Negative for rash.   Musculoskeletal: Negative for falls and myalgias.   Gastrointestinal: Negative for hematochezia, jaundice, melena, nausea and vomiting.   Genitourinary: Negative for hematuria.   Neurological: Negative  for dizziness and seizures.   Psychiatric/Behavioral: Negative for altered mental status and memory loss.       Allergies   Allergen Reactions   • Contrast Dye Hives   • Iodinated Diagnostic Agents    • Sudafed 12 Hour [Pseudoephedrine Hcl Er]    • Pseudoephedrine Palpitations         Current Outpatient Medications:   •  apixaban (ELIQUIS) 5 MG tablet tablet, Take 1 tablet by mouth Every 12 (Twelve) Hours., Disp: 60 tablet, Rfl: 11  •  aspirin 81 MG EC tablet, Take 81 mg by mouth Daily., Disp: , Rfl:   •  atorvastatin (LIPITOR) 80 MG tablet, Take 1 tablet by mouth Daily. 200001, Disp: 90 tablet, Rfl: 2  •  Cholecalciferol (VITAMIN D3) 5000 UNITS capsule capsule, Take 2,000 Units by mouth Every Other Day., Disp: , Rfl:   •  clobetasol (TEMOVATE) 0.05 % cream, Apply  topically 2 (Two) Times a Day., Disp: , Rfl:   •  Empagliflozin (Jardiance) 10 MG tablet, Take 10 mg by mouth Daily., Disp: 90 tablet, Rfl: 2  •  ezetimibe (ZETIA) 10 MG tablet, Take 10 mg by mouth daily., Disp: , Rfl:   •  fluocinonide (LIDEX) 0.05 % cream, Apply  topically to the appropriate area as directed 2 (Two) Times a Day., Disp: , Rfl:   •  fluticasone (FLONASE) 50 MCG/ACT nasal spray, 1 spray into the nostril(s) as directed by provider Daily., Disp: , Rfl:   •  furosemide (LASIX) 20 MG tablet, Take 20 mg by mouth Daily., Disp: , Rfl:   •  lisinopril (PRINIVIL,ZESTRIL) 2.5 MG tablet, Take 2.5 mg by mouth Daily., Disp: , Rfl:   •  metaxalone (SKELAXIN) 800 MG tablet, Take 800 mg by mouth As Needed for Muscle Spasms., Disp: , Rfl:   •  metoprolol succinate XL (TOPROL-XL) 50 MG 24 hr tablet, Take 150 mg by mouth Daily., Disp: , Rfl:   •  Minoxidil 5 % foam, Apply  topically 2 (two) times a day., Disp: , Rfl:   •  nitroglycerin (NITROSTAT) 0.4 MG SL tablet, 1 under the tongue as needed for angina, may repeat q5mins for up three doses, Disp: 100 tablet, Rfl: 11  •  omeprazole (priLOSEC) 40 MG capsule, Take 40 mg by mouth., Disp: , Rfl:   •   "pimecrolimus (ELIDEL) 1 % cream, Apply  topically 2 (Two) Times a Day., Disp: , Rfl:   •  spironolactone (ALDACTONE) 25 MG tablet, Take 12.5 mg by mouth Daily., Disp: , Rfl:   •  cetirizine (ZyrTEC) 10 MG tablet, Take 10 mg by mouth As Needed. Only Occasionally will use, Disp: , Rfl:   •  metoprolol succinate XL (Toprol XL) 100 MG 24 hr tablet, Take 1 tablet by mouth 2 (two) times a day. (Patient taking differently: Take 150 mg by mouth 2 (two) times a day.), Disp: 60 tablet, Rfl: 11      Objective:     Vitals:    09/21/21 1410   BP: 120/70   Pulse: 70   Weight: 83.5 kg (184 lb)   Height: 188 cm (74\")     Body mass index is 23.62 kg/m².    Physical Exam  Constitutional:       Appearance: He is well-developed.   HENT:      Head: Normocephalic.   Eyes:      General: No scleral icterus.  Neck:      Thyroid: No thyromegaly.      Vascular: No JVD.   Cardiovascular:      Rate and Rhythm: Normal rate and regular rhythm.      Heart sounds: Normal heart sounds. No murmur heard.   No friction rub. No gallop.    Pulmonary:      Effort: Pulmonary effort is normal.      Breath sounds: Normal breath sounds. No wheezing or rales.   Chest:       Abdominal:      Palpations: Abdomen is soft.      Tenderness: There is no abdominal tenderness.   Musculoskeletal:         General: Normal range of motion.   Lymphadenopathy:      Cervical: No cervical adenopathy.   Skin:     General: Skin is warm and dry.      Findings: No rash.   Neurological:      Mental Status: He is alert and oriented to person, place, and time.           ECG 12 Lead    Date/Time: 9/21/2021 3:00 PM  Performed by: Andrew Ball MD  Authorized by: Andrew Ball MD   Comparison: compared with previous ECG   Similar to previous ECG  Rhythm: sinus rhythm  Q waves: II, III and aVF    T inversion: II, III and aVF    Clinical impression: abnormal EKG             Assessment:       Diagnosis Plan   1. ST elevation myocardial infarction involving right coronary artery " (CMS/HCC)     2. S/P CABG (coronary artery bypass graft)     3. Ischemic cardiomyopathy     4. Paroxysmal atrial fibrillation (CMS/HCC)     5. PVC (premature ventricular contraction)            Plan:       In general I think he is doing pretty well he is on good medical therapy probably the most that he can tolerate when we tried him on more medicines he has gotten hypotensive and so I do not think I would change that right now and his EF is 35 to 40% so that is great also.  It is improved probably 30% from where it was.  I Aliya have him come back and see me in 6 months sooner if he has trouble but I am pleased with how he is doing overall interestingly his PVCs have gone away when he stopped drinking caffeine    As always, it has been a pleasure to participate in your patient's care.      Sincerely,       Andrew Ball MD

## 2021-09-22 ENCOUNTER — APPOINTMENT (OUTPATIENT)
Dept: CARDIAC REHAB | Facility: HOSPITAL | Age: 71
End: 2021-09-22

## 2021-09-24 ENCOUNTER — TREATMENT (OUTPATIENT)
Dept: CARDIAC REHAB | Facility: HOSPITAL | Age: 71
End: 2021-09-24

## 2021-09-24 DIAGNOSIS — I50.22 CHRONIC SYSTOLIC CONGESTIVE HEART FAILURE (HCC): Primary | ICD-10-CM

## 2021-09-24 PROCEDURE — 93798 PHYS/QHP OP CAR RHAB W/ECG: CPT

## 2021-10-25 ENCOUNTER — TELEPHONE (OUTPATIENT)
Dept: CARDIOLOGY | Facility: CLINIC | Age: 71
End: 2021-10-25

## 2021-10-25 NOTE — TELEPHONE ENCOUNTER
[phone]  9:15 am    Pt called and lm asking if WD is taking on new pts.  He states his nephew has cardiac issues and was wanting to see [facility].  Please advise.    [facility] SATNAM

## 2021-10-28 ENCOUNTER — CLINICAL SUPPORT NO REQUIREMENTS (OUTPATIENT)
Dept: CARDIOLOGY | Facility: CLINIC | Age: 71
End: 2021-10-28

## 2021-10-28 ENCOUNTER — OFFICE VISIT (OUTPATIENT)
Dept: CARDIOLOGY | Facility: CLINIC | Age: 71
End: 2021-10-28

## 2021-10-28 VITALS
SYSTOLIC BLOOD PRESSURE: 124 MMHG | HEIGHT: 74 IN | DIASTOLIC BLOOD PRESSURE: 86 MMHG | HEART RATE: 73 BPM | BODY MASS INDEX: 23.36 KG/M2 | WEIGHT: 182 LBS

## 2021-10-28 DIAGNOSIS — I48.19 ATRIAL FIBRILLATION, PERSISTENT (HCC): Primary | ICD-10-CM

## 2021-10-28 DIAGNOSIS — I25.5 ISCHEMIC CARDIOMYOPATHY: Primary | ICD-10-CM

## 2021-10-28 DIAGNOSIS — I25.5 ISCHEMIC CARDIOMYOPATHY: ICD-10-CM

## 2021-10-28 PROCEDURE — 93283 PRGRMG EVAL IMPLANTABLE DFB: CPT | Performed by: INTERNAL MEDICINE

## 2021-10-28 PROCEDURE — 93000 ELECTROCARDIOGRAM COMPLETE: CPT | Performed by: INTERNAL MEDICINE

## 2021-10-28 PROCEDURE — 99213 OFFICE O/P EST LOW 20 MIN: CPT | Performed by: INTERNAL MEDICINE

## 2021-10-29 NOTE — PROGRESS NOTES
Date of Office Visit: 10/28/2021  Encounter Provider: Robby Ngo MD  Place of Service: [facility] CARDIOLOGY  Patient Name: Alden Pratt III  :1950    Chief Complaint   Patient presents with   • paroxysmal AFIB     2 month f/u    • persistent AFIB   • Cardiomyopathy   • s/p CABG   • Pacemaker Check   :     HPI: Alden Pratt III is a 70 y.o. male who presents today for persistent atrial fibrillation.     The patient device was interrogated and he has not had any atrial fibrillation since ablation.  He reports improvement in shortness of breath and exertional dyspnea since ablation.       He is scheduled to undergo right heart cath in a few days.            Past Medical History:   Diagnosis Date   • Arthritis    • CAD (coronary artery disease)     atheroclerosis   • Cancer (CMS/HCC)     prostate   • Chest pain    • CHF (congestive heart failure) (CMS/HCC)    • Elevated cholesterol    • GERD (gastroesophageal reflux disease)    • Hyperlipidemia    • Implantable cardioverter-defibrillator (ICD) discharge    • Myocardial infarction (CMS/HCC)     inferior   • PVC (premature ventricular contraction)    • PVD (peripheral vascular disease) (CMS/HCC)    • Status post phlebectomy     varicose veins   • Thyroid nodule 2014   • VT (ventricular tachycardia) (CMS/HCC)        Past Surgical History:   Procedure Laterality Date   • CARDIAC CATHETERIZATION     • CARDIAC CATHETERIZATION Left 5/10/2021    Procedure: Left Heart Cath;  Surgeon: Andrew Ball MD;  Location: [facility] CATH INVASIVE LOCATION;  Service: Cardiology;  Laterality: Left;   • CARDIAC CATHETERIZATION N/A 5/10/2021    Procedure: Coronary angiography;  Surgeon: Andrew Ball MD;  Location: [facility] CATH INVASIVE LOCATION;  Service: Cardiology;  Laterality: N/A;   • CARDIAC CATHETERIZATION N/A 5/10/2021    Procedure: Left ventriculography;  Surgeon: Andrew Ball MD;  Location: [facility] CATH INVASIVE  LOCATION;  Service: Cardiology;  Laterality: N/A;   • CARDIAC CATHETERIZATION  5/10/2021    Procedure: Saphenous Vein Graft;  Surgeon: Andrew Ball MD;  Location:  [facility] CATH INVASIVE LOCATION;  Service: Cardiology;;   • CARDIAC DEFIBRILLATOR PLACEMENT     • CARDIAC ELECTROPHYSIOLOGY PROCEDURE N/A 8/2/2021    Procedure: Ablation atrial fibrillation;  Surgeon: Robby Ngo MD;  Location:  [facility] CATH INVASIVE LOCATION;  Service: Cardiovascular;  Laterality: N/A;   • CORONARY ARTERY BYPASS GRAFT      stents   • CORONARY STENT PLACEMENT     • HAND SURGERY Left    • KNEE SURGERY Left    • MICROAMBULATORY PHLEBECTOMY     • SHOULDER SURGERY Right    • THYROID BIOPSY     • VARICOSE VEIN SURGERY         Social History     Socioeconomic History   • Marital status:    Tobacco Use   • Smoking status: Never Smoker   • Smokeless tobacco: Never Used   Vaping Use   • Vaping Use: Never used   Substance and Sexual Activity   • Alcohol use: Yes     Comment: rareLY   • Drug use: No   • Sexual activity: Defer       Family History   Problem Relation Age of Onset   • Coronary artery disease Other    • Stroke Other    • Hypertension Other    • Heart disease Father    • Heart failure Father    • Stroke Father    • No Known Problems Maternal Grandmother    • No Known Problems Maternal Grandfather    • No Known Problems Paternal Grandmother    • No Known Problems Paternal Grandfather        Review of Systems   Constitutional: Negative.   Cardiovascular: Negative.    Respiratory: Positive for shortness of breath.    Gastrointestinal: Negative.        Allergies   Allergen Reactions   • Contrast Dye Hives   • Iodinated Diagnostic Agents    • Sudafed 12 Hour [Pseudoephedrine Hcl Er]    • Pseudoephedrine Palpitations         Current Outpatient Medications:   •  apixaban (ELIQUIS) 5 MG tablet tablet, Take 1 tablet by mouth Every 12 (Twelve) Hours., Disp: 60 tablet, Rfl: 11  •  aspirin 81 MG EC tablet, Take 81 mg by mouth Daily.,  Disp: , Rfl:   •  atorvastatin (LIPITOR) 80 MG tablet, Take 1 tablet by mouth Daily. 200001, Disp: 90 tablet, Rfl: 2  •  cetirizine (ZyrTEC) 10 MG tablet, Take 10 mg by mouth As Needed. Only Occasionally will use, Disp: , Rfl:   •  Cholecalciferol (VITAMIN D3) 5000 UNITS capsule capsule, Take 2,000 Units by mouth Every Other Day., Disp: , Rfl:   •  clobetasol (TEMOVATE) 0.05 % cream, Apply  topically 2 (Two) Times a Day., Disp: , Rfl:   •  Empagliflozin (Jardiance) 10 MG tablet, Take 10 mg by mouth Daily., Disp: 90 tablet, Rfl: 2  •  ezetimibe (ZETIA) 10 MG tablet, Take 10 mg by mouth daily., Disp: , Rfl:   •  fluocinonide (LIDEX) 0.05 % cream, Apply  topically to the appropriate area as directed 2 (Two) Times a Day., Disp: , Rfl:   •  fluticasone (FLONASE) 50 MCG/ACT nasal spray, 1 spray into the nostril(s) as directed by provider 2 (Two) Times a Day As Needed., Disp: , Rfl:   •  furosemide (LASIX) 20 MG tablet, Take 20 mg by mouth Daily As Needed (swelling/weight gain)., Disp: , Rfl:   •  lisinopril (PRINIVIL,ZESTRIL) 2.5 MG tablet, Take 2.5 mg by mouth 2 (Two) Times a Day., Disp: , Rfl:   •  metaxalone (SKELAXIN) 800 MG tablet, Take 800 mg by mouth As Needed for Muscle Spasms., Disp: , Rfl:   •  metoprolol succinate XL (Toprol XL) 100 MG 24 hr tablet, Take 1 tablet by mouth 2 (two) times a day. (Patient taking differently: Take 100 mg by mouth Daily.), Disp: 60 tablet, Rfl: 11  •  metoprolol succinate XL (TOPROL-XL) 50 MG 24 hr tablet, Take 50 mg by mouth Daily., Disp: , Rfl:   •  Minoxidil 5 % foam, Apply  topically 2 (two) times a day., Disp: , Rfl:   •  nitroglycerin (NITROSTAT) 0.4 MG SL tablet, 1 under the tongue as needed for angina, may repeat q5mins for up three doses, Disp: 100 tablet, Rfl: 11  •  omeprazole (priLOSEC) 40 MG capsule, Take 40 mg by mouth., Disp: , Rfl:   •  pimecrolimus (ELIDEL) 1 % cream, Apply  topically 2 (Two) Times a Day., Disp: , Rfl:   •  spironolactone (ALDACTONE) 25 MG tablet,  "Take 12.5 mg by mouth Daily., Disp: , Rfl:       Objective:     Vitals:    10/28/21 1422   BP: 124/86   Pulse: 73   Weight: 82.6 kg (182 lb)   Height: 188 cm (74\")     Body mass index is 23.37 kg/m².    PHYSICAL EXAM:    Vitals and nursing note reviewed.   Constitutional:       Appearance: Normal and healthy appearance.   HENT:      Head: Normocephalic and atraumatic.   Pulmonary:      Effort: Pulmonary effort is normal.      Breath sounds: Normal breath sounds.   Chest:   Breasts:      Right: Normal.       Cardiovascular:      Normal rate. Regular rhythm.      Murmurs: There is no murmur.   Edema:     Peripheral edema absent.   Skin:     General: Skin is warm.   Neurological:      General: No focal deficit present.      Mental Status: Alert, oriented to person, place, and time and oriented to person, place and time.   Psychiatric:         Attention and Perception: Attention and perception normal.         Mood and Affect: Mood and affect normal.         Behavior: Behavior is cooperative.             ECG 12 Lead    Date/Time: 10/28/2021 10:57 PM  Performed by: Robby Ngo MD  Authorized by: Robby Ngo MD   Comparison: compared with previous ECG from 9/12/2021  Comparison to previous ECG: Atrial paced rhythm similar to prior ekg  Rhythm: paced              Assessment:       Diagnosis Plan   1. Atrial fibrillation, persistent (HCC)     2. Ischemic cardiomyopathy            Plan:       Persistent Atrial Fibrillation, improved since ablation.  Continue eliquis.     He still has NYHA class II heart failure symptoms.  They have improved since ablation.  He is on good medical therapy and follows with [place] heart failure clinic.  He has a right heart cath scheduled in a few weeks.         Robby Ngo MD  "

## 2022-02-02 ENCOUNTER — TELEPHONE (OUTPATIENT)
Dept: CARDIOLOGY | Facility: CLINIC | Age: 72
End: 2022-02-02

## 2022-02-02 NOTE — TELEPHONE ENCOUNTER
Pt's manual device ([employer] ICD) transmission shows him presenting in AP/VS @ 80s bpm. He has 1 event listed on device which is from 1/9 - this was a NSVT event around 10 beats long at 204s bpm (avg V rate). Strip below. Otherwise his report is WNL as far as testing and lead trends. PVC counters show 61.9 per hr and PVC runs 2.3 per hr. Please let us know if there is anything else pacemaker dept can do. Kindly, Elinor Wadsworth RN

## 2022-02-02 NOTE — TELEPHONE ENCOUNTER
Patient called today concerned about severe frequent PVCs or AFIB.  He isn't sure which it is.  He states he feels 2 beats and then a skip, 2 beats and then a skip and it will go on like that for 45 mins to an hour.  This has been happening for the last several days and overall making him feel crummy.  He gets a headache, feels somewhat tired, and gets lightheaded when up moving around.    Recently been taken off lisinopril and started on entresto 24-26mg, taking 1/2 tab BID as long as b/p allows.  He also takes metop 50 AM and 100mg PM.  He did take an additional metop yesterday morning and that seemed to help.  His b/p today 109/63 with a HR of 73.    He is wondering if he needs to be seen, not due to f/u until 5/5/22.    I asked that he send a remote to see if anything is showing up.

## 2022-02-03 NOTE — TELEPHONE ENCOUNTER
I called and discussed symptoms with him.  Having some palpitations, I think they are premature ventricular contractions.  He has a history of premature ventricular contractions going back years.  No atrial fibrillation has been identified.  He is going to monitor symptoms and if these do not improve we can get him a Zio Patch.

## 2022-02-10 DIAGNOSIS — I48.0 PAROXYSMAL ATRIAL FIBRILLATION: ICD-10-CM

## 2022-02-10 DIAGNOSIS — I49.3 PVC (PREMATURE VENTRICULAR CONTRACTION): Primary | ICD-10-CM

## 2022-02-10 NOTE — TELEPHONE ENCOUNTER
Patient calling back today, ready to proceed with heart monitor.    If agreeable, please place order for Zio.    Thanks!

## 2022-03-18 ENCOUNTER — TELEPHONE (OUTPATIENT)
Dept: CARDIOLOGY | Facility: CLINIC | Age: 72
End: 2022-03-18

## 2022-03-18 DIAGNOSIS — I49.3 PVC (PREMATURE VENTRICULAR CONTRACTION): Primary | ICD-10-CM

## 2022-03-18 NOTE — TELEPHONE ENCOUNTER
Pt called stating he SW you about doing a PVC ablation. He wants to move forward with scheduling this. Pt was last seen in office 10/28 I do not see anything in that note about PVC ablation. He wore a zio 2-15 that CR ordered maybe it was discussed with the results of that monitor. Please let me know...Brenda

## 2022-04-04 ENCOUNTER — TELEPHONE (OUTPATIENT)
Dept: CARDIOLOGY | Facility: CLINIC | Age: 72
End: 2022-04-04

## 2022-04-04 NOTE — TELEPHONE ENCOUNTER
I called patient to schedule PVC Ablation order placed by Galo.  Patient said he needs an appointment with Dr. Ngo before scheduling.      Thank you    Kostas DENISE

## 2022-04-06 ENCOUNTER — OFFICE VISIT (OUTPATIENT)
Dept: CARDIOLOGY | Facility: CLINIC | Age: 72
End: 2022-04-06

## 2022-04-06 VITALS
BODY MASS INDEX: 23.49 KG/M2 | HEIGHT: 74 IN | WEIGHT: 183 LBS | SYSTOLIC BLOOD PRESSURE: 118 MMHG | HEART RATE: 74 BPM | DIASTOLIC BLOOD PRESSURE: 78 MMHG

## 2022-04-06 DIAGNOSIS — I21.11 ST ELEVATION MYOCARDIAL INFARCTION INVOLVING RIGHT CORONARY ARTERY: Primary | ICD-10-CM

## 2022-04-06 DIAGNOSIS — Z95.1 S/P CABG (CORONARY ARTERY BYPASS GRAFT): ICD-10-CM

## 2022-04-06 DIAGNOSIS — I48.19 ATRIAL FIBRILLATION, PERSISTENT: ICD-10-CM

## 2022-04-06 DIAGNOSIS — I25.5 ISCHEMIC CARDIOMYOPATHY: ICD-10-CM

## 2022-04-06 DIAGNOSIS — I49.3 PVC (PREMATURE VENTRICULAR CONTRACTION): ICD-10-CM

## 2022-04-06 PROCEDURE — 93000 ELECTROCARDIOGRAM COMPLETE: CPT | Performed by: INTERNAL MEDICINE

## 2022-04-06 PROCEDURE — 99214 OFFICE O/P EST MOD 30 MIN: CPT | Performed by: INTERNAL MEDICINE

## 2022-04-06 RX ORDER — METOPROLOL SUCCINATE 100 MG/1
100 TABLET, EXTENDED RELEASE ORAL DAILY
Qty: 180 TABLET | Refills: 2 | Status: SHIPPED | OUTPATIENT
Start: 2022-04-06

## 2022-04-06 NOTE — PROGRESS NOTES
Date of Office Visit: 22  Encounter Provider: Andrew Ball MD  Place of Service: [facility] CARDIOLOGY  Patient Name: Alden Pratt III  :1950  [id number]    Chief Complaint   Patient presents with   • Coronary Artery Disease   • Congestive Heart Failure   :     HPI: Alden Pratt III is a 71 y.o. male  he had an MI in .  At that time we did an angioplasty and stenting to his RCA but he had other significant disease his stents ended up renarrowing down he had an ischemic myopathy also and had an ICD placed we did a bypass on him and a LIMA to his LAD a vein to his distal LAD a vein to an OM 1 a vein to the PDA vein to the KIERAN a vein to the RV branch.  He is done really pretty well he has had chronic PVCs even before the infarct that persisted some.  He had pretty emotionally dramatic reunion with his brother he ended up having VF and got a shock from his defibrillator.  The shocked knocked him into A. fib with anticoagulate him and then cardiovert him out.  So earlier in the month he went into atrial fibrillation again and did not feel good we cardioverted him out of it he is actually seen the arrhythmia service they put him on Multaq.  He does not feel any better.    We were concerned that he was having angina and so we cathed him and May 2021 and this showed 6 of 6 bypasses were patent severe cardiomyopathy we added some Jardiance to his regimen now he is here for follow-up.  He has followed up with the [facility] heart failure clinic.  This is recommended to him by his daughter and son-in-law.  They gave him some Farxiga samples and also doubled his lisinopril and then he had a hypotensive episode and had to go back to his regular dose of lisinopril     He has had a prior A. fib ablation and last echo which was done  showed an ejection fraction of 35 to 40%.    He is here for follow-up he had a lot of PVCs a couple months ago wore a monitor  had it for about 7-1/2% of the time talked with the arrhythmia team about an ablation they were kind of ready to do it but he wants to think about a little bit more and they went away again but when he has them it does bother him that he has had PVCs since his 30s way before his infarct.  He has not had a VT is not had any syncope he is not a shocks he does not have chest discomfort.  When he is had more medications for his cardiomyopathy he has developed orthostatic symptoms and he came off his Entresto and is just on lisinopril and feels better.  Follows with the [place] heart failure team    Past Medical History:   Diagnosis Date   • Arthritis    • CAD (coronary artery disease)     atheroclerosis   • Cancer ([facility])     prostate   • Chest pain    • CHF (congestive heart failure) ([facility])    • Elevated cholesterol    • GERD (gastroesophageal reflux disease)    • Hyperlipidemia    • Implantable cardioverter-defibrillator (ICD) discharge    • Myocardial infarction ([facility])     inferior   • PVC (premature ventricular contraction)    • PVD (peripheral vascular disease) ([facility])    • Status post phlebectomy     varicose veins   • Thyroid nodule 7/18/2014   • VT (ventricular tachycardia) ([facility])        Past Surgical History:   Procedure Laterality Date   • CARDIAC CATHETERIZATION     • CARDIAC CATHETERIZATION Left 5/10/2021    Procedure: Left Heart Cath;  Surgeon: Andrew Ball MD;  Location:  INVASIVE LOCATION;  Service: Cardiology;  Laterality: Left;   • CARDIAC CATHETERIZATION N/A 5/10/2021    Procedure: Coronary angiography;  Surgeon: Andrew Ball MD;  Location:  INVASIVE LOCATION;  Service: Cardiology;  Laterality: N/A;   • CARDIAC CATHETERIZATION N/A 5/10/2021    Procedure: Left ventriculography;  Surgeon: Andrew Ball MD;  Location: [facility] CATH INVASIVE LOCATION;  Service: Cardiology;  Laterality: N/A;   • CARDIAC CATHETERIZATION  5/10/2021    Procedure: Saphenous Vein Graft;  Surgeon: Quinn  MD Andrew;  Location: [facility] CATH INVASIVE LOCATION;  Service: Cardiology;;   • CARDIAC DEFIBRILLATOR PLACEMENT     • CARDIAC ELECTROPHYSIOLOGY PROCEDURE N/A 8/2/2021    Procedure: Ablation atrial fibrillation;  Surgeon: Robby Ngo MD;  Location: [facility] CATH INVASIVE LOCATION;  Service: Cardiovascular;  Laterality: N/A;   • CORONARY ARTERY BYPASS GRAFT      stents   • CORONARY STENT PLACEMENT     • HAND SURGERY Left    • KNEE SURGERY Left    • MICROAMBULATORY PHLEBECTOMY     • SHOULDER SURGERY Right    • THYROID BIOPSY     • VARICOSE VEIN SURGERY         Social History     Socioeconomic History   • Marital status:    Tobacco Use   • Smoking status: Never Smoker   • Smokeless tobacco: Never Used   Vaping Use   • Vaping Use: Never used   Substance and Sexual Activity   • Alcohol use: Yes     Comment: rareLY   • Drug use: No   • Sexual activity: Defer       Family History   Problem Relation Age of Onset   • Coronary artery disease Other    • Stroke Other    • Hypertension Other    • Heart disease Father    • Heart failure Father    • Stroke Father    • No Known Problems Maternal Grandmother    • No Known Problems Maternal Grandfather    • No Known Problems Paternal Grandmother    • No Known Problems Paternal Grandfather        Review of Systems   Constitutional: Negative for decreased appetite, fever, malaise/fatigue and weight loss.   HENT: Negative for nosebleeds.    Eyes: Negative for double vision.   Cardiovascular: Negative for chest pain, claudication, cyanosis, dyspnea on exertion, irregular heartbeat, leg swelling, near-syncope, orthopnea, palpitations, paroxysmal nocturnal dyspnea and syncope.   Respiratory: Negative for cough, hemoptysis and shortness of breath.    Hematologic/Lymphatic: Negative for bleeding problem.   Skin: Negative for rash.   Musculoskeletal: Negative for falls and myalgias.   Gastrointestinal: Negative for hematochezia, jaundice, melena, nausea and vomiting.    Genitourinary: Negative for hematuria.   Neurological: Negative for dizziness and seizures.   Psychiatric/Behavioral: Negative for altered mental status and memory loss.       Allergies   Allergen Reactions   • Contrast Dye Hives   • Iodinated Diagnostic Agents    • Sudafed 12 Hour [Pseudoephedrine Hcl Er]    • Pseudoephedrine Palpitations         Current Outpatient Medications:   •  apixaban (ELIQUIS) 5 MG tablet tablet, Take 1 tablet by mouth Every 12 (Twelve) Hours., Disp: 180 tablet, Rfl: 2  •  aspirin 81 MG EC tablet, Take 81 mg by mouth Daily., Disp: , Rfl:   •  atorvastatin (LIPITOR) 80 MG tablet, Take 1 tablet by mouth Daily. 200001, Disp: 90 tablet, Rfl: 2  •  cetirizine (ZyrTEC) 10 MG tablet, Take 10 mg by mouth As Needed. Only Occasionally will use, Disp: , Rfl:   •  Cholecalciferol (VITAMIN D3) 5000 UNITS capsule capsule, Take 2,000 Units by mouth Every Other Day., Disp: , Rfl:   •  clobetasol (TEMOVATE) 0.05 % cream, Apply  topically 2 (Two) Times a Day., Disp: , Rfl:   •  Empagliflozin (Jardiance) 10 MG tablet, Take 10 mg by mouth Daily., Disp: 90 tablet, Rfl: 2  •  ezetimibe (ZETIA) 10 MG tablet, Take 10 mg by mouth daily., Disp: , Rfl:   •  fluocinonide (LIDEX) 0.05 % cream, Apply  topically to the appropriate area as directed 2 (Two) Times a Day., Disp: , Rfl:   •  fluticasone (FLONASE) 50 MCG/ACT nasal spray, 1 spray into the nostril(s) as directed by provider 2 (Two) Times a Day As Needed., Disp: , Rfl:   •  furosemide (LASIX) 20 MG tablet, Take 20 mg by mouth Daily As Needed (swelling/weight gain)., Disp: , Rfl:   •  lisinopril (PRINIVIL,ZESTRIL) 2.5 MG tablet, Take 2.5 mg by mouth 2 (Two) Times a Day., Disp: , Rfl:   •  metaxalone (SKELAXIN) 800 MG tablet, Take 800 mg by mouth As Needed for Muscle Spasms., Disp: , Rfl:   •  metoprolol succinate XL (Toprol XL) 100 MG 24 hr tablet, Take 1 tablet by mouth Daily., Disp: 180 tablet, Rfl: 2  •  Minoxidil 5 % foam, Apply  topically 2 (two) times a  "day., Disp: , Rfl:   •  nitroglycerin (NITROSTAT) 0.4 MG SL tablet, 1 under the tongue as needed for angina, may repeat q5mins for up three doses, Disp: 100 tablet, Rfl: 11  •  omeprazole (priLOSEC) 40 MG capsule, Take 40 mg by mouth., Disp: , Rfl:   •  pimecrolimus (ELIDEL) 1 % cream, Apply  topically 2 (Two) Times a Day., Disp: , Rfl:   •  spironolactone (ALDACTONE) 25 MG tablet, Take 12.5 mg by mouth Daily., Disp: , Rfl:       Objective:     Vitals:    04/06/22 1338   BP: 118/78   Pulse: 74   Weight: 83 kg (183 lb)   Height: 188 cm (74\")     Body mass index is 23.5 kg/m².    Physical Exam  Constitutional:       Appearance: He is well-developed.   HENT:      Head: Normocephalic.   Eyes:      General: No scleral icterus.  Neck:      Thyroid: No thyromegaly.      Vascular: No JVD.   Cardiovascular:      Rate and Rhythm: Normal rate and regular rhythm.      Heart sounds: Normal heart sounds. No murmur heard.    No friction rub. No gallop.   Pulmonary:      Effort: Pulmonary effort is normal.      Breath sounds: Normal breath sounds. No wheezing or rales.   Chest:       Abdominal:      Palpations: Abdomen is soft.      Tenderness: There is no abdominal tenderness.   Musculoskeletal:         General: Normal range of motion.   Lymphadenopathy:      Cervical: No cervical adenopathy.   Skin:     General: Skin is warm and dry.      Findings: No rash.   Neurological:      Mental Status: He is alert and oriented to person, place, and time.           ECG 12 Lead    Date/Time: 4/6/2022 2:26 PM  Performed by: Andrew Ball MD  Authorized by: Andrew Ball MD   Comparison: compared with previous ECG   Similar to previous ECG  Rhythm: sinus rhythm  Q waves: II, III and aVF    T inversion: II, III and aVF    Clinical impression: abnormal EKG             Assessment:       Diagnosis Plan   1. ST elevation myocardial infarction involving right coronary artery (HCC)     2. S/P CABG (coronary artery bypass graft)     3. Ischemic " cardiomyopathy     4. PVC (premature ventricular contraction)     5. Atrial fibrillation, persistent (HCC)            Plan:       I did see a big change with Bill actually advised him to hold off on an ablation like if he showed up for 1 today he would not of been able to have one because he does not have any ectopy.  I am going to have him come back and see me in 6 months in general I think he is doing okay.  I do still think he can take a lot more medicine    As always, it has been a pleasure to participate in your patient's care.      Sincerely,       Andrew Ball MD

## 2022-04-09 PROCEDURE — 93296 REM INTERROG EVL PM/IDS: CPT | Performed by: INTERNAL MEDICINE

## 2022-04-09 PROCEDURE — 93295 DEV INTERROG REMOTE 1/2/MLT: CPT | Performed by: INTERNAL MEDICINE

## 2022-05-05 ENCOUNTER — OFFICE VISIT (OUTPATIENT)
Dept: CARDIOLOGY | Facility: CLINIC | Age: 72
End: 2022-05-05

## 2022-05-05 ENCOUNTER — CLINICAL SUPPORT NO REQUIREMENTS (OUTPATIENT)
Dept: CARDIOLOGY | Facility: CLINIC | Age: 72
End: 2022-05-05

## 2022-05-05 VITALS
WEIGHT: 183 LBS | BODY MASS INDEX: 23.49 KG/M2 | DIASTOLIC BLOOD PRESSURE: 80 MMHG | HEART RATE: 71 BPM | SYSTOLIC BLOOD PRESSURE: 112 MMHG | HEIGHT: 74 IN

## 2022-05-05 DIAGNOSIS — I48.19 ATRIAL FIBRILLATION, PERSISTENT: Primary | ICD-10-CM

## 2022-05-05 DIAGNOSIS — I49.3 PVC (PREMATURE VENTRICULAR CONTRACTION): ICD-10-CM

## 2022-05-05 DIAGNOSIS — I25.5 ISCHEMIC CARDIOMYOPATHY: Primary | ICD-10-CM

## 2022-05-05 DIAGNOSIS — I25.5 ISCHEMIC CARDIOMYOPATHY: ICD-10-CM

## 2022-05-05 PROCEDURE — 99214 OFFICE O/P EST MOD 30 MIN: CPT | Performed by: INTERNAL MEDICINE

## 2022-05-05 PROCEDURE — 93000 ELECTROCARDIOGRAM COMPLETE: CPT | Performed by: INTERNAL MEDICINE

## 2022-05-05 PROCEDURE — 93283 PRGRMG EVAL IMPLANTABLE DFB: CPT | Performed by: INTERNAL MEDICINE

## 2022-05-05 NOTE — PROGRESS NOTES
Date of Office Visit: 2022  Encounter Provider: Robby Ngo MD  Place of Service: [facility] CARDIOLOGY  Patient Name: Alden Pratt III  :1950    Chief Complaint   Patient presents with   • Pacemaker Check   • Atrial Fibrillation     6 mnth follow up   :     HPI: Alden Pratt III is a 71 y.o. male who presents today for follow-up of atrial fibrillation, premature ventricular contractions, ischemic cardiomyopathy with ICD.    Patient has a history of persistent atrial fibrillation.  He underwent ablation, and has done well since then.  No detected atrial fibrillation on his dual-chamber ICD.    In regards to his premature ventricular contractions.  He was noticing some irregular heartbeats.  Holter monitoring showed a 7.5% overall burden of PVCs, but they varied in volume with some days being worse at up to 20%.  We discussed PVC ablation, and had plans to move forward.  However, at about that time the PVCs seem to have improved dramatically and they are no longer bothering him.  He perhaps associates this with some changes to his heart failure medication switching between Entresto and lisinopril.    Ischemic cardiomyopathy is stable with no heart failure symptoms.  His ICD appears stable.  It does record some short V to V intervals, EGM's are not available for review.  Other parameters are unchanged.          Past Medical History:   Diagnosis Date   • Arthritis    • CAD (coronary artery disease)     atheroclerosis   • Cancer (HCC)     prostate   • Chest pain    • CHF (congestive heart failure) ([facility])    • Elevated cholesterol    • GERD (gastroesophageal reflux disease)    • Hyperlipidemia    • Implantable cardioverter-defibrillator (ICD) discharge    • Myocardial infarction (HCC)     inferior   • PVC (premature ventricular contraction)    • PVD (peripheral vascular disease) ([facility])    • Status post phlebectomy     varicose veins   • Thyroid nodule 2014    • VT (ventricular tachycardia) ([facility])        Past Surgical History:   Procedure Laterality Date   • CARDIAC CATHETERIZATION     • CARDIAC CATHETERIZATION Left 5/10/2021    Procedure: Left Heart Cath;  Surgeon: Andrew Ball MD;  [facility];  Service: Cardiology;  Laterality: Left;   • CARDIAC CATHETERIZATION N/A 5/10/2021    Procedure: Coronary angiography;  Surgeon: Andrew Ball MD;  [facility];  Service: Cardiology;  Laterality: N/A;   • CARDIAC CATHETERIZATION N/A 5/10/2021    Procedure: Left ventriculography;  Surgeon: Andrew Ball MD;  [facility];  Service: Cardiology;  Laterality: N/A;   • CARDIAC CATHETERIZATION  5/10/2021    Procedure: Saphenous Vein Graft;  Surgeon: Andrew Ball MD;  Location: [facility] CATH INVASIVE LOCATION;  Service: Cardiology;;   • CARDIAC DEFIBRILLATOR PLACEMENT     • CARDIAC ELECTROPHYSIOLOGY PROCEDURE N/A 8/2/2021    Procedure: Ablation atrial fibrillation;  Surgeon: Robby Ngo MD;  Location: [facility] CATH INVASIVE LOCATION;  Service: Cardiovascular;  Laterality: N/A;   • CORONARY ARTERY BYPASS GRAFT      stents   • CORONARY STENT PLACEMENT     • HAND SURGERY Left    • KNEE SURGERY Left    • MICROAMBULATORY PHLEBECTOMY     • SHOULDER SURGERY Right    • THYROID BIOPSY     • VARICOSE VEIN SURGERY         Social History     Socioeconomic History   • Marital status:    Tobacco Use   • Smoking status: Never Smoker   • Smokeless tobacco: Never Used   Vaping Use   • Vaping Use: Never used   Substance and Sexual Activity   • Alcohol use: Yes     Comment: rareLY   • Drug use: No   • Sexual activity: Defer       Family History   Problem Relation Age of Onset   • Coronary artery disease Other    • Stroke Other    • Hypertension Other    • Heart disease Father    • Heart failure Father    • Stroke Father    • No Known Problems Maternal Grandmother    • No Known Problems Maternal  Grandfather    • No Known Problems Paternal Grandmother    • No Known Problems Paternal Grandfather        Review of Systems   Constitutional: Negative.   Cardiovascular: Positive for palpitations.   Respiratory: Negative.    Gastrointestinal: Negative.        Allergies   Allergen Reactions   • Contrast Dye Hives   • Iodinated Diagnostic Agents    • Sudafed 12 Hour [Pseudoephedrine Hcl Er]    • Pseudoephedrine Palpitations         Current Outpatient Medications:   •  apixaban (ELIQUIS) 5 MG tablet tablet, Take 1 tablet by mouth Every 12 (Twelve) Hours., Disp: 180 tablet, Rfl: 2  •  aspirin 81 MG EC tablet, Take 81 mg by mouth Daily., Disp: , Rfl:   •  atorvastatin (LIPITOR) 80 MG tablet, Take 1 tablet by mouth Daily. 200001, Disp: 90 tablet, Rfl: 2  •  cetirizine (ZyrTEC) 10 MG tablet, Take 10 mg by mouth As Needed. Only Occasionally will use, Disp: , Rfl:   •  Cholecalciferol (VITAMIN D3) 5000 UNITS capsule capsule, Take 2,000 Units by mouth Every Other Day., Disp: , Rfl:   •  clobetasol (TEMOVATE) 0.05 % cream, Apply  topically 2 (Two) Times a Day., Disp: , Rfl:   •  Empagliflozin (Jardiance) 10 MG tablet, Take 10 mg by mouth Daily., Disp: 90 tablet, Rfl: 2  •  ezetimibe (ZETIA) 10 MG tablet, Take 10 mg by mouth daily., Disp: , Rfl:   •  fluocinonide (LIDEX) 0.05 % cream, Apply  topically to the appropriate area as directed 2 (Two) Times a Day., Disp: , Rfl:   •  fluticasone (FLONASE) 50 MCG/ACT nasal spray, 1 spray into the nostril(s) as directed by provider 2 (Two) Times a Day As Needed., Disp: , Rfl:   •  furosemide (LASIX) 20 MG tablet, Take 20 mg by mouth Daily As Needed (swelling/weight gain)., Disp: , Rfl:   •  lisinopril (PRINIVIL,ZESTRIL) 2.5 MG tablet, Take 2.5 mg by mouth 2 (Two) Times a Day., Disp: , Rfl:   •  metaxalone (SKELAXIN) 800 MG tablet, Take 800 mg by mouth As Needed for Muscle Spasms., Disp: , Rfl:   •  metoprolol succinate XL (Toprol XL) 100 MG 24 hr tablet, Take 1 tablet by mouth Daily.,  "Disp: 180 tablet, Rfl: 2  •  Minoxidil 5 % foam, Apply  topically 2 (two) times a day., Disp: , Rfl:   •  nitroglycerin (NITROSTAT) 0.4 MG SL tablet, 1 under the tongue as needed for angina, may repeat q5mins for up three doses, Disp: 100 tablet, Rfl: 11  •  omeprazole (priLOSEC) 40 MG capsule, Take 40 mg by mouth., Disp: , Rfl:   •  pimecrolimus (ELIDEL) 1 % cream, Apply  topically 2 (Two) Times a Day., Disp: , Rfl:   •  sacubitril-valsartan (ENTRESTO) 24-26 MG tablet, Take 0.5 tablets by mouth., Disp: , Rfl:   •  spironolactone (ALDACTONE) 25 MG tablet, Take 12.5 mg by mouth Daily., Disp: , Rfl:       Objective:     Vitals:    05/05/22 1114   BP: 112/80   Pulse: 71   Weight: 83 kg (183 lb)   Height: 188 cm (74\")     Body mass index is 23.5 kg/m².    PHYSICAL EXAM:    Vitals and nursing note reviewed.   Constitutional:       General: Not in acute distress.     Appearance: Healthy appearance.   HENT:    Mouth/Throat:      Pharynx: Oropharynx is clear.   Pulmonary:      Effort: Pulmonary effort is normal. No respiratory distress.   Chest:      Comments: ICD in place on left chest and well healed  Cardiovascular:      Normal rate. Regular rhythm.      Murmurs: There is no murmur.   Edema:     Peripheral edema absent.   Skin:     General: Skin is warm and dry.   Neurological:      General: No focal deficit present.      Mental Status: Alert, oriented to person, place, and time and oriented to person, place and time.   Psychiatric:         Attention and Perception: Attention and perception normal.         Mood and Affect: Mood and affect normal.         Behavior: Behavior normal.         Thought Content: Thought content normal.         Judgment: Judgment normal.             ECG 12 Lead    Date/Time: 5/5/2022 11:51 AM  Performed by: Robby Ngo MD  Authorized by: Robby Ngo MD   Comparison: compared with previous ECG from 4/6/2022  Similar to previous ECG  Comparison to previous ECG: Atrially paced, " ventricularly sensed rhythm  Rhythm: paced              Assessment:       Diagnosis Plan   1. Atrial fibrillation, persistent (HCC)     2. Ischemic cardiomyopathy     3. PVC (premature ventricular contraction)            Plan:       Atrial fibrillation has been significantly improved since ablation.  Really no recurrence at all.  His ischemic cardiomyopathy is stable.  He has a moderate burden of premature ventricular contractions, that currently are not causing him significant symptoms.  After further discussion today we have decided to just continue observation rather than proceed with ablation.    As always, it has been a pleasure to participate in your patient's care.      Sincerely,         Robby Ngo MD

## 2022-11-03 ENCOUNTER — OFFICE VISIT (OUTPATIENT)
Dept: CARDIOLOGY | Facility: CLINIC | Age: 72
End: 2022-11-03

## 2022-11-03 ENCOUNTER — CLINICAL SUPPORT NO REQUIREMENTS (OUTPATIENT)
Dept: CARDIOLOGY | Facility: CLINIC | Age: 72
End: 2022-11-03

## 2022-11-03 VITALS
DIASTOLIC BLOOD PRESSURE: 76 MMHG | HEIGHT: 74 IN | SYSTOLIC BLOOD PRESSURE: 120 MMHG | WEIGHT: 174 LBS | BODY MASS INDEX: 22.33 KG/M2 | HEART RATE: 75 BPM

## 2022-11-03 DIAGNOSIS — I25.5 ISCHEMIC CARDIOMYOPATHY: ICD-10-CM

## 2022-11-03 DIAGNOSIS — I47.20 VENTRICULAR TACHYCARDIA: Primary | ICD-10-CM

## 2022-11-03 DIAGNOSIS — I48.0 PAROXYSMAL ATRIAL FIBRILLATION: Primary | ICD-10-CM

## 2022-11-03 DIAGNOSIS — I49.3 PVC (PREMATURE VENTRICULAR CONTRACTION): ICD-10-CM

## 2022-11-03 PROCEDURE — 93000 ELECTROCARDIOGRAM COMPLETE: CPT | Performed by: INTERNAL MEDICINE

## 2022-11-03 PROCEDURE — 99214 OFFICE O/P EST MOD 30 MIN: CPT | Performed by: INTERNAL MEDICINE

## 2022-11-03 PROCEDURE — 93283 PRGRMG EVAL IMPLANTABLE DFB: CPT | Performed by: INTERNAL MEDICINE

## 2022-11-03 PROCEDURE — 93290 INTERROG DEV EVAL ICPMS IP: CPT | Performed by: INTERNAL MEDICINE

## 2022-11-03 RX ORDER — CARBOXYMETHYLCELLULOSE SODIUM 5 MG/ML
1 SOLUTION/ DROPS OPHTHALMIC
COMMUNITY
Start: 2022-06-14

## 2022-11-03 RX ORDER — FERROUS SULFATE 325(65) MG
325 TABLET ORAL 2 TIMES DAILY
COMMUNITY

## 2022-11-03 RX ORDER — DICLOFENAC SODIUM 75 MG/1
75 TABLET, DELAYED RELEASE ORAL 2 TIMES DAILY
Qty: 60 TABLET | Refills: 11 | COMMUNITY
Start: 2022-10-12 | End: 2023-10-12

## 2022-11-03 RX ORDER — LISINOPRIL 2.5 MG/1
2.5 TABLET ORAL 2 TIMES DAILY
COMMUNITY
Start: 2022-06-13

## 2022-11-03 RX ORDER — SULFAMETHOXAZOLE AND TRIMETHOPRIM 800; 160 MG/1; MG/1
1 TABLET ORAL 2 TIMES DAILY
COMMUNITY

## 2022-11-03 NOTE — PROGRESS NOTES
Date of Office Visit: 2022  Encounter Provider: Robby Ngo MD  Place of Service: [facility] CARDIOLOGY  Patient Name: Alden Pratt III  :1950    Chief Complaint   Patient presents with   • persistent AFIB     6 month f/u    • Cardiomyopathy            • PVCs   • Pacemaker Check   :     HPI: Alden Pratt III is a 71 y.o. male who presents today for follow-up paroxysmal atrial fibrillation and PVC    He device recordings show no evidence of atrial fibrillation since ablation     He was noticing some irregular heartbeats,  Holter monitoring 5-7% PVC    We considered ablation, but ultimately symptoms improved and deferred.     He has been doing well, since last visit, no events or concerns.           Past Medical History:   Diagnosis Date   • Arthritis    • CAD (coronary artery disease)     atheroclerosis   • Cancer (HCC)     prostate   • Chest pain    • CHF (congestive heart failure) ([facility])    • Elevated cholesterol    • GERD (gastroesophageal reflux disease)    • Hyperlipidemia    • Implantable cardioverter-defibrillator (ICD) discharge    • Myocardial infarction ([facility])     inferior   • PVC (premature ventricular contraction)    • PVD (peripheral vascular disease) ([facility])    • Status post phlebectomy     varicose veins   • Thyroid nodule 2014   • VT (ventricular tachycardia) ([facility])        Past Surgical History:   Procedure Laterality Date   • CARDIAC CATHETERIZATION     • CARDIAC CATHETERIZATION Left 5/10/2021    Procedure: Left Heart Cath;  Surgeon: Andrew Ball MD;  Location: [facility] INVASIVE LOCATION;  Service: Cardiology;  Laterality: Left;   • CARDIAC CATHETERIZATION N/A 5/10/2021    Procedure: Coronary angiography;  Surgeon: Andrew Ball MD;  Location: [facility] INVASIVE LOCATION;  Service: Cardiology;  Laterality: N/A;   • CARDIAC CATHETERIZATION N/A 5/10/2021    Procedure: Left ventriculography;  Surgeon: Andrew Ball MD;   Location:  [facility] CATH INVASIVE LOCATION;  Service: Cardiology;  Laterality: N/A;   • CARDIAC CATHETERIZATION  5/10/2021    Procedure: Saphenous Vein Graft;  Surgeon: Andrew Ball MD;  Location: [facility] CATH INVASIVE LOCATION;  Service: Cardiology;;   • CARDIAC DEFIBRILLATOR PLACEMENT     • CARDIAC ELECTROPHYSIOLOGY PROCEDURE N/A 8/2/2021    Procedure: Ablation atrial fibrillation;  Surgeon: Robby Ngo MD;  Location: [facility] CATH INVASIVE LOCATION;  Service: Cardiovascular;  Laterality: N/A;   • CORONARY ARTERY BYPASS GRAFT      stents   • CORONARY STENT PLACEMENT     • HAND SURGERY Left    • KNEE SURGERY Left    • MICROAMBULATORY PHLEBECTOMY     • SHOULDER SURGERY Right    • THYROID BIOPSY     • VARICOSE VEIN SURGERY         Social History     Socioeconomic History   • Marital status:    Tobacco Use   • Smoking status: Never   • Smokeless tobacco: Never   Vaping Use   • Vaping Use: Never used   Substance and Sexual Activity   • Alcohol use: Yes     Comment: rareLY   • Drug use: No   • Sexual activity: Defer       Family History   Problem Relation Age of Onset   • Coronary artery disease Other    • Stroke Other    • Hypertension Other    • Heart disease Father    • Heart failure Father    • Stroke Father    • No Known Problems Maternal Grandmother    • No Known Problems Maternal Grandfather    • No Known Problems Paternal Grandmother    • No Known Problems Paternal Grandfather        Review of Systems   Constitutional: Negative.   Cardiovascular: Negative.    Respiratory: Negative.    Gastrointestinal: Negative.        Allergies   Allergen Reactions   • Contrast Dye Hives   • Iodinated Diagnostic Agents    • Sudafed 12 Hour [Pseudoephedrine Hcl Er]    • Pseudoephedrine Palpitations         Current Outpatient Medications:   •  apixaban (ELIQUIS) 5 MG tablet tablet, Take 1 tablet by mouth Every 12 (Twelve) Hours., Disp: 180 tablet, Rfl: 2  •  aspirin 81 MG EC tablet, Take 81 mg by mouth Daily.,  Disp: , Rfl:   •  atorvastatin (LIPITOR) 80 MG tablet, Take 1 tablet by mouth Daily. 200001, Disp: 90 tablet, Rfl: 2  •  carboxymethylcellulose (REFRESH PLUS) 0.5 % solution, Apply 1 drop to eye(s) as directed by provider., Disp: , Rfl:   •  cetirizine (ZyrTEC) 10 MG tablet, Take 10 mg by mouth As Needed. Only Occasionally will use, Disp: , Rfl:   •  Cholecalciferol (VITAMIN D3) 5000 UNITS capsule capsule, Take 2,000 Units by mouth Every Other Day., Disp: , Rfl:   •  clobetasol (TEMOVATE) 0.05 % cream, Apply  topically 2 (Two) Times a Day., Disp: , Rfl:   •  diclofenac (VOLTAREN) 75 MG EC tablet, Take 1 tablet by mouth 2 (Two) Times a Day., Disp: 60 tablet, Rfl: 11  •  Empagliflozin (Jardiance) 10 MG tablet, Take 10 mg by mouth Daily., Disp: 90 tablet, Rfl: 2  •  ezetimibe (ZETIA) 10 MG tablet, Take 10 mg by mouth daily., Disp: , Rfl:   •  ferrous sulfate 325 (65 FE) MG tablet, Take 1 tablet by mouth 2 (Two) Times a Day., Disp: , Rfl:   •  fluocinonide (LIDEX) 0.05 % cream, Apply  topically to the appropriate area as directed 2 (Two) Times a Day., Disp: , Rfl:   •  fluticasone (FLONASE) 50 MCG/ACT nasal spray, 1 spray into the nostril(s) as directed by provider 2 (Two) Times a Day As Needed., Disp: , Rfl:   •  furosemide (LASIX) 20 MG tablet, Take 20 mg by mouth Daily As Needed (swelling/weight gain)., Disp: , Rfl:   •  lisinopril (PRINIVIL,ZESTRIL) 2.5 MG tablet, Take 1 tablet by mouth Daily., Disp: , Rfl:   •  metaxalone (SKELAXIN) 800 MG tablet, Take 800 mg by mouth As Needed for Muscle Spasms., Disp: , Rfl:   •  metoprolol succinate XL (Toprol XL) 100 MG 24 hr tablet, Take 1 tablet by mouth Daily., Disp: 180 tablet, Rfl: 2  •  Minoxidil 5 % foam, Apply  topically 2 (two) times a day., Disp: , Rfl:   •  nitroglycerin (NITROSTAT) 0.4 MG SL tablet, 1 under the tongue as needed for angina, may repeat q5mins for up three doses, Disp: 100 tablet, Rfl: 11  •  omeprazole (priLOSEC) 40 MG capsule, Take 40 mg by mouth.,  "Disp: , Rfl:   •  pimecrolimus (ELIDEL) 1 % cream, Apply  topically 2 (Two) Times a Day., Disp: , Rfl:   •  spironolactone (ALDACTONE) 25 MG tablet, Take 12.5 mg by mouth Daily., Disp: , Rfl:   •  sulfamethoxazole-trimethoprim (BACTRIM DS,SEPTRA DS) 800-160 MG per tablet, Take 1 tablet by mouth 2 (Two) Times a Day., Disp: , Rfl:       Objective:     Vitals:    11/03/22 1121   BP: 120/76   Pulse: 75   Weight: 78.9 kg (174 lb)   Height: 188 cm (74\")     Body mass index is 22.34 kg/m².    PHYSICAL EXAM:    Vitals and nursing note reviewed.   Constitutional:       Appearance: Normal and healthy appearance.   HENT:      Head: Normocephalic and atraumatic.    Mouth/Throat:      Pharynx: Oropharynx is clear.   Pulmonary:      Effort: Pulmonary effort is normal.   Cardiovascular:      Normal rate. Regular rhythm.      Murmurs: There is no murmur.   Edema:     Peripheral edema absent.   Skin:     General: Skin is warm.   Neurological:      General: No focal deficit present.      Mental Status: Alert, oriented to person, place, and time and oriented to person, place and time.   Psychiatric:         Attention and Perception: Attention and perception normal.         Mood and Affect: Mood and affect normal.         Behavior: Behavior is cooperative.             ECG 12 Lead    Date/Time: 11/3/2022 2:40 PM  Performed by: Robby Ngo MD  Authorized by: Robby Ngo MD   Comparison: compared with previous ECG   Rhythm: sinus rhythm              Assessment:       Diagnosis Plan   1. Paroxysmal atrial fibrillation (HCC)        2. Ischemic cardiomyopathy        3. PVC (premature ventricular contraction)               Plan:       PAF, stable.  No events on monitoring. CHADS VASC score 2.  He remains on eliquis.     PVC.  Stable, non on ekg today    Ischemic Cardiomyopathy. Stable.  Follow-up with Dr. Ball    As always, it has been a pleasure to participate in your patient's care.      Sincerely,         Robby ADAMS" MD Jeni

## 2022-12-06 ENCOUNTER — OFFICE VISIT (OUTPATIENT)
Dept: CARDIOLOGY | Facility: CLINIC | Age: 72
End: 2022-12-06

## 2022-12-06 VITALS
WEIGHT: 179.2 LBS | BODY MASS INDEX: 23 KG/M2 | DIASTOLIC BLOOD PRESSURE: 70 MMHG | HEIGHT: 74 IN | SYSTOLIC BLOOD PRESSURE: 120 MMHG | HEART RATE: 71 BPM

## 2022-12-06 DIAGNOSIS — I49.3 PVC (PREMATURE VENTRICULAR CONTRACTION): ICD-10-CM

## 2022-12-06 DIAGNOSIS — Z95.1 S/P CABG (CORONARY ARTERY BYPASS GRAFT): ICD-10-CM

## 2022-12-06 DIAGNOSIS — I25.5 ISCHEMIC CARDIOMYOPATHY: ICD-10-CM

## 2022-12-06 DIAGNOSIS — I21.11 ST ELEVATION MYOCARDIAL INFARCTION INVOLVING RIGHT CORONARY ARTERY: Primary | ICD-10-CM

## 2022-12-06 DIAGNOSIS — I48.19 ATRIAL FIBRILLATION, PERSISTENT: ICD-10-CM

## 2022-12-06 PROCEDURE — 99214 OFFICE O/P EST MOD 30 MIN: CPT | Performed by: INTERNAL MEDICINE

## 2022-12-06 PROCEDURE — 93000 ELECTROCARDIOGRAM COMPLETE: CPT | Performed by: INTERNAL MEDICINE

## 2022-12-06 RX ORDER — METOPROLOL SUCCINATE 50 MG/1
50 TABLET, EXTENDED RELEASE ORAL DAILY
COMMUNITY

## 2022-12-06 NOTE — PROGRESS NOTES
Date of Office Visit: 22  Encounter Provider: Andrew Ball MD  Place of Service: [facility]  Patient Name: Alden Pratt III  :1950  [id number]    Chief Complaint   Patient presents with   • Atrial Fibrillation   :     HPI: Alden Pratt III is a 72 y.o. male  he had an MI in .  At that time we did an angioplasty and stenting to his RCA but he had other significant disease his stents ended up renarrowing down he had an ischemic myopathy also and had an ICD placed we did a bypass on him and a LIMA to his LAD a vein to his distal LAD a vein to an OM 1 a vein to the PDA vein to the KIERAN a vein to the RV branch.  He is done really pretty well he has had chronic PVCs even before the infarct that persisted some.  He had pretty emotionally dramatic reunion with his brother he ended up having VF and got a shock from his defibrillator.  The shocked knocked him into A. fib with anticoagulate him and then cardiovert him out.  So earlier in the month he went into atrial fibrillation again and did not feel good we cardioverted him out of it he is actually seen the arrhythmia service they put him on Multaq.  He does not feel any better.    We were concerned that he was having angina and so we cathed him and May 2021 and this showed 6 of 6 bypasses were patent severe cardiomyopathy we added some Jardiance to his regimen now he is here for follow-up.  He has followed up with the [facility] heart failure clinic.  This is recommended to him by his daughter and son-in-law.  They gave him some Farxiga samples and also doubled his lisinopril and then he had a hypotensive episode and had to go back to his regular dose of lisinopril     He has had a prior A. fib ablation and last echo which was done  showed an ejection fraction of 35 to 40%.    He is here for follow-up and has been doing well he has been followed by the heart failure team at [facility] they did  a right heart cath on him last year with exercise and he really did well with that.  And in general he is done very well he is not any A. fib he is only thing is when he has the PVCs he does not feel good with him anymore he had PVCs before his infarct but now when he has them he really kind of bothers him it does not seem to be predictable he has not had PND orthopnea I would say is class I may be class II heart failure    Past Medical History:   Diagnosis Date   • Arthritis    • CAD (coronary artery disease)     atheroclerosis   • Cancer ([facility])     prostate   • Chest pain    • CHF (congestive heart failure) ([facility])    • Elevated cholesterol    • GERD (gastroesophageal reflux disease)    • Hyperlipidemia    • Implantable cardioverter-defibrillator (ICD) discharge    • Myocardial infarction ([facility])     inferior   • PVC (premature ventricular contraction)    • PVD (peripheral vascular disease) ([facility])    • Status post phlebectomy     varicose veins   • Thyroid nodule 7/18/2014   • VT (ventricular tachycardia)        Past Surgical History:   Procedure Laterality Date   • CARDIAC CATHETERIZATION     • CARDIAC CATHETERIZATION Left 5/10/2021    Procedure: Left Heart Cath;  Surgeon: Andrew Ball MD;  Location: [facility] CATH INVASIVE LOCATION;  Service: Cardiology;  Laterality: Left;   • CARDIAC CATHETERIZATION N/A 5/10/2021    Procedure: Coronary angiography;  Surgeon: Andrew Ball MD;  Location: [facility] CATH INVASIVE LOCATION;  Service: Cardiology;  Laterality: N/A;   • CARDIAC CATHETERIZATION N/A 5/10/2021    Procedure: Left ventriculography;  Surgeon: Andrew Ball MD;  Location: [facility] CATH INVASIVE LOCATION;  Service: Cardiology;  Laterality: N/A;   • CARDIAC CATHETERIZATION  5/10/2021    Procedure: Saphenous Vein Graft;  Surgeon: Andrew Ball MD;  Location: [facility] CATH INVASIVE LOCATION;  Service: Cardiology;;   • CARDIAC DEFIBRILLATOR PLACEMENT     • CARDIAC ELECTROPHYSIOLOGY PROCEDURE N/A 8/2/2021     Procedure: Ablation atrial fibrillation;  Surgeon: Robby Ngo MD;  Location: [facility] INVASIVE LOCATION;  Service: Cardiovascular;  Laterality: N/A;   • CORONARY ARTERY BYPASS GRAFT      stents   • CORONARY STENT PLACEMENT     • HAND SURGERY Left    • KNEE SURGERY Left    • MICROAMBULATORY PHLEBECTOMY     • SHOULDER SURGERY Right    • THYROID BIOPSY     • VARICOSE VEIN SURGERY         Social History     Socioeconomic History   • Marital status:    Tobacco Use   • Smoking status: Never   • Smokeless tobacco: Never   Vaping Use   • Vaping Use: Never used   Substance and Sexual Activity   • Alcohol use: Yes     Comment: rareLY   • Drug use: No   • Sexual activity: Defer       Family History   Problem Relation Age of Onset   • Coronary artery disease Other    • Stroke Other    • Hypertension Other    • Heart disease Father    • Heart failure Father    • Stroke Father    • No Known Problems Maternal Grandmother    • No Known Problems Maternal Grandfather    • No Known Problems Paternal Grandmother    • No Known Problems Paternal Grandfather        Review of Systems   Constitutional: Negative for decreased appetite, fever, malaise/fatigue and weight loss.   HENT: Negative for nosebleeds.    Eyes: Negative for double vision.   Cardiovascular: Negative for chest pain, claudication, cyanosis, dyspnea on exertion, irregular heartbeat, leg swelling, near-syncope, orthopnea, palpitations, paroxysmal nocturnal dyspnea and syncope.   Respiratory: Negative for cough, hemoptysis and shortness of breath.    Hematologic/Lymphatic: Negative for bleeding problem.   Skin: Negative for rash.   Musculoskeletal: Negative for falls and myalgias.   Gastrointestinal: Negative for hematochezia, jaundice, melena, nausea and vomiting.   Genitourinary: Negative for hematuria.   Neurological: Negative for dizziness and seizures.   Psychiatric/Behavioral: Negative for altered mental status and memory loss.       Allergies    Allergen Reactions   • Contrast Dye Hives   • Iodinated Diagnostic Agents    • Sudafed 12 Hour [Pseudoephedrine Hcl Er]    • Pseudoephedrine Palpitations         Current Outpatient Medications:   •  apixaban (ELIQUIS) 5 MG tablet tablet, Take 1 tablet by mouth Every 12 (Twelve) Hours., Disp: 180 tablet, Rfl: 2  •  aspirin 81 MG EC tablet, Take 81 mg by mouth Daily., Disp: , Rfl:   •  atorvastatin (LIPITOR) 80 MG tablet, Take 1 tablet by mouth Daily. 200001, Disp: 90 tablet, Rfl: 2  •  carboxymethylcellulose (REFRESH PLUS) 0.5 % solution, Apply 1 drop to eye(s) as directed by provider., Disp: , Rfl:   •  cetirizine (ZyrTEC) 10 MG tablet, Take 10 mg by mouth As Needed. Only Occasionally will use, Disp: , Rfl:   •  Cholecalciferol (VITAMIN D3) 5000 UNITS capsule capsule, Take 2,000 Units by mouth Every Other Day., Disp: , Rfl:   •  clobetasol (TEMOVATE) 0.05 % cream, Apply  topically 2 (Two) Times a Day., Disp: , Rfl:   •  diclofenac (VOLTAREN) 75 MG EC tablet, Take 1 tablet by mouth 2 (Two) Times a Day., Disp: 60 tablet, Rfl: 11  •  Empagliflozin (Jardiance) 10 MG tablet, Take 10 mg by mouth Daily., Disp: 90 tablet, Rfl: 2  •  ezetimibe (ZETIA) 10 MG tablet, Take 10 mg by mouth daily., Disp: , Rfl:   •  ferrous sulfate 325 (65 FE) MG tablet, Take 1 tablet by mouth 2 (Two) Times a Day., Disp: , Rfl:   •  fluocinonide (LIDEX) 0.05 % cream, Apply  topically to the appropriate area as directed 2 (Two) Times a Day., Disp: , Rfl:   •  fluticasone (FLONASE) 50 MCG/ACT nasal spray, 1 spray into the nostril(s) as directed by provider 2 (Two) Times a Day As Needed., Disp: , Rfl:   •  furosemide (LASIX) 20 MG tablet, Take 20 mg by mouth Daily As Needed (swelling/weight gain)., Disp: , Rfl:   •  lisinopril (PRINIVIL,ZESTRIL) 2.5 MG tablet, Take 1 tablet by mouth Daily., Disp: , Rfl:   •  metaxalone (SKELAXIN) 800 MG tablet, Take 800 mg by mouth As Needed for Muscle Spasms., Disp: , Rfl:   •  metoprolol succinate XL (Toprol XL)  "100 MG 24 hr tablet, Take 1 tablet by mouth Daily., Disp: 180 tablet, Rfl: 2  •  metoprolol succinate XL (TOPROL-XL) 50 MG 24 hr tablet, Take 50 mg by mouth Daily., Disp: , Rfl:   •  Minoxidil 5 % foam, Apply  topically 2 (two) times a day., Disp: , Rfl:   •  nitroglycerin (NITROSTAT) 0.4 MG SL tablet, 1 under the tongue as needed for angina, may repeat q5mins for up three doses, Disp: 100 tablet, Rfl: 11  •  omeprazole (priLOSEC) 40 MG capsule, Take 40 mg by mouth., Disp: , Rfl:   •  pimecrolimus (ELIDEL) 1 % cream, Apply  topically 2 (Two) Times a Day., Disp: , Rfl:   •  spironolactone (ALDACTONE) 25 MG tablet, Take 12.5 mg by mouth Daily., Disp: , Rfl:   •  sulfamethoxazole-trimethoprim (BACTRIM DS,SEPTRA DS) 800-160 MG per tablet, Take 1 tablet by mouth 2 (Two) Times a Day., Disp: , Rfl:       Objective:     Vitals:    12/06/22 1308   BP: 120/70   Pulse: 71   Weight: 81.3 kg (179 lb 3.2 oz)   Height: 188 cm (74\")     Body mass index is 23.01 kg/m².    Physical Exam  Constitutional:       Appearance: He is well-developed.   HENT:      Head: Normocephalic.   Eyes:      General: No scleral icterus.  Neck:      Thyroid: No thyromegaly.      Vascular: No JVD.   Cardiovascular:      Rate and Rhythm: Normal rate and regular rhythm.      Heart sounds: Normal heart sounds. No murmur heard.    No friction rub. No gallop.   Pulmonary:      Effort: Pulmonary effort is normal.      Breath sounds: Normal breath sounds. No wheezing or rales.   Chest:       Abdominal:      Palpations: Abdomen is soft.      Tenderness: There is no abdominal tenderness.   Musculoskeletal:         General: Normal range of motion.   Lymphadenopathy:      Cervical: No cervical adenopathy.   Skin:     General: Skin is warm and dry.      Findings: No rash.   Neurological:      Mental Status: He is alert and oriented to person, place, and time.           ECG 12 Lead    Date/Time: 12/6/2022 1:46 PM  Performed by: Andrew Ball MD  Authorized by: " Andrew Ball MD   Comparison: compared with previous ECG   Similar to previous ECG  Rhythm: sinus rhythm  Other findings: non-specific ST-T wave changes    Clinical impression: abnormal EKG             Assessment:       Diagnosis Plan   1. ST elevation myocardial infarction involving right coronary artery (HCC)        2. S/P CABG (coronary artery bypass graft)        3. PVC (premature ventricular contraction)        4. Ischemic cardiomyopathy        5. Atrial fibrillation, persistent (HCC)               Plan:       Well I think he is doing really well I feel like he is pretty much maxed out where he is for heart failure medications he can take Entresto his blood pressure got too low I would be curious to see what his LV functions like he was last echo in over a year ago at [facility].  I also recommended some magnesium for him about the PVCs we will see if that knocks it down that would be great if it did I will have him come back and see me in a year it is always a pleasure to see him    As always, it has been a pleasure to participate in your patient's care.      Sincerely,       Andrew Ball MD

## 2023-01-17 ENCOUNTER — TELEPHONE (OUTPATIENT)
Dept: CARDIOLOGY | Facility: CLINIC | Age: 73
End: 2023-01-17
Payer: MEDICARE

## 2023-01-17 ENCOUNTER — HOSPITAL ENCOUNTER (OUTPATIENT)
Dept: CARDIOLOGY | Facility: HOSPITAL | Age: 73
Discharge: HOME | End: 2023-01-17
Admitting: INTERNAL MEDICINE
Payer: MEDICARE

## 2023-01-17 VITALS
DIASTOLIC BLOOD PRESSURE: 80 MMHG | BODY MASS INDEX: 22.97 KG/M2 | SYSTOLIC BLOOD PRESSURE: 128 MMHG | WEIGHT: 179 LBS | HEIGHT: 74 IN | HEART RATE: 78 BPM

## 2023-01-17 DIAGNOSIS — I48.19 ATRIAL FIBRILLATION, PERSISTENT: ICD-10-CM

## 2023-01-17 DIAGNOSIS — I49.3 PVC (PREMATURE VENTRICULAR CONTRACTION): ICD-10-CM

## 2023-01-17 DIAGNOSIS — Z95.1 S/P CABG (CORONARY ARTERY BYPASS GRAFT): ICD-10-CM

## 2023-01-17 DIAGNOSIS — I21.11 ST ELEVATION MYOCARDIAL INFARCTION INVOLVING RIGHT CORONARY ARTERY: ICD-10-CM

## 2023-01-17 DIAGNOSIS — I25.5 ISCHEMIC CARDIOMYOPATHY: ICD-10-CM

## 2023-01-17 LAB
AORTIC ARCH: 2.2 CM
ASCENDING AORTA: 3.7 CM
BH CV ECHO LEFT VENTRICLE GLOBAL LONGITUDINAL STRAIN: -14.5 %
BH CV ECHO MEAS - ACS: 2.5 CM
BH CV ECHO MEAS - AI P1/2T: 497.1 MSEC
BH CV ECHO MEAS - AO MAX PG: 3.1 MMHG
BH CV ECHO MEAS - AO MEAN PG: 2 MMHG
BH CV ECHO MEAS - AO ROOT DIAM: 3.9 CM
BH CV ECHO MEAS - AO V2 MAX: 87.6 CM/SEC
BH CV ECHO MEAS - AO V2 VTI: 18.9 CM
BH CV ECHO MEAS - AVA(I,D): 2.8 CM2
BH CV ECHO MEAS - EDV(CUBED): 246.8 ML
BH CV ECHO MEAS - EDV(MOD-SP2): 201 ML
BH CV ECHO MEAS - EDV(MOD-SP4): 140 ML
BH CV ECHO MEAS - EF(MOD-BP): 45 %
BH CV ECHO MEAS - EF(MOD-SP2): 45.8 %
BH CV ECHO MEAS - EF(MOD-SP4): 47.1 %
BH CV ECHO MEAS - ESV(CUBED): 196.3 ML
BH CV ECHO MEAS - ESV(MOD-SP2): 109 ML
BH CV ECHO MEAS - ESV(MOD-SP4): 74 ML
BH CV ECHO MEAS - FS: 7.4 %
BH CV ECHO MEAS - IVS/LVPW: 1.37 CM
BH CV ECHO MEAS - IVSD: 1.08 CM
BH CV ECHO MEAS - LAT PEAK E' VEL: 11.3 CM/SEC
BH CV ECHO MEAS - LV DIASTOLIC VOL/BSA (35-75): 67.5 CM2
BH CV ECHO MEAS - LV MASS(C)D: 243.5 GRAMS
BH CV ECHO MEAS - LV MAX PG: 1.93 MMHG
BH CV ECHO MEAS - LV MEAN PG: 1 MMHG
BH CV ECHO MEAS - LV SYSTOLIC VOL/BSA (12-30): 35.7 CM2
BH CV ECHO MEAS - LV V1 MAX: 69.4 CM/SEC
BH CV ECHO MEAS - LV V1 VTI: 14.2 CM
BH CV ECHO MEAS - LVIDD: 6.3 CM
BH CV ECHO MEAS - LVIDS: 5.8 CM
BH CV ECHO MEAS - LVOT AREA: 3.7 CM2
BH CV ECHO MEAS - LVOT DIAM: 2.18 CM
BH CV ECHO MEAS - LVPWD: 0.79 CM
BH CV ECHO MEAS - MED PEAK E' VEL: 8.5 CM/SEC
BH CV ECHO MEAS - MR MAX PG: 95.5 MMHG
BH CV ECHO MEAS - MR MAX VEL: 488.7 CM/SEC
BH CV ECHO MEAS - MV A DUR: 0.12 SEC
BH CV ECHO MEAS - MV A MAX VEL: 45.8 CM/SEC
BH CV ECHO MEAS - MV DEC TIME: 0.21 MSEC
BH CV ECHO MEAS - MV E MAX VEL: 64.3 CM/SEC
BH CV ECHO MEAS - MV E/A: 1.4
BH CV ECHO MEAS - MV MAX PG: 2.36 MMHG
BH CV ECHO MEAS - MV MEAN PG: 1.12 MMHG
BH CV ECHO MEAS - MV V2 VTI: 18.5 CM
BH CV ECHO MEAS - MVA(VTI): 2.9 CM2
BH CV ECHO MEAS - PA ACC TIME: 0.12 SEC
BH CV ECHO MEAS - PA PR(ACCEL): 24.3 MMHG
BH CV ECHO MEAS - PA V2 MAX: 78.5 CM/SEC
BH CV ECHO MEAS - PULM A REVS DUR: 0.09 SEC
BH CV ECHO MEAS - PULM A REVS VEL: 17.1 CM/SEC
BH CV ECHO MEAS - PULM DIAS VEL: 50.6 CM/SEC
BH CV ECHO MEAS - PULM S/D: 0.88
BH CV ECHO MEAS - PULM SYS VEL: 44.6 CM/SEC
BH CV ECHO MEAS - QP/QS: 0.52
BH CV ECHO MEAS - RAP SYSTOLE: 3 MMHG
BH CV ECHO MEAS - RV MAX PG: 0.62 MMHG
BH CV ECHO MEAS - RV V1 MAX: 39.4 CM/SEC
BH CV ECHO MEAS - RV V1 VTI: 9.3 CM
BH CV ECHO MEAS - RVOT DIAM: 1.94 CM
BH CV ECHO MEAS - RVSP: 28 MMHG
BH CV ECHO MEAS - SI(MOD-SP2): 44.4 ML/M2
BH CV ECHO MEAS - SI(MOD-SP4): 31.8 ML/M2
BH CV ECHO MEAS - SUP REN AO DIAM: 1.8 CM
BH CV ECHO MEAS - SV(LVOT): 53.1 ML
BH CV ECHO MEAS - SV(MOD-SP2): 92 ML
BH CV ECHO MEAS - SV(MOD-SP4): 66 ML
BH CV ECHO MEAS - SV(RVOT): 27.4 ML
BH CV ECHO MEAS - TAPSE (>1.6): 1.41 CM
BH CV ECHO MEAS - TR MAX PG: 25 MMHG
BH CV ECHO MEAS - TR MAX VEL: 249.8 CM/SEC
BH CV ECHO MEASUREMENTS AVERAGE E/E' RATIO: 6.49
BH CV XLRA - RV BASE: 4 CM
BH CV XLRA - RV LENGTH: 7.5 CM
BH CV XLRA - RV MID: 3.5 CM
BH CV XLRA - TDI S': 7.7 CM/SEC
LEFT ATRIUM VOLUME INDEX: 40 ML/M2
MAXIMAL PREDICTED HEART RATE: 150 BPM
SINUS: 3.7 CM
STJ: 3.4 CM
STRESS TARGET HR: 128 BPM

## 2023-01-17 PROCEDURE — 93306 TTE W/DOPPLER COMPLETE: CPT | Performed by: INTERNAL MEDICINE

## 2023-01-17 PROCEDURE — 93356 MYOCRD STRAIN IMG SPCKL TRCK: CPT

## 2023-01-17 PROCEDURE — 93356 MYOCRD STRAIN IMG SPCKL TRCK: CPT | Performed by: INTERNAL MEDICINE

## 2023-01-17 PROCEDURE — 93306 TTE W/DOPPLER COMPLETE: CPT

## 2023-01-17 NOTE — TELEPHONE ENCOUNTER
Patient calling stating someone called him and left a voicemail but he is unable to get the voicemail  He had an Echo done today.      [phone]

## 2023-02-08 ENCOUNTER — TELEPHONE (OUTPATIENT)
Dept: CARDIOLOGY | Facility: CLINIC | Age: 73
End: 2023-02-08
Payer: MEDICARE

## 2023-02-08 NOTE — TELEPHONE ENCOUNTER
Spoke with pt and had him send in a transmission from his home monitor. Pt stated he's felt lightheaded and dizzy with almost near syncopal episodes the past 5 days and felt especially poorly this past Monday, 2/6/23.     On report, since 1/9/23-2/8/23, pt has had 21 NSVT events on counters, 15 of which are included on the logbook but only 3 EGMs are available to review. These 3 EGMs are NSVT events from today with longest event lasting for 17 beats with average v.rate of 165 bpm. EGM included below. It's difficult to fully determine but it almost appears that NSVT events start with a PVC        Other 2 NSVT events from today lasted for 11 beats with average v.rate of 168 bpm and 7 beats with average v.rate of 171 bpm

## 2023-02-08 NOTE — TELEPHONE ENCOUNTER
----- Message from Alden Pratt III sent at 2023 10:46 AM EST -----  Regarding: Multiple PVCs and Shortness of Breath  Contact: [phone]  Dr. Britton,  For the last five days (starting Friday February 3) I have experienced shortness of breath and felt light headed, possibly caused by runs of multiple PVCs.  was especially bad. My most recent office visit with you was November 3, 2022 at which time my ICD was interrogated. I believe my ICD is due to be checked this month. I would like to have my ICD checked in the office, discuss the results with you and get your recommendations on what the next steps should be to address these symptoms. The episodes I described are very disconcerting as they make me feel like I’m on the verge of passing out. I left a voice mail with the [facility] pacemaker department and your medical assistant today with the same information contained in this message. Is it possible for me to see you in your office by the end of this week? If not, do you think I should go to the emergency room based on the symptoms I have described?  Thank you, Jonathan BURRIS 1950 CELL PHONE [phone]

## 2023-02-09 PROCEDURE — G2066 INTER DEVC REMOTE 30D: HCPCS | Performed by: INTERNAL MEDICINE

## 2023-02-09 PROCEDURE — 93297 REM INTERROG DEV EVAL ICPMS: CPT | Performed by: INTERNAL MEDICINE

## 2023-02-10 ENCOUNTER — OFFICE VISIT (OUTPATIENT)
Dept: CARDIOLOGY | Facility: CLINIC | Age: 73
End: 2023-02-10
Payer: MEDICARE

## 2023-02-10 ENCOUNTER — LAB (OUTPATIENT)
Dept: LAB | Facility: HOSPITAL | Age: 73
End: 2023-02-10
Payer: MEDICARE

## 2023-02-10 ENCOUNTER — TELEPHONE (OUTPATIENT)
Dept: CARDIOLOGY | Facility: CLINIC | Age: 73
End: 2023-02-10

## 2023-02-10 VITALS
SYSTOLIC BLOOD PRESSURE: 118 MMHG | BODY MASS INDEX: 22.95 KG/M2 | WEIGHT: 178.8 LBS | DIASTOLIC BLOOD PRESSURE: 86 MMHG | HEIGHT: 74 IN | HEART RATE: 87 BPM

## 2023-02-10 DIAGNOSIS — I21.11 ST ELEVATION MYOCARDIAL INFARCTION INVOLVING RIGHT CORONARY ARTERY: ICD-10-CM

## 2023-02-10 DIAGNOSIS — I21.11 ST ELEVATION MYOCARDIAL INFARCTION INVOLVING RIGHT CORONARY ARTERY: Primary | ICD-10-CM

## 2023-02-10 DIAGNOSIS — I48.0 PAROXYSMAL ATRIAL FIBRILLATION: ICD-10-CM

## 2023-02-10 DIAGNOSIS — I25.2 HISTORY OF MI (MYOCARDIAL INFARCTION): ICD-10-CM

## 2023-02-10 DIAGNOSIS — Z45.02 IMPLANTABLE CARDIOVERTER-DEFIBRILLATOR (ICD) DISCHARGE: ICD-10-CM

## 2023-02-10 DIAGNOSIS — Z95.1 S/P CABG (CORONARY ARTERY BYPASS GRAFT): ICD-10-CM

## 2023-02-10 PROBLEM — I47.29 NSVT (NONSUSTAINED VENTRICULAR TACHYCARDIA) (HCC): Status: ACTIVE | Noted: 2023-02-10

## 2023-02-10 LAB
ALBUMIN SERPL-MCNC: 4.6 G/DL (ref 3.5–5.2)
ALBUMIN/GLOB SERPL: 2 G/DL
ALP SERPL-CCNC: 86 U/L (ref 39–117)
ALT SERPL W P-5'-P-CCNC: 23 U/L (ref 1–41)
ANION GAP SERPL CALCULATED.3IONS-SCNC: 6 MMOL/L (ref 5–15)
AST SERPL-CCNC: 26 U/L (ref 1–40)
BILIRUB SERPL-MCNC: 0.9 MG/DL (ref 0–1.2)
BUN SERPL-MCNC: 12 MG/DL (ref 8–23)
BUN/CREAT SERPL: 15.8 (ref 7–25)
CALCIUM SPEC-SCNC: 9.3 MG/DL (ref 8.6–10.5)
CHLORIDE SERPL-SCNC: 103 MMOL/L (ref 98–107)
CO2 SERPL-SCNC: 30 MMOL/L (ref 22–29)
CREAT SERPL-MCNC: 0.76 MG/DL (ref 0.76–1.27)
EGFRCR SERPLBLD CKD-EPI 2021: 96.7 ML/MIN/1.73
GLOBULIN UR ELPH-MCNC: 2.3 GM/DL
GLUCOSE SERPL-MCNC: 84 MG/DL (ref 65–99)
MAGNESIUM SERPL-MCNC: 2.2 MG/DL (ref 1.6–2.4)
POTASSIUM SERPL-SCNC: 4.8 MMOL/L (ref 3.5–5.2)
PROT SERPL-MCNC: 6.9 G/DL (ref 6–8.5)
SODIUM SERPL-SCNC: 139 MMOL/L (ref 136–145)
WHOLE BLOOD HOLD SPECIMEN: NORMAL

## 2023-02-10 PROCEDURE — 36415 COLL VENOUS BLD VENIPUNCTURE: CPT

## 2023-02-10 PROCEDURE — 83735 ASSAY OF MAGNESIUM: CPT

## 2023-02-10 PROCEDURE — 93000 ELECTROCARDIOGRAM COMPLETE: CPT | Performed by: INTERNAL MEDICINE

## 2023-02-10 PROCEDURE — 80053 COMPREHEN METABOLIC PANEL: CPT

## 2023-02-10 PROCEDURE — 99214 OFFICE O/P EST MOD 30 MIN: CPT | Performed by: INTERNAL MEDICINE

## 2023-02-10 RX ORDER — AMIODARONE HYDROCHLORIDE 200 MG/1
200 TABLET ORAL DAILY
Qty: 90 TABLET | Refills: 3 | Status: SHIPPED | OUTPATIENT
Start: 2023-02-10

## 2023-02-10 RX ORDER — AMIODARONE HYDROCHLORIDE 200 MG/1
200 TABLET ORAL DAILY
Qty: 30 TABLET | Refills: 5 | Status: SHIPPED | OUTPATIENT
Start: 2023-02-10

## 2023-02-10 NOTE — TELEPHONE ENCOUNTER
Pt called the office again this morning.    He reports he had 3 of these episodes yesterday and has already had 2 this morning.  Pt expressing frustration that he hasn't heard back from office since 2/8/23.    Do you have any recommendations for this patient?    Thank you,    Jessica Levy RN  [facility] Triage  02/10/23  08:37 EST

## 2023-02-10 NOTE — TELEPHONE ENCOUNTER
Called patient          Andrew Ball MD Bishop, Rita, MA  Please let him know that the labs are normal    Associated Results     Contains abnormal data Comprehensive Metabolic Panel  Order: [id number]   Status: Final result      Visible to patient: Yes (not seen)      Dx: Paroxysmal atrial fibrillation (HCC);...     Specimen Information: Blood    1 Result Note            Component  Ref Range & Units 12:59  (2/10/23) 8 mo ago  (6/14/22) 9 mo ago  (5/12/22) 1 yr ago  (12/16/21) 1 yr ago  (10/21/21) 1 yr ago  (8/2/21) 1 yr ago  (8/2/21)   Glucose  65 - 99 mg/dL 84  73 Low  R  64 Low  R  60 Low  R  80 R  160 High  R, CM  78 R, CM    BUN  8 - 23 mg/dL 12  14 R  12 R  12 R  13 R      Creatinine  0.76 - 1.27 mg/dL 0.76  0.75 R  0.66 Low  R  0.73 R  0.8 R      Sodium  136 - 145 mmol/L 139  141  141  141  140 R      Potassium  3.5 - 5.2 mmol/L 4.8  4.4 R  4.4 R  4.4 R  4.6 R      Chloride  98 - 107 mmol/L 103  105  105  105  100      CO2  22.0 - 29.0 mmol/L 30.0 High           Calcium  8.6 - 10.5 mg/dL 9.3  9.2 R  9.0 R  8.8 R  9.2 R      Total Protein  6.0 - 8.5 g/dL 6.9  6.7 R    7.0 R      Albumin  3.5 - 5.2 g/dL 4.6  4.3 R    4.3 R      ALT (SGPT)  1 - 41 U/L 23  15 R    18 R      AST (SGOT)  1 - 40 U/L 26  28 R    33 R      Alkaline Phosphatase  39 - 117 U/L 86  66 R    87 R      Total Bilirubin  0.0 - 1.2 mg/dL 0.9  0.8 R    0.8 R      Globulin  gm/dL 2.3  2.4 R    2.7 R      A/G Ratio  g/dL 2.0  1.8 R    1.6 R      BUN/Creatinine Ratio  7.0 - 25.0 15.8  18.4 R  18.0 R  16.1 R  17.0 R      Anion Gap  5.0 - 15.0 mmol/L 6.0          eGFR  >60.0 mL/min/1.73 96.7          Resulting Agency  [facility]           Narrative  Performed by: [facility]  GFR Normal >60   Chronic Kidney Disease <60   Kidney Failure <15       Specimen Collected: 02/10/23 12:59 EST Last  Resulted: 02/10/23 13:53 EST         Lab Flowsheet      Order Details      View Encounter      Lab and Collection Details      Routing      Result History     View Encounter Conversation        CM=Additional comments  R=Reference range differs from displayed range        Result Care Coordination      Result Notes     Andrew Ball MD   2/10/2023  2:06 PM EST Back to Top      Please let him know that the labs are normal       Patient Communication     Released  Not seen Back to Top           Magnesium  Order: [id number]   Status: Final result      Visible to patient: Yes (not seen)      Dx: Paroxysmal atrial fibrillation (HCC);...     Specimen Information: Blood    1 Result Note       Component  Ref Range & Units 12:59 4 yr ago   Magnesium  1.6 - 2.4 mg/dL 2.2  2.0    Resulting Agency  [facility]  [facility]              Specimen Collected: 02/10/23 12:59 EST Last Resulted: 02/10/23 13:53 EST         Lab Flowsheet      Order Details      View Encounter      Lab and Collection Details      Routing      Result History     View Encounter Conversation           Result Care Coordination      Result Notes     Andrew Ball MD   2/10/2023  2:06 PM EST Back to Top      Please let him know that the labs are normal

## 2023-02-10 NOTE — TELEPHONE ENCOUNTER
----- Message from Andrew Ball MD sent at 2/10/2023  2:06 PM EST -----  Please let him know that the labs are normal

## 2023-02-10 NOTE — PROGRESS NOTES
Date of Office Visit: 02/10/23  Encounter Provider: Andrew Ball MD  Place of Service: [facility] CARDIOLOGY  Patient Name: Alden Pratt III  :1950  [id number]    Chief Complaint   Patient presents with   • Palpitations   :     HPI: Alden Pratt III is a 72 y.o. male  he had an MI in .  At that time we did an angioplasty and stenting to his RCA but he had other significant disease his stents ended up renarrowing down he had an ischemic myopathy also and had an ICD placed we did a bypass on him and a LIMA to his LAD a vein to his distal LAD a vein to an OM 1 a vein to the PDA vein to the KIERAN a vein to the RV branch.  He is done really pretty well he has had chronic PVCs even before the infarct that persisted some.  He had pretty emotionally dramatic reunion with his brother he ended up having VF and got a shock from his defibrillator.  The shocked knocked him into A. fib with anticoagulate him and then cardiovert him out.  So earlier in the month he went into atrial fibrillation again and did not feel good we cardioverted him out of it he is actually seen the arrhythmia service they put him on Multaq.  He does not feel any better.    We were concerned that he was having angina and so we cathed him and May 2021 and this showed 6 of 6 bypasses were patent severe cardiomyopathy we added some Jardiance to his regimen now he is here for follow-up.  He has followed up with the [facility] heart failure clinic.  This is recommended to him by his daughter and son-in-law.  They gave him some Farxiga samples and also doubled his lisinopril and then he had a hypotensive episode and had to go back to his regular dose of lisinopril     He has had a prior A. fib ablation and last echo which was done  showed an ejection fraction of 35 to 40%.    He is here for follow-up and has been doing well he has been followed by the heart failure team at [facility] they did a  right heart cath on him last year with exercise and he really did well with that.  And in general he is done very well he is not any A. fib he is only thing is when he has the PVCs he does not feel good with him anymore he had PVCs before his infarct but now when he has them he really kind of bothers him it does not seem to be predictable     He called me today because he has been having short runs of palpitations that are fast and they really bother him they are unpredictable nothing is really changed in his regimen or what his routine he has been exercising he feels fine when he is doing that does not seem to happen with exercise.  He has sent in the strips to the pacemaker center and he is having runs of ventricular tachycardia that are nonsustained    Past Medical History:   Diagnosis Date   • Arthritis    • CAD (coronary artery disease)     atheroclerosis   • Cancer ([facility])     prostate   • Chest pain    • CHF (congestive heart failure) ([facility])    • Elevated cholesterol    • GERD (gastroesophageal reflux disease)    • Hyperlipidemia    • Implantable cardioverter-defibrillator (ICD) discharge    • Myocardial infarction ([facility])     inferior   • PVC (premature ventricular contraction)    • PVD (peripheral vascular disease) ([facility])    • Status post phlebectomy     varicose veins   • Thyroid nodule 7/18/2014   • VT (ventricular tachycardia)        Past Surgical History:   Procedure Laterality Date   • CARDIAC CATHETERIZATION     • CARDIAC CATHETERIZATION Left 5/10/2021    Procedure: Left Heart Cath;  Surgeon: Andrew Ball MD;  Location: [facility] INVASIVE LOCATION;  Service: Cardiology;  Laterality: Left;   • CARDIAC CATHETERIZATION N/A 5/10/2021    Procedure: Coronary angiography;  Surgeon: Andrew Ball MD;  Location: [facility] INVASIVE LOCATION;  Service: Cardiology;  Laterality: N/A;   • CARDIAC CATHETERIZATION N/A 5/10/2021    Procedure: Left ventriculography;  Surgeon: Andrew Ball MD;  Location: [facility]  CATH INVASIVE LOCATION;  Service: Cardiology;  Laterality: N/A;   • CARDIAC CATHETERIZATION  5/10/2021    Procedure: Saphenous Vein Graft;  Surgeon: Andrew Ball MD;  Location: [facility] CATH INVASIVE LOCATION;  Service: Cardiology;;   • CARDIAC DEFIBRILLATOR PLACEMENT     • CARDIAC ELECTROPHYSIOLOGY PROCEDURE N/A 8/2/2021    Procedure: Ablation atrial fibrillation;  Surgeon: Robby Ngo MD;  Location: [facility] CATH INVASIVE LOCATION;  Service: Cardiovascular;  Laterality: N/A;   • CORONARY ARTERY BYPASS GRAFT      stents   • CORONARY STENT PLACEMENT     • HAND SURGERY Left    • KNEE SURGERY Left    • MICROAMBULATORY PHLEBECTOMY     • SHOULDER SURGERY Right    • THYROID BIOPSY     • VARICOSE VEIN SURGERY         Social History     Socioeconomic History   • Marital status:    Tobacco Use   • Smoking status: Never   • Smokeless tobacco: Never   Vaping Use   • Vaping Use: Never used   Substance and Sexual Activity   • Alcohol use: Yes     Comment: rareLY   • Drug use: No   • Sexual activity: Defer       Family History   Problem Relation Age of Onset   • Coronary artery disease Other    • Stroke Other    • Hypertension Other    • Heart disease Father    • Heart failure Father    • Stroke Father    • No Known Problems Maternal Grandmother    • No Known Problems Maternal Grandfather    • No Known Problems Paternal Grandmother    • No Known Problems Paternal Grandfather        Review of Systems   Constitutional: Negative for decreased appetite, fever, malaise/fatigue and weight loss.   HENT: Negative for nosebleeds.    Eyes: Negative for double vision.   Cardiovascular: Negative for chest pain, claudication, cyanosis, dyspnea on exertion, irregular heartbeat, leg swelling, near-syncope, orthopnea, palpitations, paroxysmal nocturnal dyspnea and syncope.   Respiratory: Negative for cough, hemoptysis and shortness of breath.    Hematologic/Lymphatic: Negative for bleeding problem.   Skin: Negative for rash.    Musculoskeletal: Negative for falls and myalgias.   Gastrointestinal: Negative for hematochezia, jaundice, melena, nausea and vomiting.   Genitourinary: Negative for hematuria.   Neurological: Negative for dizziness and seizures.   Psychiatric/Behavioral: Negative for altered mental status and memory loss.       Allergies   Allergen Reactions   • Contrast Dye (Echo Or Unknown Ct/Mr) Hives   • Iodinated Contrast Media    • Sudafed 12 Hour [Pseudoephedrine Hcl Er]    • Pseudoephedrine Palpitations         Current Outpatient Medications:   •  apixaban (ELIQUIS) 5 MG tablet tablet, Take 1 tablet by mouth Every 12 (Twelve) Hours., Disp: 180 tablet, Rfl: 2  •  aspirin 81 MG EC tablet, Take 81 mg by mouth Daily., Disp: , Rfl:   •  atorvastatin (LIPITOR) 80 MG tablet, Take 1 tablet by mouth Daily. 200001, Disp: 90 tablet, Rfl: 2  •  carboxymethylcellulose (REFRESH PLUS) 0.5 % solution, Apply 1 drop to eye(s) as directed by provider., Disp: , Rfl:   •  cetirizine (ZyrTEC) 10 MG tablet, Take 10 mg by mouth As Needed. Only Occasionally will use, Disp: , Rfl:   •  Cholecalciferol (VITAMIN D3) 5000 UNITS capsule capsule, Take 2,000 Units by mouth Every Other Day., Disp: , Rfl:   •  clobetasol (TEMOVATE) 0.05 % cream, Apply  topically 2 (Two) Times a Day., Disp: , Rfl:   •  diclofenac (VOLTAREN) 75 MG EC tablet, Take 1 tablet by mouth 2 (Two) Times a Day., Disp: 60 tablet, Rfl: 11  •  Empagliflozin (Jardiance) 10 MG tablet, Take 10 mg by mouth Daily., Disp: 90 tablet, Rfl: 2  •  ezetimibe (ZETIA) 10 MG tablet, Take 10 mg by mouth daily., Disp: , Rfl:   •  ferrous sulfate 325 (65 FE) MG tablet, Take 1 tablet by mouth 2 (Two) Times a Day., Disp: , Rfl:   •  fluocinonide (LIDEX) 0.05 % cream, Apply  topically to the appropriate area as directed 2 (Two) Times a Day., Disp: , Rfl:   •  fluticasone (FLONASE) 50 MCG/ACT nasal spray, 1 spray into the nostril(s) as directed by provider 2 (Two) Times a Day As Needed., Disp: , Rfl:   •   "furosemide (LASIX) 20 MG tablet, Take 20 mg by mouth Daily As Needed (swelling/weight gain)., Disp: , Rfl:   •  lisinopril (PRINIVIL,ZESTRIL) 2.5 MG tablet, Take 2.5 mg by mouth 2 (Two) Times a Day., Disp: , Rfl:   •  metaxalone (SKELAXIN) 800 MG tablet, Take 800 mg by mouth As Needed for Muscle Spasms., Disp: , Rfl:   •  metoprolol succinate XL (Toprol XL) 100 MG 24 hr tablet, Take 1 tablet by mouth Daily., Disp: 180 tablet, Rfl: 2  •  metoprolol succinate XL (TOPROL-XL) 50 MG 24 hr tablet, Take 50 mg by mouth Daily., Disp: , Rfl:   •  Minoxidil 5 % foam, Apply  topically 2 (two) times a day., Disp: , Rfl:   •  nitroglycerin (NITROSTAT) 0.4 MG SL tablet, 1 under the tongue as needed for angina, may repeat q5mins for up three doses, Disp: 100 tablet, Rfl: 11  •  omeprazole (priLOSEC) 40 MG capsule, Take 40 mg by mouth., Disp: , Rfl:   •  pimecrolimus (ELIDEL) 1 % cream, Apply  topically 2 (Two) Times a Day., Disp: , Rfl:   •  spironolactone (ALDACTONE) 25 MG tablet, Take 12.5 mg by mouth Daily., Disp: , Rfl:   •  sulfamethoxazole-trimethoprim (BACTRIM DS,SEPTRA DS) 800-160 MG per tablet, Take 1 tablet by mouth 2 (Two) Times a Day., Disp: , Rfl:       Objective:     Vitals:    02/10/23 1148   BP: 118/86   Pulse: 87   Weight: 81.1 kg (178 lb 12.8 oz)   Height: 188 cm (74\")     Body mass index is 22.96 kg/m².    Physical Exam  Constitutional:       Appearance: He is well-developed.   HENT:      Head: Normocephalic.   Eyes:      General: No scleral icterus.  Neck:      Thyroid: No thyromegaly.      Vascular: No JVD.   Cardiovascular:      Rate and Rhythm: Normal rate and regular rhythm.      Heart sounds: Normal heart sounds. No murmur heard.    No friction rub. No gallop.   Pulmonary:      Effort: Pulmonary effort is normal.      Breath sounds: Normal breath sounds. No wheezing or rales.   Chest:       Abdominal:      Palpations: Abdomen is soft.      Tenderness: There is no abdominal tenderness.   Musculoskeletal:    "      General: Normal range of motion.   Lymphadenopathy:      Cervical: No cervical adenopathy.   Skin:     General: Skin is warm and dry.      Findings: No rash.   Neurological:      Mental Status: He is alert and oriented to person, place, and time.           ECG 12 Lead    Date/Time: 2/10/2023 12:05 PM  Performed by: Andrew Ball MD  Authorized by: Andrew Ball MD   Rhythm: paced  Other findings: non-specific ST-T wave changes    Clinical impression: abnormal EKG             Assessment:       Diagnosis Plan   1. ST elevation myocardial infarction involving right coronary artery (HCC)        2. S/P CABG (coronary artery bypass graft)        3. History of MI (myocardial infarction)        4. Paroxysmal atrial fibrillation (HCC)        5. Implantable cardioverter-defibrillator (ICD) discharge               Plan:       Always always had PVCs but now he is having nonsustained VT that symptomatic and have not seen a clear change the arrhythmia team does not think there is anything they can do with an ablation very easily so I think we should check his electrolytes and I Aliya put him on amiodarone at least for a period of time I talked with him about the risks and benefits of it and we did talk about maybe increasing the metoprolol but his blood pressure is just been really soft so I do not think we can do that.  I Aliya have him come back and see me in 3 months sooner if he has trouble    As always, it has been a pleasure to participate in your patient's care.      Sincerely,       Andrew Ball MD

## 2023-03-12 PROCEDURE — 93297 REM INTERROG DEV EVAL ICPMS: CPT | Performed by: INTERNAL MEDICINE

## 2023-03-12 PROCEDURE — G2066 INTER DEVC REMOTE 30D: HCPCS | Performed by: INTERNAL MEDICINE

## 2023-04-10 PROCEDURE — 93296 REM INTERROG EVL PM/IDS: CPT | Performed by: INTERNAL MEDICINE

## 2023-04-10 PROCEDURE — 93295 DEV INTERROG REMOTE 1/2/MLT: CPT | Performed by: INTERNAL MEDICINE

## 2023-05-11 ENCOUNTER — OFFICE VISIT (OUTPATIENT)
Dept: CARDIOLOGY | Facility: CLINIC | Age: 73
End: 2023-05-11
Payer: MEDICARE

## 2023-05-11 VITALS
OXYGEN SATURATION: 99 % | HEART RATE: 85 BPM | DIASTOLIC BLOOD PRESSURE: 76 MMHG | BODY MASS INDEX: 23.36 KG/M2 | SYSTOLIC BLOOD PRESSURE: 124 MMHG | HEIGHT: 74 IN | WEIGHT: 182 LBS

## 2023-05-11 DIAGNOSIS — I47.29 NSVT (NONSUSTAINED VENTRICULAR TACHYCARDIA): ICD-10-CM

## 2023-05-11 DIAGNOSIS — Z45.02 IMPLANTABLE CARDIOVERTER-DEFIBRILLATOR (ICD) DISCHARGE: ICD-10-CM

## 2023-05-11 DIAGNOSIS — I20.0 UNSTABLE ANGINA PECTORIS: Primary | ICD-10-CM

## 2023-05-11 DIAGNOSIS — I25.5 ISCHEMIC CARDIOMYOPATHY: ICD-10-CM

## 2023-05-11 DIAGNOSIS — E78.5 HYPERLIPIDEMIA WITH TARGET LDL LESS THAN 70: ICD-10-CM

## 2023-05-11 DIAGNOSIS — I48.0 PAROXYSMAL ATRIAL FIBRILLATION: ICD-10-CM

## 2023-05-11 DIAGNOSIS — Z95.1 S/P CABG (CORONARY ARTERY BYPASS GRAFT): ICD-10-CM

## 2023-05-11 DIAGNOSIS — I48.19 ATRIAL FIBRILLATION, PERSISTENT: ICD-10-CM

## 2023-05-11 PROCEDURE — 99214 OFFICE O/P EST MOD 30 MIN: CPT | Performed by: NURSE PRACTITIONER

## 2023-05-11 PROCEDURE — 1160F RVW MEDS BY RX/DR IN RCRD: CPT | Performed by: NURSE PRACTITIONER

## 2023-05-11 PROCEDURE — 93000 ELECTROCARDIOGRAM COMPLETE: CPT | Performed by: NURSE PRACTITIONER

## 2023-05-11 PROCEDURE — 1159F MED LIST DOCD IN RCRD: CPT | Performed by: NURSE PRACTITIONER

## 2023-05-11 RX ORDER — AMIODARONE HYDROCHLORIDE 200 MG/1
100 TABLET ORAL DAILY
Qty: 30 TABLET | Refills: 5 | Status: SHIPPED | OUTPATIENT
Start: 2023-05-11

## 2023-05-11 NOTE — PROGRESS NOTES
Date of Office Visit: 2023  Encounter Provider: BRANDON Pizarro  Place of Service: [facility] CARDIOLOGY  Patient Name: Alden Pratt III  :1950    Chief Complaint   Patient presents with   • Follow-up   • Coronary Artery Disease   • Hyperlipidemia   :     HPI: Alden Pratt III is a 72 y.o. male who is a patient of Dr. Ball and is new to me today he had an MI in  got a stent to his right coronary.  He had other disease stents ended up renarrowing and he has an ischemic cardiomyopathy.  He had bypass surgery and had an ICD placed.  He got a LIMA to his LAD, vein graft to his LAD distal LAD, vein graft to the OM, vein graft to the PDA, vein graft to KIERAN, vein graft to RV branch.  He has chronic PVCs even before his infarct that are persistent.  He has had a V-fib in the past and had a shock from his defibrillator he went into A-fib and got cardioverted and was anticoagulated.  We were concerned in May 2021 that he was having some angina we cathed him he 6 out of 6 of his bypasses were patent but he had some severe cardiomyopathy.  He went to the heart failure clinic at [place] and we put him on Jardiance.  We doubled his lisinopril he had a hypotensive episode and went back to his regular dose.    EF prior to his A-fib ablation in  was 35 to 40%.  He was last in the office in February he was doing fairly well when he is in A-fib he really does not feel good.  He called because he was having palpitations he was having runs of ventricular tachycardia that were nonsustained.  We talked to the arrhythmia team they did not think there was anything they could do with ablation we rechecked his electrolytes and put him on amiodarone for period of time talked about increasing his metoprolol but his blood pressure had been kind of soft.  He is here for follow-up today.    Overall he is feeling well.  His PVCs and palpitations have significantly decreased.   On his interrogation in April he had not had any ventricular tachycardia since the beginning of March.  He did have some nausea and is splitting the pill in half taking 100 mg in the morning and the 100 mg in the evening.  He is denying any chest pain, shortness of breath.  He did a metabolic stress test and it is showing some improvement from previous.  Previous testing and notes have been reviewed by me.   Past Medical History:   Diagnosis Date   • Arthritis    • CAD (coronary artery disease)     atheroclerosis   • Cancer     prostate   • Chest pain    • CHF (congestive heart failure)    • Elevated cholesterol    • GERD (gastroesophageal reflux disease)    • Hyperlipidemia    • Implantable cardioverter-defibrillator (ICD) discharge    • Myocardial infarction     inferior   • PVC (premature ventricular contraction)    • PVD (peripheral vascular disease)    • Status post phlebectomy     varicose veins   • Thyroid nodule 7/18/2014   • VT (ventricular tachycardia)        Past Surgical History:   Procedure Laterality Date   • CARDIAC CATHETERIZATION     • CARDIAC CATHETERIZATION Left 5/10/2021    Procedure: Left Heart Cath;  Surgeon: Andrew Ball MD;  Location: [facility] CATH INVASIVE LOCATION;  Service: Cardiology;  Laterality: Left;   • CARDIAC CATHETERIZATION N/A 5/10/2021    Procedure: Coronary angiography;  Surgeon: Andrew Ball MD;  Location: [facility] CATH INVASIVE LOCATION;  Service: Cardiology;  Laterality: N/A;   • CARDIAC CATHETERIZATION N/A 5/10/2021    Procedure: Left ventriculography;  Surgeon: Andrew Ball MD;  Location: [facility] CATH INVASIVE LOCATION;  Service: Cardiology;  Laterality: N/A;   • CARDIAC CATHETERIZATION  5/10/2021    Procedure: Saphenous Vein Graft;  Surgeon: Andrew Ball MD;  Location: [facility] CATH INVASIVE LOCATION;  Service: Cardiology;;   • CARDIAC DEFIBRILLATOR PLACEMENT     • CARDIAC ELECTROPHYSIOLOGY PROCEDURE N/A 8/2/2021    Procedure: Ablation atrial fibrillation;   Surgeon: Robby Ngo MD;  Location: [facility] CATH INVASIVE LOCATION;  Service: Cardiovascular;  Laterality: N/A;   • CORONARY ARTERY BYPASS GRAFT      stents   • CORONARY STENT PLACEMENT     • HAND SURGERY Left    • KNEE SURGERY Left    • MICROAMBULATORY PHLEBECTOMY     • SHOULDER SURGERY Right    • THYROID BIOPSY     • VARICOSE VEIN SURGERY         Social History     Socioeconomic History   • Marital status:    Tobacco Use   • Smoking status: Never   • Smokeless tobacco: Never   Vaping Use   • Vaping Use: Never used   Substance and Sexual Activity   • Alcohol use: Yes     Comment: rareLY   • Drug use: No   • Sexual activity: Defer       Family History   Problem Relation Age of Onset   • Coronary artery disease Other    • Stroke Other    • Hypertension Other    • Heart disease Father    • Heart failure Father    • Stroke Father    • No Known Problems Maternal Grandmother    • No Known Problems Maternal Grandfather    • No Known Problems Paternal Grandmother    • No Known Problems Paternal Grandfather        Review of Systems   Constitutional: Negative for diaphoresis and malaise/fatigue.   Cardiovascular: Negative for chest pain, claudication, dyspnea on exertion, irregular heartbeat, leg swelling, near-syncope, orthopnea, palpitations, paroxysmal nocturnal dyspnea and syncope.   Respiratory: Negative for cough, shortness of breath and sleep disturbances due to breathing.    Musculoskeletal: Negative for falls.   Neurological: Negative for dizziness and weakness.   Psychiatric/Behavioral: Negative for altered mental status and substance abuse.       Allergies   Allergen Reactions   • Contrast Dye (Echo Or Unknown Ct/Mr) Hives   • Iodinated Contrast Media    • Sudafed 12 Hour [Pseudoephedrine Hcl Er]    • Pseudoephedrine Palpitations         Current Outpatient Medications:   •  amiodarone (PACERONE) 200 MG tablet, Take 1 tablet by mouth Daily., Disp: 30 tablet, Rfl: 5  •  apixaban (ELIQUIS) 5 MG tablet  tablet, Take 1 tablet by mouth Every 12 (Twelve) Hours., Disp: 180 tablet, Rfl: 2  •  aspirin 81 MG EC tablet, Take 1 tablet by mouth Daily., Disp: , Rfl:   •  atorvastatin (LIPITOR) 80 MG tablet, Take 1 tablet by mouth Daily. 200001, Disp: 90 tablet, Rfl: 2  •  carboxymethylcellulose (REFRESH PLUS) 0.5 % solution, Apply 1 drop to eye(s) as directed by provider., Disp: , Rfl:   •  cetirizine (ZyrTEC) 10 MG tablet, Take 1 tablet by mouth As Needed. Only Occasionally will use, Disp: , Rfl:   •  Cholecalciferol (VITAMIN D3) 5000 UNITS capsule capsule, Take 2,000 Units by mouth Every Other Day., Disp: , Rfl:   •  clobetasol (TEMOVATE) 0.05 % cream, Apply  topically 2 (Two) Times a Day., Disp: , Rfl:   •  diclofenac (VOLTAREN) 75 MG EC tablet, Take 1 tablet by mouth 2 (Two) Times a Day., Disp: 60 tablet, Rfl: 11  •  Empagliflozin (Jardiance) 10 MG tablet, Take 10 mg by mouth Daily., Disp: 90 tablet, Rfl: 2  •  ezetimibe (ZETIA) 10 MG tablet, Take 1 tablet by mouth Daily., Disp: , Rfl:   •  ferrous sulfate 325 (65 FE) MG tablet, Take 1 tablet by mouth 2 (Two) Times a Day., Disp: , Rfl:   •  fluocinonide (LIDEX) 0.05 % cream, Apply  topically to the appropriate area as directed 2 (Two) Times a Day., Disp: , Rfl:   •  fluticasone (FLONASE) 50 MCG/ACT nasal spray, 1 spray into the nostril(s) as directed by provider 2 (Two) Times a Day As Needed., Disp: , Rfl:   •  furosemide (LASIX) 20 MG tablet, Take 1 tablet by mouth Daily As Needed (swelling/weight gain)., Disp: , Rfl:   •  lisinopril (PRINIVIL,ZESTRIL) 2.5 MG tablet, Take 1 tablet by mouth 2 (Two) Times a Day., Disp: , Rfl:   •  metaxalone (SKELAXIN) 800 MG tablet, Take 1 tablet by mouth As Needed for Muscle Spasms., Disp: , Rfl:   •  metoprolol succinate XL (Toprol XL) 100 MG 24 hr tablet, Take 1 tablet by mouth Daily., Disp: 180 tablet, Rfl: 2  •  metoprolol succinate XL (TOPROL-XL) 50 MG 24 hr tablet, Take 1 tablet by mouth Daily., Disp: , Rfl:   •  Minoxidil 5 %  "foam, Apply  topically 2 (two) times a day., Disp: , Rfl:   •  nitroglycerin (NITROSTAT) 0.4 MG SL tablet, 1 under the tongue as needed for angina, may repeat q5mins for up three doses, Disp: 100 tablet, Rfl: 11  •  omeprazole (priLOSEC) 40 MG capsule, Take 1 capsule by mouth., Disp: , Rfl:   •  pimecrolimus (ELIDEL) 1 % cream, Apply  topically 2 (Two) Times a Day., Disp: , Rfl:   •  spironolactone (ALDACTONE) 25 MG tablet, Take 12.5 mg by mouth Daily., Disp: , Rfl:   •  sulfamethoxazole-trimethoprim (BACTRIM DS,SEPTRA DS) 800-160 MG per tablet, Take 1 tablet by mouth 2 (Two) Times a Day., Disp: , Rfl:       Objective:     Vitals:    05/11/23 1033   BP: 124/76   Pulse: 85   SpO2: 99%   Weight: 82.6 kg (182 lb)   Height: 188 cm (74\")     Body mass index is 23.37 kg/m².    PHYSICAL EXAM:    Constitutional:       General: Not in acute distress.     Appearance: Normal appearance. Well-developed.   Eyes:      Pupils: Pupils are equal, round, and reactive to light.   HENT:      Head: Normocephalic.   Neck:      Vascular: No carotid bruit or JVD.   Pulmonary:      Effort: Pulmonary effort is normal. No tachypnea.      Breath sounds: Normal breath sounds. No wheezing. No rales.   Cardiovascular:      Normal rate. Regular rhythm.      S3 Gallop.   Pulses:     Intact distal pulses.   Abdominal:      General: Bowel sounds are normal.      Palpations: Abdomen is soft.      Tenderness: There is no abdominal tenderness.   Musculoskeletal: Normal range of motion.      Cervical back: Normal range of motion and neck supple. No edema. Skin:     General: Skin is warm and dry.   Neurological:      Mental Status: Alert and oriented to person, place, and time.           ECG 12 Lead    Date/Time: 5/11/2023 11:31 AM  Performed by: Vanessa Echavarria APRN  Authorized by: Vanessa Echavarria APRN   Comparison: compared with previous ECG from 2/10/2023  Similar to previous ECG  Rhythm: sinus rhythm  Rate: normal  Conduction: non-specific " intraventricular conduction delay  T inversion: II, III and aVF  QRS axis: normal    Clinical impression: non-specific ECG              Assessment:       Diagnosis Plan   1. Unstable angina pectoris     No angina on current   2. S/P CABG (coronary artery bypass graft)     Continue with Statin and BB   3. Paroxysmal atrial fibrillation     Brief afib on recent interrogation. Asymptomatic   4. NSVT (nonsustained ventricular tachycardia)     No arryhthmias since March. Decrease Amiodarone to 100mg daily   5. Ischemic cardiomyopathy     Follow with heart failure clinic at [place] with Dr. Topete. On Jardiance.    6. Implantable cardioverter-defibrillator (ICD) discharge     No device shocks.    7. Hyperlipidemia with target LDL less than 70     At goal             No orders of the defined types were placed in this encounter.         Plan:       Follow up in 3 months.       Your medication list          Accurate as of May 11, 2023 10:46 AM. If you have any questions, ask your nurse or doctor.            CONTINUE taking these medications      Instructions Last Dose Given Next Dose Due   amiodarone 200 MG tablet  Commonly known as: PACERONE      Take 1 tablet by mouth Daily.       apixaban 5 MG tablet tablet  Commonly known as: ELIQUIS      Take 1 tablet by mouth Every 12 (Twelve) Hours.       aspirin 81 MG EC tablet      Take 1 tablet by mouth Daily.       atorvastatin 80 MG tablet  Commonly known as: LIPITOR      Take 1 tablet by mouth Daily. 200001       carboxymethylcellulose 0.5 % solution  Commonly known as: REFRESH PLUS      Apply 1 drop to eye(s) as directed by provider.       cetirizine 10 MG tablet  Commonly known as: zyrTEC      Take 1 tablet by mouth As Needed. Only Occasionally will use       clobetasol 0.05 % cream  Commonly known as: TEMOVATE      Apply  topically 2 (Two) Times a Day.       diclofenac 75 MG EC tablet  Commonly known as: VOLTAREN      Take 1 tablet by mouth 2 (Two) Times a Day.        ezetimibe 10 MG tablet  Commonly known as: ZETIA      Take 1 tablet by mouth Daily.       ferrous sulfate 325 (65 FE) MG tablet      Take 1 tablet by mouth 2 (Two) Times a Day.       fluocinonide 0.05 % cream  Commonly known as: LIDEX      Apply  topically to the appropriate area as directed 2 (Two) Times a Day.       fluticasone 50 MCG/ACT nasal spray  Commonly known as: FLONASE      1 spray into the nostril(s) as directed by provider 2 (Two) Times a Day As Needed.       furosemide 20 MG tablet  Commonly known as: LASIX      Take 1 tablet by mouth Daily As Needed (swelling/weight gain).       Jardiance 10 MG tablet tablet  Generic drug: empagliflozin      Take 10 mg by mouth Daily.       lisinopril 2.5 MG tablet  Commonly known as: PRINIVIL,ZESTRIL      Take 1 tablet by mouth 2 (Two) Times a Day.       metaxalone 800 MG tablet  Commonly known as: SKELAXIN      Take 1 tablet by mouth As Needed for Muscle Spasms.       metoprolol succinate XL 50 MG 24 hr tablet  Commonly known as: TOPROL-XL      Take 1 tablet by mouth Daily.       metoprolol succinate  MG 24 hr tablet  Commonly known as: Toprol XL      Take 1 tablet by mouth Daily.       Minoxidil 5 % foam      Apply  topically 2 (two) times a day.       nitroglycerin 0.4 MG SL tablet  Commonly known as: NITROSTAT      1 under the tongue as needed for angina, may repeat q5mins for up three doses       omeprazole 40 MG capsule  Commonly known as: priLOSEC      Take 1 capsule by mouth.       pimecrolimus 1 % cream  Commonly known as: ELIDEL      Apply  topically 2 (Two) Times a Day.       spironolactone 25 MG tablet  Commonly known as: ALDACTONE      Take 12.5 mg by mouth Daily.       sulfamethoxazole-trimethoprim 800-160 MG per tablet  Commonly known as: BACTRIM DS,SEPTRA DS      Take 1 tablet by mouth 2 (Two) Times a Day.       vitamin D3 125 MCG (5000 UT) capsule capsule      Take 2,000 Units by mouth Every Other Day.                As always, it has been a  pleasure to participate in your patient's care.      Sincerely,     Vanessa TAYLOR

## 2023-08-11 ENCOUNTER — OFFICE VISIT (OUTPATIENT)
Dept: CARDIOLOGY | Facility: CLINIC | Age: 73
End: 2023-08-11
Payer: MEDICARE

## 2023-08-11 VITALS
BODY MASS INDEX: 23.74 KG/M2 | HEART RATE: 71 BPM | DIASTOLIC BLOOD PRESSURE: 72 MMHG | WEIGHT: 185 LBS | SYSTOLIC BLOOD PRESSURE: 112 MMHG | HEIGHT: 74 IN

## 2023-08-11 DIAGNOSIS — I48.0 PAROXYSMAL ATRIAL FIBRILLATION: ICD-10-CM

## 2023-08-11 DIAGNOSIS — I47.29 NSVT (NONSUSTAINED VENTRICULAR TACHYCARDIA): ICD-10-CM

## 2023-08-11 DIAGNOSIS — I25.5 ISCHEMIC CARDIOMYOPATHY: ICD-10-CM

## 2023-08-11 DIAGNOSIS — Z95.1 S/P CABG (CORONARY ARTERY BYPASS GRAFT): Primary | ICD-10-CM

## 2023-08-11 DIAGNOSIS — Z45.02 IMPLANTABLE CARDIOVERTER-DEFIBRILLATOR (ICD) DISCHARGE: ICD-10-CM

## 2023-08-11 RX ORDER — METOPROLOL SUCCINATE 100 MG/1
100 TABLET, EXTENDED RELEASE ORAL
COMMUNITY

## 2023-08-11 NOTE — PROGRESS NOTES
Date of Office Visit: 23  Encounter Provider: Andrew Ball MD  Place of Service: [facility] CARDIOLOGY  Patient Name: Alden Pratt III  :1950  [id number]    Chief Complaint   Patient presents with    Coronary Artery Disease    Follow-up     3 month   :     HPI: Alden Pratt III is a 72 y.o. male  he had an MI in .  At that time we did an angioplasty and stenting to his RCA but he had other significant disease his stents ended up renarrowing down and he had an ischemic myopathy.  He had an ICD placed we did a bypass on him with a LIMA to his LAD, a vein to his distal LAD, a vein to an OM 1, a vein to the PDA, vein to the KIERAN, a vein to the RV branch.  He is done really pretty well he has had chronic PVCs even before the infarct that have persisted some.  He had pretty emotionally dramatic reunion with his brother he ended up having VF and got a shock from his defibrillator.  The shocked knocked him into A. Fib.   We had to anticoagulate him and then cardiovert him out.  So earlier in  he went into atrial fibrillation again and did not feel good.  We cardioverted him out of it.  He is actually seen the arrhythmia service they put him on Multaq.  He does not feel any better.      We were concerned that he was having angina and so we cathed him and May 2021 and this showed 6 of 6 bypasses were patent severe cardiomyopathy we added some Jardiance to his regimen now he is here for follow-up.  He has followed up with the [facility] heart failure clinic.  This is recommended to him by his daughter and son-in-law.  They gave him some Farxiga samples and also doubled his lisinopril and then he had a hypotensive episode and had to go back to his regular dose of lisinopril     He has had a prior A. fib ablation and last echo which was done  showed an ejection fraction of 35 to 40%.    He is here for follow-up and has been doing well he has been  followed by the heart failure team at [place] they did a right heart cath on him last year with exercise and he really did well with that.  And in general he is done very well he is not any A. fib he is only thing is when he has the PVCs he does not feel good with him anymore he had PVCs before his infarct but now when he has them he really kind of bothers him it does not seem to be predictable     He called me today because he has been having short runs of palpitations that are fast and they really bother him they are unpredictable nothing is really changed in his regimen or what his routine he has been exercising he feels fine when he is doing that does not seem to happen with exercise.  He has sent in the strips to the pacemaker center and he is having runs of ventricular tachycardia that are nonsustained.  In May 2023 we elected to put him on low-dose amiodarone to treat the symptomatic nonsustained VT.  Now he is here for follow-up    He is here for follow-up and is doing great the amiodarone is really knocked his ectopy down has not had any thing like he used to have no syncope no racing of the heart at all you know he is a smart gentleman he is read about amiodarone he knows it can have side effects.  In addition he is not really having much in the way of heart failure symptoms but states class I    Past Medical History:   Diagnosis Date    Arthritis     CAD (coronary artery disease)     atheroclerosis    Cancer     prostate    Chest pain     CHF (congestive heart failure)     Elevated cholesterol     GERD (gastroesophageal reflux disease)     Hyperlipidemia     Implantable cardioverter-defibrillator (ICD) discharge     Myocardial infarction     inferior    PVC (premature ventricular contraction)     PVD (peripheral vascular disease)     Status post phlebectomy     varicose veins    Thyroid nodule 7/18/2014    VT (ventricular tachycardia)        Past Surgical History:   Procedure Laterality Date    CARDIAC  CATHETERIZATION      CARDIAC CATHETERIZATION Left 5/10/2021    Procedure: Left Heart Cath;  Surgeon: Andrew Ball MD;  Location:  [facility] CATH INVASIVE LOCATION;  Service: Cardiology;  Laterality: Left;    CARDIAC CATHETERIZATION N/A 5/10/2021    Procedure: Coronary angiography;  Surgeon: Andrew Ball MD;  Location:  [facility] CATH INVASIVE LOCATION;  Service: Cardiology;  Laterality: N/A;    CARDIAC CATHETERIZATION N/A 5/10/2021    Procedure: Left ventriculography;  Surgeon: Andrew Ball MD;  Location:  [facility] CATH INVASIVE LOCATION;  Service: Cardiology;  Laterality: N/A;    CARDIAC CATHETERIZATION  5/10/2021    Procedure: Saphenous Vein Graft;  Surgeon: Andrew Ball MD;  Location: [facility] C[facility];  Service: Cardiology;;    CARDIAC DEFIBRILLATOR PLACEMENT      CARDIAC ELECTROPHYSIOLOGY PROCEDURE N/A 8/2/2021    Procedure: Ablation atrial fibrillation;  Surgeon: Robby Ngo MD;  [facility];  Service: Cardiovascular;  Laterality: N/A;    CORONARY ARTERY BYPASS GRAFT      stents    CORONARY STENT PLACEMENT      HAND SURGERY Left     KNEE SURGERY Left     MICROAMBULATORY PHLEBECTOMY      SHOULDER SURGERY Right     THYROID BIOPSY      VARICOSE VEIN SURGERY         Social History     Socioeconomic History    Marital status:    Tobacco Use    Smoking status: Never    Smokeless tobacco: Never   Vaping Use    Vaping Use: Never used   Substance and Sexual Activity    Alcohol use: Yes     Comment: rareLY    Drug use: No    Sexual activity: Defer       Family History   Problem Relation Age of Onset    Coronary artery disease Other     Stroke Other     Hypertension Other     Heart disease Father     Heart failure Father     Stroke Father     No Known Problems Maternal Grandmother     No Known Problems Maternal Grandfather     No Known Problems Paternal Grandmother     No Known Problems Paternal Grandfather        Review of Systems   Constitutional: Negative  for decreased appetite, fever, malaise/fatigue and weight loss.   HENT:  Negative for nosebleeds.    Eyes:  Negative for double vision.   Cardiovascular:  Negative for chest pain, claudication, cyanosis, dyspnea on exertion, irregular heartbeat, leg swelling, near-syncope, orthopnea, palpitations, paroxysmal nocturnal dyspnea and syncope.   Respiratory:  Negative for cough, hemoptysis and shortness of breath.    Hematologic/Lymphatic: Negative for bleeding problem.   Skin:  Negative for rash.   Musculoskeletal:  Negative for falls and myalgias.   Gastrointestinal:  Negative for hematochezia, jaundice, melena, nausea and vomiting.   Genitourinary:  Negative for hematuria.   Neurological:  Negative for dizziness and seizures.   Psychiatric/Behavioral:  Negative for altered mental status and memory loss.      Allergies   Allergen Reactions    Contrast Dye (Echo Or Unknown Ct/Mr) Hives    Iodinated Contrast Media     Sudafed 12 Hour [Pseudoephedrine Hcl Er]     Pseudoephedrine Palpitations         Current Outpatient Medications:     amiodarone (PACERONE) 200 MG tablet, Take 0.5 tablets by mouth Daily., Disp: 30 tablet, Rfl: 5    apixaban (ELIQUIS) 5 MG tablet tablet, Take 1 tablet by mouth Every 12 (Twelve) Hours., Disp: 180 tablet, Rfl: 2    aspirin 81 MG EC tablet, Take 1 tablet by mouth Daily., Disp: , Rfl:     atorvastatin (LIPITOR) 80 MG tablet, Take 1 tablet by mouth Daily. 200001, Disp: 90 tablet, Rfl: 2    carboxymethylcellulose (REFRESH PLUS) 0.5 % solution, Apply 1 drop to eye(s) as directed by provider., Disp: , Rfl:     cetirizine (ZyrTEC) 10 MG tablet, Take 1 tablet by mouth As Needed. Only Occasionally will use, Disp: , Rfl:     Cholecalciferol (VITAMIN D3) 5000 UNITS capsule capsule, Take 2,000 Units by mouth Every Other Day., Disp: , Rfl:     clobetasol (TEMOVATE) 0.05 % cream, Apply  topically 2 (Two) Times a Day., Disp: , Rfl:     diclofenac (VOLTAREN) 75 MG EC tablet, Take 1 tablet by mouth 2 (Two)  "Times a Day., Disp: 60 tablet, Rfl: 11    Empagliflozin (Jardiance) 10 MG tablet, Take 10 mg by mouth Daily., Disp: 90 tablet, Rfl: 2    ezetimibe (ZETIA) 10 MG tablet, Take 1 tablet by mouth Daily., Disp: , Rfl:     fluticasone (FLONASE) 50 MCG/ACT nasal spray, 1 spray into the nostril(s) as directed by provider 2 (Two) Times a Day As Needed., Disp: , Rfl:     furosemide (LASIX) 20 MG tablet, Take 1 tablet by mouth Daily As Needed (swelling/weight gain)., Disp: , Rfl:     lisinopril (PRINIVIL,ZESTRIL) 2.5 MG tablet, Take 1 tablet by mouth Daily., Disp: , Rfl:     metaxalone (SKELAXIN) 800 MG tablet, Take 1 tablet by mouth As Needed for Muscle Spasms., Disp: , Rfl:     metoprolol succinate XL (TOPROL-XL) 100 MG 24 hr tablet, Take 1 tablet by mouth. In PM, Disp: , Rfl:     metoprolol succinate XL (TOPROL-XL) 50 MG 24 hr tablet, Take 1 tablet by mouth. In AM, Disp: , Rfl:     Minoxidil 5 % foam, Apply  topically 2 (two) times a day., Disp: , Rfl:     nitroglycerin (NITROSTAT) 0.4 MG SL tablet, 1 under the tongue as needed for angina, may repeat q5mins for up three doses, Disp: 100 tablet, Rfl: 11    omeprazole (priLOSEC) 40 MG capsule, Take 1 capsule by mouth 3 (Three) Times a Week., Disp: , Rfl:     pimecrolimus (ELIDEL) 1 % cream, Apply  topically 2 (Two) Times a Day., Disp: , Rfl:     spironolactone (ALDACTONE) 25 MG tablet, Take 0.5 tablets by mouth Daily., Disp: , Rfl:       Objective:     Vitals:    08/11/23 1443   BP: 112/72   Pulse: 71   Weight: 83.9 kg (185 lb)   Height: 188 cm (74\")     Body mass index is 23.75 kg/mý.    Physical Exam  Constitutional:       Appearance: He is well-developed.   HENT:      Head: Normocephalic.   Eyes:      General: No scleral icterus.  Neck:      Thyroid: No thyromegaly.      Vascular: No JVD.   Cardiovascular:      Rate and Rhythm: Normal rate and regular rhythm.      Heart sounds: Normal heart sounds. No murmur heard.    No friction rub. No gallop.   Pulmonary:      Effort: " Pulmonary effort is normal.      Breath sounds: Normal breath sounds. No wheezing or rales.   Chest:       Abdominal:      Palpations: Abdomen is soft.      Tenderness: There is no abdominal tenderness.   Musculoskeletal:         General: Normal range of motion.   Lymphadenopathy:      Cervical: No cervical adenopathy.   Skin:     General: Skin is warm and dry.      Findings: No rash.   Neurological:      Mental Status: He is alert and oriented to person, place, and time.         ECG 12 Lead    Date/Time: 8/11/2023 3:35 PM  Performed by: Andrew Ball MD  Authorized by: Andrew Ball MD   Comparison: compared with previous ECG   Similar to previous ECG  Rhythm: sinus rhythm  Q waves: II, III and aVF    Other findings: non-specific ST-T wave changes    Clinical impression: abnormal EKG         Assessment:       Diagnosis Plan   1. S/P CABG (coronary artery bypass graft)        2. Ischemic cardiomyopathy        3. Paroxysmal atrial fibrillation        4. NSVT (nonsustained ventricular tachycardia)        5. Implantable cardioverter-defibrillator (ICD) discharge                 Plan:       Doing well.  He is getting get his amiodarone labs and chest x-ray done with his primary care doctor in October I will have him come back and see me in 6 months but I am pleased with how he is doing and will keep him on the same meds for right now might even think about a limited echo at some point see if his LV functions gotten better with less ectopy    As always, it has been a pleasure to participate in your patient's care.      Sincerely,       Andrew Ball MD

## 2023-08-16 ENCOUNTER — TELEPHONE (OUTPATIENT)
Dept: CARDIOLOGY | Facility: CLINIC | Age: 73
End: 2023-08-16

## 2023-08-16 NOTE — TELEPHONE ENCOUNTER
"    Caller: Alden Pratt III \"Bill\"    Relationship to patient: Self    Best call back number:[phone]    Patient is needing: PATIENT WAS SEEN BY DR. SNIDER ON 08.11.23 AND WAS SUPPOSE TO HAVE A FOLLOW UP WITH DR. SNIDER IN FEBRUARY BUT THE  COULD NOT GET HIM IN ON FEBRUARY. PATIENT HAD AN APPOINTMENT ALREADY SCHEDULED ON 12.05.23. HE WANTS TO KNOW IF HE KEEPS THIS APPOINTMENT OR SOMEONE CALL HIM TO GET FEBRUARY APPOINTMENT. THANK YOU.            "

## 2023-10-21 ENCOUNTER — APPOINTMENT (OUTPATIENT)
Dept: GENERAL RADIOLOGY | Facility: HOSPITAL | Age: 73
End: 2023-10-21
Payer: MEDICARE

## 2023-10-21 ENCOUNTER — HOSPITAL ENCOUNTER (INPATIENT)
Facility: HOSPITAL | Age: 73
LOS: 1 days | Discharge: HOME | End: 2023-10-23
Attending: EMERGENCY MEDICINE | Admitting: INTERNAL MEDICINE
Payer: MEDICARE

## 2023-10-21 DIAGNOSIS — Z79.01 CHRONIC ANTICOAGULATION: ICD-10-CM

## 2023-10-21 DIAGNOSIS — Z95.0 HISTORY OF PACEMAKER: ICD-10-CM

## 2023-10-21 DIAGNOSIS — I49.9 CARDIAC ARRHYTHMIA, UNSPECIFIED CARDIAC ARRHYTHMIA TYPE: Primary | ICD-10-CM

## 2023-10-21 DIAGNOSIS — Z86.79 HISTORY OF CORONARY ARTERY DISEASE: ICD-10-CM

## 2023-10-21 DIAGNOSIS — R07.9 CHEST PAIN, UNSPECIFIED TYPE: ICD-10-CM

## 2023-10-21 PROBLEM — R55 NEAR SYNCOPE: Status: ACTIVE | Noted: 2023-10-21

## 2023-10-21 PROBLEM — R55 NEAR SYNCOPE: Status: RESOLVED | Noted: 2023-10-21 | Resolved: 2023-10-21

## 2023-10-21 LAB
ALBUMIN SERPL-MCNC: 3.9 G/DL (ref 3.5–5.2)
ALBUMIN/GLOB SERPL: 1.7 G/DL
ALP SERPL-CCNC: 166 U/L (ref 39–117)
ALT SERPL W P-5'-P-CCNC: 90 U/L (ref 1–41)
ANION GAP SERPL CALCULATED.3IONS-SCNC: 7 MMOL/L (ref 5–15)
AST SERPL-CCNC: 146 U/L (ref 1–40)
BASOPHILS # BLD AUTO: 0.03 10*3/MM3 (ref 0–0.2)
BASOPHILS NFR BLD AUTO: 0.5 % (ref 0–1.5)
BILIRUB SERPL-MCNC: 0.5 MG/DL (ref 0–1.2)
BUN SERPL-MCNC: 16 MG/DL (ref 8–23)
BUN/CREAT SERPL: 18 (ref 7–25)
CALCIUM SPEC-SCNC: 9.1 MG/DL (ref 8.6–10.5)
CHLORIDE SERPL-SCNC: 103 MMOL/L (ref 98–107)
CO2 SERPL-SCNC: 27 MMOL/L (ref 22–29)
CREAT SERPL-MCNC: 0.89 MG/DL (ref 0.76–1.27)
DEPRECATED RDW RBC AUTO: 42.4 FL (ref 37–54)
EGFRCR SERPLBLD CKD-EPI 2021: 92.2 ML/MIN/1.73
EOSINOPHIL # BLD AUTO: 0.16 10*3/MM3 (ref 0–0.4)
EOSINOPHIL NFR BLD AUTO: 2.7 % (ref 0.3–6.2)
ERYTHROCYTE [DISTWIDTH] IN BLOOD BY AUTOMATED COUNT: 13.3 % (ref 12.3–15.4)
GEN 5 2HR TROPONIN T REFLEX: 10 NG/L
GLOBULIN UR ELPH-MCNC: 2.3 GM/DL
GLUCOSE SERPL-MCNC: 118 MG/DL (ref 65–99)
HAV IGM SERPL QL IA: NORMAL
HBV CORE IGM SERPL QL IA: NORMAL
HBV SURFACE AG SERPL QL IA: NORMAL
HCT VFR BLD AUTO: 41.7 % (ref 37.5–51)
HCV AB SER DONR QL: NORMAL
HGB BLD-MCNC: 12.9 G/DL (ref 13–17.7)
HOLD SPECIMEN: NORMAL
HOLD SPECIMEN: NORMAL
IMM GRANULOCYTES # BLD AUTO: 0.02 10*3/MM3 (ref 0–0.05)
IMM GRANULOCYTES NFR BLD AUTO: 0.3 % (ref 0–0.5)
LYMPHOCYTES # BLD AUTO: 1.18 10*3/MM3 (ref 0.7–3.1)
LYMPHOCYTES NFR BLD AUTO: 20.2 % (ref 19.6–45.3)
MAGNESIUM SERPL-MCNC: 2.3 MG/DL (ref 1.6–2.4)
MCH RBC QN AUTO: 26.7 PG (ref 26.6–33)
MCHC RBC AUTO-ENTMCNC: 30.9 G/DL (ref 31.5–35.7)
MCV RBC AUTO: 86.3 FL (ref 79–97)
MONOCYTES # BLD AUTO: 0.61 10*3/MM3 (ref 0.1–0.9)
MONOCYTES NFR BLD AUTO: 10.5 % (ref 5–12)
NEUTROPHILS NFR BLD AUTO: 3.83 10*3/MM3 (ref 1.7–7)
NEUTROPHILS NFR BLD AUTO: 65.8 % (ref 42.7–76)
NRBC BLD AUTO-RTO: 0 /100 WBC (ref 0–0.2)
PLATELET # BLD AUTO: 151 10*3/MM3 (ref 140–450)
PMV BLD AUTO: 10.8 FL (ref 6–12)
POTASSIUM SERPL-SCNC: 4.5 MMOL/L (ref 3.5–5.2)
PROT SERPL-MCNC: 6.2 G/DL (ref 6–8.5)
RBC # BLD AUTO: 4.83 10*6/MM3 (ref 4.14–5.8)
SODIUM SERPL-SCNC: 137 MMOL/L (ref 136–145)
T4 FREE SERPL-MCNC: 1.52 NG/DL (ref 0.93–1.7)
TROPONIN T DELTA: 2 NG/L
TROPONIN T SERPL HS-MCNC: 8 NG/L
TSH SERPL DL<=0.05 MIU/L-ACNC: 0.75 UIU/ML (ref 0.27–4.2)
WBC NRBC COR # BLD: 5.83 10*3/MM3 (ref 3.4–10.8)
WHOLE BLOOD HOLD COAG: NORMAL
WHOLE BLOOD HOLD SPECIMEN: NORMAL

## 2023-10-21 PROCEDURE — G0378 HOSPITAL OBSERVATION PER HR: HCPCS

## 2023-10-21 PROCEDURE — 99285 EMERGENCY DEPT VISIT HI MDM: CPT

## 2023-10-21 PROCEDURE — 36415 COLL VENOUS BLD VENIPUNCTURE: CPT

## 2023-10-21 PROCEDURE — 80053 COMPREHEN METABOLIC PANEL: CPT | Performed by: EMERGENCY MEDICINE

## 2023-10-21 PROCEDURE — 84439 ASSAY OF FREE THYROXINE: CPT | Performed by: EMERGENCY MEDICINE

## 2023-10-21 PROCEDURE — 93005 ELECTROCARDIOGRAM TRACING: CPT

## 2023-10-21 PROCEDURE — 93010 ELECTROCARDIOGRAM REPORT: CPT | Performed by: INTERNAL MEDICINE

## 2023-10-21 PROCEDURE — 84443 ASSAY THYROID STIM HORMONE: CPT | Performed by: EMERGENCY MEDICINE

## 2023-10-21 PROCEDURE — 85025 COMPLETE CBC W/AUTO DIFF WBC: CPT | Performed by: EMERGENCY MEDICINE

## 2023-10-21 PROCEDURE — 71045 X-RAY EXAM CHEST 1 VIEW: CPT

## 2023-10-21 PROCEDURE — 84484 ASSAY OF TROPONIN QUANT: CPT | Performed by: EMERGENCY MEDICINE

## 2023-10-21 PROCEDURE — 80074 ACUTE HEPATITIS PANEL: CPT

## 2023-10-21 PROCEDURE — 83735 ASSAY OF MAGNESIUM: CPT | Performed by: EMERGENCY MEDICINE

## 2023-10-21 RX ORDER — POLYETHYLENE GLYCOL 3350 17 G/17G
17 POWDER, FOR SOLUTION ORAL DAILY PRN
Status: DISCONTINUED | OUTPATIENT
Start: 2023-10-21 | End: 2023-10-23 | Stop reason: HOSPADM

## 2023-10-21 RX ORDER — BISACODYL 5 MG/1
5 TABLET, DELAYED RELEASE ORAL DAILY PRN
Status: DISCONTINUED | OUTPATIENT
Start: 2023-10-21 | End: 2023-10-23 | Stop reason: HOSPADM

## 2023-10-21 RX ORDER — FUROSEMIDE 20 MG/1
20 TABLET ORAL DAILY PRN
Status: DISCONTINUED | OUTPATIENT
Start: 2023-10-21 | End: 2023-10-23 | Stop reason: HOSPADM

## 2023-10-21 RX ORDER — SODIUM CHLORIDE 0.9 % (FLUSH) 0.9 %
10 SYRINGE (ML) INJECTION AS NEEDED
Status: DISCONTINUED | OUTPATIENT
Start: 2023-10-21 | End: 2023-10-23 | Stop reason: HOSPADM

## 2023-10-21 RX ORDER — LISINOPRIL 2.5 MG/1
2.5 TABLET ORAL DAILY
Status: DISCONTINUED | OUTPATIENT
Start: 2023-10-22 | End: 2023-10-23 | Stop reason: HOSPADM

## 2023-10-21 RX ORDER — METOPROLOL SUCCINATE 50 MG/1
50 TABLET, EXTENDED RELEASE ORAL EVERY MORNING
Status: DISCONTINUED | OUTPATIENT
Start: 2023-10-22 | End: 2023-10-23 | Stop reason: HOSPADM

## 2023-10-21 RX ORDER — AMIODARONE HYDROCHLORIDE 200 MG/1
100 TABLET ORAL 2 TIMES DAILY
Status: DISCONTINUED | OUTPATIENT
Start: 2023-10-21 | End: 2023-10-23 | Stop reason: HOSPADM

## 2023-10-21 RX ORDER — ATORVASTATIN CALCIUM 80 MG/1
80 TABLET, FILM COATED ORAL DAILY
Status: DISCONTINUED | OUTPATIENT
Start: 2023-10-22 | End: 2023-10-21

## 2023-10-21 RX ORDER — SODIUM CHLORIDE 0.9 % (FLUSH) 0.9 %
10 SYRINGE (ML) INJECTION EVERY 12 HOURS SCHEDULED
Status: DISCONTINUED | OUTPATIENT
Start: 2023-10-21 | End: 2023-10-23 | Stop reason: HOSPADM

## 2023-10-21 RX ORDER — ONDANSETRON 2 MG/ML
4 INJECTION INTRAMUSCULAR; INTRAVENOUS EVERY 6 HOURS PRN
Status: DISCONTINUED | OUTPATIENT
Start: 2023-10-21 | End: 2023-10-23 | Stop reason: HOSPADM

## 2023-10-21 RX ORDER — ONDANSETRON 4 MG/1
4 TABLET, FILM COATED ORAL EVERY 6 HOURS PRN
Status: DISCONTINUED | OUTPATIENT
Start: 2023-10-21 | End: 2023-10-23 | Stop reason: HOSPADM

## 2023-10-21 RX ORDER — PANTOPRAZOLE SODIUM 40 MG/1
40 TABLET, DELAYED RELEASE ORAL
Status: DISCONTINUED | OUTPATIENT
Start: 2023-10-22 | End: 2023-10-23 | Stop reason: HOSPADM

## 2023-10-21 RX ORDER — SODIUM CHLORIDE 9 MG/ML
40 INJECTION, SOLUTION INTRAVENOUS AS NEEDED
Status: DISCONTINUED | OUTPATIENT
Start: 2023-10-21 | End: 2023-10-23 | Stop reason: HOSPADM

## 2023-10-21 RX ORDER — NAFTIFINE HYDROCHLORIDE 10 MG/G
GEL TOPICAL
COMMUNITY

## 2023-10-21 RX ORDER — METOPROLOL SUCCINATE 100 MG/1
100 TABLET, EXTENDED RELEASE ORAL NIGHTLY
Status: DISCONTINUED | OUTPATIENT
Start: 2023-10-21 | End: 2023-10-23 | Stop reason: HOSPADM

## 2023-10-21 RX ORDER — BISACODYL 10 MG
10 SUPPOSITORY, RECTAL RECTAL DAILY PRN
Status: DISCONTINUED | OUTPATIENT
Start: 2023-10-21 | End: 2023-10-23 | Stop reason: HOSPADM

## 2023-10-21 RX ORDER — ATORVASTATIN CALCIUM 80 MG/1
80 TABLET, FILM COATED ORAL DAILY
Status: DISCONTINUED | OUTPATIENT
Start: 2023-10-21 | End: 2023-10-23 | Stop reason: HOSPADM

## 2023-10-21 RX ORDER — AMOXICILLIN 250 MG
2 CAPSULE ORAL 2 TIMES DAILY
Status: DISCONTINUED | OUTPATIENT
Start: 2023-10-21 | End: 2023-10-23 | Stop reason: HOSPADM

## 2023-10-21 RX ORDER — ASPIRIN 81 MG/1
81 TABLET ORAL DAILY
Status: DISCONTINUED | OUTPATIENT
Start: 2023-10-22 | End: 2023-10-23 | Stop reason: HOSPADM

## 2023-10-21 RX ORDER — NITROGLYCERIN 0.4 MG/1
0.4 TABLET SUBLINGUAL
Status: DISCONTINUED | OUTPATIENT
Start: 2023-10-21 | End: 2023-10-23 | Stop reason: HOSPADM

## 2023-10-21 RX ADMIN — METOPROLOL SUCCINATE 100 MG: 50 TABLET, EXTENDED RELEASE ORAL at 22:12

## 2023-10-21 RX ADMIN — AMIODARONE HYDROCHLORIDE 100 MG: 200 TABLET ORAL at 22:12

## 2023-10-21 RX ADMIN — ATORVASTATIN CALCIUM 80 MG: 80 TABLET, FILM COATED ORAL at 22:12

## 2023-10-21 RX ADMIN — APIXABAN 5 MG: 5 TABLET, FILM COATED ORAL at 22:14

## 2023-10-21 RX ADMIN — Medication 10 ML: at 21:00

## 2023-10-21 NOTE — H&P
[facility]   HISTORY AND PHYSICAL    Patient Name: Alden Pratt III  : 1950  MRN: [id number]  Primary Care Physician:  Jhoan Quezada MD  Date of admission: 10/21/2023    Subjective   Subjective     Chief Complaint:   Chief Complaint   Patient presents with    Chest Pain         HPI:    Alden Pratt III is a 72 y.o. male, with a past medical history including, but not limited to, coronary artery disease, congestive heart failure, GERD, hyperlipidemia, ventricular tachycardia,cardiac defibrillator placement, and peripheral vascular disease, presented to the emergency department after having a syncopal episode at home.  He states he was outside mowing his grass when he began having palpitations.  He states they got very intense and he sat down and then had a syncopal episode.  Wife and the neighbor saw the event and immediately ran to him.  They state that he was unresponsive for approximately 3 to 4 minutes prior to EMS arriving.  He denies any chest pain, shortness of breath, nausea, vomiting, diaphoresis.  He states that he has had episodes similar to this but never as bad as today.  He denies any complaints at this time and states that he feels back to his normal self.  In the emergency department high-sensitivity troponin was 8, alkaline phosphatase  166, AST, 146 ALT 90.  He states that he will occasionally have a cider and if he takes Tylenol he will take 2 Tylenol, 2-3 times weekly, prior to going to bed.  Hepatitis panel is negative.  He was started on amiodarone in 2023.  EKG shows a paced rhythm.  Cardiology has been consulted to see the patient in the a.m.    Review of Systems   All systems were reviewed and negative except for: What was mentioned above in the HPI.    Personal History     Past Medical History:   Diagnosis Date    Arthritis     CAD (coronary artery disease)     atheroclerosis    Cancer     prostate    Chest pain     CHF (congestive heart failure)      Elevated cholesterol     GERD (gastroesophageal reflux disease)     Hyperlipidemia     Implantable cardioverter-defibrillator (ICD) discharge     Myocardial infarction     inferior    PVC (premature ventricular contraction)     PVD (peripheral vascular disease)     Status post phlebectomy     varicose veins    Thyroid nodule 7/18/2014    VT (ventricular tachycardia)        Past Surgical History:   Procedure Laterality Date    CARDIAC CATHETERIZATION      CARDIAC CATHETERIZATION Left 5/10/2021    Procedure: Left Heart Cath;  Surgeon: Andrew Ball MD;  [facility];  Service: Cardiology;  Laterality: Left;    CARDIAC CATHETERIZATION N/A 5/10/2021    Procedure: Coronary angiography;  Surgeon: Andrew Ball MD;  Location:  [facility] CATH INVASIVE LOCATION;  Service: Cardiology;  Laterality: N/A;    CARDIAC CATHETERIZATION N/A 5/10/2021    Procedure: Left ventriculography;  Surgeon: Andrew Ball MD;  Location:  [facility] CATH INVASIVE LOCATION;  Service: Cardiology;  Laterality: N/A;    CARDIAC CATHETERIZATION  5/10/2021    Procedure: Saphenous Vein Graft;  Surgeon: Andrew Ball MD;  Location: [facility] C[facility]VASIVE LOC[facility];  Service: Cardiology;;    CARDIAC DEFIBRILLATOR PLACEMENT      CARDIAC ELECTROPHYSIOLOGY PROCEDURE N/A 8/2/2021    Procedure: Ablation atrial fibrillation;  Surgeon: Robby Ngo MD;  Location: [facility] CATH [facility]VASIVE LOCATION;  Service: Cardiovascular;  Laterality: N/A;    CORONARY ARTERY BYPASS GRAFT      stents    CORONARY STENT PLACEMENT      HAND SURGERY Left     KNEE SURGERY Left     MICROAMBULATORY PHLEBECTOMY      SHOULDER SURGERY Right     THYROID BIOPSY      VARICOSE VEIN SURGERY         Family History: family history includes Coronary artery disease in an other family member; Heart disease in his father; Heart failure in his father; Hypertension in an other family member; No Known Problems in his maternal grandfather, maternal grandmother, paternal  grandfather, and paternal grandmother; Stroke in his father and another family member. Otherwise pertinent FHx was reviewed and not pertinent to current issue.    Social History:  reports that he has never smoked. He has never used smokeless tobacco. He reports current alcohol use. He reports that he does not use drugs.    Home Medications:  Diclofenac, Minoxidil, amiodarone, apixaban, aspirin, atorvastatin, carboxymethylcellulose, cetirizine, clobetasol, empagliflozin, ezetimibe, fluticasone, furosemide, lisinopril, metaxalone, metoprolol succinate XL, naftifine, nitroglycerin, omeprazole, pimecrolimus, spironolactone, and vitamin D3    Allergies:  Allergies   Allergen Reactions    Contrast Dye (Echo Or Unknown Ct/Mr) Hives    Iodinated Contrast Media Hives     Tolerated with pre meds prior to heart cath in feb     [facility]]     Pseudoephedrine Palpitations       Objective   Objective     Vitals:   Temp:  [97.4 °F (36.3 °C)] 97.4 °F (36.3 °C)  Heart Rate:  [70-75] 75  Resp:  [16] 16  BP: (116-136)/(72-85) 116/72  Physical Exam    Constitutional: Awake, alert   Eyes: PERRLA, sclerae anicteric, no conjunctival injection   HENT: NCAT, mucous membranes moist   Neck: Supple, no thyromegaly, no lymphadenopathy, trachea midline   Respiratory: Clear to auscultation bilaterally, nonlabored respirations    Cardiovascular: RRR, no murmurs, rubs, or gallops, palpable pedal pulses bilaterally   Gastrointestinal: Positive bowel sounds, soft, nontender, nondistended   Musculoskeletal: No bilateral ankle edema, no clubbing or cyanosis to extremities   Psychiatric: Appropriate affect, cooperative   Neurologic: Oriented x 3, strength symmetric in all extremities, Cranial Nerves grossly intact to confrontation, speech clear   Skin: No rashes     Result Review    Result Review:  I have personally reviewed the results from the time of this admission to 10/21/2023 18:06 EDT and agree with these  findings:  [x]  Laboratory list / accordion  []  Microbiology  [x]  Radiology  [x]  EKG/Telemetry   []  Cardiology/Vascular   []  Pathology  [x]  Old records  []  Other:      Assessment & Plan   Assessment / Plan     Brief Patient Summary:  Alden Pratt III is a 72 y.o. male who was admitted to the observation unit for further evaluation and treatment of his near syncopal episode.    Active Hospital Problems:  Active Hospital Problems    Diagnosis     **Near syncope      Plan:   Near-Syncope  -Cardiac monitoring  -Vital signs every 4 hours  -Cardiology consult  -High Sensitivity Troponin 8, , repeat in a.m.  -EKG Paced rhythm  -N.p.o. after midnight     Transaminitis  -Hepatitis panel negative  -Avoid hepatotoxic medications    DVT prophylaxis:  Mechanical DVT prophylaxis orders are present.    CODE STATUS:    Code Status (Patient has no pulse and is not breathing): CPR (Attempt to Resuscitate)  Medical Interventions (Patient has pulse or is breathing): Full Support    Admission Status:  I believe this patient meets observation status.    78 minutes have been spent by [facility] providers in the care of this patient while under observation status.      Appropriate PPE worn during patient encounter.  Hand hygeine performed before and after seeing the patient.      Electronically signed by BRANDON Deleon, 10/21/23, 6:06 PM EDT.

## 2023-10-21 NOTE — ED NOTES
Pt was working in the yard, began feeling heart racing became lightheaded, + vomiting, + loc. States pain was 9/10, now 2/10. Hx of prior MI, cabg. Sees dr resendez and spencer

## 2023-10-21 NOTE — ED NOTES
"Nursing report ED to floor  [facility]  72 y.o.  male    HPI :   Chief Complaint   Patient presents with    Chest Pain       Admitting doctor:   Gilberto Acosta MD    Admitting diagnosis:   The primary encounter diagnosis was Cardiac arrhythmia, unspecified cardiac arrhythmia type. Diagnoses of Chest pain, unspecified type, History of coronary artery disease, Chronic anticoagulation, and History of pacemaker were also pertinent to this visit.    Code status:   Current Code Status       Date Active Code Status Order ID Comments User Context       10/21/2023 1805 CPR (Attempt to Resuscitate) [id number]  Yanira Myers, [facility]        Question Answer    Code Status (Patient has no pulse and is not breathing) CPR (Attempt to Resuscitate)    Medical Interventions (Patient has pulse or is breathing) Full Support                    Allergies:   Contrast dye (echo or unknown ct/mr), Iodinated contrast media, Sudafed 12 hour [pseudoephedrine hcl er], and Pseudoephedrine    Isolation:   No active isolations    Intake and Output  No intake or output data in the 24 hours ending 10/21/23 1807    Weight:       10/21/23  1606   Weight: 86.7 kg (191 lb 2.2 oz)       Most recent vitals:   Vitals:    10/21/23 1606 10/21/23 1707   BP: 136/85 116/72   Pulse: 70 75   Resp: 16 16   Temp: 97.4 °F (36.3 °C)    TempSrc: Oral    SpO2: 97% 97%   Weight: 86.7 kg (191 lb 2.2 oz)    Height: 188 cm (74\")        Active LDAs/IV Access:   Lines, Drains & Airways       Active LDAs       Name Placement date Placement time Site Days    Peripheral IV 10/21/23 1605 Left;Posterior Hand 10/21/23  1605  Hand  less than 1                    Labs (abnormal labs have a star):   Labs Reviewed   COMPREHENSIVE METABOLIC PANEL - Abnormal; Notable for the following components:       Result Value    Glucose 118 (*)     ALT (SGPT) 90 (*)     AST (SGOT) 146 (*)     Alkaline Phosphatase 166 (*)     All other components within normal limits    Narrative:     " GFR Normal >60  Chronic Kidney Disease <60  Kidney Failure <15     CBC WITH AUTO DIFFERENTIAL - Abnormal; Notable for the following components:    Hemoglobin 12.9 (*)     MCHC 30.9 (*)     All other components within normal limits   TROPONIN - Normal    Narrative:     High Sensitive Troponin T Reference Range:  <10.0 ng/L- Negative Female for AMI  <15.0 ng/L- Negative Male for AMI  >=10 - Abnormal Female indicating possible myocardial injury.  >=15 - Abnormal Male indicating possible myocardial injury.   Clinicians would have to utilize clinical acumen, EKG, Troponin, and serial changes to determine if it is an Acute Myocardial Infarction or myocardial injury due to an underlying chronic condition.        RAINBOW DRAW    Narrative:     The following orders were created for panel order [place] Draw.  Procedure                               Abnormality         Status                     ---------                               -----------         ------                     Green Top (Gel)[[id number]]                                  Final result               Lavender Top[[id number]]                                     Final result               Gold Top - SST[[id number]]                                   Final result               Light Blue Top[[id number]]                                   Final result                 Please view results for these tests on the individual orders.   HIGH SENSITIVITIY TROPONIN T 2HR   TSH   T4, FREE   MAGNESIUM   CBC AND DIFFERENTIAL    Narrative:     The following orders were created for panel order CBC & Differential.  Procedure                               Abnormality         Status                     ---------                               -----------         ------                     CBC Auto Differential[[id number]]        Abnormal            Final result                 Please view results for these tests on the individual orders.   GREEN TOP   LAVENDER TOP   GOLD TOP - SST   LIGHT  BLUE TOP       EKG:   ECG 12 Lead Chest Pain   Preliminary Result   HEART RATE= 70  bpm   RR Interval= 857  ms   LA Interval= 221  ms   P Horizontal Axis= -44  deg   P Front Axis=   deg   QRSD Interval= 111  ms   QT Interval= 431  ms   QTcB= 466  ms   QRS Axis= 14  deg   T Wave Axis= -12  deg   - ABNORMAL ECG -   Atrial-paced complexes   Prolonged LA interval   Inferior infarct, age indeterminate   Electronically Signed By:    Date and Time of Study: 2023-10-21 16:06:28          Meds given in ED:   Medications   sodium chloride 0.9 % flush 10 mL (has no administration in time range)   sodium chloride 0.9 % flush 10 mL (has no administration in time range)   sodium chloride 0.9 % infusion 40 mL (has no administration in time range)   sennosides-docusate (PERICOLACE) 8.6-50 MG per tablet 2 tablet (has no administration in time range)     And   polyethylene glycol (MIRALAX) packet 17 g (has no administration in time range)     And   bisacodyl (DULCOLAX) EC tablet 5 mg (has no administration in time range)     And   bisacodyl (DULCOLAX) suppository 10 mg (has no administration in time range)   ondansetron (ZOFRAN) tablet 4 mg (has no administration in time range)     Or   ondansetron (ZOFRAN) injection 4 mg (has no administration in time range)   nitroglycerin (NITROSTAT) SL tablet 0.4 mg (has no administration in time range)       Imaging results:  XR Chest 1 View    Result Date: 10/21/2023  1. No acute process.   This report was finalized on 10/21/2023 5:12 PM by Dr. Lawson Dowd M.D on Workstation: [id number]       Ambulatory status:   - independent     Social issues:   Social History     Socioeconomic History    Marital status:    Tobacco Use    Smoking status: Never    Smokeless tobacco: Never   Vaping Use    Vaping Use: Never used   Substance and Sexual Activity    Alcohol use: Yes     Comment: rareLY    Drug use: No    Sexual activity: Defer       NIH Stroke Scale:       Effie Meza RN  10/21/23  18:07 EDT

## 2023-10-21 NOTE — ED PROVIDER NOTES
EMERGENCY DEPARTMENT ENCOUNTER    Room Number:  19/19  PCP: Jhoan Quezada MD  Historian: Patient      HPI:  Chief Complaint: Chest pain  A complete HPI/ROS/PMH/PSH/SH/FH are unobtainable due to: None    Context: Alden Pratt III is a 72 y.o. male who presents to the ED via [facility] from home when he had acute onset of chest pain with racing heart, lightheadedness, nausea, vomiting and syncope.  EMS found him with a heart rate of 170, concerns for STEMI and the activated Cath Lab.  Patient was hypotensive into the 80s.  On arrival patient had converted into a heart rate of 70 with an improved looking EKG and improve blood pressure.  Symptoms are significantly improved.  Does have a history of coronary disease with CABG, follows up with cardiology.  No recent illnesses.      MEDICAL RECORD REVIEW    External (non-ED) record review: Adult transthoracic echo January 17, 2023 shows ejection fraction of 45% with diffuse hypokinesis, right atrial cavity mildly dilated, left ventricular cavity moderately dilated    PAST MEDICAL HISTORY  Active Ambulatory Problems     Diagnosis Date Noted    Hyperlipidemia with target LDL less than 70 06/19/2018    ST elevation myocardial infarction involving right coronary artery 06/19/2018    S/P CABG (coronary artery bypass graft) 06/19/2018    Chest pain 10/18/2018    Abnormal EKG 10/19/2018    CAD (coronary artery disease) 07/20/2012    Acid reflux 10/30/2018    GERD (gastroesophageal reflux disease) 07/20/2012    History of Guillain-[place] syndrome 10/05/2018    History of MI (myocardial infarction) 07/20/2012    Prostate cancer 08/30/2013    PVC (premature ventricular contraction) 12/20/2013    Radiation proctitis 02/22/2018    Thyroid nodule 07/18/2014    Tinnitus 07/11/2014    Vitamin D deficiency 07/18/2014    Ischemic cardiomyopathy 10/30/2018    Paroxysmal atrial fibrillation 05/05/2021    Dyspnea on exertion 05/05/2021    Unstable angina pectoris 05/05/2021     Atrial fibrillation, persistent 07/08/2021    Implantable cardioverter-defibrillator (ICD) discharge     NSVT (nonsustained ventricular tachycardia) 02/10/2023     Resolved Ambulatory Problems     Diagnosis Date Noted    Persistent atrial fibrillation 10/30/2018    Paroxysmal A-fib 03/09/2021     Past Medical History:   Diagnosis Date    Arthritis     Cancer     CHF (congestive heart failure)     Elevated cholesterol     Hyperlipidemia     Myocardial infarction     PVD (peripheral vascular disease)     Status post phlebectomy     VT (ventricular tachycardia)          PAST SURGICAL HISTORY  Past Surgical History:   Procedure Laterality Date    CARDIAC CATHETERIZATION      CARDIAC CATHETERIZATION Left 5/10/2021    Procedure: Left Heart Cath;  Surgeon: Andrew Ball MD;  Location: [facility] CATH [facility]VASIVE LOCATION;  Service: Cardiology;  Laterality: Left;    CARDIAC CATHETERIZATION N/A 5/10/2021    Procedure: Coronary angiography;  Surgeon: Andrew Ball MD;  Location: [facility] CATH [facility]AS[facility] LOC[facility];  Service: Cardiology;  Laterality: N/A;    CARDIAC CATHETERIZATION N/A 5/10/2021    Procedure: Left ventriculography;  Surgeon: Andrew Ball MD;  Location: [facility] CATH [facility]AS[facility] LOC[facility];  Service: Cardiology;  Laterality: N/A;    CARDIAC CATHETERIZATION  5/10/2021    Procedure: Saphenous Vein Graft;  Surgeon: Andrew Ball MD;  Location: [facility] CATH [facility]AS[facility] LOC[facility];  Service: Cardiology;;    CARDIAC DEFIBRILLATOR PLACEMENT      CARDIAC ELECTROPHYSIOLOGY PROCEDURE N/A 8/2/2021    Procedure: Ablation atrial fibrillation;  Surgeon: Robby Ngo MD;  Location: [facility] CATH [facility] LOC[facility];  Service: Cardiovascular;  Laterality: N/A;    CORONARY ARTERY BYPASS GRAFT      stents    CORONARY STENT PLACEMENT      HAND SURGERY Left     KNEE SURGERY Left     MICROAMBULATORY PHLEBECTOMY      SHOULDER SURGERY Right     THYROID BIOPSY      VARICOSE VEIN SURGERY           FAMILY HISTORY  Family History   Problem  Relation Age of Onset    Coronary artery disease Other     Stroke Other     Hypertension Other     Heart disease Father     Heart failure Father     Stroke Father     No Known Problems Maternal Grandmother     No Known Problems Maternal Grandfather     No Known Problems Paternal Grandmother     No Known Problems Paternal Grandfather          SOCIAL HISTORY  Social History     Socioeconomic History    Marital status:    Tobacco Use    Smoking status: Never    Smokeless tobacco: Never   Vaping Use    Vaping Use: Never used   Substance and Sexual Activity    Alcohol use: Yes     Comment: rareLY    Drug use: No    Sexual activity: Defer         ALLERGIES  Contrast dye (echo or unknown ct/mr), Iodinated contrast media, Sudafed 12 hour [pseudoephedrine hcl er], and Pseudoephedrine        REVIEW OF SYSTEMS  Review of Systems     All systems reviewed and negative except for those discussed in HPI.       PHYSICAL EXAM    I have reviewed the triage vital signs and nursing notes.    ED Triage Vitals [10/21/23 1606]   Temp Heart Rate Resp BP SpO2   97.4 °F (36.3 °C) 70 16 136/85 97 %      Temp src Heart Rate Source Patient Position BP Location FiO2 (%)   Oral Monitor -- -- --       Physical Exam  General: No acute distress, nontoxic, nondiaphoretic  HEENT: Mucous membranes moist, atraumatic, EOMI  Neck: Full ROM  Pulm: Symmetric chest rise, nonlabored, lungs CTAB  Cardiovascular: Regular rate and rhythm, intact distal pulses, no peripheral edema  GI: Soft, nontender, nondistended, no rebound, no guarding, bowel sounds present  MSK: Full ROM, no deformity  Skin: Warm, dry  Neuro: Awake, alert, oriented x 4, GCS 15, moving all extremities, no focal deficits  Psych: Calm, cooperative        LAB RESULTS  Recent Results (from the past 24 hour(s))   ECG 12 Lead Chest Pain    Collection Time: 10/21/23  4:06 PM   Result Value Ref Range    QT Interval 431 ms    QTC Interval 466 ms   Green Top (Gel)    Collection Time: 10/21/23   4:17 PM   Result Value Ref Range    Extra Tube Hold for add-ons.    Lavender Top    Collection Time: 10/21/23  4:17 PM   Result Value Ref Range    Extra Tube hold for add-on    Gold Top - SST    Collection Time: 10/21/23  4:17 PM   Result Value Ref Range    Extra Tube Hold for add-ons.    Light Blue Top    Collection Time: 10/21/23  4:17 PM   Result Value Ref Range    Extra Tube Hold for add-ons.    CBC Auto Differential    Collection Time: 10/21/23  4:17 PM    Specimen: Blood   Result Value Ref Range    WBC 5.83 3.40 - 10.80 10*3/mm3    RBC 4.83 4.14 - 5.80 10*6/mm3    Hemoglobin 12.9 (L) 13.0 - 17.7 g/dL    Hematocrit 41.7 37.5 - 51.0 %    MCV 86.3 79.0 - 97.0 fL    MCH 26.7 26.6 - 33.0 pg    MCHC 30.9 (L) 31.5 - 35.7 g/dL    RDW 13.3 12.3 - 15.4 %    RDW-SD 42.4 37.0 - 54.0 fl    MPV 10.8 6.0 - 12.0 fL    Platelets 151 140 - 450 10*3/mm3    Neutrophil % 65.8 42.7 - 76.0 %    Lymphocyte % 20.2 19.6 - 45.3 %    Monocyte % 10.5 5.0 - 12.0 %    Eosinophil % 2.7 0.3 - 6.2 %    Basophil % 0.5 0.0 - 1.5 %    Immature Grans % 0.3 0.0 - 0.5 %    Neutrophils, Absolute 3.83 1.70 - 7.00 10*3/mm3    Lymphocytes, Absolute 1.18 0.70 - 3.10 10*3/mm3    Monocytes, Absolute 0.61 0.10 - 0.90 10*3/mm3    Eosinophils, Absolute 0.16 0.00 - 0.40 10*3/mm3    Basophils, Absolute 0.03 0.00 - 0.20 10*3/mm3    Immature Grans, Absolute 0.02 0.00 - 0.05 10*3/mm3    nRBC 0.0 0.0 - 0.2 /100 WBC   Comprehensive Metabolic Panel    Collection Time: 10/21/23  5:08 PM    Specimen: Blood   Result Value Ref Range    Glucose 118 (H) 65 - 99 mg/dL    BUN 16 8 - 23 mg/dL    Creatinine 0.89 0.76 - 1.27 mg/dL    Sodium 137 136 - 145 mmol/L    Potassium 4.5 3.5 - 5.2 mmol/L    Chloride 103 98 - 107 mmol/L    CO2 27.0 22.0 - 29.0 mmol/L    Calcium 9.1 8.6 - 10.5 mg/dL    Total Protein 6.2 6.0 - 8.5 g/dL    Albumin 3.9 3.5 - 5.2 g/dL    ALT (SGPT) 90 (H) 1 - 41 U/L    AST (SGOT) 146 (H) 1 - 40 U/L    Alkaline Phosphatase 166 (H) 39 - 117 U/L    Total  Bilirubin 0.5 0.0 - 1.2 mg/dL    Globulin 2.3 gm/dL    A/G Ratio 1.7 g/dL    BUN/Creatinine Ratio 18.0 7.0 - 25.0    Anion Gap 7.0 5.0 - 15.0 mmol/L    eGFR 92.2 >60.0 mL/min/1.73   High Sensitivity Troponin T    Collection Time: 10/21/23  5:08 PM    Specimen: Blood   Result Value Ref Range    HS Troponin T 8 <15 ng/L       Ordered the above labs and independently interpreted results. My findings will be discussed in the medical decision making section below        RADIOLOGY  XR Chest 1 View    Result Date: 10/21/2023  XR CHEST 1 VW-10/21/2023  HISTORY: Chest pain.  Heart size is within normal limits. Lungs appear clear. Sternotomy wires and cardiac pacemaker are seen.      1. No acute process.   This report was finalized on 10/21/2023 5:12 PM by Dr. Lawson Dowd M.D on Workstation: [id number]       Ordered the above noted radiological studies.  Independently interpreted by me and my independent review of findings can be found in the ED Course.  See dictation for official radiology interpretation.      PROCEDURES    Procedures  EKG - Per my independent interpretation:    EKG Time: 1606  Rhythm/Rate: Atrially paced complexes with rate of 70  Normal axis  NV interval of 221  T wave inversions in the inferior leads   No STEMI       No emergent changes compared to August 3, 2021      MEDICATIONS GIVEN IN ER    Medications - No data to display      PROGRESS, DATA ANALYSIS, CONSULTS, AND MEDICAL DECISION MAKING    Please note that this section constitutes my independent interpretation of clinical data including lab results, radiology, EKG's.  This constitutes my independent professional opinion regarding differential diagnosis and management of this patient.  It may include any factors such as history from outside sources, review of external records, social determinants of health, management of medications, response to those treatments, and discussions with other providers.    Differential Diagnosis and Plan: HEART  score 6 (pre-troponin), concern for ACS, arrhythmia, electrolyte abnormalities, anemia, among others.  Lower concern for pneumothorax, aortic dissection, PE.  Plan for labs, chest x-ray, EKG, and cardiology consult.    EKGs are improved as compared to EMS, rate is significantly improved.    Additional sources:  - Discussed/ obtained information from independent historians:       - Chronic or social conditions impacting care:     - Shared decision making:  Patient fully updated on and in agreement with the course and plan moving forward    ED Course as of 10/21/23 1803   Sat Oct 21, 2023   1613 I have reviewed EMS and ED EKG's with Dr. Cortez, interventional cardiology, I discussed patient's improving symptoms and with improving EKG, and at this point we will not move forward with Cath Lab activation. [DC]   1630 WBC: 5.83 [DC]   1630 Hemoglobin(!): 12.9 [DC]   1630 Platelets: 151 [DC]   1753 HS Troponin T: 8 [DC]   1753 Glucose(!): 118 [DC]   1753 BUN: 16 [DC]   1753 Creatinine: 0.89 [DC]   1754 Sodium: 137 [DC]   1754 Potassium: 4.5 [DC]   1754 ALT (SGPT)(!): 90 [DC]   1754 AST (SGOT)(!): 146 [DC]   1754 Alkaline Phosphatase(!): 166 [DC]   1754 Total Bilirubin: 0.5 [DC]   1759 On reevaluation, discussed patient's so far reassuring work-up with plan for second set troponin.  However, patient and wife are uncomfortable with plan for discharge given his previous cardiac history and previous recommendations from his cardiologist to stay overnight with a similar though less severe episode in the past.  He is remained well-appearing and asymptomatic in the Emergency Department but I think observation unit stay for further monitoring and cardiology evaluation went to be reasonable and warranted.  We will interrogate the pacemaker in the meantime. [DC]   1802 Discussed with [facility] [place], observation unit, discussed patient's clinical course and findings today, discussions with cardiology, and need for observation  unit stay. [DC]      ED Course User Index  [DC] Silvio Perkins MD       Hospitalization Considered?: yes    Orders Placed During This Visit:  Orders Placed This Encounter   Procedures    XR Chest 1 View    [place] Draw    Comprehensive Metabolic Panel    High Sensitivity Troponin T    CBC Auto Differential    High Sensitivity Troponin T 2Hr    TSH    T4, Free    Magnesium    NPO Diet NPO Type: Strict NPO    Undress & Gown    Continuous Pulse Oximetry    Device Interrogation. Device type? Pacemaker; Company to contact? [employer] [phone]    Oxygen Therapy- Nasal Cannula; Titrate 1-6 LPM Per SpO2; 90 - 95%    ECG 12 Lead Chest Pain    Initiate ED Observation Status    CBC & Differential    Green Top (Gel)    Lavender Top    Gold Top - SST    Light Blue Top       Additional orders considered but not placed:      Independent interpretation of labs, radiology studies, and discussions with consultants: See ED Course        AS OF 18:03 EDT VITALS:    BP - 116/72  HR - 75  TEMP - 97.4 °F (36.3 °C) (Oral)  02 SATS - 97%        DIAGNOSIS  Final diagnoses:   Cardiac arrhythmia, unspecified cardiac arrhythmia type   Chest pain, unspecified type   History of coronary artery disease   Chronic anticoagulation   History of pacemaker         DISPOSITION  HOSPITALIZATION    Discussed treatment plan and reason for hospitalization with pt/family and hospitalizing physician.  Pt/family voiced understanding of the plan for hospitalization for further testing/treatment as needed.                 --    Please note that portions of this were completed with a voice recognition program.       Note Disclaimer: At [facility], we believe that sharing information builds trust and better relationships. You are receiving this note because you are receiving care at [facility] or recently visited. It is possible you will see health information before a provider has talked with you about it. This kind of information can be easy to  misunderstand. To help you fully understand what it means for your health, we urge you to discuss this note with your provider.           Silvio Perkins MD  10/21/23 1146

## 2023-10-22 ENCOUNTER — APPOINTMENT (OUTPATIENT)
Dept: CARDIOLOGY | Facility: HOSPITAL | Age: 73
End: 2023-10-22
Payer: MEDICARE

## 2023-10-22 LAB
ALBUMIN SERPL-MCNC: 3.8 G/DL (ref 3.5–5.2)
ALBUMIN/GLOB SERPL: 1.7 G/DL
ALP SERPL-CCNC: 135 U/L (ref 39–117)
ALT SERPL W P-5'-P-CCNC: 69 U/L (ref 1–41)
ANION GAP SERPL CALCULATED.3IONS-SCNC: 9.7 MMOL/L (ref 5–15)
AORTIC DIMENSIONLESS INDEX: 0.6 (DI)
ASCENDING AORTA: 3.6 CM
AST SERPL-CCNC: 66 U/L (ref 1–40)
BH CV ECHO MEAS - ACS: 2.35 CM
BH CV ECHO MEAS - AI P1/2T: 577 MSEC
BH CV ECHO MEAS - AO MAX PG: 3.6 MMHG
BH CV ECHO MEAS - AO MEAN PG: 2 MMHG
BH CV ECHO MEAS - AO ROOT DIAM: 3.5 CM
BH CV ECHO MEAS - AO V2 MAX: 95 CM/SEC
BH CV ECHO MEAS - AO V2 VTI: 19.1 CM
BH CV ECHO MEAS - AVA(I,D): 2.31 CM2
BH CV ECHO MEAS - EDV(CUBED): 282.1 ML
BH CV ECHO MEAS - EDV(MOD-SP2): 128 ML
BH CV ECHO MEAS - EDV(MOD-SP4): 142 ML
BH CV ECHO MEAS - EF(MOD-BP): 31.2 %
BH CV ECHO MEAS - EF(MOD-SP2): 29.7 %
BH CV ECHO MEAS - EF(MOD-SP4): 28.9 %
BH CV ECHO MEAS - ESV(CUBED): 171.8 ML
BH CV ECHO MEAS - ESV(MOD-SP2): 90 ML
BH CV ECHO MEAS - ESV(MOD-SP4): 101 ML
BH CV ECHO MEAS - FS: 15.2 %
BH CV ECHO MEAS - IVS/LVPW: 1.24 CM
BH CV ECHO MEAS - IVSD: 1.56 CM
BH CV ECHO MEAS - LA DIMENSION: 3.7 CM
BH CV ECHO MEAS - LAT PEAK E' VEL: 7.3 CM/SEC
BH CV ECHO MEAS - LV DIASTOLIC VOL/BSA (35-75): 68.3 CM2
BH CV ECHO MEAS - LV MASS(C)D: 452.9 GRAMS
BH CV ECHO MEAS - LV MAX PG: 2 MMHG
BH CV ECHO MEAS - LV MEAN PG: 1 MMHG
BH CV ECHO MEAS - LV SYSTOLIC VOL/BSA (12-30): 48.6 CM2
BH CV ECHO MEAS - LV V1 MAX: 70.7 CM/SEC
BH CV ECHO MEAS - LV V1 VTI: 12.1 CM
BH CV ECHO MEAS - LVIDD: 6.6 CM
BH CV ECHO MEAS - LVIDS: 5.6 CM
BH CV ECHO MEAS - LVOT AREA: 3.6 CM2
BH CV ECHO MEAS - LVOT DIAM: 2.16 CM
BH CV ECHO MEAS - LVPWD: 1.26 CM
BH CV ECHO MEAS - MED PEAK E' VEL: 6.3 CM/SEC
BH CV ECHO MEAS - MR MAX PG: 90.7 MMHG
BH CV ECHO MEAS - MR MAX VEL: 476.1 CM/SEC
BH CV ECHO MEAS - MV A DUR: 0.18 SEC
BH CV ECHO MEAS - MV A MAX VEL: 60 CM/SEC
BH CV ECHO MEAS - MV DEC SLOPE: 242.4 CM/SEC2
BH CV ECHO MEAS - MV DEC TIME: 219 SEC
BH CV ECHO MEAS - MV E MAX VEL: 39.4 CM/SEC
BH CV ECHO MEAS - MV E/A: 0.66
BH CV ECHO MEAS - MV MAX PG: 2.06 MMHG
BH CV ECHO MEAS - MV MEAN PG: 1.12 MMHG
BH CV ECHO MEAS - MV P1/2T: 72.6 MSEC
BH CV ECHO MEAS - MV V2 VTI: 18.6 CM
BH CV ECHO MEAS - MVA(P1/2T): 3 CM2
BH CV ECHO MEAS - MVA(VTI): 2.38 CM2
BH CV ECHO MEAS - PA ACC TIME: 0.14 SEC
BH CV ECHO MEAS - PA V2 MAX: 96.4 CM/SEC
BH CV ECHO MEAS - QP/QS: 1.93
BH CV ECHO MEAS - RAP SYSTOLE: 3 MMHG
BH CV ECHO MEAS - RV MAX PG: 0.89 MMHG
BH CV ECHO MEAS - RV V1 MAX: 47.1 CM/SEC
BH CV ECHO MEAS - RV V1 VTI: 9.7 CM
BH CV ECHO MEAS - RVDD: 2.48 CM
BH CV ECHO MEAS - RVOT DIAM: 3.3 CM
BH CV ECHO MEAS - RVSP: 26 MMHG
BH CV ECHO MEAS - SI(MOD-SP2): 18.3 ML/M2
BH CV ECHO MEAS - SI(MOD-SP4): 19.7 ML/M2
BH CV ECHO MEAS - SV(LVOT): 44.1 ML
BH CV ECHO MEAS - SV(MOD-SP2): 38 ML
BH CV ECHO MEAS - SV(MOD-SP4): 41 ML
BH CV ECHO MEAS - SV(RVOT): 85.3 ML
BH CV ECHO MEAS - TAPSE (>1.6): 1.79 CM
BH CV ECHO MEAS - TR MAX PG: 22.8 MMHG
BH CV ECHO MEAS - TR MAX VEL: 238.5 CM/SEC
BH CV ECHO MEASUREMENTS AVERAGE E/E' RATIO: 5.79
BH CV XLRA - RV BASE: 4.2 CM
BH CV XLRA - RV LENGTH: 5.5 CM
BH CV XLRA - RV MID: 3.8 CM
BH CV XLRA - TDI S': 5.8 CM/SEC
BILIRUB SERPL-MCNC: 0.7 MG/DL (ref 0–1.2)
BUN SERPL-MCNC: 10 MG/DL (ref 8–23)
BUN/CREAT SERPL: 14.7 (ref 7–25)
CALCIUM SPEC-SCNC: 8.7 MG/DL (ref 8.6–10.5)
CHLORIDE SERPL-SCNC: 106 MMOL/L (ref 98–107)
CO2 SERPL-SCNC: 25.3 MMOL/L (ref 22–29)
CREAT SERPL-MCNC: 0.68 MG/DL (ref 0.76–1.27)
DEPRECATED RDW RBC AUTO: 42.4 FL (ref 37–54)
EGFRCR SERPLBLD CKD-EPI 2021: 100 ML/MIN/1.73
ERYTHROCYTE [DISTWIDTH] IN BLOOD BY AUTOMATED COUNT: 13.8 % (ref 12.3–15.4)
GLOBULIN UR ELPH-MCNC: 2.3 GM/DL
GLUCOSE SERPL-MCNC: 84 MG/DL (ref 65–99)
HCT VFR BLD AUTO: 45.1 % (ref 37.5–51)
HGB BLD-MCNC: 14.1 G/DL (ref 13–17.7)
MCH RBC QN AUTO: 26.3 PG (ref 26.6–33)
MCHC RBC AUTO-ENTMCNC: 31.3 G/DL (ref 31.5–35.7)
MCV RBC AUTO: 84 FL (ref 79–97)
PLATELET # BLD AUTO: 147 10*3/MM3 (ref 140–450)
PMV BLD AUTO: 10.7 FL (ref 6–12)
POTASSIUM SERPL-SCNC: 3.9 MMOL/L (ref 3.5–5.2)
PROT SERPL-MCNC: 6.1 G/DL (ref 6–8.5)
QT INTERVAL: 431 MS
QT INTERVAL: 462 MS
QTC INTERVAL: 466 MS
QTC INTERVAL: 499 MS
RBC # BLD AUTO: 5.37 10*6/MM3 (ref 4.14–5.8)
SINUS: 3.2 CM
SODIUM SERPL-SCNC: 141 MMOL/L (ref 136–145)
STJ: 3 CM
TROPONIN T SERPL HS-MCNC: 9 NG/L
WBC NRBC COR # BLD: 4.78 10*3/MM3 (ref 3.4–10.8)

## 2023-10-22 PROCEDURE — 99222 1ST HOSP IP/OBS MODERATE 55: CPT | Performed by: INTERNAL MEDICINE

## 2023-10-22 PROCEDURE — 80053 COMPREHEN METABOLIC PANEL: CPT

## 2023-10-22 PROCEDURE — 93306 TTE W/DOPPLER COMPLETE: CPT | Performed by: INTERNAL MEDICINE

## 2023-10-22 PROCEDURE — 93005 ELECTROCARDIOGRAM TRACING: CPT

## 2023-10-22 PROCEDURE — 85027 COMPLETE CBC AUTOMATED: CPT

## 2023-10-22 PROCEDURE — 93010 ELECTROCARDIOGRAM REPORT: CPT | Performed by: INTERNAL MEDICINE

## 2023-10-22 PROCEDURE — 93306 TTE W/DOPPLER COMPLETE: CPT

## 2023-10-22 PROCEDURE — 84484 ASSAY OF TROPONIN QUANT: CPT

## 2023-10-22 PROCEDURE — 25510000001 PERFLUTREN (DEFINITY) 8.476 MG IN SODIUM CHLORIDE (PF) 0.9 % 10 ML INJECTION: Performed by: INTERNAL MEDICINE

## 2023-10-22 RX ADMIN — METOPROLOL SUCCINATE 100 MG: 50 TABLET, EXTENDED RELEASE ORAL at 20:34

## 2023-10-22 RX ADMIN — Medication 10 ML: at 20:37

## 2023-10-22 RX ADMIN — AMIODARONE HYDROCHLORIDE 100 MG: 200 TABLET ORAL at 08:54

## 2023-10-22 RX ADMIN — PANTOPRAZOLE SODIUM 40 MG: 40 TABLET, DELAYED RELEASE ORAL at 06:25

## 2023-10-22 RX ADMIN — APIXABAN 5 MG: 5 TABLET, FILM COATED ORAL at 08:58

## 2023-10-22 RX ADMIN — APIXABAN 5 MG: 5 TABLET, FILM COATED ORAL at 20:34

## 2023-10-22 RX ADMIN — LISINOPRIL 2.5 MG: 2.5 TABLET ORAL at 08:56

## 2023-10-22 RX ADMIN — EMPAGLIFLOZIN 10 MG: 10 TABLET, FILM COATED ORAL at 08:59

## 2023-10-22 RX ADMIN — ATORVASTATIN CALCIUM 80 MG: 80 TABLET, FILM COATED ORAL at 20:35

## 2023-10-22 RX ADMIN — SPIRONOLACTONE 12.5 MG: 25 TABLET ORAL at 08:54

## 2023-10-22 RX ADMIN — ASPIRIN 81 MG: 81 TABLET, COATED ORAL at 20:35

## 2023-10-22 RX ADMIN — AMIODARONE HYDROCHLORIDE 100 MG: 200 TABLET ORAL at 20:34

## 2023-10-22 RX ADMIN — METOPROLOL SUCCINATE 50 MG: 50 TABLET, FILM COATED, EXTENDED RELEASE ORAL at 08:56

## 2023-10-22 RX ADMIN — PERFLUTREN 2 ML: 6.52 INJECTION, SUSPENSION INTRAVENOUS at 11:57

## 2023-10-22 RX ADMIN — Medication 10 ML: at 08:57

## 2023-10-22 NOTE — PLAN OF CARE
Goal Outcome Evaluation:  Plan of Care Reviewed With: patient        Progress: improving  Outcome Evaluation: Alert, oreinted, no c/o chest pain this shift, VSS, NPO after midnight, standby assist when up to BR.

## 2023-10-22 NOTE — PROGRESS NOTES
"MD Attestation Note    I supervised care provided by the midlevel provider.    The CARMEN and I have discussed this patient's history, physical exam, and treatment plan. I have reviewed the documentation and personally had a face to face interaction with the patient  I affirm the documentation and agree with the treatment and plan.       My personal findings are:        Subjective:  Patient remains asymptomatic overnight, no chest pain or shortness of breath.        Objective:      PHYSICAL EXAM  Vitals:    10/22/23 0010 10/22/23 0418 10/22/23 0841 10/22/23 1121   BP: 116/71 112/73 119/78 108/68   BP Location: Right arm Right arm Right arm    Patient Position: Lying Lying Lying    Pulse:  69 70 71   Resp: 18 18 16    Temp: 98.1 °F (36.7 °C) 98 °F (36.7 °C) 97.3 °F (36.3 °C)    TempSrc: Oral Oral Oral    SpO2: 99% 98% 98%    Weight:    81.6 kg (180 lb)   Height:    188 cm (74\")         GENERAL: no acute distress  HENT: nares patent  EYES: no scleral icterus  CV: regular rhythm, normal rate  RESPIRATORY: normal effort  ABDOMEN: soft  MUSCULOSKELETAL: no deformity  NEURO: alert, moves all extremities, follows commands  PSYCH:  calm, cooperative  SKIN: warm, dry    Vital signs and nursing notes reviewed.      Assessment/ Plan:  Patient presenting status post syncopal episode, found to have run of V. tach.  No shock or overdrive pacing delivered.  Evaluated by cardiology, recommend admission to cardiovascular unit for additional management.    "

## 2023-10-22 NOTE — PROGRESS NOTES
ANNIKA COPELAND Attestation Note    I supervised care provided by the midlevel provider.    The CARMEN and I have discussed this patient's history, physical exam, and treatment plan. I have reviewed the documentation and personally had a face to face interaction with the patient  I affirm the documentation and agree with the treatment and plan. I provided a substantive portion of the care of this patient.  I personally performed the physical exam, in its entirety.  My personal findings are documented in below:    History:  Pt presented for eval of syncopal episode.  Has hx of PVC's and V-tach arrythmia.  Sees Dr. Ball in [facility].  He was working outside and collapsed.  Had palpitations in chest leading up to syncope.      Pacer/Defib interrogated in ER - showing run of V-tach, but no ICD discharge apparently b/c he terminated the arrythmia spontaneously (4 beats prior to threshold for discharge).      Physical Exam:  General: No acute distress.  Resting comfortably in bed  HENT: NCAT, PERRL, Nares patent.  Eyes: no scleral icterus.  Neck: trachea midline, no ROM limitations.  CV: Pink warm and well-perfused throughout, normal pulses  Respiratory: No distress or increased work of breathing  Abdomen: soft, no focal tenderness or rigidity  Musculoskeletal: no deformity.  Neuro: alert, moves all extremities, follows commands.  Skin: warm, dry.    Assessment and Plan:  Syncope /V. tach arrhythmia: Continue to monitor on telemetry.  Continue home medications for now.  Cardiology consult.  Elevated LFT's: Review with Cardiology medication list tomorrow.  Amiodarone contributing(?)

## 2023-10-22 NOTE — CONSULTS
Date of Consultation: 10/22/23    Referral Provider: Dr. Lee    Reason for Consultation: Ventricular tachycardia, syncope, multiple cardiac issues.    Encounter Provider: Berto Liu MD    Group of Service: [facility]     Patient Name: Alden Pratt III    :1950    Chief complaint: Palpitations, syncope.    History of Present Illness:      This is a very pleasant 72 year-old male who is normally followed by Dr. Ball in our group, as well as the electrophysiology service.  He is seen both Dr. Ngo and Dr. Andrews in the past.  He has a history of coronary artery disease, and underwent 6 vessel CABG.  His most recent catheterization from 5/10/2021 showed 6 out of 6 bypass grafts to be patent.  He also has a history of an ischemic cardiomyopathy, with his last ejection fraction at 40 to 45%.  He has had nonsustained ventricular tachycardia, and underwent ICD placement by Dr. Andrews in the past.  He also underwent an atrial fibrillation ablation by Dr. Ngo on 2021.  He also has longstanding chronic PVCs, which he can feel.      The patient was mowing his grass yesterday when he began to feel PVCs.  He then began to feel rapid palpitations, which he immediately identified as ventricular tachycardia.  He then woke up on the ground with his neighbor and wife over him.  Interrogation of his ICD in the ER showed that he did have ventricular tachycardia which was terminated with ATP pacing.  He did not receive a shock.  He has not had any true chest pain or pressure.  His troponin has remained normal thus far.  He is on amiodarone at 100 mg twice daily, although his QT interval is slightly prolonged on the EKG.    ECHO 10-22-23    The left ventricular cavity is mild to moderately dilated.    The basal to mid inferior wall is aneurysmal. There is akinesis of the basal inferolateral wall and basal anterolateral wall. There is severe hypokinesis of the basal  inferoseptum    Left ventricular systolic function is moderately decreased. Left ventricular ejection fraction appears to be 36 - 40%.    Left ventricular diastolic function is consistent with (grade I) impaired relaxation.    The right ventricular cavity is mild to moderately dilated. Normal right ventricular systolic function noted.    The left atrial cavity is mild to moderately dilated.    Mild mitral valve regurgitation is present.    Mild tricuspid valve regurgitation is present.    Calculated right ventricular systolic pressure from tricuspid regurgitation is 26 mmHg.    There is no evidence of pericardial effusio    HOLTER 2-15-22    An abnormal monitor study.     Significant number of premature ventricular contractions at 7.5%.  These are associated with patient reported symptom of palpitations.  The Holter monitor was read as having periods of Mobitz type II block, however I think this is episodes of pacemaker self diagnostic testing.    STRESS TEST 6-19-18  Frequent PVC's, ventricular couplets, and rare triplets during recovery.  Enlarged LV cavity size.  Myocardial perfusion imaging indicates a medium-to-large-sized infarct located in the inferior wall with no significant ischemia noted.  There is severe inferior wall hypokinesis.  Left ventricular ejection fraction is mildly reduced (Calculated EF = 40%).  Impressions are consistent with a study indicating uncertain risk    Past Medical History:   Diagnosis Date    Arthritis     CAD (coronary artery disease)     atheroclerosis    Cancer     prostate    Chest pain     CHF (congestive heart failure)     Elevated cholesterol     GERD (gastroesophageal reflux disease)     Hyperlipidemia     Implantable cardioverter-defibrillator (ICD) discharge     Myocardial infarction     inferior    PVC (premature ventricular contraction)     PVD (peripheral vascular disease)     Status post phlebectomy     varicose veins    Thyroid nodule 7/18/2014    VT (ventricular  tachycardia)          Past Surgical History:   Procedure Laterality Date    CARDIAC CATHETERIZATION      CARDIAC CATHETERIZATION Left 5/10/2021    Procedure: Left Heart Cath;  Surgeon: Andrew Ball MD;  Location: [facility] CATH INVASIVE LOCATION;  Service: Cardiology;  Laterality: Left;    CARDIAC CATHETERIZATION N/A 5/10/2021    Procedure: Coronary angiography;  Surgeon: Andrew Ball MD;  Location:  [facility] CATH INVASIVE LOCATION;  Service: Cardiology;  Laterality: N/A;    CARDIAC CATHETERIZATION N/A 5/10/2021    Procedure: Left ventriculography;  Surgeon: Andrew Ball MD;  Location:  [facility] CATH INVASIVE LOCATION;  Service: Cardiology;  Laterality: N/A;    CARDIAC CATHETERIZATION  5/10/2021    Procedure: Saphenous Vein Graft;  Surgeon: Andrew Ball MD;  Location:  [facility] CATH INVASIVE LOCATION;  Service: Cardiology;;    CARDIAC DEFIBRILLATOR PLACEMENT      CARDIAC ELECTROPHYSIOLOGY PROCEDURE N/A 8/2/2021    Procedure: Ablation atrial fibrillation;  Surgeon: Robby Ngo MD;  Location: [facility] CATH INVASIVE LOCATION;  Service: Cardiovascular;  Laterality: N/A;    CORONARY ARTERY BYPASS GRAFT      stents    CORONARY STENT PLACEMENT      HAND SURGERY Left     KNEE SURGERY Left     MICROAMBULATORY PHLEBECTOMY      SHOULDER SURGERY Right     THYROID BIOPSY      VARICOSE VEIN SURGERY           Allergies   Allergen Reactions    Contrast Dye (Echo Or Unknown Ct/Mr) Hives    Iodinated Contrast Media Hives     Tolerated with pre meds prior to heart cath in feb     Sudafed 12 Hour [Pseudoephedrine Hcl Er]     Pseudoephedrine Palpitations         No current facility-administered medications on file prior to encounter.     Current Outpatient Medications on File Prior to Encounter   Medication Sig Dispense Refill    amiodarone (PACERONE) 200 MG tablet Take 0.5 tablets by mouth Daily. (Patient taking differently: Take 0.5 tablets by mouth 2 (Two) Times a Day.) 30 tablet 5    apixaban (ELIQUIS) 5 MG tablet tablet  Take 1 tablet by mouth Every 12 (Twelve) Hours. 180 tablet 2    aspirin 81 MG EC tablet Take 1 tablet by mouth Daily.      atorvastatin (LIPITOR) 80 MG tablet Take 1 tablet by mouth Daily. 200001 90 tablet 2    Cholecalciferol (VITAMIN D3) 5000 UNITS capsule capsule Take 1 capsule by mouth Every Other Day.      DICLOFENAC PO Take 75 mg by mouth 2 (Two) Times a Day.      Empagliflozin (Jardiance) 10 MG tablet Take 10 mg by mouth Daily. 90 tablet 2    ezetimibe (ZETIA) 10 MG tablet Take 1 tablet by mouth Daily.      fluticasone (FLONASE) 50 MCG/ACT nasal spray 1 spray into the nostril(s) as directed by provider 2 (Two) Times a Day As Needed.      lisinopril (PRINIVIL,ZESTRIL) 2.5 MG tablet Take 1 tablet by mouth Daily.      metoprolol succinate XL (TOPROL-XL) 100 MG 24 hr tablet Take 1 tablet by mouth. In PM      metoprolol succinate XL (TOPROL-XL) 50 MG 24 hr tablet Take 1 tablet by mouth. In AM      nitroglycerin (NITROSTAT) 0.4 MG SL tablet 1 under the tongue as needed for angina, may repeat q5mins for up three doses 100 tablet 11    omeprazole (priLOSEC) 40 MG capsule Take 1 capsule by mouth Daily.      spironolactone (ALDACTONE) 25 MG tablet Take 0.5 tablets by mouth Daily.      carboxymethylcellulose (REFRESH PLUS) 0.5 % solution Apply 1 drop to eye(s) as directed by provider.      cetirizine (ZyrTEC) 10 MG tablet Take 1 tablet by mouth As Needed. Only Occasionally will use      clobetasol (TEMOVATE) 0.05 % cream Apply  topically 2 (Two) Times a Day.      furosemide (LASIX) 20 MG tablet Take 1 tablet by mouth Daily As Needed (swelling/weight gain).      metaxalone (SKELAXIN) 800 MG tablet Take 1 tablet by mouth As Needed for Muscle Spasms.      Minoxidil 5 % foam Apply  topically 2 (two) times a day.      naftifine (NAFTIN) 1 % gel gel Apply  topically to the appropriate area as directed Daily.      pimecrolimus (ELIDEL) 1 % cream Apply  topically 2 (Two) Times a Day.           Social History     Socioeconomic  "History    Marital status:    Tobacco Use    Smoking status: Never    Smokeless tobacco: Never   Vaping Use    Vaping Use: Never used   Substance and Sexual Activity    Alcohol use: Yes     Comment: rareLY    Drug use: No    Sexual activity: Defer         Family History   Problem Relation Age of Onset    Coronary artery disease Other     Stroke Other     Hypertension Other     Heart disease Father     Heart failure Father     Stroke Father     No Known Problems Maternal Grandmother     No Known Problems Maternal Grandfather     No Known Problems Paternal Grandmother     No Known Problems Paternal Grandfather        REVIEW OF SYSTEMS:   Pertinent positives are noted in the HPI above.  Otherwise, all other systems were reviewed, and are negative.     Objective:     Vitals:    10/22/23 0841 10/22/23 1121 10/22/23 1301 10/22/23 1603   BP: 119/78 108/68 113/70 100/62   BP Location: Right arm  Right arm Right arm   Patient Position: Lying  Lying Lying   Pulse: 70 71 71 70   Resp: 16  16 16   Temp: 97.3 °F (36.3 °C)  97.3 °F (36.3 °C) 97.2 °F (36.2 °C)   TempSrc: Oral  Oral Oral   SpO2: 98%  100%    Weight:  81.6 kg (180 lb)     Height:  188 cm (74\")       Body mass index is 23.11 kg/m².  Flowsheet Rows      Flowsheet Row First Filed Value   Admission Height 188 cm (74\") Documented at 10/21/2023 1606   Admission Weight 86.7 kg (191 lb 2.2 oz) Documented at 10/21/2023 1606             General:    No acute distress, alert and oriented x4, pleasant                   Head:    Normocephalic, atraumatic.   Eyes:          Conjunctivae and sclerae normal, no icterus.   Throat:   No oral lesions, no thrush, oral mucosa moist.    Neck:   Supple, trachea midline.   Lungs:     Clear to auscultation bilaterally     Heart:    Regular rhythm and normal rate.  No murmurs, gallops, or rubs noted.   Abdomen:     Soft, non-tender, non-distended, positive bowel sounds.    Extremities:   No clubbing, cyanosis, or edema.     Pulses:   " Pulses palpable and equal bilaterally.    Skin:   No bleeding or rash.   Neuro:   Non-focal.  Moves all extremities well.    Psychiatric:   Normal mood and affect.       Lab Review:                Results from last 7 days   Lab Units 10/22/23  0457   SODIUM mmol/L 141   POTASSIUM mmol/L 3.9   CHLORIDE mmol/L 106   CO2 mmol/L 25.3   BUN mg/dL 10   CREATININE mg/dL 0.68*   GLUCOSE mg/dL 84   CALCIUM mg/dL 8.7     Results from last 7 days   Lab Units 10/22/23  0457 10/21/23  2000 10/21/23  1708   HSTROP T ng/L 9 10 8     Results from last 7 days   Lab Units 10/22/23  0457   WBC 10*3/mm3 4.78   HEMOGLOBIN g/dL 14.1   HEMATOCRIT % 45.1   PLATELETS 10*3/mm3 147             Results from last 7 days   Lab Units 10/21/23  1708   MAGNESIUM mg/dL 2.3           10-22-23      8-11-23      EKG (reviewed by me personally):      Assessment:   1.  Ventricular tachycardia, terminated with ATP pacing   2.  Syncope, secondary to #1  3.  History of ventricular tachycardia and ventricular fibrillation, on amiodarone  4.  Coronary artery disease, status post 6 vessel CABG (all 6 bypass grafts were patent by cath on 5/10/2021)  5.  Ischemic cardiomyopathy, current EF 35 to 40%  6.  Atrial fibrillation, status post ablation in August 2021 by Dr. Ngo  7.  Status post ICD placement by Dr. Andrews  8.  Symptomatic PVCs    Plan:       I had a long discussion with the patient and his wife today.  He had ventricular tachycardia which was terminated by ATP pacing, which was enough to cause syncope.  He has a history of ventricular tachycardia and ventricular fibrillation in the past.  He is on amiodarone, and his QT interval is slightly prolonged.  I am not going to increase the amiodarone at this point.  He is currently on 100 mg twice daily.  He is also on a high dose of metoprolol succinate at 150 mg/day.  I am not sure if mexiletine would be useful in the setting, although I feel that he needs to be seen by electrophysiology tomorrow.  In  the meantime, he is going to be watched on telemetry and admitted overnight again.    He has not had chest pain, and his troponin has been negative.  I also checked an echo.  His ejection fraction is 35 to 40%.  It was 40 to 45% on the previous echocardiogram, although I reviewed this echo and his previous echoes.  He has the same exact wall motion abnormalities on the last several echocardiograms, and I do not think that there is any difference in the studies.  This is likely reader variability.  I will make him n.p.o. after midnight in case an ischemic evaluation is warranted tomorrow, although I am not going to order one currently.    He did have an atrial fibrillation ablation and will remain on Eliquis for now.  I also kept him on aspirin, Lipitor, lisinopril, Jardiance, and spironolactone for his congestive heart failure and coronary artery disease in general.    Electrophysiology evaluation tomorrow and further plans per their recommendations.    Thank you very much for this consult.    Mihai Liu MD

## 2023-10-22 NOTE — PROGRESS NOTES
ED OBSERVATION PROGRESS/DISCHARGE SUMMARY    Date of Admission: 10/21/2023   LOS: 0 days   PCP: Jhoan Quezada MD    Final Diagnosis Syncope      Subjective     Hospital Outcome:     Patient is a pleasant afebrile ambulatory 72-year-old  male admitted to the ED Observation Unit for a syncopal episode. Overnight he did not have any arrhythmias nor additional episodes of syncope.     His high sensitivity troponins have been normal. His thyroid studies were unremarkable.     He was seen and evaluated by Dr. Liu with the cardiology service who requests patient to be admitted to their service for further eval by the electrophysiology team. Patient is agreeable to plan.     ROS:  General: no fevers, chills  Respiratory: no cough, dyspnea  Cardiovascular: no chest pain, palpitations  Abdomen: No abdominal pain, nausea, vomiting, or diarrhea  Neurologic: No focal weakness    Objective   Physical Exam:  I have reviewed the vital signs.  Temp:  [97.3 °F (36.3 °C)-98.1 °F (36.7 °C)] 97.3 °F (36.3 °C)  Heart Rate:  [69-75] 70  Resp:  [16-20] 16  BP: (112-136)/(71-85) 119/78  General Appearance:    Alert, cooperative, no distress  Head:    Normocephalic, atraumatic  Eyes:    Sclerae anicteric  Neck:   Supple, no mass  Lungs: Clear to auscultation bilaterally, respirations unlabored  Heart: Regular rate and rhythm, S1 and S2 normal, no murmur, rub or gallop  Abdomen:  Soft, non-tender, bowel sounds active, nondistended  Extremities: No clubbing, cyanosis, or edema to lower extremities  Pulses:  2+ and symmetric in distal lower extremities  Skin: No rashes   Neurologic: Oriented x3, Normal strength to extremities    Results Review:    I have reviewed the labs, radiology results and diagnostic studies.    Results from last 7 days   Lab Units 10/22/23  0457   WBC 10*3/mm3 4.78   HEMOGLOBIN g/dL 14.1   HEMATOCRIT % 45.1   PLATELETS 10*3/mm3 147     Results from last 7 days   Lab Units 10/22/23  0457 10/21/23  2341    SODIUM mmol/L 141 137   POTASSIUM mmol/L 3.9 4.5   CHLORIDE mmol/L 106 103   CO2 mmol/L 25.3 27.0   BUN mg/dL 10 16   CREATININE mg/dL 0.68* 0.89   CALCIUM mg/dL 8.7 9.1   BILIRUBIN mg/dL 0.7 0.5   ALK PHOS U/L 135* 166*   ALT (SGPT) U/L 69* 90*   AST (SGOT) U/L 66* 146*   GLUCOSE mg/dL 84 118*     Imaging Results (Last 24 Hours)       Procedure Component Value Units Date/Time    XR Chest 1 View [[id number]] Collected: 10/21/23 1712     Updated: 10/21/23 1715    Narrative:      XR CHEST 1 VW-10/21/2023     HISTORY: Chest pain.     Heart size is within normal limits. Lungs appear clear. Sternotomy wires  and cardiac pacemaker are seen.       Impression:      1. No acute process.        This report was finalized on 10/21/2023 5:12 PM by Dr. Lawson Dowd M.D on Workstation: [id number]               I have reviewed the medications.  ---------------------------------------------------------------------------------------------  Assessment & Plan   Assessment/Problem List    Syncope      Plan:    Near-Syncope  -Cardiac monitoring  -Vital signs every 4 hours  -Cardiology consult  -High Sensitivity Troponin 8, 10, 9  -EKG Paced rhythm     Transaminitis  -Hepatitis panel negative  -Avoid hepatotoxic medications    Disposition: admitted to cardiology    This note will serve as a transfer and progress note.    Cinthia Lew, APRN 10/22/23 09:05 EDT    I have worn appropriate PPE during this patient encounter, sanitized my hands both with entering and exiting patient's room.      52 minutes has been spent by [facility] providers in the care of this patient while under observation status

## 2023-10-23 ENCOUNTER — TELEPHONE (OUTPATIENT)
Age: 73
End: 2023-10-23
Payer: MEDICARE

## 2023-10-23 ENCOUNTER — READMISSION MANAGEMENT (OUTPATIENT)
Dept: CALL CENTER | Facility: HOSPITAL | Age: 73
End: 2023-10-23
Payer: MEDICARE

## 2023-10-23 VITALS
HEART RATE: 72 BPM | DIASTOLIC BLOOD PRESSURE: 75 MMHG | OXYGEN SATURATION: 100 % | SYSTOLIC BLOOD PRESSURE: 109 MMHG | BODY MASS INDEX: 22.42 KG/M2 | WEIGHT: 174.7 LBS | TEMPERATURE: 97.6 F | HEIGHT: 74 IN | RESPIRATION RATE: 16 BRPM

## 2023-10-23 LAB
ALBUMIN SERPL-MCNC: 3.9 G/DL (ref 3.5–5.2)
ALBUMIN/GLOB SERPL: 1.8 G/DL
ALP SERPL-CCNC: 131 U/L (ref 39–117)
ALT SERPL W P-5'-P-CCNC: 50 U/L (ref 1–41)
ANION GAP SERPL CALCULATED.3IONS-SCNC: 8 MMOL/L (ref 5–15)
AST SERPL-CCNC: 39 U/L (ref 1–40)
BILIRUB SERPL-MCNC: 0.6 MG/DL (ref 0–1.2)
BUN SERPL-MCNC: 16 MG/DL (ref 8–23)
BUN/CREAT SERPL: 20 (ref 7–25)
CALCIUM SPEC-SCNC: 9.1 MG/DL (ref 8.6–10.5)
CHLORIDE SERPL-SCNC: 106 MMOL/L (ref 98–107)
CO2 SERPL-SCNC: 25 MMOL/L (ref 22–29)
CREAT SERPL-MCNC: 0.8 MG/DL (ref 0.76–1.27)
DEPRECATED RDW RBC AUTO: 42.3 FL (ref 37–54)
EGFRCR SERPLBLD CKD-EPI 2021: 95.2 ML/MIN/1.73
ERYTHROCYTE [DISTWIDTH] IN BLOOD BY AUTOMATED COUNT: 13.5 % (ref 12.3–15.4)
GLOBULIN UR ELPH-MCNC: 2.2 GM/DL
GLUCOSE SERPL-MCNC: 92 MG/DL (ref 65–99)
HCT VFR BLD AUTO: 44.4 % (ref 37.5–51)
HGB BLD-MCNC: 13.9 G/DL (ref 13–17.7)
MAGNESIUM SERPL-MCNC: 2.1 MG/DL (ref 1.6–2.4)
MCH RBC QN AUTO: 26.5 PG (ref 26.6–33)
MCHC RBC AUTO-ENTMCNC: 31.3 G/DL (ref 31.5–35.7)
MCV RBC AUTO: 84.6 FL (ref 79–97)
PLATELET # BLD AUTO: 148 10*3/MM3 (ref 140–450)
PMV BLD AUTO: 10.9 FL (ref 6–12)
POTASSIUM SERPL-SCNC: 3.9 MMOL/L (ref 3.5–5.2)
PROT SERPL-MCNC: 6.1 G/DL (ref 6–8.5)
QT INTERVAL: 462 MS
QTC INTERVAL: 499 MS
RBC # BLD AUTO: 5.25 10*6/MM3 (ref 4.14–5.8)
SODIUM SERPL-SCNC: 139 MMOL/L (ref 136–145)
WBC NRBC COR # BLD: 4.76 10*3/MM3 (ref 3.4–10.8)

## 2023-10-23 PROCEDURE — 99232 SBSQ HOSP IP/OBS MODERATE 35: CPT | Performed by: INTERNAL MEDICINE

## 2023-10-23 PROCEDURE — 80053 COMPREHEN METABOLIC PANEL: CPT | Performed by: INTERNAL MEDICINE

## 2023-10-23 PROCEDURE — 85027 COMPLETE CBC AUTOMATED: CPT | Performed by: INTERNAL MEDICINE

## 2023-10-23 PROCEDURE — 93005 ELECTROCARDIOGRAM TRACING: CPT | Performed by: INTERNAL MEDICINE

## 2023-10-23 PROCEDURE — 83735 ASSAY OF MAGNESIUM: CPT | Performed by: INTERNAL MEDICINE

## 2023-10-23 PROCEDURE — 80151 DRUG ASSAY AMIODARONE: CPT | Performed by: NURSE PRACTITIONER

## 2023-10-23 PROCEDURE — 99238 HOSP IP/OBS DSCHRG MGMT 30/<: CPT

## 2023-10-23 PROCEDURE — 93010 ELECTROCARDIOGRAM REPORT: CPT | Performed by: INTERNAL MEDICINE

## 2023-10-23 RX ORDER — AMIODARONE HYDROCHLORIDE 200 MG/1
100 TABLET ORAL 2 TIMES DAILY
Start: 2023-10-23

## 2023-10-23 RX ADMIN — METOPROLOL SUCCINATE 50 MG: 50 TABLET, FILM COATED, EXTENDED RELEASE ORAL at 09:04

## 2023-10-23 RX ADMIN — PANTOPRAZOLE SODIUM 40 MG: 40 TABLET, DELAYED RELEASE ORAL at 09:04

## 2023-10-23 RX ADMIN — AMIODARONE HYDROCHLORIDE 100 MG: 200 TABLET ORAL at 09:04

## 2023-10-23 RX ADMIN — EMPAGLIFLOZIN 10 MG: 10 TABLET, FILM COATED ORAL at 09:04

## 2023-10-23 RX ADMIN — LISINOPRIL 2.5 MG: 2.5 TABLET ORAL at 09:04

## 2023-10-23 RX ADMIN — Medication 10 ML: at 08:31

## 2023-10-23 RX ADMIN — SPIRONOLACTONE 12.5 MG: 25 TABLET ORAL at 09:04

## 2023-10-23 RX ADMIN — APIXABAN 5 MG: 5 TABLET, FILM COATED ORAL at 09:59

## 2023-10-23 NOTE — PROGRESS NOTES
"Patient Care Team:  Jhoan Quezada MD as PCP - General    Chief Complaint: Follow-up ventricular tachycardia, chronic heart failure with reduced ejection fraction    Interval History:   Doing well currently.  No chest pain.    Objective   Vital Signs  Temp:  [97.2 °F (36.2 °C)-97.6 °F (36.4 °C)] 97.6 °F (36.4 °C)  Heart Rate:  [70-74] 74  Resp:  [16] 16  BP: ()/(62-79) 119/76    Intake/Output Summary (Last 24 hours) at 10/23/2023 0846  Last data filed at 10/23/2023 0515  Gross per 24 hour   Intake 0 ml   Output 400 ml   Net -400 ml     Flowsheet Rows      Flowsheet Row First Filed Value   Admission Height 188 cm (74\") Documented at 10/21/2023 1606   Admission Weight 86.7 kg (191 lb 2.2 oz) Documented at 10/21/2023 1606            Temp:  [97.2 °F (36.2 °C)-97.6 °F (36.4 °C)] 97.6 °F (36.4 °C)  Heart Rate:  [70-74] 74  Resp:  [16] 16  BP: ()/(62-79) 119/76    Intake/Output Summary (Last 24 hours) at 10/23/2023 0846  Last data filed at 10/23/2023 0515  Gross per 24 hour   Intake 0 ml   Output 400 ml   Net -400 ml     Flowsheet Rows      Flowsheet Row First Filed Value   Admission Height 188 cm (74\") Documented at 10/21/2023 1606   Admission Weight 86.7 kg (191 lb 2.2 oz) Documented at 10/21/2023 1606            General Appearance:    Alert, cooperative, in no acute distress   Head:    Normocephalic, without obvious abnormality, atraumatic       Neck/Lymph   No adenopathy, supple, no thyromegaly, no carotid bruit, no    JVD   Lungs:     Clear to auscultation bilaterally, no wheezes, rales, or     rhonchi    Cardiac:    Normal rate, regular rhythm, no murmur, no rub, no gallop   Chest Wall:    No abnormalities observed   GI:     Normal bowel sounds, soft, nontender, nondistended,            no rebound tenderness   Extremities:   No cyanosis, clubbing, or edema   Circulatory/Peripheral Vascular :   Pulses palpable and equal bilaterally   Integumentary:   No bleeding or rash. Normal temperature     "   Neurologic:   Cranial nerves 2 - 12 grossly intact, sensation intact              amiodarone, 100 mg, Oral, BID  apixaban, 5 mg, Oral, Q12H  aspirin, 81 mg, Oral, Daily  atorvastatin, 80 mg, Oral, Daily  empagliflozin, 10 mg, Oral, Daily  lisinopril, 2.5 mg, Oral, Daily  metoprolol succinate XL, 100 mg, Oral, Nightly  metoprolol succinate XL, 50 mg, Oral, QAM  pantoprazole, 40 mg, Oral, Q AM  senna-docusate sodium, 2 tablet, Oral, BID  sodium chloride, 10 mL, Intravenous, Q12H  spironolactone, 12.5 mg, Oral, Daily             Results Review:    Results from last 7 days   Lab Units 10/23/23  0357   SODIUM mmol/L 139   POTASSIUM mmol/L 3.9   CHLORIDE mmol/L 106   CO2 mmol/L 25.0   BUN mg/dL 16   CREATININE mg/dL 0.80   GLUCOSE mg/dL 92   CALCIUM mg/dL 9.1     Results from last 7 days   Lab Units 10/22/23  0457 10/21/23  2000 10/21/23  1708   HSTROP T ng/L 9 10 8     Results from last 7 days   Lab Units 10/23/23  0356   WBC 10*3/mm3 4.76   HEMOGLOBIN g/dL 13.9   HEMATOCRIT % 44.4   PLATELETS 10*3/mm3 148             Results from last 7 days   Lab Units 10/23/23  0357   MAGNESIUM mg/dL 2.1         @LABRCNT(bnp)@  I reviewed the patient's new clinical results.  I personally viewed and interpreted the patient's EKG/Telemetry data            Assessment & Plan   1.  Ventricular tachycardia, terminated with ATP pacing   2.  Syncope  3.  History of ventricular tachycardia and ventricular fibrillation, on amiodarone  4.  Coronary artery disease, status post 6 vessel CABG (all 6 bypass grafts were patent by cath on 5/10/2021)  5.  Ischemic cardiomyopathy, current EF 35 to 40%  6.  Atrial fibrillation, status post ablation in August 2021 by Dr. Ngo  7.  Status post ICD placement by Dr. Andrews  8.  Symptomatic PVCs    -Electrophysiology consultation currently pending.  - Patient had bypass surgery in 2006 and as of 2021 bypass graft look perfect.  Unlikely that this is ischemic.  Will discuss with electrophysiology.   Eliquis on hold.  Hold off on cath at this time

## 2023-10-23 NOTE — CASE MANAGEMENT/SOCIAL WORK
Case Management Discharge Note      Final Note: home no needs         Selected Continued Care - Discharged on 10/23/2023 Admission date: 10/21/2023 - Discharge disposition: Home or Self Care      Destination    No services have been selected for the patient.                Durable Medical Equipment    No services have been selected for the patient.                Dialysis/Infusion    No services have been selected for the patient.                Home Medical Care    No services have been selected for the patient.                Therapy    No services have been selected for the patient.                Community Resources    No services have been selected for the patient.                Community & DME    No services have been selected for the patient.                    Transportation Services  Private: Car    Final Discharge Disposition Code: 01 - home or self-care

## 2023-10-23 NOTE — OUTREACH NOTE
Prep Survey      Flowsheet Row Responses   [facility] facility patient discharged from? [facility]   Is LACE score < 7 ? No   Eligibility Readm Mgmt   Discharge diagnosis Syncope   Does the patient have one of the following disease processes/diagnoses(primary or secondary)? Other   Does the patient have Home health ordered? No   Is there a DME ordered? No   Prep survey completed? Yes            YUNIOR HALL - Registered Nurse

## 2023-10-23 NOTE — CONSULTS
Electrophysiology Hospital Consult            Patient Name: Alden Pratt III  Age/Sex: 72 y.o. male  : 1950  MRN: [id number]    Date of Admission: 10/21/2023  Date of Encounter Visit: 10/23/23  Encounter Provider: BRANDON Lentz  Referring Provider: No ref. provider found  Place of Service: [facility] CARDIOLOGY  Patient Care Team:  Jhoan Quezada MD as PCP - General      Subjective:   EP Consultation for: Syncope, VT treated w/ATP    Chief Complaint: Syncope    History of Present Illness:  Alden Pratt III is a 72 y.o. male follows with Dr. Ball and Dr. Ngo----MI , CAD, s/p stents, s/p CABG, ICM, s/p ICD.     Has had chronic PVC's has had VF treated with shock that sent him into AF, cardioverted then had recurrence ---was on dronedarone at one point and the had PVI 2021 and has not had recurrence.     He also follows with the [facility].     He saw Dr. Ball in Feb with complaints of palpitations that he reported were really fast and really bothered him---device showed short runs of NSVT, he was started on low dose amiodarone 100 mg BID.      He was seen by Dr. Ball in August and was doing well.     Presented to ED 10/21 with palpitations and syncope. He was mowing grass and started to feel the PVC's and then felt the rapid palpitations and had syncope.     Device interrogation shows VT that spontaneously converted just prior to meeting criteria for therapy.     Echo shows EF 36-40% which is down from prior which was about 45% but per Dr. Liu's noted he thinks it is really unchanged and just reader variability.     EP has been ask to see.           Dr. Andrews and I saw him this morning. He says that Saturday he was mowing his yard, noted palpitations for several minutes then woke up on the ground with his wife checking on him.     Reviewing device interrogation. Shows sustained monomorphic VT at a rate of  ~150bpm for 40 minutes before he spontaneously converted.     VT zone currently set at 170bpm for ATP.     There is a VT monitor zone of 133-171bpm but no treatment.    Likely scar related from prior infarct.     Echo shows severe septal hypokinesis.     He was placed on amiodarone a few months ago for frequent symptomatic PVCs and non-sustained VT.     This is his first sustained episodes since 2017.         Past Medical History:  Past Medical History:   Diagnosis Date    Arthritis     CAD (coronary artery disease)     atheroclerosis    Cancer     prostate    Chest pain     CHF (congestive heart failure)     Elevated cholesterol     GERD (gastroesophageal reflux disease)     Hyperlipidemia     Implantable cardioverter-defibrillator (ICD) discharge     Myocardial infarction     inferior    PVC (premature ventricular contraction)     PVD (peripheral vascular disease)     Status post phlebectomy     varicose veins    Thyroid nodule 7/18/2014    VT (ventricular tachycardia)        Past Surgical History:   Procedure Laterality Date    CARDIAC CATHETERIZATION      CARDIAC CATHETERIZATION Left 5/10/2021    Procedure: Left Heart Cath;  Surgeon: Andrew Ball MD;  Location: [facility] CATH INVASIVE LOCATION;  Service: Cardiology;  Laterality: Left;    CARDIAC CATHETERIZATION N/A 5/10/2021    Procedure: Coronary angiography;  Surgeon: Andrew Ball MD;  Location: [facility] CATH INVASIVE LOCATION;  Service: Cardiology;  Laterality: N/A;    CARDIAC CATHETERIZATION N/A 5/10/2021    Procedure: Left ventriculography;  Surgeon: Andrew Ball MD;  Location: [facility] CATH INVASIVE LOCATION;  Service: Cardiology;  Laterality: N/A;    CARDIAC CATHETERIZATION  5/10/2021    Procedure: Saphenous Vein Graft;  Surgeon: Andrew Ball MD;  Location: [facility] CATH INVASIVE LOCATION;  Service: Cardiology;;    CARDIAC DEFIBRILLATOR PLACEMENT      CARDIAC ELECTROPHYSIOLOGY PROCEDURE N/A 8/2/2021    Procedure: Ablation atrial fibrillation;   Surgeon: Robby Ngo MD;  Location: [facility] CATH INVASIVE LOCATION;  Service: Cardiovascular;  Laterality: N/A;    CORONARY ARTERY BYPASS GRAFT      stents    CORONARY STENT PLACEMENT      HAND SURGERY Left     KNEE SURGERY Left     MICROAMBULATORY PHLEBECTOMY      SHOULDER SURGERY Right     THYROID BIOPSY      VARICOSE VEIN SURGERY         Home Medications:   Medications Prior to Admission   Medication Sig Dispense Refill Last Dose    amiodarone (PACERONE) 200 MG tablet Take 0.5 tablets by mouth Daily. (Patient taking differently: Take 0.5 tablets by mouth 2 (Two) Times a Day.) 30 tablet 5 10/21/2023 at 0900    apixaban (ELIQUIS) 5 MG tablet tablet Take 1 tablet by mouth Every 12 (Twelve) Hours. 180 tablet 2 10/21/2023 at 0900    aspirin 81 MG EC tablet Take 1 tablet by mouth Daily.   10/21/2023 at 0900    atorvastatin (LIPITOR) 80 MG tablet Take 1 tablet by mouth Daily. 200001 90 tablet 2 10/20/2023 at 2100    Cholecalciferol (VITAMIN D3) 5000 UNITS capsule capsule Take 1 capsule by mouth Every Other Day.   10/21/2023 at 0900    DICLOFENAC PO Take 75 mg by mouth 2 (Two) Times a Day.   Past Month    Empagliflozin (Jardiance) 10 MG tablet Take 10 mg by mouth Daily. 90 tablet 2 10/21/2023 at 0900    ezetimibe (ZETIA) 10 MG tablet Take 1 tablet by mouth Daily.   10/20/2023 at 2100    fluticasone (FLONASE) 50 MCG/ACT nasal spray 1 spray into the nostril(s) as directed by provider 2 (Two) Times a Day As Needed.   Past Month    lisinopril (PRINIVIL,ZESTRIL) 2.5 MG tablet Take 1 tablet by mouth Daily.   10/21/2023 at 0900    metoprolol succinate XL (TOPROL-XL) 100 MG 24 hr tablet Take 1 tablet by mouth. In PM   10/20/2023 at 2100    metoprolol succinate XL (TOPROL-XL) 50 MG 24 hr tablet Take 1 tablet by mouth. In AM   10/21/2023 at 0900    nitroglycerin (NITROSTAT) 0.4 MG SL tablet 1 under the tongue as needed for angina, may repeat q5mins for up three doses 100 tablet 11 10/21/2023    omeprazole (priLOSEC) 40 MG  capsule Take 1 capsule by mouth Daily.   Past Week    spironolactone (ALDACTONE) 25 MG tablet Take 0.5 tablets by mouth Daily.   10/21/2023 at 0900    carboxymethylcellulose (REFRESH PLUS) 0.5 % solution Apply 1 drop to eye(s) as directed by provider.   More than a month    cetirizine (ZyrTEC) 10 MG tablet Take 1 tablet by mouth As Needed. Only Occasionally will use   More than a month    clobetasol (TEMOVATE) 0.05 % cream Apply  topically 2 (Two) Times a Day.   More than a month    furosemide (LASIX) 20 MG tablet Take 1 tablet by mouth Daily As Needed (swelling/weight gain).   More than a month    metaxalone (SKELAXIN) 800 MG tablet Take 1 tablet by mouth As Needed for Muscle Spasms.   More than a month    Minoxidil 5 % foam Apply  topically 2 (two) times a day.   More than a month    naftifine (NAFTIN) 1 % gel gel Apply  topically to the appropriate area as directed Daily.   More than a month    pimecrolimus (ELIDEL) 1 % cream Apply  topically 2 (Two) Times a Day.   More than a month       Allergies:  Allergies   Allergen Reactions    Contrast Dye (Echo Or Unknown Ct/Mr) Hives    Iodinated Contrast Media Hives     Tolerated with pre meds prior to heart cath in feb     Sudafed 12 Hour [Pseudoephedrine Hcl Er]     Pseudoephedrine Palpitations       Past Social History:  Social History     Socioeconomic History    Marital status:    Tobacco Use    Smoking status: Never    Smokeless tobacco: Never   Vaping Use    Vaping Use: Never used   Substance and Sexual Activity    Alcohol use: Yes     Comment: rareLY    Drug use: No    Sexual activity: Defer       Past Family History:  Family History   Problem Relation Age of Onset    Coronary artery disease Other     Stroke Other     Hypertension Other     Heart disease Father     Heart failure Father     Stroke Father     No Known Problems Maternal Grandmother     No Known Problems Maternal Grandfather     No Known Problems Paternal Grandmother     No Known Problems  Paternal Grandfather        Review of Systems: All systems reviewed. Pertinent positives identified in HPI. All other systems are negative.     14 point ROS was performed and is negative except as outlined in HPI.     Objective:     Objective:  Vital Signs (last 24 hours)         10/22 0700  10/23 0659 10/23 0700  10/23 0800   Most Recent      Temp (°F) 97.2 -  97.6       97.3 (36.3) 10/22 2355    Heart Rate 70 -  71       70 10/22 2355    Resp   16       16 10/22 2355    BP 96/65 -  128/79       96/65 10/22 2355    SpO2 (%) 98 -  100       100 10/22 1301          Temp:  [97.2 °F (36.2 °C)-97.6 °F (36.4 °C)] 97.3 °F (36.3 °C)  Heart Rate:  [70-71] 70  Resp:  [16] 16  BP: ()/(62-79) 96/65  Body mass index is 23.11 kg/m².        Physical Exam:     General Appearance: No acute distress, well developed and well nourished.   Eyes: Conjunctiva and lids: No erythema, swelling, or discharge. Sclera non-icteric.   HENT: Atraumatic, normocephalic. External eyes, ears, and nose normal.   Respiratory: No signs of respiratory distress. Respiration rhythm and depth normal.   Clear to auscultation. No rales, crackles, rhonchi, or wheezing auscultated.   Cardiovascular:  Heart Rate and Rhythm: Normal, Heart Sounds: Normal S1 and S2. No S3 or S4 noted.  Murmurs: No murmurs noted. No rubs, thrills, or gallops.   Arterial Pulses: Posterior tibialis and dorsalis pedis pulses normal.   Lower Extremities: No edema noted.  Gastrointestinal:  Abdomen soft, non-distended, non-tender.  Musculoskeletal: Normal movement of extremities  Skin: Warm and dry.   Psychiatric: Patient alert and oriented to person, place, and time. Speech and behavior appropriate. Normal mood and affect.    Labs:   Lab Review:     Results from last 7 days   Lab Units 10/23/23  0357 10/22/23  0457 10/21/23  1708   SODIUM mmol/L 139 141 137   POTASSIUM mmol/L 3.9 3.9 4.5   CHLORIDE mmol/L 106 106 103   CO2 mmol/L 25.0 25.3 27.0   BUN mg/dL 16 10 16   CREATININE  mg/dL 0.80 0.68* 0.89   GLUCOSE mg/dL 92 84 118*   CALCIUM mg/dL 9.1 8.7 9.1   AST (SGOT) U/L 39 66* 146*   ALT (SGPT) U/L 50* 69* 90*     Results from last 7 days   Lab Units 10/22/23  0457 10/21/23  2000 10/21/23  1708   HSTROP T ng/L 9 10 8     Results from last 7 days   Lab Units 10/23/23  0356   WBC 10*3/mm3 4.76   HEMOGLOBIN g/dL 13.9   HEMATOCRIT % 44.4   PLATELETS 10*3/mm3 148             Results from last 7 days   Lab Units 10/23/23  0357   MAGNESIUM mg/dL 2.1                 Results from last 7 days   Lab Units 10/21/23  1708   TSH uIU/mL 0.751         Imaging:     10/22/2023 Echo:    Interpretation Summary         The left ventricular cavity is mild to moderately dilated.    The basal to mid inferior wall is aneurysmal. There is akinesis of the basal inferolateral wall and basal anterolateral wall. There is severe hypokinesis of the basal inferoseptum    Left ventricular systolic function is moderately decreased. Left ventricular ejection fraction appears to be 36 - 40%.    Left ventricular diastolic function is consistent with (grade I) impaired relaxation.    The right ventricular cavity is mild to moderately dilated. Normal right ventricular systolic function noted.    The left atrial cavity is mild to moderately dilated.    Mild mitral valve regurgitation is present.    Mild tricuspid valve regurgitation is present.    Calculated right ventricular systolic pressure from tricuspid regurgitation is 26 mmHg.    There is no evidence of pericardial effusion     5/2021 Cath:    SUMMARY:Severe native coronary disease, 6 of 6 bypasses are patent and look good, severe ischemic cardiomyopathy     RECOMMENDATIONS: Medical therapy great risk factor modification continue to optimize cardiomyopathy medicines I am going to start him on Jardiance and have him go through the heart failure clinic      Andrew Ball MD  05/10/21  09:23 EDT    EKG:           I personally viewed and interpreted the patient's  "EKG/Telemetry tracings.    Assessment:       Syncope        Plan:   Dr. Andrews and I saw him this morning.     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 Echo shows severe septal hypokinesis.     This is really his first event since 2017. He is currently on amiodarone for frequent PVC and NSVT. Will draw amiodarone level and consider increasing is level is low.     Will also have [employer] come reprogram his device. VT zone 140-170bpm to deliver burst ATP to prevent future sustained episodes.           We are going to consider this a \"one off\" episode. Make device adjustments and check amiodarone level.     He can go home after device changes.     Will have him follow up in a week for device check and consider increasing amidoarone.     If recurrent events then may have to consider more invasive treatment options.         Thank you for allowing me to participate in the care of Alden Pratt ISAAK. Feel free to contact me directly with any further questions or concerns.    BRANDON Lentz  [facility]  10/23/23  08:00 EDT    "

## 2023-10-23 NOTE — TELEPHONE ENCOUNTER
Patient with DDDR/ICD, remote transmission done @  ED on 10/21/23. On 10/21/23 @ 14:32 patient had VT with syncope @ home when he was cutting his grass. EGM shows 48 minutes of VT with average V rates 158-170 BPM, no therapy delivered.   VT was in the monitor only zone, therapy zone rate is programmed @ 171 BPM. No other events documented on remote. See epic notes, patient currently in-patient @ .    10/21/23 VT event:

## 2023-10-23 NOTE — DISCHARGE SUMMARY
DISCHARGE NOTE    Patient Name: Alden Pratt III  Age/Sex: 72 y.o. male  : 1950  MRN: [id number]    Date of Discharge:  10/23/2023   Date of Admit: 10/21/2023  Encounter Provider: BRANDON Irvin  Place of Service: [facility] CARDIOLOGY  Patient Care Team:  Jhoan Quezada MD as PCP - General    Subjective:     Discharge Diagnosis:    Syncope      Hospital Course:   Please see consult note.     Patient presented with sustained monomorphic VT. First documented episode since . Checked amiodarone level, will make further recommendations regarding dose once level comes back.     Made device adjusts. VT zone 140bpm to 170bpm to deliver burst ATP.  Previously had only monitor zone 133-170bpm.     Will discharge today, follow up in device clinic in one week. Has follow up with Dr. Ngo on .   Vital Signs  Temp:  [97.2 °F (36.2 °C)-97.6 °F (36.4 °C)] 97.6 °F (36.4 °C)  Heart Rate:  [70-74] 70  Resp:  [16] 16  BP: ()/(62-79) 119/76    Intake/Output Summary (Last 24 hours) at 10/23/2023 1140  Last data filed at 10/23/2023 0515  Gross per 24 hour   Intake 0 ml   Output 400 ml   Net -400 ml       Physical Exam:    General Appearance: No acute distress, well developed and well nourished.   Eyes: Conjunctiva and lids: No erythema, swelling, or discharge. Sclera non-icteric.   HENT: Atraumatic, normocephalic. External eyes, ears, and nose normal.   Respiratory: No signs of respiratory distress. Respiration rhythm and depth normal.   Clear to auscultation. No rales, crackles, rhonchi, or wheezing auscultated.   Cardiovascular:  Heart Rate and Rhythm: Normal, Heart Sounds: Normal S1 and S2. No S3 or S4 noted.  Gastrointestinal:  Abdomen soft, non-distended, non-tender.  Musculoskeletal: Normal movement of extremities  Skin: Warm and dry.   Psychiatric: Patient alert and  oriented to person, place, and time. Speech and behavior appropriate. Normal mood and affect.    Labs:   Results from last 7 days   Lab Units 10/23/23  0357 10/22/23  0457 10/21/23  1708   SODIUM mmol/L 139 141 137   POTASSIUM mmol/L 3.9 3.9 4.5   CHLORIDE mmol/L 106 106 103   CO2 mmol/L 25.0 25.3 27.0   BUN mg/dL 16 10 16   CREATININE mg/dL 0.80 0.68* 0.89   GLUCOSE mg/dL 92 84 118*   CALCIUM mg/dL 9.1 8.7 9.1   AST (SGOT) U/L 39 66* 146*   ALT (SGPT) U/L 50* 69* 90*     Results from last 7 days   Lab Units 10/22/23  0457 10/21/23  2000 10/21/23  1708   HSTROP T ng/L 9 10 8     Results from last 7 days   Lab Units 10/23/23  0356 10/22/23  0457 10/21/23  1617   WBC 10*3/mm3 4.76 4.78 5.83   HEMOGLOBIN g/dL 13.9 14.1 12.9*   HEMATOCRIT % 44.4 45.1 41.7   PLATELETS 10*3/mm3 148 147 151         Results from last 7 days   Lab Units 10/23/23  0357 10/21/23  1708   MAGNESIUM mg/dL 2.1 2.3             Results from last 7 days   Lab Units 10/21/23  1708   TSH uIU/mL 0.751       Discharge Diet:    Dietary Orders (From admission, onward)       Start     Ordered    10/23/23 0931  Diet: Cardiac Diets; Healthy Heart (2-3 Na+); Texture: Regular Texture (IDDSI 7); Fluid Consistency: Thin (IDDSI 0)  Diet Effective Now        References:    Diet Order [place]   Question Answer Comment   Diets: Cardiac Diets    Cardiac Diet: Healthy Heart (2-3 Na+)    Texture: Regular Texture (IDDSI 7)    Fluid Consistency: Thin (IDDSI 0)        10/23/23 0930                      Activity at Discharge:      Discharge Medications     Discharge Medications        ASK your doctor about these medications        Instructions Start Date   amiodarone 200 MG tablet  Commonly known as: PACERONE   100 mg, Oral, Daily      apixaban 5 MG tablet tablet  Commonly known as: ELIQUIS   5 mg, Oral, Every 12 Hours Scheduled      aspirin 81 MG EC tablet   81 mg, Oral, Daily      atorvastatin 80 MG tablet  Commonly known as: LIPITOR   80 mg, Oral, Daily, 200001       carboxymethylcellulose 0.5 % solution  Commonly known as: REFRESH PLUS   1 drop, Ophthalmic      cetirizine 10 MG tablet  Commonly known as: zyrTEC   10 mg, Oral, As Needed, Only Occasionally will use      clobetasol 0.05 % cream  Commonly known as: TEMOVATE   Topical, 2 Times Daily      DICLOFENAC PO   75 mg, Oral, 2 Times Daily      ezetimibe 10 MG tablet  Commonly known as: ZETIA   10 mg, Oral, Daily      fluticasone 50 MCG/ACT nasal spray  Commonly known as: FLONASE   1 spray, Nasal, 2 Times Daily PRN      furosemide 20 MG tablet  Commonly known as: LASIX   20 mg, Oral, Daily PRN      Jardiance 10 MG tablet tablet  Generic drug: empagliflozin   10 mg, Oral, Daily      lisinopril 2.5 MG tablet  Commonly known as: PRINIVIL,ZESTRIL   2.5 mg, Oral, Daily      metaxalone 800 MG tablet  Commonly known as: SKELAXIN   800 mg, Oral, As Needed      metoprolol succinate  MG 24 hr tablet  Commonly known as: TOPROL-XL   100 mg, Oral, In PM      metoprolol succinate XL 50 MG 24 hr tablet  Commonly known as: TOPROL-XL   50 mg, Oral, In AM       Minoxidil 5 % foam   Apply externally, 2 times daily      naftifine 1 % gel gel  Commonly known as: NAFTIN   Topical, Every 24 Hours Scheduled      nitroglycerin 0.4 MG SL tablet  Commonly known as: NITROSTAT   1 under the tongue as needed for angina, may repeat q5mins for up three doses      omeprazole 40 MG capsule  Commonly known as: priLOSEC   40 mg, Oral, Daily      pimecrolimus 1 % cream  Commonly known as: ELIDEL   Topical, 2 Times Daily      spironolactone 25 MG tablet  Commonly known as: ALDACTONE   12.5 mg, Oral, Daily      vitamin D3 125 MCG (5000 UT) capsule capsule   5,000 Units, Oral, Every Other Day               Discharge disposition: home    Follow-up Appointments    Future Appointments   Date Time Provider Department Center   11/9/2023 11:00 AM [facility]ICE CHECK [id number] None   11/9/2023 11:45 AM Robby Ngo MD [id number] None    2/16/2024 11:40 AM Andrew Ball MD [id number]         BRANDON Irvin  10/23/23  11:40 EDT

## 2023-10-26 ENCOUNTER — READMISSION MANAGEMENT (OUTPATIENT)
Dept: CALL CENTER | Facility: HOSPITAL | Age: 73
End: 2023-10-26
Payer: MEDICARE

## 2023-10-26 NOTE — OUTREACH NOTE
Medical Week 1 Survey      Flowsheet Row Responses   [facility] patient discharged from? [place]   Does the patient have one of the following disease processes/diagnoses(primary or secondary)? Other   Week 1 attempt successful? Yes   Call start time 1113   Call end time 1143   Discharge diagnosis Syncope   Person spoke with today (if not patient) and relationship pt   Meds reviewed with patient/caregiver? Yes   Is the patient having any side effects they believe may be caused by any medication additions or changes? No   Does the patient have all medications ordered at discharge? Yes   Is the patient taking all medications as directed (includes completed medication regime)? Yes   Does the patient have a primary care provider?  Yes   Does the patient have an appointment with their PCP within 7 days of discharge? Yes   Has the patient kept scheduled appointments due by today? N/A   Psychosocial issues? No   Did the patient receive a copy of their discharge instructions? Yes   Nursing interventions Reviewed instructions with patient   What is the patient's perception of their health status since discharge? Improving   Is the patient/caregiver able to teach back signs and symptoms related to disease process for when to call PCP? Yes   Is the patient/caregiver able to teach back signs and symptoms related to disease process for when to call 911? Yes   Is the patient/caregiver able to teach back the hierarchy of who to call/visit for symptoms/problems? PCP, Specialist, Home health nurse, Urgent Care, ED, 911 Yes   If the patient is a current smoker, are they able to teach back resources for cessation? Not a smoker   Week 1 call completed? Yes   Would this patient benefit from a Referral to Amb Social Work? No   Is the patient interested in additional calls from an ambulatory ? No   Wrap up additional comments Pt states he is doing better, and reports having some moments of lightheadedness. Pt advised  to speak with cardiologist about lightheadedness upon standing, and possible lisinopril side effects. Reviewed AVS/meds with pt. Pt advised to make a PCP fu appt, and awaiting for cardiology office to call back with a soon fu appt.   Call end time [id number]            Tisha DENISE - Registered Nurse

## 2023-10-30 ENCOUNTER — PATIENT MESSAGE (OUTPATIENT)
Dept: CARDIOLOGY | Facility: CLINIC | Age: 73
End: 2023-10-30
Payer: MEDICARE

## 2023-10-31 NOTE — TELEPHONE ENCOUNTER
FYI: pt sent remote transmission yesterday (10/30/2023) to check for any further events. No events since 10/21/2023.     Pt stated he still has dizzy episodes that correlates with PVCs. Episodes last about 5-10 minutes and occurs about 2-3 times/day.           Copy of remote included below:

## 2023-11-01 LAB
AMIODARONE SERPL-MCNC: 539 NG/ML (ref 1000–2500)
DESETHYLAMIODARONE SERPL-MCNC: 488 NG/ML

## 2023-11-07 ENCOUNTER — CLINICAL SUPPORT NO REQUIREMENTS (OUTPATIENT)
Age: 73
End: 2023-11-07
Payer: MEDICARE

## 2023-11-07 DIAGNOSIS — I47.29 NSVT (NONSUSTAINED VENTRICULAR TACHYCARDIA): ICD-10-CM

## 2023-11-07 DIAGNOSIS — I25.5 ISCHEMIC CARDIOMYOPATHY: Primary | ICD-10-CM

## 2023-11-09 ENCOUNTER — TRANSCRIBE ORDERS (OUTPATIENT)
Dept: CARDIOLOGY | Facility: CLINIC | Age: 73
End: 2023-11-09
Payer: MEDICARE

## 2023-11-09 ENCOUNTER — CLINICAL SUPPORT NO REQUIREMENTS (OUTPATIENT)
Age: 73
End: 2023-11-09
Payer: MEDICARE

## 2023-11-09 ENCOUNTER — LAB (OUTPATIENT)
Dept: LAB | Facility: HOSPITAL | Age: 73
End: 2023-11-09
Payer: MEDICARE

## 2023-11-09 ENCOUNTER — OFFICE VISIT (OUTPATIENT)
Age: 73
End: 2023-11-09
Payer: MEDICARE

## 2023-11-09 VITALS
HEIGHT: 74 IN | DIASTOLIC BLOOD PRESSURE: 82 MMHG | WEIGHT: 181 LBS | HEART RATE: 75 BPM | SYSTOLIC BLOOD PRESSURE: 128 MMHG | BODY MASS INDEX: 23.23 KG/M2

## 2023-11-09 DIAGNOSIS — Z01.810 PRE-OPERATIVE CARDIOVASCULAR EXAMINATION: ICD-10-CM

## 2023-11-09 DIAGNOSIS — Z13.6 SCREENING FOR ISCHEMIC HEART DISEASE: Primary | ICD-10-CM

## 2023-11-09 DIAGNOSIS — Z13.6 SCREENING FOR ISCHEMIC HEART DISEASE: ICD-10-CM

## 2023-11-09 DIAGNOSIS — I47.20 VT (VENTRICULAR TACHYCARDIA): Primary | ICD-10-CM

## 2023-11-09 DIAGNOSIS — I47.29 NSVT (NONSUSTAINED VENTRICULAR TACHYCARDIA): ICD-10-CM

## 2023-11-09 DIAGNOSIS — I25.5 ISCHEMIC CARDIOMYOPATHY: Primary | ICD-10-CM

## 2023-11-09 LAB
ANION GAP SERPL CALCULATED.3IONS-SCNC: 8 MMOL/L (ref 5–15)
BASOPHILS # BLD AUTO: 0.04 10*3/MM3 (ref 0–0.2)
BASOPHILS NFR BLD AUTO: 0.9 % (ref 0–1.5)
BUN SERPL-MCNC: 12 MG/DL (ref 8–23)
BUN/CREAT SERPL: 14.6 (ref 7–25)
CALCIUM SPEC-SCNC: 9.5 MG/DL (ref 8.6–10.5)
CHLORIDE SERPL-SCNC: 103 MMOL/L (ref 98–107)
CO2 SERPL-SCNC: 30 MMOL/L (ref 22–29)
CREAT SERPL-MCNC: 0.82 MG/DL (ref 0.76–1.27)
DEPRECATED RDW RBC AUTO: 40.3 FL (ref 37–54)
EGFRCR SERPLBLD CKD-EPI 2021: 93.9 ML/MIN/1.73
EOSINOPHIL # BLD AUTO: 0.21 10*3/MM3 (ref 0–0.4)
EOSINOPHIL NFR BLD AUTO: 4.5 % (ref 0.3–6.2)
ERYTHROCYTE [DISTWIDTH] IN BLOOD BY AUTOMATED COUNT: 13.3 % (ref 12.3–15.4)
GLUCOSE SERPL-MCNC: 80 MG/DL (ref 65–99)
HCT VFR BLD AUTO: 41.1 % (ref 37.5–51)
HGB BLD-MCNC: 12.9 G/DL (ref 13–17.7)
IMM GRANULOCYTES # BLD AUTO: 0.01 10*3/MM3 (ref 0–0.05)
IMM GRANULOCYTES NFR BLD AUTO: 0.2 % (ref 0–0.5)
LYMPHOCYTES # BLD AUTO: 0.86 10*3/MM3 (ref 0.7–3.1)
LYMPHOCYTES NFR BLD AUTO: 18.3 % (ref 19.6–45.3)
MCH RBC QN AUTO: 26.3 PG (ref 26.6–33)
MCHC RBC AUTO-ENTMCNC: 31.4 G/DL (ref 31.5–35.7)
MCV RBC AUTO: 83.7 FL (ref 79–97)
MONOCYTES # BLD AUTO: 0.51 10*3/MM3 (ref 0.1–0.9)
MONOCYTES NFR BLD AUTO: 10.9 % (ref 5–12)
NEUTROPHILS NFR BLD AUTO: 3.06 10*3/MM3 (ref 1.7–7)
NEUTROPHILS NFR BLD AUTO: 65.2 % (ref 42.7–76)
NRBC BLD AUTO-RTO: 0 /100 WBC (ref 0–0.2)
PLATELET # BLD AUTO: 159 10*3/MM3 (ref 140–450)
PMV BLD AUTO: 9.9 FL (ref 6–12)
POTASSIUM SERPL-SCNC: 4.5 MMOL/L (ref 3.5–5.2)
RBC # BLD AUTO: 4.91 10*6/MM3 (ref 4.14–5.8)
SODIUM SERPL-SCNC: 141 MMOL/L (ref 136–145)
WBC NRBC COR # BLD: 4.69 10*3/MM3 (ref 3.4–10.8)

## 2023-11-09 PROCEDURE — 85025 COMPLETE CBC W/AUTO DIFF WBC: CPT

## 2023-11-09 PROCEDURE — 80048 BASIC METABOLIC PNL TOTAL CA: CPT

## 2023-11-09 PROCEDURE — 99214 OFFICE O/P EST MOD 30 MIN: CPT | Performed by: INTERNAL MEDICINE

## 2023-11-09 PROCEDURE — 36415 COLL VENOUS BLD VENIPUNCTURE: CPT

## 2023-11-10 PROCEDURE — 93000 ELECTROCARDIOGRAM COMPLETE: CPT | Performed by: INTERNAL MEDICINE

## 2023-11-11 ENCOUNTER — HOSPITAL ENCOUNTER (INPATIENT)
Facility: HOSPITAL | Age: 73
LOS: 5 days | Discharge: HOME | DRG: 274 | End: 2023-11-16
Attending: INTERNAL MEDICINE | Admitting: INTERNAL MEDICINE
Payer: MEDICARE

## 2023-11-11 DIAGNOSIS — I47.20 VT (VENTRICULAR TACHYCARDIA): ICD-10-CM

## 2023-11-11 PROCEDURE — 93010 ELECTROCARDIOGRAM REPORT: CPT | Performed by: INTERNAL MEDICINE

## 2023-11-11 PROCEDURE — 93005 ELECTROCARDIOGRAM TRACING: CPT | Performed by: INTERNAL MEDICINE

## 2023-11-11 RX ORDER — METOPROLOL SUCCINATE 50 MG/1
50 TABLET, EXTENDED RELEASE ORAL
Status: COMPLETED | OUTPATIENT
Start: 2023-11-12 | End: 2023-11-12

## 2023-11-11 RX ORDER — AMIODARONE HYDROCHLORIDE 200 MG/1
400 TABLET ORAL
Status: DISCONTINUED | OUTPATIENT
Start: 2023-11-12 | End: 2023-11-11

## 2023-11-11 RX ORDER — AMIODARONE HYDROCHLORIDE 200 MG/1
400 TABLET ORAL
Status: DISCONTINUED | OUTPATIENT
Start: 2023-11-12 | End: 2023-11-12

## 2023-11-11 NOTE — Clinical Note
The DP pulses are +2 bilaterally. The PT pulses are +1 bilaterally. Heels dry, intact, and elevated; cocoon warmer on 46°C; circa temp probe in place; continuous hemodynamics with clear sight cuff.

## 2023-11-11 NOTE — Clinical Note
Replaced previous sheath in the right femoral vein. 2nd short 8fr out, versacross wire and sheath in

## 2023-11-11 NOTE — Clinical Note
A 4 fr sheath was  inserted using micropuncture technique with ultrasound guidance into the right femoral artery. Sheath insertion not delayed. Sheath sutured by MD

## 2023-11-11 NOTE — Clinical Note
A 4 fr sheath was  inserted using micropuncture technique with ultrasound guidance into the right femoral artery. Sheath insertion not delayed.

## 2023-11-11 NOTE — PROGRESS NOTES
Date of Office Visit: 2023  Encounter Provider: Robby Ngo MD  Place of Service: [facility] CARDIOLOGY  Patient Name: Alden Pratt III  :1950    Chief Complaint   Patient presents with    Cardiomyopathy    persistent AFIB    PVCs    Pacemaker Check   :     HPI: Alden Pratt III is a 73 y.o. male who presents today for ventricular tachycardia    He has a history of CAD and prior MI.  He had an MI in  and treated with PCI and then had a CABG.     He had atrial fibrillation and had pulmonary vein ablation over a year ago.  The afib has been much improved since then.      Recently, he has been having episodes of ventricular tachycardia.      There was an issue with detection and had syncope.      He has been treated with amiodarone, and his dose was increased following VT episode.           Past Medical History:   Diagnosis Date    Arthritis     CAD (coronary artery disease)     atheroclerosis    Cancer     prostate    Chest pain     CHF (congestive heart failure)     Elevated cholesterol     GERD (gastroesophageal reflux disease)     Hyperlipidemia     Implantable cardioverter-defibrillator (ICD) discharge     Myocardial infarction     inferior    PVC (premature ventricular contraction)     PVD (peripheral vascular disease)     Status post phlebectomy     varicose veins    Thyroid nodule 2014    VT (ventricular tachycardia)        Past Surgical History:   Procedure Laterality Date    CARDIAC CATHETERIZATION      CARDIAC CATHETERIZATION Left 5/10/2021    Procedure: Left Heart Cath;  Surgeon: Andrew Ball MD;  Location: [facility] CATH INVASIVE LOCATION;  Service: Cardiology;  Laterality: Left;    CARDIAC CATHETERIZATION N/A 5/10/2021    Procedure: Coronary angiography;  Surgeon: Andrew Ball MD;  Location: [facility] CATH INVASIVE LOCATION;  Service: Cardiology;  Laterality: N/A;    CARDIAC CATHETERIZATION N/A 5/10/2021    Procedure: Left  ventriculography;  Surgeon: Andrew Ball MD;  Location:  [facility] CATH INVASIVE LOCATION;  Service: Cardiology;  Laterality: N/A;    CARDIAC CATHETERIZATION  5/10/2021    Procedure: Saphenous Vein Graft;  Surgeon: Andrew Ball MD;  Location:  [facility] CATH INVASIVE LOCATION;  Service: Cardiology;;    CARDIAC DEFIBRILLATOR PLACEMENT      CARDIAC ELECTROPHYSIOLOGY PROCEDURE N/A 8/2/2021    Procedure: Ablation atrial fibrillation;  Surgeon: Robby Ngo MD;  Location:  [facility] CATH INVASIVE LOCATION;  Service: Cardiovascular;  Laterality: N/A;    CORONARY ARTERY BYPASS GRAFT      stents    CORONARY STENT PLACEMENT      HAND SURGERY Left     KNEE SURGERY Left     MICROAMBULATORY PHLEBECTOMY      SHOULDER SURGERY Right     THYROID BIOPSY      VARICOSE VEIN SURGERY         Social History     Socioeconomic History    Marital status:    Tobacco Use    Smoking status: Never    Smokeless tobacco: Never   Vaping Use    Vaping Use: Never used   Substance and Sexual Activity    Alcohol use: Yes     Comment: rareLY    Drug use: No    Sexual activity: Defer       Family History   Problem Relation Age of Onset    Coronary artery disease Other     Stroke Other     Hypertension Other     Heart disease Father     Heart failure Father     Stroke Father     No Known Problems Maternal Grandmother     No Known Problems Maternal Grandfather     No Known Problems Paternal Grandmother     No Known Problems Paternal Grandfather        Review of Systems   Constitutional: Negative.   Cardiovascular:  Positive for palpitations and syncope.   Respiratory: Negative.     Gastrointestinal: Negative.        Allergies   Allergen Reactions    Contrast Dye (Echo Or Unknown Ct/Mr) Hives    Iodinated Contrast Media Hives     Tolerated with pre meds prior to heart cath in feb     Sudafed 12 Hour [Pseudoephedrine Hcl Er]     Pseudoephedrine Palpitations         Current Outpatient Medications:     amiodarone (PACERONE) 200 MG tablet, Take 0.5  tablets by mouth 2 (Two) Times a Day. (Patient taking differently: Take 1 tablet by mouth 2 (Two) Times a Day.), Disp: , Rfl:     apixaban (ELIQUIS) 5 MG tablet tablet, Take 1 tablet by mouth Every 12 (Twelve) Hours., Disp: 180 tablet, Rfl: 2    aspirin 81 MG EC tablet, Take 1 tablet by mouth Daily., Disp: , Rfl:     atorvastatin (LIPITOR) 80 MG tablet, Take 1 tablet by mouth Daily. 200001, Disp: 90 tablet, Rfl: 2    carboxymethylcellulose (REFRESH PLUS) 0.5 % solution, Apply 1 drop to eye(s) as directed by provider., Disp: , Rfl:     cetirizine (ZyrTEC) 10 MG tablet, Take 1 tablet by mouth As Needed. Only Occasionally will use, Disp: , Rfl:     Cholecalciferol (VITAMIN D3) 5000 UNITS capsule capsule, Take 1 capsule by mouth Every Other Day., Disp: , Rfl:     clobetasol (TEMOVATE) 0.05 % cream, Apply  topically 2 (Two) Times a Day., Disp: , Rfl:     DICLOFENAC PO, Take 75 mg by mouth 2 (Two) Times a Day., Disp: , Rfl:     Empagliflozin (Jardiance) 10 MG tablet, Take 10 mg by mouth Daily., Disp: 90 tablet, Rfl: 2    ezetimibe (ZETIA) 10 MG tablet, Take 1 tablet by mouth Daily., Disp: , Rfl:     fluticasone (FLONASE) 50 MCG/ACT nasal spray, 1 spray into the nostril(s) as directed by provider 2 (Two) Times a Day As Needed., Disp: , Rfl:     furosemide (LASIX) 20 MG tablet, Take 1 tablet by mouth Daily As Needed (swelling/weight gain)., Disp: , Rfl:     lisinopril (PRINIVIL,ZESTRIL) 2.5 MG tablet, Take 1 tablet by mouth Daily., Disp: , Rfl:     metaxalone (SKELAXIN) 800 MG tablet, Take 1 tablet by mouth As Needed for Muscle Spasms., Disp: , Rfl:     metoprolol succinate XL (TOPROL-XL) 100 MG 24 hr tablet, Take 1 tablet by mouth. In PM, Disp: , Rfl:     metoprolol succinate XL (TOPROL-XL) 50 MG 24 hr tablet, Take 1 tablet by mouth. In AM, Disp: , Rfl:     Minoxidil 5 % foam, Apply  topically 2 (two) times a day., Disp: , Rfl:     naftifine (NAFTIN) 1 % gel gel, Apply  topically to the appropriate area as directed Daily.,  "Disp: , Rfl:     nitroglycerin (NITROSTAT) 0.4 MG SL tablet, 1 under the tongue as needed for angina, may repeat q5mins for up three doses, Disp: 100 tablet, Rfl: 11    omeprazole (priLOSEC) 40 MG capsule, Take 1 capsule by mouth Daily., Disp: , Rfl:     pimecrolimus (ELIDEL) 1 % cream, Apply  topically 2 (Two) Times a Day., Disp: , Rfl:     spironolactone (ALDACTONE) 25 MG tablet, Take 0.5 tablets by mouth Daily., Disp: , Rfl:       Objective:     Vitals:    11/09/23 1133   BP: 128/82   Pulse: 75   Weight: 82.1 kg (181 lb)   Height: 188 cm (74\")     Body mass index is 23.24 kg/m².    PHYSICAL EXAM:    Vitals and nursing note reviewed.   Constitutional:       Appearance: Well-developed.   Eyes:      General:         Right eye: No discharge.         Left eye: No discharge.      Conjunctiva/sclera: Conjunctivae normal.   HENT:      Head: Normocephalic and atraumatic.   Neck:      Vascular: No JVD.   Pulmonary:      Effort: No respiratory distress.   Chest:      Comments: ICD in place on left chest.   Cardiovascular:      Normal rate.   Edema:     Peripheral edema absent.   Abdominal:      General: There is no distension.      Tenderness: There is no abdominal tenderness.   Musculoskeletal: Normal range of motion.      Cervical back: Neck supple. Neurological:      Cranial Nerves: No cranial nerve deficit.             ECG 12 Lead    Date/Time: 11/10/2023 7:40 PM  Performed by: Robby Ngo MD    Authorized by: Robby Ngo MD  Comparison: compared with previous ECG from 10/23/2023  Similar to previous ECG  Rhythm: sinus rhythm            Assessment:       Diagnosis Plan   1. VT (ventricular tachycardia)  Case Request EP Lab: Ablation VT             Plan:       We are going to proceed with VT ablation    We discussed risk of ablation including stroke, cardiac injury and death    I'm going to have him hold his eliquis for 2 days prior to ablation, since we will likely have arterial access    We will " need [place] there for his ICD.     He will reduce his amiodarone dose.     As always, it has been a pleasure to participate in your patient's care.      Sincerely,         Robby Ngo MD

## 2023-11-11 NOTE — Clinical Note
Hemostasis started on the left femoral vein. Figure 8 suturing was used in achieving hemostasis. Closure device deployed in the vessel. Hemostasis achieved successfully. Closure device additional comment: Figure 8 suture, lido c epi, manual pressure, gauze and tegaderm.

## 2023-11-11 NOTE — Clinical Note
Hemostasis started on the right femoral vein. Figure 8 suturing was used in achieving hemostasis. Closure device deployed in the vessel. Hemostasis achieved successfully. Closure device additional comment: Figure 8 suture, lido c epi, manual pressure, gauze and tegaderm.

## 2023-11-12 PROBLEM — I47.20 RECURRENT VENTRICULAR TACHYCARDIA: Status: ACTIVE | Noted: 2023-11-12

## 2023-11-12 LAB
ANION GAP SERPL CALCULATED.3IONS-SCNC: 7.9 MMOL/L (ref 5–15)
BUN SERPL-MCNC: 12 MG/DL (ref 8–23)
BUN/CREAT SERPL: 15.6 (ref 7–25)
CALCIUM SPEC-SCNC: 8.7 MG/DL (ref 8.6–10.5)
CHLORIDE SERPL-SCNC: 108 MMOL/L (ref 98–107)
CO2 SERPL-SCNC: 25.1 MMOL/L (ref 22–29)
CREAT SERPL-MCNC: 0.77 MG/DL (ref 0.76–1.27)
EGFRCR SERPLBLD CKD-EPI 2021: 95.7 ML/MIN/1.73
GLUCOSE SERPL-MCNC: 87 MG/DL (ref 65–99)
MAGNESIUM SERPL-MCNC: 2.2 MG/DL (ref 1.6–2.4)
POTASSIUM SERPL-SCNC: 3.9 MMOL/L (ref 3.5–5.2)
SODIUM SERPL-SCNC: 141 MMOL/L (ref 136–145)

## 2023-11-12 PROCEDURE — 80048 BASIC METABOLIC PNL TOTAL CA: CPT | Performed by: INTERNAL MEDICINE

## 2023-11-12 PROCEDURE — 83735 ASSAY OF MAGNESIUM: CPT | Performed by: INTERNAL MEDICINE

## 2023-11-12 PROCEDURE — 99223 1ST HOSP IP/OBS HIGH 75: CPT | Performed by: INTERNAL MEDICINE

## 2023-11-12 RX ORDER — AMIODARONE HYDROCHLORIDE 200 MG/1
200 TABLET ORAL EVERY 12 HOURS SCHEDULED
Status: DISCONTINUED | OUTPATIENT
Start: 2023-11-12 | End: 2023-11-12

## 2023-11-12 RX ORDER — ASPIRIN 81 MG/1
81 TABLET, CHEWABLE ORAL DAILY
Status: DISCONTINUED | OUTPATIENT
Start: 2023-11-12 | End: 2023-11-16 | Stop reason: HOSPADM

## 2023-11-12 RX ORDER — NITROGLYCERIN 0.4 MG/1
0.4 TABLET SUBLINGUAL
Status: DISCONTINUED | OUTPATIENT
Start: 2023-11-12 | End: 2023-11-16 | Stop reason: HOSPADM

## 2023-11-12 RX ORDER — AMIODARONE HYDROCHLORIDE 200 MG/1
200 TABLET ORAL 3 TIMES DAILY
Status: DISCONTINUED | OUTPATIENT
Start: 2023-11-12 | End: 2023-11-13

## 2023-11-12 RX ORDER — METOPROLOL SUCCINATE 100 MG/1
100 TABLET, EXTENDED RELEASE ORAL
Status: DISCONTINUED | OUTPATIENT
Start: 2023-11-12 | End: 2023-11-16 | Stop reason: HOSPADM

## 2023-11-12 RX ADMIN — AMIODARONE HYDROCHLORIDE 400 MG: 200 TABLET ORAL at 08:59

## 2023-11-12 RX ADMIN — METOPROLOL SUCCINATE 100 MG: 100 TABLET, EXTENDED RELEASE ORAL at 17:03

## 2023-11-12 RX ADMIN — ASPIRIN 81 MG: 81 TABLET, CHEWABLE ORAL at 17:03

## 2023-11-12 RX ADMIN — EMPAGLIFLOZIN 10 MG: 10 TABLET, FILM COATED ORAL at 17:03

## 2023-11-12 RX ADMIN — METOPROLOL SUCCINATE 50 MG: 50 TABLET, FILM COATED, EXTENDED RELEASE ORAL at 00:44

## 2023-11-12 RX ADMIN — AMIODARONE HYDROCHLORIDE 200 MG: 200 TABLET ORAL at 17:03

## 2023-11-12 NOTE — H&P
Date of Hospital Visit: 2023  Date of consult:   Encounter Provider: Bryan Oden MD  Place of Service: [facility] CARDIOLOGY  Patient Name: Alden Pratt III  :1950  Referral Provider: Andrew Ball MD    Chief complaint:     Reason for consult:     History of Present Illness    Mr. Alden Pratt is a 73 year old male with a past medical history significant for CAD, past MI in  s/p stenting to RCA and eventual CABG (LIMA to his LAD, a vein to his distal LAD, a vein to an OM 1, a vein to the PDA, vein to the KIERAN, a vein to the RV branch), ICD implantation, atrial fib ablation.      He has done well post CABG. He had an emotional reunion with his brother and ended up having VF and received a shock from his defibrillator. The shocked put him into atrial fibrillation. He was anticoagulated and then cardioverted. In Early  he went into atrial fibrillation again and was cardioverted for a second time. He was seen by Dr. Ngo and placed on Multaq. He did not feel better. In May 2021 he was experiencing angina. He underwent cardiac catheterization which revealed 6 of 6 bypasses were patent and severe cardiomyopathy. Jardiance was added.     He followed up with [facility] which was recommended to him by his family members. They gave him some Farxiga samples and also doubled his lisinopril and then he had a hypotensive episode and had to go back to his regular dose of lisinopril.     He has had a prior A. fib ablation and last echo which was done  showed an ejection fraction of 35 to 40%.     He follows with Dr. Ball and was last seen on 23 for a follow up. He c/o short runs of palpitations. Nothing precipitates it. It does not bother him when he exercises. He sent in strips to the pacemaker center which showed he was having runs of nonsustained ventricular tachycardia. He was placed on amiodarone back in May to  treat nonsustained VT. Amio level was drawn and there were no changes to his treatment plan.     He was hospitalized from 10/21-10/23 with nonsustained monomorphic VT. His device was adjusted. VT zone 140bpm to 170bpm to deliver burst ATP.  Previously had only monitor zone 133-170bpm. He was stable and discharged home.     He saw Dr. Ngo on 11/9/23 for continued issues with VT. He has had being having episodes of ventricular tachycardia still and syncope. There was an issue with detection.  A plan was made for a VT ablation.     On Saturday night, the patient was sitting down and passed out. He reported this episode last 1-3 minutes. There was no warning. He did not feel his heart racing. He reached out to Dr. Ball who direct admitted him to the hospital.       ECHO 10/22/23    The left ventricular cavity is mild to moderately dilated.    The basal to mid inferior wall is aneurysmal. There is akinesis of the basal inferolateral wall and basal anterolateral wall. There is severe hypokinesis of the basal inferoseptum    Left ventricular systolic function is moderately decreased. Left ventricular ejection fraction appears to be 36 - 40%.    Left ventricular diastolic function is consistent with (grade I) impaired relaxation.    The right ventricular cavity is mild to moderately dilated. Normal right ventricular systolic function noted.    The left atrial cavity is mild to moderately dilated.    Mild mitral valve regurgitation is present.    Mild tricuspid valve regurgitation is present.    Calculated right ventricular systolic pressure from tricuspid regurgitation is 26 mmHg.    There is no evidence of pericardial effusion    Stress Test 6/19/28  Frequent PVC's, ventricular couplets, and rare triplets during recovery.  Enlarged LV cavity size.  Myocardial perfusion imaging indicates a medium-to-large-sized infarct located in the inferior wall with no significant ischemia noted.  There is severe inferior wall  hypokinesis.  Left ventricular ejection fraction is mildly reduced (Calculated EF = 40%).  Impressions are consistent with a study indicating uncertain risk.    Cardiac Catheterization 5/10/21  SUMMARY:Severe native coronary disease, 6 of 6 bypasses are patent and look good, severe ischemic cardiomyopathy      RECOMMENDATIONS: Medical therapy great risk factor modification continue to optimize cardiomyopathy medicines I am going to start him on Jardiance and have him go through the heart failure clinic      Past Medical History:   Diagnosis Date    Arthritis     CAD (coronary artery disease)     atheroclerosis    Cancer     prostate    Chest pain     CHF (congestive heart failure)     Elevated cholesterol     GERD (gastroesophageal reflux disease)     Hyperlipidemia     Implantable cardioverter-defibrillator (ICD) discharge     Myocardial infarction     inferior    PVC (premature ventricular contraction)     PVD (peripheral vascular disease)     Status post phlebectomy     varicose veins    Thyroid nodule 7/18/2014    VT (ventricular tachycardia)        Past Surgical History:   Procedure Laterality Date    CARDIAC CATHETERIZATION      CARDIAC CATHETERIZATION Left 5/10/2021    Procedure: Left Heart Cath;  Surgeon: Andrew Ball MD;  Location: [facility] CATH INVASIVE LOCATION;  Service: Cardiology;  Laterality: Left;    CARDIAC CATHETERIZATION N/A 5/10/2021    Procedure: Coronary angiography;  Surgeon: Andrew Ball MD;  Location: [facility] CATH INVASIVE LOCATION;  Service: Cardiology;  Laterality: N/A;    CARDIAC CATHETERIZATION N/A 5/10/2021    Procedure: Left ventriculography;  Surgeon: Andrew Ball MD;  Location: [facility] CATH INVASIVE LOCATION;  Service: Cardiology;  Laterality: N/A;    CARDIAC CATHETERIZATION  5/10/2021    Procedure: Saphenous Vein Graft;  Surgeon: Andrew Ball MD;  Location: [facility] CATH INVASIVE LOCATION;  Service: Cardiology;;    CARDIAC DEFIBRILLATOR PLACEMENT      CARDIAC  ELECTROPHYSIOLOGY PROCEDURE N/A 8/2/2021    Procedure: Ablation atrial fibrillation;  Surgeon: Robby Ngo MD;  Location: [facility] CATH INVASIVE LOCATION;  Service: Cardiovascular;  Laterality: N/A;    CORONARY ARTERY BYPASS GRAFT      stents    CORONARY STENT PLACEMENT      HAND SURGERY Left     KNEE SURGERY Left     MICROAMBULATORY PHLEBECTOMY      SHOULDER SURGERY Right     THYROID BIOPSY      VARICOSE VEIN SURGERY         Medications Prior to Admission   Medication Sig Dispense Refill Last Dose    amiodarone (PACERONE) 200 MG tablet Take 0.5 tablets by mouth 2 (Two) Times a Day. (Patient taking differently: Take 1 tablet by mouth 2 (Two) Times a Day.)       apixaban (ELIQUIS) 5 MG tablet tablet Take 1 tablet by mouth Every 12 (Twelve) Hours. 180 tablet 2     aspirin 81 MG EC tablet Take 1 tablet by mouth Daily.       atorvastatin (LIPITOR) 80 MG tablet Take 1 tablet by mouth Daily. 200001 90 tablet 2     Empagliflozin (Jardiance) 10 MG tablet Take 10 mg by mouth Daily. 90 tablet 2     ezetimibe (ZETIA) 10 MG tablet Take 1 tablet by mouth Daily.       lisinopril (PRINIVIL,ZESTRIL) 2.5 MG tablet Take 1 tablet by mouth Daily.       metoprolol succinate XL (TOPROL-XL) 100 MG 24 hr tablet Take 1 tablet by mouth. In PM       metoprolol succinate XL (TOPROL-XL) 50 MG 24 hr tablet Take 1 tablet by mouth. In AM       spironolactone (ALDACTONE) 25 MG tablet Take 0.5 tablets by mouth Daily.       carboxymethylcellulose (REFRESH PLUS) 0.5 % solution Apply 1 drop to eye(s) as directed by provider.       cetirizine (ZyrTEC) 10 MG tablet Take 1 tablet by mouth As Needed. Only Occasionally will use       Cholecalciferol (VITAMIN D3) 5000 UNITS capsule capsule Take 1 capsule by mouth Every Other Day.       clobetasol (TEMOVATE) 0.05 % cream Apply  topically 2 (Two) Times a Day.       DICLOFENAC PO Take 75 mg by mouth 2 (Two) Times a Day.       fluticasone (FLONASE) 50 MCG/ACT nasal spray 1 spray into the nostril(s) as  directed by provider 2 (Two) Times a Day As Needed.       furosemide (LASIX) 20 MG tablet Take 1 tablet by mouth Daily As Needed (swelling/weight gain).       metaxalone (SKELAXIN) 800 MG tablet Take 1 tablet by mouth As Needed for Muscle Spasms.       Minoxidil 5 % foam Apply  topically 2 (two) times a day.       naftifine (NAFTIN) 1 % gel gel Apply  topically to the appropriate area as directed Daily.       nitroglycerin (NITROSTAT) 0.4 MG SL tablet 1 under the tongue as needed for angina, may repeat q5mins for up three doses 100 tablet 11     omeprazole (priLOSEC) 40 MG capsule Take 1 capsule by mouth Daily.       pimecrolimus (ELIDEL) 1 % cream Apply  topically 2 (Two) Times a Day.          Current Meds  Scheduled Meds:amiodarone, 200 mg, Oral, Q12H      Continuous Infusions:   PRN Meds:.  nitroglycerin    Allergies as of 11/11/2023 - Reviewed 11/11/2023   Allergen Reaction Noted    Contrast dye (echo or unknown ct/mr) Hives 05/09/2016    Iodinated contrast media Hives 12/20/2013    Sudafed 12 hour [pseudoephedrine hcl er]  05/09/2016    Pseudoephedrine Palpitations 03/06/2015       Social History     Socioeconomic History    Marital status:    Tobacco Use    Smoking status: Never    Smokeless tobacco: Never   Vaping Use    Vaping Use: Never used   Substance and Sexual Activity    Alcohol use: Yes     Comment: rareLY    Drug use: No    Sexual activity: Defer       Family History   Problem Relation Age of Onset    Coronary artery disease Other     Stroke Other     Hypertension Other     Heart disease Father     Heart failure Father     Stroke Father     No Known Problems Maternal Grandmother     No Known Problems Maternal Grandfather     No Known Problems Paternal Grandmother     No Known Problems Paternal Grandfather        REVIEW OF SYSTEMS:   All systems reviewed and pertinent positives include in HPI otherwise negative review of systems.       Objective:   Temp:  [97.4 °F (36.3 °C)-98.2 °F (36.8 °C)]  97.4 °F (36.3 °C)  Heart Rate:  [70-73] 71  Resp:  [18] 18  BP: (109-125)/(70-75) 116/71  Body mass index is 23.17 kg/m².  Flowsheet Rows      Flowsheet Row First Filed Value   Admission Height --   Admission Weight 81.9 kg (180 lb 8 oz) Documented at 11/11/2023 2038          Vitals:    11/12/23 0800   BP: 116/71   Pulse: 71   Resp: 18   Temp:    SpO2:        General Appearance:    Alert, cooperative, in no acute distress   Head:    Normocephalic, without obvious abnormality, atraumatic   Eyes:            Lids and lashes normal, conjunctivae and sclerae normal, no   icterus, no pallor, corneas clear, PERRLA   Ears:    Ears appear intact with no abnormalities noted   Throat:   No oral lesions, no thrush, oral mucosa moist   Neck:   No adenopathy, supple, trachea midline, no thyromegaly, no   carotid bruit, no JVD   Back:     No kyphosis present, no scoliosis present, no skin lesions, erythema or scars, no tenderness to percussion or palpation, range of motion normal   Lungs:     Clear to auscultation,respirations regular, even and unlabored    Heart:    Regular rhythm and normal rate, normal S1 and S2, no murmur, no gallop, no rub, no click   Chest Wall:    No abnormalities observed   Abdomen:     Normal bowel sounds, no masses, no organomegaly, soft nontender, nondistended, no guarding, no rebound  tenderness   Extremities:   Moves all extremities well, no edema, no cyanosis, no redness   Pulses:   Pulses palpable and equal bilaterally. Normal radial, carotid, femoral, dorsalis pedis and posterior tibial pulses bilaterally. Normal abdominal aorta   Skin:  Neurology:   Psychiatric:   No bleeding, bruising or rash   Normal speech and cranial nerve exam, no focal deficit   Alert and oriented x 3, normal mood and affect             Review of Data:      Results from last 7 days   Lab Units 11/12/23  0322   SODIUM mmol/L 141   POTASSIUM mmol/L 3.9   CHLORIDE mmol/L 108*   CO2 mmol/L 25.1   BUN mg/dL 12   CREATININE mg/dL  0.77   CALCIUM mg/dL 8.7   GLUCOSE mg/dL 87         @LABRCNTbnp@  Results from last 7 days   Lab Units 11/09/23  1311   WBC 10*3/mm3 4.69   HEMOGLOBIN g/dL 12.9*   HEMATOCRIT % 41.1   PLATELETS 10*3/mm3 159         Results from last 7 days   Lab Units 11/12/23  0322   MAGNESIUM mg/dL 2.2     @LABRCNTIP(chol,trig,hdl,ldl)    I personally viewed and interpreted the patient's EKG/Telemetry data  )   * No active hospital problems. *              Assessment and Plan:    Recurrent sustained monomorphic VT-currently on beta-blocker and amiodarone.  Subtherapeutic amiodarone level per most recent blood work.  Did not tolerate dose increase because of nausea/hypotension.  Device suggested with VT is 140-170 bpm to deliver ATP than monitor.  He had an episode last evening where he thought he passed out for about 30 seconds on the toilet following palpitations.  Device interrogation confirms delivery of shock for VT with heart rate in the 280s  Ischemic cardiomyopathy/coronary artery disease/CABG. patent grafts in 2021 angiogram.  History of atrial fibrillation and ablation on Eliquis    EKG today atrial paced rhythm.  Normal electrolyte levels.  Normal vital signs.  Continue home medications with monitoring.  Hold Eliquis pending EP evaluation  May start procainamide drip with recurrent VT  EP to see patient in a.m.    Bryan Oden MD  11/12/23  13:51 EST.      Time spent in reviewing chart, discussion and examination:

## 2023-11-13 LAB
ANION GAP SERPL CALCULATED.3IONS-SCNC: 8 MMOL/L (ref 5–15)
BUN SERPL-MCNC: 15 MG/DL (ref 8–23)
BUN/CREAT SERPL: 16.5 (ref 7–25)
CALCIUM SPEC-SCNC: 9.4 MG/DL (ref 8.6–10.5)
CHLORIDE SERPL-SCNC: 102 MMOL/L (ref 98–107)
CO2 SERPL-SCNC: 29 MMOL/L (ref 22–29)
CREAT SERPL-MCNC: 0.91 MG/DL (ref 0.76–1.27)
EGFRCR SERPLBLD CKD-EPI 2021: 90.1 ML/MIN/1.73
GLUCOSE SERPL-MCNC: 85 MG/DL (ref 65–99)
MAGNESIUM SERPL-MCNC: 2.3 MG/DL (ref 1.6–2.4)
POTASSIUM SERPL-SCNC: 4.1 MMOL/L (ref 3.5–5.2)
QT INTERVAL: 468 MS
QTC INTERVAL: 506 MS
SODIUM SERPL-SCNC: 139 MMOL/L (ref 136–145)

## 2023-11-13 PROCEDURE — 99222 1ST HOSP IP/OBS MODERATE 55: CPT

## 2023-11-13 PROCEDURE — 99232 SBSQ HOSP IP/OBS MODERATE 35: CPT | Performed by: NURSE PRACTITIONER

## 2023-11-13 PROCEDURE — 80048 BASIC METABOLIC PNL TOTAL CA: CPT | Performed by: NURSE PRACTITIONER

## 2023-11-13 PROCEDURE — 83735 ASSAY OF MAGNESIUM: CPT | Performed by: NURSE PRACTITIONER

## 2023-11-13 RX ORDER — LISINOPRIL 2.5 MG/1
2.5 TABLET ORAL
Status: DISCONTINUED | OUTPATIENT
Start: 2023-11-13 | End: 2023-11-16 | Stop reason: HOSPADM

## 2023-11-13 RX ADMIN — EMPAGLIFLOZIN 10 MG: 10 TABLET, FILM COATED ORAL at 09:20

## 2023-11-13 RX ADMIN — METOPROLOL SUCCINATE 100 MG: 100 TABLET, EXTENDED RELEASE ORAL at 09:21

## 2023-11-13 RX ADMIN — ASPIRIN 81 MG: 81 TABLET, CHEWABLE ORAL at 09:21

## 2023-11-13 RX ADMIN — AMIODARONE HYDROCHLORIDE 200 MG: 200 TABLET ORAL at 05:21

## 2023-11-13 RX ADMIN — LISINOPRIL 2.5 MG: 2.5 TABLET ORAL at 14:01

## 2023-11-13 NOTE — PLAN OF CARE
Goal Outcome Evaluation:           Progress: improving       VSS; no c/o pain; A / O x 4; Ablation scheduled for Wed 11/15

## 2023-11-13 NOTE — CONSULTS
Electrophysiology Hospital Consult            Patient Name: Alden Pratt III  Age/Sex: 73 y.o. male  : 1950  MRN: [id number]    Date of Admission: 2023  Date of Encounter Visit: 23  Encounter Provider: BRANDON Irvin  Referring Provider: Andrew Ball MD  Place of Service: [facility] CARDIOLOGY  Patient Care Team:  Jhoan Quezada MD as PCP - General      Subjective:   EP Consultation for:     Chief Complaint:     History of Present Illness:  Alden Pratt III is a 73 y.o. male follows with Dr. Ball and Dr. Ngo----MI , CAD, s/p stents, s/p CABG, ICM, s/p ICD.      Has had chronic PVC's has had VF treated with shock that sent him into AF, cardioverted then had recurrence ---was on dronedarone at one point and the had PVI 2021 and has not had recurrence.      He also follows with the [facility].      He saw Dr. Ball in Feb with complaints of palpitations that he reported were really fast and really bothered him---device showed short runs of NSVT, he was started on low dose amiodarone 100 mg BID.       Presented to ED 10/21 with palpitations and syncope. He was mowing grass and started to feel the PVC's and then felt the rapid palpitations and had syncope.      Device interrogation shows VT that spontaneously converted just prior to meeting criteria for therapy.      Echo shows EF 36-40% which is down from prior which was about 45% but per Dr. Liu's noted he thinks it is really unchanged and just reader variability.     We increased his amiodarone to 200mg BID.     Over the next couple of weeks. He had recurrent VT. Episode was shorter lasting 2-3 minutes.     Saw Dr. Ngo in the office on . Discussed VT ablation and this was scheduled for .    Over the weekend he presented with syncope. Device interrogation showed sustained VT and successful shock x1.      EP has been asked to see.      Jeni and I saw him this morning. Says Saturday he was sitting down and just passed out. Reports the episode lasted a few minutes. Says it was similar to his episodes in the past. He now feels at baseline.     Past Medical History:  Past Medical History:   Diagnosis Date    Arthritis     CAD (coronary artery disease)     atheroclerosis    Cancer     prostate    Chest pain     CHF (congestive heart failure)     Elevated cholesterol     GERD (gastroesophageal reflux disease)     Hyperlipidemia     Implantable cardioverter-defibrillator (ICD) discharge     Myocardial infarction     inferior    PVC (premature ventricular contraction)     PVD (peripheral vascular disease)     Status post phlebectomy     varicose veins    Thyroid nodule 7/18/2014    VT (ventricular tachycardia)        Past Surgical History:   Procedure Laterality Date    CARDIAC CATHETERIZATION      CARDIAC CATHETERIZATION Left 5/10/2021    Procedure: Left Heart Cath;  Surgeon: Andrew Ball MD;  Location: [facility] CATH INVASIVE LOCATION;  Service: Cardiology;  Laterality: Left;    CARDIAC CATHETERIZATION N/A 5/10/2021    Procedure: Coronary angiography;  Surgeon: Andrew Ball MD;  Location: [facility] CATH INVASIVE LOCATION;  Service: Cardiology;  Laterality: N/A;    CARDIAC CATHETERIZATION N/A 5/10/2021    Procedure: Left ventriculography;  Surgeon: Andrew Ball MD;  Location: [facility] CATH INVASIVE LOCATION;  Service: Cardiology;  Laterality: N/A;    CARDIAC CATHETERIZATION  5/10/2021    Procedure: Saphenous Vein Graft;  Surgeon: Andrew Ball MD;  Location: [facility] CATH INVASIVE LOCATION;  Service: Cardiology;;    CARDIAC DEFIBRILLATOR PLACEMENT      CARDIAC ELECTROPHYSIOLOGY PROCEDURE N/A 8/2/2021    Procedure: Ablation atrial fibrillation;  Surgeon: Robby Ngo MD;  Location: [facility] CATH INVASIVE LOCATION;  Service: Cardiovascular;  Laterality: N/A;    CORONARY ARTERY BYPASS GRAFT      stents    CORONARY STENT PLACEMENT      HAND  SURGERY Left     KNEE SURGERY Left     MICROAMBULATORY PHLEBECTOMY      SHOULDER SURGERY Right     THYROID BIOPSY      VARICOSE VEIN SURGERY         Home Medications:   Medications Prior to Admission   Medication Sig Dispense Refill Last Dose    amiodarone (PACERONE) 200 MG tablet Take 0.5 tablets by mouth 2 (Two) Times a Day. (Patient taking differently: Take 1 tablet by mouth 2 (Two) Times a Day.)       apixaban (ELIQUIS) 5 MG tablet tablet Take 1 tablet by mouth Every 12 (Twelve) Hours. 180 tablet 2     aspirin 81 MG EC tablet Take 1 tablet by mouth Daily.       atorvastatin (LIPITOR) 80 MG tablet Take 1 tablet by mouth Daily. 200001 90 tablet 2     Empagliflozin (Jardiance) 10 MG tablet Take 10 mg by mouth Daily. 90 tablet 2     ezetimibe (ZETIA) 10 MG tablet Take 1 tablet by mouth Daily.       lisinopril (PRINIVIL,ZESTRIL) 2.5 MG tablet Take 1 tablet by mouth Daily.       metoprolol succinate XL (TOPROL-XL) 100 MG 24 hr tablet Take 1 tablet by mouth. In PM       metoprolol succinate XL (TOPROL-XL) 50 MG 24 hr tablet Take 1 tablet by mouth. In AM       spironolactone (ALDACTONE) 25 MG tablet Take 0.5 tablets by mouth Daily.       carboxymethylcellulose (REFRESH PLUS) 0.5 % solution Apply 1 drop to eye(s) as directed by provider.       cetirizine (ZyrTEC) 10 MG tablet Take 1 tablet by mouth As Needed. Only Occasionally will use       Cholecalciferol (VITAMIN D3) 5000 UNITS capsule capsule Take 1 capsule by mouth Every Other Day.       clobetasol (TEMOVATE) 0.05 % cream Apply  topically 2 (Two) Times a Day.       DICLOFENAC PO Take 75 mg by mouth 2 (Two) Times a Day.       fluticasone (FLONASE) 50 MCG/ACT nasal spray 1 spray into the nostril(s) as directed by provider 2 (Two) Times a Day As Needed.       furosemide (LASIX) 20 MG tablet Take 1 tablet by mouth Daily As Needed (swelling/weight gain).       metaxalone (SKELAXIN) 800 MG tablet Take 1 tablet by mouth As Needed for Muscle Spasms.       Minoxidil 5 % foam  Apply  topically 2 (two) times a day.       naftifine (NAFTIN) 1 % gel gel Apply  topically to the appropriate area as directed Daily.       nitroglycerin (NITROSTAT) 0.4 MG SL tablet 1 under the tongue as needed for angina, may repeat q5mins for up three doses 100 tablet 11     omeprazole (priLOSEC) 40 MG capsule Take 1 capsule by mouth Daily.       pimecrolimus (ELIDEL) 1 % cream Apply  topically 2 (Two) Times a Day.          Allergies:  Allergies   Allergen Reactions    Contrast Dye (Echo Or Unknown Ct/Mr) Hives    Iodinated Contrast Media Hives     Tolerated with pre meds prior to heart cath in feb     Sudafed 12 Hour [Pseudoephedrine Hcl Er]     Pseudoephedrine Palpitations       Past Social History:  Social History     Socioeconomic History    Marital status:    Tobacco Use    Smoking status: Never    Smokeless tobacco: Never   Vaping Use    Vaping Use: Never used   Substance and Sexual Activity    Alcohol use: Yes     Comment: rareLY    Drug use: No    Sexual activity: Defer       Past Family History:  Family History   Problem Relation Age of Onset    Coronary artery disease Other     Stroke Other     Hypertension Other     Heart disease Father     Heart failure Father     Stroke Father     No Known Problems Maternal Grandmother     No Known Problems Maternal Grandfather     No Known Problems Paternal Grandmother     No Known Problems Paternal Grandfather        Review of Systems: All systems reviewed. Pertinent positives identified in HPI. All other systems are negative.     14 point ROS was performed and is negative except as outlined in HPI.     Objective:     Objective:  Vital Signs (last 24 hours)         11/12 0700  11/13 0659 11/13 0700  11/13 0814   Most Recent      Temp (°F) 97.7 -  98.3      98.1     98.1 (36.7) 11/13 0736    Heart Rate 70 -  76      71     71 11/13 0736    Resp 17 -  18      17     17 11/13 0736    /74 -  119/69      120/74     120/74 11/13 0736    SpO2 (%)   99        99 11/12 1322          Temp:  [97.7 °F (36.5 °C)-98.3 °F (36.8 °C)] 98.1 °F (36.7 °C)  Heart Rate:  [70-76] 71  Resp:  [17-18] 17  BP: (112-120)/(65-80) 120/74  Body mass index is 23.17 kg/m².        Physical Exam:     General Appearance: No acute distress, well developed and well nourished.   Eyes: Conjunctiva and lids: No erythema, swelling, or discharge. Sclera non-icteric.   HENT: Atraumatic, normocephalic. External eyes, ears, and nose normal.   Respiratory: No signs of respiratory distress. Respiration rhythm and depth normal.   Clear to auscultation. No rales, crackles, rhonchi, or wheezing auscultated.   Cardiovascular:  Jugular Venous Pressure: Normal  Heart Rate and Rhythm: Normal, Heart Sounds: Normal S1 and S2. No S3 or S4 noted.  Murmurs: No murmurs noted. No rubs, thrills, or gallops.   Arterial Pulses: Posterior tibialis and dorsalis pedis pulses normal.   Lower Extremities: No edema noted.  Gastrointestinal:  Abdomen soft, non-distended, non-tender.  Musculoskeletal: Normal movement of extremities  Skin: Warm and dry.   Psychiatric: Patient alert and oriented to person, place, and time. Speech and behavior appropriate. Normal mood and affect.    Labs:   Lab Review:     Results from last 7 days   Lab Units 11/12/23  0322 11/09/23  1311   SODIUM mmol/L 141 141   POTASSIUM mmol/L 3.9 4.5   CHLORIDE mmol/L 108* 103   CO2 mmol/L 25.1 30.0*   BUN mg/dL 12 12   CREATININE mg/dL 0.77 0.82   GLUCOSE mg/dL 87 80   CALCIUM mg/dL 8.7 9.5         Results from last 7 days   Lab Units 11/09/23  1311   WBC 10*3/mm3 4.69   HEMOGLOBIN g/dL 12.9*   HEMATOCRIT % 41.1   PLATELETS 10*3/mm3 159             Results from last 7 days   Lab Units 11/12/23  0322   MAGNESIUM mg/dL 2.2                         I personally viewed and interpreted the patient's EKG/Telemetry tracings.    Assessment:       Recurrent ventricular tachycardia        Plan:   Dr. Britton and I saw this patient.       He has ischemic cardiomyopathy and  recurrent VT despite amiodarone therapy. He was scheduled for VT ablation on 11/30 but presented with VT and ICD shock.     We are going to move the schedule around and will move his procedure to this Wednesday.               We are going to start holding his amiodarone for the procedure. Will also start holding his jardiance now, NPO after midnight 11/15.           Thank you for allowing me to participate in the care of Alden Pratt III. Feel free to contact me directly with any further questions or concerns.    BRANDON Irvin  [facility]  11/13/23  08:14 EST

## 2023-11-13 NOTE — PROGRESS NOTES
Hospital Follow Up    LOS:  LOS: 2 days   Patient Name: Alden Pratt III  Age/Sex: 73 y.o. male  : 1950  MRN: [id number]    Day of Service: 23   Length of Stay: 2  Encounter Provider: BRANDON Pizarro  Place of Service: [facility] CARDIOLOGY  Patient Care Team:  Jhoan Quezada MD as PCP - General    Subjective:     Chief Complaint: Syncope, VT, ICD shock    Interval History: OOB in chair. No complaints today    Objective:     Objective:  Temp:  [97.7 °F (36.5 °C)-98.3 °F (36.8 °C)] 98.1 °F (36.7 °C)  Heart Rate:  [70-76] 71  Resp:  [17-18] 17  BP: (112-120)/(65-80) 120/74     Intake/Output Summary (Last 24 hours) at 2023 1033  Last data filed at 2023 0736  Gross per 24 hour   Intake 600 ml   Output --   Net 600 ml     Body mass index is 23.17 kg/m².      23   Weight: 81.9 kg (180 lb 8 oz)     Weight change:     Physical Exam:   General Appearance:    Awake alert and oriented in no acute distress.   Color:  Skin:  Neuro:  HEENT:    Lungs:     Pink  Warm and dry  No focal, motor or sensory deficits  Neck supple, pupils equal, round and reactive. No JVD, No Bruit  Clear to auscultation,respirations regular, even and                  unlabored    Heart:    Regular rate and rhythm, S1 and S2, no murmur, no gallop, no rub. No edema, DP/PT pulses are 2+   Chest Wall:    No abnormalities observed   Abdomen:     Normal bowel sounds, no masses, no organomegaly, soft        non-tender, non-distended, no guarding, no ascites noted   Extremities:   Moves all extremities well, no edema, no cyanosis, no redness       Lab Review:   Results from last 7 days   Lab Units 23  0322 23  1311   SODIUM mmol/L 141 141   POTASSIUM mmol/L 3.9 4.5   CHLORIDE mmol/L 108* 103   CO2 mmol/L 25.1 30.0*   BUN mg/dL 12 12   CREATININE mg/dL 0.77 0.82   GLUCOSE mg/dL 87 80   CALCIUM mg/dL 8.7 9.5         Results from last 7 days   Lab Units 23  8065  "  WBC 10*3/mm3 4.69   HEMOGLOBIN g/dL 12.9*   HEMATOCRIT % 41.1   PLATELETS 10*3/mm3 159         Results from last 7 days   Lab Units 11/12/23  0322   MAGNESIUM mg/dL 2.2           Invalid input(s): \"LDLCALC\"          I reviewed the patient's new clinical results.  I personally viewed and interpreted the patient's EKG  Current Medications:   Scheduled Meds:amiodarone, 200 mg, Oral, TID  aspirin, 81 mg, Oral, Daily  empagliflozin, 10 mg, Oral, Daily  lisinopril, 2.5 mg, Oral, Q24H  metoprolol succinate XL, 100 mg, Oral, Q24H      Continuous Infusions:     Allergies:  Allergies   Allergen Reactions    Contrast Dye (Echo Or Unknown Ct/Mr) Hives    Iodinated Contrast Media Hives     Tolerated with pre meds prior to heart cath in feb     Sudafed 12 Hour [Pseudoephedrine Hcl Er]     Pseudoephedrine Palpitations       Assessment:       Recurrent ventricular tachycardia    Syncope secondary to VT with ICD shock-check labs today plan for evaluation by electrophysiology.  Coronary artery disease with history of CABG  Ischemic cardiomyopathy EF 40%  Chronic systolic heart failure clinically compensated on guideline directed therapy  Dyslipidemia on statin and Zetia    Plan:           BRANDON Pizarro  11/13/23  10:33 EST  Electronically signed by BRANDON Pizarro, 11/13/23, 10:31 AM EST.     "

## 2023-11-14 PROCEDURE — 99232 SBSQ HOSP IP/OBS MODERATE 35: CPT | Performed by: NURSE PRACTITIONER

## 2023-11-14 RX ORDER — PANTOPRAZOLE SODIUM 40 MG/1
40 TABLET, DELAYED RELEASE ORAL
Status: DISCONTINUED | OUTPATIENT
Start: 2023-11-14 | End: 2023-11-16 | Stop reason: HOSPADM

## 2023-11-14 RX ADMIN — ASPIRIN 81 MG: 81 TABLET, CHEWABLE ORAL at 09:57

## 2023-11-14 RX ADMIN — LISINOPRIL 2.5 MG: 2.5 TABLET ORAL at 09:57

## 2023-11-14 RX ADMIN — PANTOPRAZOLE SODIUM 40 MG: 40 TABLET, DELAYED RELEASE ORAL at 11:11

## 2023-11-14 RX ADMIN — METOPROLOL SUCCINATE 100 MG: 100 TABLET, EXTENDED RELEASE ORAL at 09:57

## 2023-11-14 NOTE — PLAN OF CARE
Goal Outcome Evaluation:  Plan of Care Reviewed With: patient        Progress: no change  Outcome Evaluation: VSS. NPO after MN. Ablation tomorrow. Ambulating frequently.

## 2023-11-14 NOTE — CASE MANAGEMENT/SOCIAL WORK
Discharge Planning Assessment  [facility]     Patient Name: Alden Pratt III  MRN: [id number]  Today's Date: 11/13/2023    Admit Date: 11/11/2023    Plan: Home; Denies needs.   Discharge Needs Assessment       Row Name 11/13/23 1925       Living Environment    People in Home spouse    Name(s) of People in Home Justine Pratt, wife    Current Living Arrangements home    Potentially Unsafe Housing Conditions none    In the past 12 months has the electric, gas, oil, or water company threatened to shut off services in your home? No    Primary Care Provided by self    Provides Primary Care For no one    Family Caregiver if Needed spouse    Family Caregiver Names Justine    Quality of Family Relationships helpful;involved;supportive    Able to Return to Prior Arrangements yes       Resource/Environmental Concerns    Resource/Environmental Concerns none    Transportation Concerns none       Food Insecurity    Within the past 12 months, you worried that your food would run out before you got the money to buy more. Never true    Within the past 12 months, the food you bought just didn't last and you didn't have money to get more. Never true       Transition Planning    Patient/Family Anticipates Transition to home with family    Patient/Family Anticipated Services at Transition none    Transportation Anticipated family or friend will provide       Discharge Needs Assessment    Readmission Within the Last 30 Days no previous admission in last 30 days    Equipment Currently Used at Home bp cuff;scales    Concerns to be Addressed no discharge needs identified;denies needs/concerns at this time    Anticipated Changes Related to Illness none    Equipment Needed After Discharge none    Provided Post Acute Provider List? N/A    N/A Provider List Comment No need for list identified at this time.                   Discharge Plan       Row Name 11/13/23 1928       Plan    Plan Home; Denies needs.    Plan Comments CCP spoke  with pleasant Pt at bedside.  CCP role explained and discharge planning discussed.  Face sheet verified.  Pt stated he is IADL's, retired and drives.  Pt lives with his spouse/Justine Pratt in a single-story home with one entrance stair steps.  Pt reports PCP is, Jhoan Quezada.  [facility]  Pt denies use of past home health or going to a sub-acute rehab.  Pt has the following DME- bp cuff monitor and scale.  Pt plans to return home at discharge and spouse will transport.  Pt denies current discharge needs.  CCP will continue to follow…….Nia ESCOBAR /.                  Continued Care and Services - Admitted Since 11/11/2023    Coordination has not been started for this encounter.       Expected Discharge Date and Time       Expected Discharge Date Expected Discharge Time    Nov 15, 2023            Demographic Summary       Row Name 11/13/23 1925       General Information    Admission Type inpatient    Arrived From home    Required Notices Provided Important Message from Medicare    Referral Source admission list    Reason for Consult discharge planning    Preferred Language English       Contact Information    Permission Granted to Share Info With family/designee                   Functional Status       Row Name 11/13/23 1925       Functional Status    Usual Activity Tolerance good    Current Activity Tolerance good       Assessment of Health Literacy    How often do you have someone help you read hospital materials? Never    Health Literacy Excellent       Functional Status, IADL    Medications independent    Meal Preparation independent    Housekeeping independent    Laundry independent    Shopping independent       Mental Status    General Appearance WDL WDL       Mental Status Summary    Recent Changes in Mental Status/Cognitive Functioning no changes       Employment/    Employment Status retired                   Psychosocial    No  documentation.                  Abuse/Neglect    No documentation.                  Legal    No documentation.                  Substance Abuse    No documentation.                  Patient Forms    No documentation.                     Nia Nicolas RN

## 2023-11-14 NOTE — PLAN OF CARE
PATIENT RESTED WELL, EASY TO AROUSE, FREE OF CHEST PAIN. VS WNL. SINUS RHYTHM, PACED RHYTHM,, PLAN OF CARE DISCUSSED, FALL PRECAUTIONS. CALL LIGHT IN REACH.

## 2023-11-14 NOTE — PROGRESS NOTES
Saw patient this afternoon. Answered questions. NPO after midnight.   Plans for VT ablation tomorrow.

## 2023-11-14 NOTE — PROGRESS NOTES
Hospital Follow Up    LOS:  LOS: 3 days   Patient Name: Alden Pratt III  Age/Sex: 73 y.o. male  : 1950  MRN: [id number]    Day of Service: 23   Length of Stay: 3  Encounter Provider: BRANDON Pizarro  Place of Service: [facility] CARDIOLOGY  Patient Care Team:  Jhoan Quezada MD as PCP - General    Subjective:     Chief Complaint: syncope, VT, ICD shock    Interval History: No symptoms today    Objective:     Objective:  Temp:  [97.5 °F (36.4 °C)-98.2 °F (36.8 °C)] 97.9 °F (36.6 °C)  Heart Rate:  [70-77] 71  Resp:  [14-17] 17  BP: (106-125)/(72-76) 111/76     Intake/Output Summary (Last 24 hours) at 2023 1402  Last data filed at 2023 1209  Gross per 24 hour   Intake 870 ml   Output --   Net 870 ml     Body mass index is 23.14 kg/m².      23  1451   Weight: 81.9 kg (180 lb 8 oz) 81.8 kg (180 lb 5.4 oz)     Weight change:     Physical Exam:   General Appearance:    Awake alert and oriented in no acute distress.   Color:  Skin:  Neuro:  HEENT:    Lungs:     Pink  Warm and dry  No focal, motor or sensory deficits  Neck supple, pupils equal, round and reactive. No JVD, No Bruit  Clear to auscultation,respirations regular, even and                  unlabored    Heart:    Regular rate and rhythm, S1 and S2, no murmur, no gallop, no rub. No edema, DP/PT pulses are 2+   Chest Wall:    No abnormalities observed   Abdomen:     Normal bowel sounds, no masses, no organomegaly, soft        non-tender, non-distended, no guarding, no ascites noted   Extremities:   Moves all extremities well, no edema, no cyanosis, no redness       Lab Review:   Results from last 7 days   Lab Units 23  1019 23  0322   SODIUM mmol/L 139 141   POTASSIUM mmol/L 4.1 3.9   CHLORIDE mmol/L 102 108*   CO2 mmol/L 29.0 25.1   BUN mg/dL 15 12   CREATININE mg/dL 0.91 0.77   GLUCOSE mg/dL 85 87   CALCIUM mg/dL 9.4 8.7         Results from last 7 days  "  Lab Units 11/09/23  1311   WBC 10*3/mm3 4.69   HEMOGLOBIN g/dL 12.9*   HEMATOCRIT % 41.1   PLATELETS 10*3/mm3 159         Results from last 7 days   Lab Units 11/13/23  1019 11/12/23  0322   MAGNESIUM mg/dL 2.3 2.2           Invalid input(s): \"LDLCALC\"          I reviewed the patient's new clinical results.  I personally viewed and interpreted the patient's EKG  Current Medications:   Scheduled Meds:aspirin, 81 mg, Oral, Daily  lisinopril, 2.5 mg, Oral, Q24H  metoprolol succinate XL, 100 mg, Oral, Q24H  pantoprazole, 40 mg, Oral, Q AM      Continuous Infusions:     Allergies:  Allergies   Allergen Reactions    Contrast Dye (Echo Or Unknown Ct/Mr) Hives    Iodinated Contrast Media Hives     Tolerated with pre meds prior to heart cath in feb Sudafed 12 Hour [Pseudoephedrine Hcl Er]     Pseudoephedrine Palpitations       Assessment:       Recurrent ventricular tachycardia    Syncope secondary to VT with ICD shock-check labs today plan for evaluation by electrophysiology.- Planned for VT ablation tomorrow. Continue current regimen.  Coronary artery disease with history of CABG  Ischemic cardiomyopathy EF 40%  Chronic systolic heart failure clinically compensated on guideline directed therapy  Dyslipidemia on statin and Zetia       Plan:           BRANDON Pizarro  11/14/23  14:02 EST  Electronically signed by BRANDON Pizarro, 11/14/23, 2:02 PM EST.     "

## 2023-11-15 ENCOUNTER — ANESTHESIA (OUTPATIENT)
Dept: CARDIOLOGY | Facility: HOSPITAL | Age: 73
End: 2023-11-15
Payer: MEDICARE

## 2023-11-15 ENCOUNTER — ANESTHESIA EVENT (OUTPATIENT)
Dept: CARDIOLOGY | Facility: HOSPITAL | Age: 73
End: 2023-11-15
Payer: MEDICARE

## 2023-11-15 LAB
ACT BLD: 149 SECONDS (ref 82–152)
ACT BLD: 325 SECONDS (ref 82–152)
ACT BLD: 347 SECONDS (ref 82–152)
ACT BLD: 363 SECONDS (ref 82–152)
ACT BLD: 369 SECONDS (ref 82–152)
ACT BLD: 390 SECONDS (ref 82–152)

## 2023-11-15 PROCEDURE — 93654 COMPRE EP EVAL TX VT: CPT | Performed by: INTERNAL MEDICINE

## 2023-11-15 PROCEDURE — C1759 CATH, INTRA ECHOCARDIOGRAPHY: HCPCS | Performed by: INTERNAL MEDICINE

## 2023-11-15 PROCEDURE — 93662 INTRACARDIAC ECG (ICE): CPT | Performed by: INTERNAL MEDICINE

## 2023-11-15 PROCEDURE — 93462 L HRT CATH TRNSPTL PUNCTURE: CPT | Performed by: INTERNAL MEDICINE

## 2023-11-15 PROCEDURE — 25010000002 METHYLPREDNISOLONE PER 40 MG

## 2023-11-15 PROCEDURE — C1894 INTRO/SHEATH, NON-LASER: HCPCS | Performed by: INTERNAL MEDICINE

## 2023-11-15 PROCEDURE — 25010000002 HEPARIN (PORCINE) PER 1000 UNITS: Performed by: INTERNAL MEDICINE

## 2023-11-15 PROCEDURE — 93005 ELECTROCARDIOGRAM TRACING: CPT | Performed by: INTERNAL MEDICINE

## 2023-11-15 PROCEDURE — 25810000003 SODIUM CHLORIDE 0.9 % SOLUTION: Performed by: ANESTHESIOLOGY

## 2023-11-15 PROCEDURE — 25010000002 SUGAMMADEX 200 MG/2ML SOLUTION: Performed by: STUDENT IN AN ORGANIZED HEALTH CARE EDUCATION/TRAINING PROGRAM

## 2023-11-15 PROCEDURE — C1893 INTRO/SHEATH, FIXED,NON-PEEL: HCPCS | Performed by: INTERNAL MEDICINE

## 2023-11-15 PROCEDURE — 25010000002 PROTAMINE SULFATE PER 10 MG: Performed by: INTERNAL MEDICINE

## 2023-11-15 PROCEDURE — 02583ZZ DESTRUCTION OF CONDUCTION MECHANISM, PERCUTANEOUS APPROACH: ICD-10-PCS | Performed by: INTERNAL MEDICINE

## 2023-11-15 PROCEDURE — C1732 CATH, EP, DIAG/ABL, 3D/VECT: HCPCS | Performed by: INTERNAL MEDICINE

## 2023-11-15 PROCEDURE — 25010000002 PROPOFOL 10 MG/ML EMULSION: Performed by: ANESTHESIOLOGY

## 2023-11-15 PROCEDURE — 25010000002 PHENYLEPHRINE 10 MG/ML SOLUTION: Performed by: ANESTHESIOLOGY

## 2023-11-15 PROCEDURE — 93010 ELECTROCARDIOGRAM REPORT: CPT | Performed by: INTERNAL MEDICINE

## 2023-11-15 PROCEDURE — C1766 INTRO/SHEATH,STRBLE,NON-PEEL: HCPCS | Performed by: INTERNAL MEDICINE

## 2023-11-15 PROCEDURE — 25010000002 MIDAZOLAM PER 1 MG: Performed by: ANESTHESIOLOGY

## 2023-11-15 PROCEDURE — 25010000002 FUROSEMIDE PER 20 MG: Performed by: INTERNAL MEDICINE

## 2023-11-15 PROCEDURE — 85347 COAGULATION TIME ACTIVATED: CPT

## 2023-11-15 PROCEDURE — 25010000002 DIPHENHYDRAMINE PER 50 MG

## 2023-11-15 RX ORDER — SODIUM CHLORIDE 9 MG/ML
250 INJECTION, SOLUTION INTRAVENOUS ONCE AS NEEDED
Status: DISCONTINUED | OUTPATIENT
Start: 2023-11-15 | End: 2023-11-16 | Stop reason: HOSPADM

## 2023-11-15 RX ORDER — ONDANSETRON 2 MG/ML
4 INJECTION INTRAMUSCULAR; INTRAVENOUS ONCE AS NEEDED
Status: DISCONTINUED | OUTPATIENT
Start: 2023-11-15 | End: 2023-11-16 | Stop reason: HOSPADM

## 2023-11-15 RX ORDER — NALOXONE HCL 0.4 MG/ML
0.2 VIAL (ML) INJECTION AS NEEDED
Status: DISCONTINUED | OUTPATIENT
Start: 2023-11-15 | End: 2023-11-16 | Stop reason: HOSPADM

## 2023-11-15 RX ORDER — FENTANYL CITRATE 50 UG/ML
50 INJECTION, SOLUTION INTRAMUSCULAR; INTRAVENOUS ONCE AS NEEDED
Status: DISCONTINUED | OUTPATIENT
Start: 2023-11-15 | End: 2023-11-16 | Stop reason: HOSPADM

## 2023-11-15 RX ORDER — LABETALOL HYDROCHLORIDE 5 MG/ML
5 INJECTION, SOLUTION INTRAVENOUS
Status: DISCONTINUED | OUTPATIENT
Start: 2023-11-15 | End: 2023-11-16 | Stop reason: HOSPADM

## 2023-11-15 RX ORDER — PHENYLEPHRINE HYDROCHLORIDE 10 MG/ML
INJECTION INTRAVENOUS AS NEEDED
Status: DISCONTINUED | OUTPATIENT
Start: 2023-11-15 | End: 2023-11-15 | Stop reason: SURG

## 2023-11-15 RX ORDER — ROCURONIUM BROMIDE 10 MG/ML
INJECTION, SOLUTION INTRAVENOUS AS NEEDED
Status: DISCONTINUED | OUTPATIENT
Start: 2023-11-15 | End: 2023-11-15 | Stop reason: SURG

## 2023-11-15 RX ORDER — OXYCODONE AND ACETAMINOPHEN 7.5; 325 MG/1; MG/1
1 TABLET ORAL EVERY 4 HOURS PRN
Status: DISCONTINUED | OUTPATIENT
Start: 2023-11-15 | End: 2023-11-16 | Stop reason: HOSPADM

## 2023-11-15 RX ORDER — IPRATROPIUM BROMIDE AND ALBUTEROL SULFATE 2.5; .5 MG/3ML; MG/3ML
3 SOLUTION RESPIRATORY (INHALATION) ONCE AS NEEDED
Status: DISCONTINUED | OUTPATIENT
Start: 2023-11-15 | End: 2023-11-16 | Stop reason: HOSPADM

## 2023-11-15 RX ORDER — PROMETHAZINE HYDROCHLORIDE 25 MG/1
25 TABLET ORAL ONCE AS NEEDED
Status: DISCONTINUED | OUTPATIENT
Start: 2023-11-15 | End: 2023-11-16 | Stop reason: HOSPADM

## 2023-11-15 RX ORDER — SODIUM CHLORIDE, SODIUM LACTATE, POTASSIUM CHLORIDE, CALCIUM CHLORIDE 600; 310; 30; 20 MG/100ML; MG/100ML; MG/100ML; MG/100ML
9 INJECTION, SOLUTION INTRAVENOUS CONTINUOUS
Status: DISCONTINUED | OUTPATIENT
Start: 2023-11-15 | End: 2023-11-16 | Stop reason: HOSPADM

## 2023-11-15 RX ORDER — FUROSEMIDE 10 MG/ML
INJECTION INTRAMUSCULAR; INTRAVENOUS
Status: DISCONTINUED | OUTPATIENT
Start: 2023-11-15 | End: 2023-11-15 | Stop reason: HOSPADM

## 2023-11-15 RX ORDER — PROPOFOL 10 MG/ML
VIAL (ML) INTRAVENOUS AS NEEDED
Status: DISCONTINUED | OUTPATIENT
Start: 2023-11-15 | End: 2023-11-15 | Stop reason: SURG

## 2023-11-15 RX ORDER — FLUMAZENIL 0.1 MG/ML
0.2 INJECTION INTRAVENOUS AS NEEDED
Status: DISCONTINUED | OUTPATIENT
Start: 2023-11-15 | End: 2023-11-16 | Stop reason: HOSPADM

## 2023-11-15 RX ORDER — PROTAMINE SULFATE 10 MG/ML
INJECTION, SOLUTION INTRAVENOUS
Status: DISCONTINUED | OUTPATIENT
Start: 2023-11-15 | End: 2023-11-15 | Stop reason: HOSPADM

## 2023-11-15 RX ORDER — NALOXONE HCL 0.4 MG/ML
0.4 VIAL (ML) INJECTION
Status: DISCONTINUED | OUTPATIENT
Start: 2023-11-15 | End: 2023-11-16 | Stop reason: HOSPADM

## 2023-11-15 RX ORDER — HYDRALAZINE HYDROCHLORIDE 20 MG/ML
5 INJECTION INTRAMUSCULAR; INTRAVENOUS
Status: DISCONTINUED | OUTPATIENT
Start: 2023-11-15 | End: 2023-11-16 | Stop reason: HOSPADM

## 2023-11-15 RX ORDER — ONDANSETRON 2 MG/ML
4 INJECTION INTRAMUSCULAR; INTRAVENOUS EVERY 6 HOURS PRN
Status: DISCONTINUED | OUTPATIENT
Start: 2023-11-15 | End: 2023-11-16 | Stop reason: HOSPADM

## 2023-11-15 RX ORDER — HEPARIN SODIUM 1000 [USP'U]/ML
INJECTION, SOLUTION INTRAVENOUS; SUBCUTANEOUS
Status: DISCONTINUED | OUTPATIENT
Start: 2023-11-15 | End: 2023-11-15 | Stop reason: HOSPADM

## 2023-11-15 RX ORDER — HYDROCODONE BITARTRATE AND ACETAMINOPHEN 5; 325 MG/1; MG/1
1 TABLET ORAL ONCE AS NEEDED
Status: DISCONTINUED | OUTPATIENT
Start: 2023-11-15 | End: 2023-11-16 | Stop reason: HOSPADM

## 2023-11-15 RX ORDER — DROPERIDOL 2.5 MG/ML
0.62 INJECTION, SOLUTION INTRAMUSCULAR; INTRAVENOUS
Status: DISCONTINUED | OUTPATIENT
Start: 2023-11-15 | End: 2023-11-16 | Stop reason: HOSPADM

## 2023-11-15 RX ORDER — FAMOTIDINE 10 MG/ML
20 INJECTION, SOLUTION INTRAVENOUS ONCE
Status: COMPLETED | OUTPATIENT
Start: 2023-11-15 | End: 2023-11-15

## 2023-11-15 RX ORDER — HYDROMORPHONE HYDROCHLORIDE 1 MG/ML
0.5 INJECTION, SOLUTION INTRAMUSCULAR; INTRAVENOUS; SUBCUTANEOUS
Status: DISCONTINUED | OUTPATIENT
Start: 2023-11-15 | End: 2023-11-16 | Stop reason: HOSPADM

## 2023-11-15 RX ORDER — SODIUM CHLORIDE 0.9 % (FLUSH) 0.9 %
3 SYRINGE (ML) INJECTION EVERY 12 HOURS SCHEDULED
Status: DISCONTINUED | OUTPATIENT
Start: 2023-11-15 | End: 2023-11-16 | Stop reason: HOSPADM

## 2023-11-15 RX ORDER — FENTANYL CITRATE 50 UG/ML
50 INJECTION, SOLUTION INTRAMUSCULAR; INTRAVENOUS
Status: DISCONTINUED | OUTPATIENT
Start: 2023-11-15 | End: 2023-11-16 | Stop reason: HOSPADM

## 2023-11-15 RX ORDER — LIDOCAINE HYDROCHLORIDE 20 MG/ML
INJECTION, SOLUTION INFILTRATION; PERINEURAL AS NEEDED
Status: DISCONTINUED | OUTPATIENT
Start: 2023-11-15 | End: 2023-11-15 | Stop reason: SURG

## 2023-11-15 RX ORDER — SODIUM CHLORIDE 9 MG/ML
INJECTION, SOLUTION INTRAVENOUS
Status: COMPLETED | OUTPATIENT
Start: 2023-11-15 | End: 2023-11-15

## 2023-11-15 RX ORDER — ACETAMINOPHEN 325 MG/1
650 TABLET ORAL EVERY 4 HOURS PRN
Status: DISCONTINUED | OUTPATIENT
Start: 2023-11-15 | End: 2023-11-16 | Stop reason: HOSPADM

## 2023-11-15 RX ORDER — MIDAZOLAM HYDROCHLORIDE 1 MG/ML
0.5 INJECTION INTRAMUSCULAR; INTRAVENOUS
Status: DISCONTINUED | OUTPATIENT
Start: 2023-11-15 | End: 2023-11-16 | Stop reason: HOSPADM

## 2023-11-15 RX ORDER — METHYLPREDNISOLONE SODIUM SUCCINATE 40 MG/ML
40 INJECTION, POWDER, LYOPHILIZED, FOR SOLUTION INTRAMUSCULAR; INTRAVENOUS EVERY 4 HOURS
Status: DISCONTINUED | OUTPATIENT
Start: 2023-11-15 | End: 2023-11-16 | Stop reason: HOSPADM

## 2023-11-15 RX ORDER — LIDOCAINE HCL/EPINEPHRINE/PF 2%-1:200K
VIAL (ML) INJECTION
Status: DISCONTINUED | OUTPATIENT
Start: 2023-11-15 | End: 2023-11-15 | Stop reason: HOSPADM

## 2023-11-15 RX ORDER — DIPHENHYDRAMINE HYDROCHLORIDE 50 MG/ML
12.5 INJECTION INTRAMUSCULAR; INTRAVENOUS
Status: DISCONTINUED | OUTPATIENT
Start: 2023-11-15 | End: 2023-11-16 | Stop reason: HOSPADM

## 2023-11-15 RX ORDER — SODIUM CHLORIDE 0.9 % (FLUSH) 0.9 %
3-10 SYRINGE (ML) INJECTION AS NEEDED
Status: DISCONTINUED | OUTPATIENT
Start: 2023-11-15 | End: 2023-11-16 | Stop reason: HOSPADM

## 2023-11-15 RX ORDER — DIPHENHYDRAMINE HYDROCHLORIDE 50 MG/ML
50 INJECTION INTRAMUSCULAR; INTRAVENOUS
Status: COMPLETED | OUTPATIENT
Start: 2023-11-15 | End: 2023-11-15

## 2023-11-15 RX ORDER — PROMETHAZINE HYDROCHLORIDE 25 MG/1
25 SUPPOSITORY RECTAL ONCE AS NEEDED
Status: DISCONTINUED | OUTPATIENT
Start: 2023-11-15 | End: 2023-11-16 | Stop reason: HOSPADM

## 2023-11-15 RX ORDER — EPHEDRINE SULFATE 50 MG/ML
5 INJECTION, SOLUTION INTRAVENOUS ONCE AS NEEDED
Status: DISCONTINUED | OUTPATIENT
Start: 2023-11-15 | End: 2023-11-16 | Stop reason: HOSPADM

## 2023-11-15 RX ORDER — LIDOCAINE HYDROCHLORIDE 10 MG/ML
0.5 INJECTION, SOLUTION INFILTRATION; PERINEURAL ONCE AS NEEDED
Status: DISCONTINUED | OUTPATIENT
Start: 2023-11-15 | End: 2023-11-16 | Stop reason: HOSPADM

## 2023-11-15 RX ORDER — SODIUM CHLORIDE 9 MG/ML
INJECTION, SOLUTION INTRAVENOUS CONTINUOUS PRN
Status: DISCONTINUED | OUTPATIENT
Start: 2023-11-15 | End: 2023-11-15 | Stop reason: SURG

## 2023-11-15 RX ORDER — ACETAMINOPHEN 650 MG/1
650 SUPPOSITORY RECTAL EVERY 4 HOURS PRN
Status: DISCONTINUED | OUTPATIENT
Start: 2023-11-15 | End: 2023-11-16 | Stop reason: HOSPADM

## 2023-11-15 RX ADMIN — PHENYLEPHRINE HYDROCHLORIDE 100 MCG: 10 INJECTION INTRAVENOUS at 16:28

## 2023-11-15 RX ADMIN — FAMOTIDINE 20 MG: 10 INJECTION INTRAVENOUS at 14:40

## 2023-11-15 RX ADMIN — SUGAMMADEX 200 MG: 100 INJECTION, SOLUTION INTRAVENOUS at 18:41

## 2023-11-15 RX ADMIN — ROCURONIUM BROMIDE 50 MG: 10 INJECTION, SOLUTION INTRAVENOUS at 15:20

## 2023-11-15 RX ADMIN — PHENYLEPHRINE HYDROCHLORIDE 150 MCG: 10 INJECTION INTRAVENOUS at 17:13

## 2023-11-15 RX ADMIN — ROCURONIUM BROMIDE 50 MG: 10 INJECTION, SOLUTION INTRAVENOUS at 16:39

## 2023-11-15 RX ADMIN — SODIUM CHLORIDE: 0.9 INJECTION, SOLUTION INTRAVENOUS at 15:10

## 2023-11-15 RX ADMIN — LIDOCAINE HYDROCHLORIDE 100 MG: 20 INJECTION, SOLUTION INFILTRATION; PERINEURAL at 15:20

## 2023-11-15 RX ADMIN — DIPHENHYDRAMINE HYDROCHLORIDE 50 MG: 50 INJECTION, SOLUTION INTRAMUSCULAR; INTRAVENOUS at 12:51

## 2023-11-15 RX ADMIN — PHENYLEPHRINE HYDROCHLORIDE 100 MCG: 10 INJECTION INTRAVENOUS at 15:35

## 2023-11-15 RX ADMIN — PROPOFOL 200 MG: 10 INJECTION, EMULSION INTRAVENOUS at 15:20

## 2023-11-15 RX ADMIN — PANTOPRAZOLE SODIUM 40 MG: 40 TABLET, DELAYED RELEASE ORAL at 06:29

## 2023-11-15 RX ADMIN — METHYLPREDNISOLONE SODIUM SUCCINATE 40 MG: 40 INJECTION, POWDER, FOR SOLUTION INTRAMUSCULAR; INTRAVENOUS at 12:52

## 2023-11-15 RX ADMIN — MIDAZOLAM 0.5 MG: 1 INJECTION INTRAMUSCULAR; INTRAVENOUS at 14:41

## 2023-11-15 NOTE — ANESTHESIA PREPROCEDURE EVALUATION
Anesthesia Evaluation     Patient summary reviewed and Nursing notes reviewed   NPO Solid Status: > 8 hours  NPO Liquid Status: > 8 hours           Airway   Mallampati: II  Neck ROM: full  No difficulty expected  Dental - normal exam     Pulmonary     breath sounds clear to auscultation  (+) ,shortness of breath  Cardiovascular     Rhythm: regular    (+) past MI , CAD, CABG, dysrhythmias Atrial Fib, angina, CHF , PVD, hyperlipidemia      Neuro/Psych  GI/Hepatic/Renal/Endo    (+) GERD    Musculoskeletal     Abdominal   (+) obese   Substance History      OB/GYN          Other   arthritis,   history of cancer                      Anesthesia Plan    ASA 3     general     intravenous induction     Anesthetic plan, risks, benefits, and alternatives have been provided, discussed and informed consent has been obtained with: patient.

## 2023-11-15 NOTE — PLAN OF CARE
PATIENT NPO POST MID-NIGHT, A-PACED. VS WNL, FREE OF CARDIAC DISTRESS. PLAN OF CARE DISCUSSED, CALL LIGHT IN REACH.

## 2023-11-15 NOTE — ANESTHESIA PROCEDURE NOTES
Airway  Urgency: elective    Date/Time: 11/15/2023 3:23 PM  End Time:11/15/2023 3:23 PM  Airway not difficult    General Information and Staff    Patient location during procedure: OR  Anesthesiologist: Noel Alexander MD    Indications and Patient Condition  Indications for airway management: airway protection    Preoxygenated: yes  MILS maintained throughout  Mask difficulty assessment: 1 - vent by mask    Final Airway Details  Final airway type: endotracheal airway      Successful airway: ETT  Cuffed: yes   Successful intubation technique: direct laryngoscopy  Facilitating devices/methods: cricoid pressure  Endotracheal tube insertion site: oral  Blade: Brianna  Blade size: 4  ETT size (mm): 7.5  Cormack-Lehane Classification: grade IIa - partial view of glottis  Placement verified by: chest auscultation and capnometry   Inital cuff pressure (cm H2O): 5  Cuff volume (mL): 5  Measured from: gums  ETT/EBT to gums (cm): 22  Number of attempts at approach: 1  Assessment: lips, teeth, and gum same as pre-op and atraumatic intubation

## 2023-11-15 NOTE — PLAN OF CARE
Goal Outcome Evaluation:  Plan of Care Reviewed With: patient        Progress: no change  Outcome Evaluation: VSS. NPO for Ablation. Sent to cath lab. Premedicated for procedure.

## 2023-11-15 NOTE — PROGRESS NOTES
VT ablation this afternoon. No questions this morning.     NPO for procedure today. No new complaints.

## 2023-11-16 ENCOUNTER — READMISSION MANAGEMENT (OUTPATIENT)
Dept: CALL CENTER | Facility: HOSPITAL | Age: 73
End: 2023-11-16
Payer: MEDICARE

## 2023-11-16 VITALS
BODY MASS INDEX: 27.9 KG/M2 | TEMPERATURE: 98 F | SYSTOLIC BLOOD PRESSURE: 106 MMHG | HEART RATE: 77 BPM | DIASTOLIC BLOOD PRESSURE: 64 MMHG | RESPIRATION RATE: 15 BRPM | HEIGHT: 74 IN | OXYGEN SATURATION: 98 % | WEIGHT: 217.4 LBS

## 2023-11-16 LAB
GLUCOSE BLDC GLUCOMTR-MCNC: 196 MG/DL (ref 70–130)
GLUCOSE BLDC GLUCOMTR-MCNC: 98 MG/DL (ref 70–130)
QT INTERVAL: 453 MS
QTC INTERVAL: 520 MS

## 2023-11-16 PROCEDURE — 99238 HOSP IP/OBS DSCHRG MGMT 30/<: CPT | Performed by: NURSE PRACTITIONER

## 2023-11-16 PROCEDURE — 82948 REAGENT STRIP/BLOOD GLUCOSE: CPT

## 2023-11-16 RX ADMIN — Medication 3 ML: at 09:03

## 2023-11-16 RX ADMIN — ASPIRIN 81 MG: 81 TABLET, CHEWABLE ORAL at 09:04

## 2023-11-16 RX ADMIN — Medication 3 ML: at 00:00

## 2023-11-16 RX ADMIN — METOPROLOL SUCCINATE 100 MG: 100 TABLET, EXTENDED RELEASE ORAL at 09:04

## 2023-11-16 RX ADMIN — LISINOPRIL 2.5 MG: 2.5 TABLET ORAL at 09:04

## 2023-11-16 RX ADMIN — PANTOPRAZOLE SODIUM 40 MG: 40 TABLET, DELAYED RELEASE ORAL at 05:16

## 2023-11-16 NOTE — SIGNIFICANT NOTE
Upon transfer pt had slight oozing to right femoral dressing. Transferring RN removed dressing and assessed that oozing was coming from the venous suture in place( CVI charge RN visualized site when dressing was removed). New dressing applied to arterial site

## 2023-11-16 NOTE — DISCHARGE SUMMARY
Patient Name: Alden Pratt III  :1950  73 y.o.    Date of Admit: 2023  Date of Discharge:  2023    Discharge Diagnosis:  Syncope secondary to VT with ICD shock-check labs today plan for evaluation by electrophysiology.- s/p Ablation  Coronary artery disease with history of CABG  Ischemic cardiomyopathy EF 40%  Chronic systolic heart failure clinically compensated on guideline directed therapy  Dyslipidemia on statin and Zetia     Hospital Course:   Patient is a 73-year-old male who is a patient of Dr. Almanza and was admitted to the hospital after he had a syncopal event.  He has a history of ventricular tachycardia and ventricular fibrillation with ICD shock in the past.  He has a history of coronary disease he had stenting after an MI in  and eventually had a coronary artery bypass graft surgery with a LIMA to his LAD, vein graft to his distal LAD, vein graft to the OM1, vein graft to the PDA and vein graft to the posterior lateral artery, vein graft to the right ventricular branch.  He has an ICD.  He has a atrial fibrillation and has had an ablation in the past he follows with Dr. Ngo.  This is the third time this year he has had a ICD shock for ventricular tachycardia and was actually scheduled for an ablation.  He was seen by EP and they did an ablation for VT yesterday.  He has done well he is not having any chest pain or shortness of breath.    Procedures Performed  Procedure(s):  Ablation VT  3D Mapping EP       Consults       Date and Time Order Name Status Description    2023  9:35 AM Cardiac Electrophysiologist Inpatient Consult Completed     10/22/2023 10:14 AM Cardiac Electrophysiologist Inpatient Consult Completed     10/21/2023  7:29 PM Inpatient Cardiology Consult Completed             Pertinent Test Results:   Results from last 7 days   Lab Units 23  1019   SODIUM mmol/L 139   POTASSIUM mmol/L 4.1   CHLORIDE mmol/L 102   CO2 mmol/L 29.0   BUN mg/dL 15    CREATININE mg/dL 0.91   CALCIUM mg/dL 9.4   GLUCOSE mg/dL 85         @LABRCNT(bnp)@  Results from last 7 days   Lab Units 11/09/23  1311   WBC 10*3/mm3 4.69   HEMOGLOBIN g/dL 12.9*   HEMATOCRIT % 41.1   PLATELETS 10*3/mm3 159         Results from last 7 days   Lab Units 11/13/23  1019   MAGNESIUM mg/dL 2.3           Condition on Discharge: stable    Discharge Medications     Discharge Medications        Continue These Medications        Instructions Start Date   apixaban 5 MG tablet tablet  Commonly known as: ELIQUIS   5 mg, Oral, Every 12 Hours Scheduled      aspirin 81 MG EC tablet   81 mg, Oral, Daily      atorvastatin 80 MG tablet  Commonly known as: LIPITOR   80 mg, Oral, Daily, 200001      carboxymethylcellulose 0.5 % solution  Commonly known as: REFRESH PLUS   1 drop, Ophthalmic      cetirizine 10 MG tablet  Commonly known as: zyrTEC   10 mg, Oral, As Needed, Only Occasionally will use      clobetasol 0.05 % cream  Commonly known as: TEMOVATE   Topical, 2 Times Daily      DICLOFENAC PO   75 mg, Oral, 2 Times Daily      ezetimibe 10 MG tablet  Commonly known as: ZETIA   10 mg, Oral, Daily      fluticasone 50 MCG/ACT nasal spray  Commonly known as: FLONASE   1 spray, Nasal, 2 Times Daily PRN      Jardiance 10 MG tablet tablet  Generic drug: empagliflozin   10 mg, Oral, Daily      lisinopril 2.5 MG tablet  Commonly known as: PRINIVIL,ZESTRIL   2.5 mg, Oral, Daily      metaxalone 800 MG tablet  Commonly known as: SKELAXIN   800 mg, Oral, As Needed      metoprolol succinate  MG 24 hr tablet  Commonly known as: TOPROL-XL   100 mg, Oral, In PM      metoprolol succinate XL 50 MG 24 hr tablet  Commonly known as: TOPROL-XL   50 mg, Oral, In AM       Minoxidil 5 % foam   Apply externally, 2 times daily      nitroglycerin 0.4 MG SL tablet  Commonly known as: NITROSTAT   1 under the tongue as needed for angina, may repeat q5mins for up three doses      omeprazole 40 MG capsule  Commonly known as: priLOSEC   40 mg,  Oral, Daily      pimecrolimus 1 % cream  Commonly known as: ELIDEL   Topical, 2 Times Daily      spironolactone 25 MG tablet  Commonly known as: ALDACTONE   12.5 mg, Oral, Daily      vitamin D3 125 MCG (5000 UT) capsule capsule   5,000 Units, Oral, Every Other Day             Stop These Medications      amiodarone 200 MG tablet  Commonly known as: PACERONE     furosemide 20 MG tablet  Commonly known as: LASIX     naftifine 1 % gel gel  Commonly known as: NAFTIN              Discharge Diet:     Activity at Discharge:     Discharge disposition: home    Follow-up Appointments  Future Appointments   Date Time Provider Department Center   12/13/2023  1:00 PM Galo Braden APRN [id number] None   2/16/2024 11:40 AM Andrew Ball MD [id number]         Test Results Pending at Discharge       BRANDON Pizarro, [facility]  11/16/23  12:58 EST    Time: Discharge 20 min  Electronically signed by BRANDON Pizarro, 11/16/23, 12:58 PM EST.

## 2023-11-16 NOTE — DISCHARGE INSTRUCTIONS
[facility]  Cardiology  [address] [place] [address]  [phone]    Coronary Ablation After Care    Refer to this sheet in the next few weeks. These instructions provide you with information on caring for yourself after your procedure. Your health care provider may also give you more specific instructions. Your treatment has been planned according to current medical practices, but problems sometimes occur. Call your health care provider if you have any problems or questions after your procedure.      What to Expect After the Procedure:  After your procedure, it is typical to have the following sensations:  Minor discomfort or tenderness and a small bump at the catheter insertion site(s). The bump(s) should usually decrease in size and tenderness within 1 to 2 weeks.  Any bruising will usually fade within 2 to 4 weeks.  Home Care Instructions:  Do not apply powder or lotion to the site.  Do not take baths, swim, or use a hot tub until your health care provider approves and the site is completely healed.  Do not bend, squat, or lift anything over 20 lb (9 kg) or as directed by your health care provider. However, we recommend lifting nothing heavier than a gallon of milk.    You may shower 24 hours after the procedure. Remove the bandage (dressing) and gently wash the site with plain soap and water. Gently pat the site dry. You may apply a band aid daily for 2 days if desired.    Inspect the site at least twice daily.  Increase your fluid intake for the next 2 days.    Limit your activity for the first 48 hours.   Avoid strenuous activity for 1 week or as advised by your physician.    Follow instructions about when you can drive or return to work as directed by your physician.    Hold direct pressure over the site when you cough, sneeze, laugh or change positions.  Do this for the next 2 days.    Call Your Doctor If:  You have drainage (other than a small amount of blood on the dressing).  You  have chills or a fever > 101.  You have redness, warmth, swelling (larger than a walnut), or pain at the insertion   You develop palpitations, chest pain or shortness of breath, feel faint, or pass out.  You develop pain, discoloration, coldness, numbness, tingling, or severe bruising in the leg that held the catheter.  You develop bleeding from any other place, such as the bowels.  You have heavy bleeding from the site.  If this happens, hold pressure on the site and call 911.        Make Sure You:  Understand these instructions.  Will watch your condition.  Will get help right away if you are not doing well or get worse.

## 2023-11-16 NOTE — NURSING NOTE
PATIENT RESTED WELL, EASY TO AROUSE, NO HEMATOMA NOTED. SINUS RHYTHM/A-PACED. FREE OF CHEST PAIN/DISCOMFORT. GAMINO CATH REMOVED. ORTHOSTATIC BP, WNL. OOB TO CHAIR. WIFE AT BEDSIDE. PLAN OF CARE DISCUSSED.

## 2023-11-17 NOTE — CASE MANAGEMENT/SOCIAL WORK
Case Management Discharge Note      Final Note: dc home    Provided Post Acute Provider List?: N/A  N/A Provider List Comment: No need for list identified at this time.    Selected Continued Care - Discharged on 11/16/2023 Admission date: 11/11/2023 - Discharge disposition: Home or Self Care      Destination    No services have been selected for the patient.                Durable Medical Equipment    No services have been selected for the patient.                Dialysis/Infusion    No services have been selected for the patient.                Home Medical Care    No services have been selected for the patient.                Therapy    No services have been selected for the patient.                Community Resources    No services have been selected for the patient.                Community & DME    No services have been selected for the patient.                         Final Discharge Disposition Code: 01 - home or self-care

## 2023-11-17 NOTE — OUTREACH NOTE
Prep Survey      Flowsheet Row Responses   [facility] facility patient discharged from? [place]   Is LACE score < 7 ? No   Eligibility Readm Mgmt   Discharge diagnosis Recurrent ventricular tachycardia   Does the patient have one of the following disease processes/diagnoses(primary or secondary)? Other   Does the patient have Home health ordered? No   Is there a DME ordered? No   Prep survey completed? Yes            Kristen HALL - Registered Nurse

## 2023-11-21 ENCOUNTER — READMISSION MANAGEMENT (OUTPATIENT)
Dept: CALL CENTER | Facility: HOSPITAL | Age: 73
End: 2023-11-21
Payer: MEDICARE

## 2023-11-21 NOTE — OUTREACH NOTE
"Medical Week 1 Survey      Flowsheet Row Responses   [facility] patient discharged from? [place]   Does the patient have one of the following disease processes/diagnoses(primary or secondary)? Other   Week 1 attempt successful? Yes   Call start time 1308   Call end time 1312   Discharge diagnosis Recurrent ventricular tachycardia   Is the patient taking all medications as directed (includes completed medication regime)? Yes   Medication comments States was restarted back on amiodarone by his cardiologist.   Does the patient have a primary care provider?  Yes   Has the patient kept scheduled appointments due by today? Yes   Comments Has seen the APRN with cardiology.  On his way to his PCP appt now.  Has follow up with Dr. Ball in Feb.   Psychosocial issues? No   Did the patient receive a copy of their discharge instructions? Yes   Nursing interventions Reviewed instructions with patient   What is the patient's perception of their health status since discharge? Improving   Is the patient/caregiver able to teach back signs and symptoms related to disease process for when to call PCP? Yes   Is the patient/caregiver able to teach back signs and symptoms related to disease process for when to call 911? Yes   Is the patient/caregiver able to teach back the hierarchy of who to call/visit for symptoms/problems? PCP, Specialist, Home health nurse, Urgent Care, ED, 911 Yes   If the patient is a current smoker, are they able to teach back resources for cessation? Not a smoker   Additional teach back comments States he is doing \"as well as can be expected\".  Hr has been \"ok but erratic at times\".  Denies episodes of vtach   Week 1 call completed? Yes   Wrap up additional comments Denies qeustions or needs at this time.   Call end time 1312            Marlin DESAI - Licensed Nurse  "

## 2023-11-22 NOTE — ANESTHESIA POSTPROCEDURE EVALUATION
"Patient: Alden Pratt III    Procedure Summary       Date: 11/15/23 Room / Location: [facility] /  [facility] C[facility] INVASIVE [facility]    Anesthesia Start: 1507 Anesthesia Stop: 1900    Procedures:       Ablation VT      3D Mapping EP Diagnosis:       VT (ventricular tachycardia)      (VT)    Providers: Robby Ngo MD Provider: Noel Alexander MD    Anesthesia Type: general ASA Status: 3            Anesthesia Type: general    Vitals  Vitals Value Taken Time   /66 11/15/23 2000   Temp 36.8 °C (98.2 °F) 11/15/23 1858   Pulse 76 11/15/23 2000   Resp 16 11/15/23 2000   SpO2 100 % 11/15/23 2000           Post Anesthesia Care and Evaluation    Patient location during evaluation: bedside  Patient participation: complete - patient participated  Level of consciousness: awake and alert  Pain management: adequate    Airway patency: patent  Anesthetic complications: No anesthetic complications    Cardiovascular status: acceptable  Respiratory status: acceptable  Hydration status: acceptable    Comments: /64 (BP Location: Left arm, Patient Position: Lying)   Pulse 77   Temp 36.7 °C (98 °F) (Oral)   Resp 15   Ht 188 cm (74.02\")   Wt 98.6 kg (217 lb 6.4 oz)   SpO2 98%   BMI 27.90 kg/m²     "

## 2023-11-29 ENCOUNTER — READMISSION MANAGEMENT (OUTPATIENT)
Dept: CALL CENTER | Facility: HOSPITAL | Age: 73
End: 2023-11-29
Payer: MEDICARE

## 2023-11-29 NOTE — OUTREACH NOTE
Medical Week 2 Survey      Flowsheet Row Responses   [facility] patient discharged from? [place]   Does the patient have one of the following disease processes/diagnoses(primary or secondary)? Other   Week 2 attempt successful? No   Unsuccessful attempts Attempt 1            Marlin DESAI - Licensed Nurse

## 2023-12-13 ENCOUNTER — CLINICAL SUPPORT NO REQUIREMENTS (OUTPATIENT)
Age: 73
End: 2023-12-13
Payer: MEDICARE

## 2023-12-13 ENCOUNTER — OFFICE VISIT (OUTPATIENT)
Age: 73
End: 2023-12-13
Payer: MEDICARE

## 2023-12-13 VITALS
SYSTOLIC BLOOD PRESSURE: 132 MMHG | HEIGHT: 74 IN | HEART RATE: 82 BPM | BODY MASS INDEX: 23.49 KG/M2 | WEIGHT: 183 LBS | DIASTOLIC BLOOD PRESSURE: 84 MMHG

## 2023-12-13 DIAGNOSIS — I25.10 CORONARY ARTERY DISEASE INVOLVING NATIVE CORONARY ARTERY OF NATIVE HEART WITHOUT ANGINA PECTORIS: ICD-10-CM

## 2023-12-13 DIAGNOSIS — Z95.1 S/P CABG (CORONARY ARTERY BYPASS GRAFT): ICD-10-CM

## 2023-12-13 DIAGNOSIS — I25.5 ISCHEMIC CARDIOMYOPATHY: Primary | ICD-10-CM

## 2023-12-13 DIAGNOSIS — I25.5 ISCHEMIC CARDIOMYOPATHY: ICD-10-CM

## 2023-12-13 DIAGNOSIS — I49.3 PVC (PREMATURE VENTRICULAR CONTRACTION): ICD-10-CM

## 2023-12-13 DIAGNOSIS — I47.20 VT (VENTRICULAR TACHYCARDIA): Primary | ICD-10-CM

## 2023-12-14 NOTE — PROGRESS NOTES
Date of Office Visit: 2023  Encounter Provider: BRANDON Irvin  Place of Service: [facility] CARDIOLOGY  Patient Name: Alden Pratt III  :1950    Chief Complaint   Patient presents with    ventricular fibrillation    Syncope    chronic systolic heart failure    Pacemaker Check   :     HPI: Alden Pratt III is a 73 y.o. male who follows with Dr. Ball and Dr. Ngo----MI , CAD, s/p stents, s/p CABG, ICM, s/p ICD.      Has had chronic PVC's has had VF treated with shock that sent him into AF, cardioverted then had recurrence ---was on dronedarone at one point and the had PVI 2021 and has not had recurrence.      He also follows with the [facility].      He saw Dr. Ball in Feb with complaints of palpitations that he reported were really fast and really bothered him---device showed short runs of NSVT, he was started on low dose amiodarone 100 mg BID.       Presented to ED 10/21 with palpitations and syncope. He was mowing grass and started to feel the PVC's and then felt the rapid palpitations and had syncope.      Device interrogation shows VT that spontaneously converted just prior to meeting criteria for therapy.      Echo shows EF 36-40% which is down from prior which was about 45% but per Dr. Liu's noted he thinks it is really unchanged and just reader variability.      We increased his amiodarone to 200mg BID.      Over the next couple of weeks. He had recurrent VT. Episode was shorter lasting 2-3 minutes.      Saw Dr. Ngo in the office on . Discussed VT ablation and this was scheduled for .     On  he presented to ED with syncope. Device showed NSVT.     He ended up undergoing VT ablation/substrate mapping on 11/15             He presents today for follow-up appointment    Since ablation he has not had any recurrent arrhythmias.    Vice interrogation shows normal testing and function.  AP: 98%.   He rarely V paces.  No events recorded since his ablation on 11/15.     He still complains of just some generalized weakness and occasional dizziness.    EKG shows normal sinus rhythm.    He has had issues with hypotension in the past.    He is on metoprolol, spironolactone, lisinopril, and for GDMT    Also follows with [facility].    He is inquiring about cardiac rehab.  He is somewhat nervous about returning to normal activity.  Past Medical History:   Diagnosis Date    Arthritis     CAD (coronary artery disease)     atheroclerosis    Cancer     prostate    Chest pain     CHF (congestive heart failure)     Elevated cholesterol     GERD (gastroesophageal reflux disease)     Hyperlipidemia     Implantable cardioverter-defibrillator (ICD) discharge     Myocardial infarction     inferior    PVC (premature ventricular contraction)     PVD (peripheral vascular disease)     Status post phlebectomy     varicose veins    Thyroid nodule 7/18/2014    VT (ventricular tachycardia)        Past Surgical History:   Procedure Laterality Date    CARDIAC CATHETERIZATION      CARDIAC CATHETERIZATION Left 5/10/2021    Procedure: Left Heart Cath;  Surgeon: Andrew Ball MD;  Location: [facility] INVASIVE LOCATION;  Service: Cardiology;  Laterality: Left;    CARDIAC CATHETERIZATION N/A 5/10/2021    Procedure: Coronary angiography;  Surgeon: Andrew Ball MD;  Location: [facility] CATH INVASIVE LOCATION;  Service: Cardiology;  Laterality: N/A;    CARDIAC CATHETERIZATION N/A 5/10/2021    Procedure: Left ventriculography;  Surgeon: Andrew Ball MD;  Location: [facility] CATH INVASIVE LOCATION;  Service: Cardiology;  Laterality: N/A;    CARDIAC CATHETERIZATION  5/10/2021    Procedure: Saphenous Vein Graft;  Surgeon: Andrew Ball MD;  Location: [facility] CATH INVASIVE LOCATION;  Service: Cardiology;;    CARDIAC DEFIBRILLATOR PLACEMENT      CARDIAC ELECTROPHYSIOLOGY PROCEDURE N/A 8/2/2021    Procedure: Ablation atrial  fibrillation;  Surgeon: Robby Ngo MD;  Location:  [facility] INVASIVE LOCATION;  Service: Cardiovascular;  Laterality: N/A;    CARDIAC ELECTROPHYSIOLOGY PROCEDURE N/A 11/15/2023    Procedure: Ablation VT;  Surgeon: Robby Ngo MD;  Location:  [facility] CATH INVASIVE LOCATION;  Service: Cardiovascular;  Laterality: N/A;    CORONARY ARTERY BYPASS GRAFT      stents    CORONARY STENT PLACEMENT      HAND SURGERY Left     KNEE SURGERY Left     MICROAMBULATORY PHLEBECTOMY      SHOULDER SURGERY Right     THYROID BIOPSY      VARICOSE VEIN SURGERY         Social History     Socioeconomic History    Marital status:    Tobacco Use    Smoking status: Never    Smokeless tobacco: Never   Vaping Use    Vaping Use: Never used   Substance and Sexual Activity    Alcohol use: Yes     Comment: rareLY    Drug use: No    Sexual activity: Defer       Family History   Problem Relation Age of Onset    Coronary artery disease Other     Stroke Other     Hypertension Other     Heart disease Father     Heart failure Father     Stroke Father     No Known Problems Maternal Grandmother     No Known Problems Maternal Grandfather     No Known Problems Paternal Grandmother     No Known Problems Paternal Grandfather        Review of Systems   Constitutional: Negative for chills, fever and malaise/fatigue.   Cardiovascular:  Negative for chest pain, dyspnea on exertion, leg swelling, near-syncope, orthopnea, palpitations, paroxysmal nocturnal dyspnea and syncope.   Respiratory:  Negative for cough and shortness of breath.    Hematologic/Lymphatic: Negative.    Musculoskeletal:  Negative for joint pain, joint swelling and myalgias.   Gastrointestinal:  Negative for abdominal pain, diarrhea, melena, nausea and vomiting.   Genitourinary:  Negative for frequency and hematuria.   Neurological:  Negative for light-headedness, numbness, paresthesias and seizures.   Allergic/Immunologic: Negative.    All other systems reviewed and are  negative.      Allergies   Allergen Reactions    Contrast Dye (Echo Or Unknown Ct/Mr) Hives    Iodinated Contrast Media Hives     Tolerated with pre meds prior to heart cath in feb     Sudafed 12 Hour [Pseudoephedrine Hcl Er]     Pseudoephedrine Palpitations         Current Outpatient Medications:     apixaban (ELIQUIS) 5 MG tablet tablet, Take 1 tablet by mouth Every 12 (Twelve) Hours., Disp: 180 tablet, Rfl: 2    aspirin 81 MG EC tablet, Take 1 tablet by mouth Daily., Disp: , Rfl:     atorvastatin (LIPITOR) 80 MG tablet, Take 1 tablet by mouth Daily. 200001, Disp: 90 tablet, Rfl: 2    carboxymethylcellulose (REFRESH PLUS) 0.5 % solution, Apply 1 drop to eye(s) as directed by provider., Disp: , Rfl:     cetirizine (ZyrTEC) 10 MG tablet, Take 1 tablet by mouth As Needed. Only Occasionally will use, Disp: , Rfl:     Cholecalciferol (VITAMIN D3) 5000 UNITS capsule capsule, Take 1 capsule by mouth Every Other Day., Disp: , Rfl:     clobetasol (TEMOVATE) 0.05 % cream, Apply  topically 2 (Two) Times a Day., Disp: , Rfl:     DICLOFENAC PO, Take 75 mg by mouth 2 (Two) Times a Day., Disp: , Rfl:     Empagliflozin (Jardiance) 10 MG tablet, Take 10 mg by mouth Daily., Disp: 90 tablet, Rfl: 2    ezetimibe (ZETIA) 10 MG tablet, Take 1 tablet by mouth Daily., Disp: , Rfl:     fluticasone (FLONASE) 50 MCG/ACT nasal spray, 1 spray into the nostril(s) as directed by provider 2 (Two) Times a Day As Needed., Disp: , Rfl:     lisinopril (PRINIVIL,ZESTRIL) 2.5 MG tablet, Take 1 tablet by mouth Daily., Disp: , Rfl:     metaxalone (SKELAXIN) 800 MG tablet, Take 1 tablet by mouth As Needed for Muscle Spasms., Disp: , Rfl:     metoprolol succinate XL (TOPROL-XL) 100 MG 24 hr tablet, Take 1 tablet by mouth. In PM, Disp: , Rfl:     metoprolol succinate XL (TOPROL-XL) 50 MG 24 hr tablet, Take 1 tablet by mouth. In AM, Disp: , Rfl:     Minoxidil 5 % foam, Apply  topically 2 (two) times a day., Disp: , Rfl:     nitroglycerin (NITROSTAT) 0.4 MG  "SL tablet, 1 under the tongue as needed for angina, may repeat q5mins for up three doses, Disp: 100 tablet, Rfl: 11    omeprazole (priLOSEC) 40 MG capsule, Take 1 capsule by mouth Daily., Disp: , Rfl:     pimecrolimus (ELIDEL) 1 % cream, Apply  topically 2 (Two) Times a Day., Disp: , Rfl:     spironolactone (ALDACTONE) 25 MG tablet, Take 0.5 tablets by mouth Daily., Disp: , Rfl:       Objective:     Vitals:    12/13/23 1252   BP: 132/84   Pulse: 82   Weight: 83 kg (183 lb)   Height: 188 cm (74\")     Body mass index is 23.5 kg/m².    PHYSICAL EXAM:    Vitals Reviewed.   General Appearance: No acute distress, well developed and well nourished.   Eyes: Conjunctiva and lids: No erythema, swelling, or discharge. Sclera non-icteric.   HENT: Atraumatic, normocephalic. External eyes, ears, and nose normal.   Respiratory: No signs of respiratory distress. Respiration rhythm and depth normal.   Clear to auscultation. No rales, crackles, rhonchi, or wheezing auscultated.   Cardiovascular:  Heart Rate and Rhythm: Normal, Heart Sounds: Normal S1 and S2. No S3 or S4 noted.  Gastrointestinal:  Abdomen soft, non-distended, non-tender.   Musculoskeletal: Normal movement of extremities  Skin: Warm and dry.   Psychiatric: Patient alert and oriented to person, place, and time. Speech and behavior appropriate. Normal mood and affect.       ECG 12 Lead    Date/Time: 12/14/2023 11:35 AM  Performed by: Galo Braden APRN    Authorized by: Galo Braden APRN  Comparison: compared with previous ECG   Similar to previous ECG  Rhythm: sinus rhythm and paced            Assessment:       Diagnosis Plan   1. Ischemic cardiomyopathy        2. PVC (premature ventricular contraction)        3. VT (ventricular tachycardia)        4. Coronary artery disease involving native coronary artery of native heart without angina pectoris        5. S/P CABG (coronary artery bypass graft)               Plan:   1-5. CAD, ICM--s/p DC ICD--s/p VT ablation " (11/15)--- he has not had any recurrent ventricular arrhythmia since his ablation last month.  Overall he feels better and has not had any further episodes of syncope.  He does note some generalized weakness and occasional dizziness.  He is on GDMT, if his dizziness continues may be consider reducing him of his medications.    Since he is doing so well right now I am just going to continue everything the same.    He had sustained VT and syncope and would like to participate in cardiac rehab.  I am going to send a referral and and see if this qualifies him.  Also has depressed LVEF.              Follow-up in 6 months with Dr. Ngo          As always, it has been a pleasure to participate in your patient's care.      Sincerely,         BRANDON Irvin

## 2024-02-06 ENCOUNTER — OFFICE VISIT (OUTPATIENT)
Dept: CARDIOLOGY | Facility: CLINIC | Age: 74
End: 2024-02-06
Payer: MEDICARE

## 2024-02-06 VITALS
HEIGHT: 74 IN | WEIGHT: 185.6 LBS | BODY MASS INDEX: 23.82 KG/M2 | SYSTOLIC BLOOD PRESSURE: 110 MMHG | HEART RATE: 71 BPM | DIASTOLIC BLOOD PRESSURE: 68 MMHG

## 2024-02-06 DIAGNOSIS — Z86.79 STATUS POST ABLATION OF VENTRICULAR ARRHYTHMIA: Primary | ICD-10-CM

## 2024-02-06 DIAGNOSIS — Z98.890 STATUS POST ABLATION OF VENTRICULAR ARRHYTHMIA: Primary | ICD-10-CM

## 2024-02-06 DIAGNOSIS — I47.20 RECURRENT VENTRICULAR TACHYCARDIA: ICD-10-CM

## 2024-02-06 DIAGNOSIS — I48.0 PAROXYSMAL ATRIAL FIBRILLATION: ICD-10-CM

## 2024-02-06 DIAGNOSIS — R00.2 PALPITATIONS: ICD-10-CM

## 2024-02-06 DIAGNOSIS — I25.5 ISCHEMIC CARDIOMYOPATHY: ICD-10-CM

## 2024-02-06 DIAGNOSIS — I49.3 PVC (PREMATURE VENTRICULAR CONTRACTION): ICD-10-CM

## 2024-02-06 RX ORDER — FUROSEMIDE 20 MG/1
20 TABLET ORAL AS NEEDED
COMMUNITY

## 2024-02-06 NOTE — PROGRESS NOTES
Date of Office Visit: 24  Encounter Provider: Andrew Ball MD  Place of Service: [facility] CARDIOLOGY  Patient Name: Alden Pratt III  :1950  [id number]    Chief Complaint   Patient presents with    Congestive Heart Failure   :     HPI: Alden Pratt III is a 73 y.o. male  he had an MI in .  At that time we did an angioplasty and stenting to his RCA but he had other significant disease his stents ended up renarrowing down and he had an ischemic myopathy.  He had an ICD placed we did a bypass on him with a LIMA to his LAD, a vein to his distal LAD, a vein to an OM 1, a vein to the PDA, vein to the KIERAN, a vein to the RV branch.  He is done really pretty well he has had chronic PVCs even before the infarct that have persisted some.  He had pretty emotionally dramatic reunion with his brother he ended up having VF and got a shock from his defibrillator.  The shocked knocked him into A. Fib.   We had to anticoagulate him and then cardiovert him out.  So earlier in  he went into atrial fibrillation again and did not feel good.  We cardioverted him out of it.  He is actually seen the arrhythmia service they put him on Multaq.  He does not feel any better.      We were concerned that he was having angina and so we cathed him and May 2021 and this showed 6 of 6 bypasses were patent severe cardiomyopathy we added some Jardiance to his regimen now he is here for follow-up.  He has followed up with the [facility] heart failure clinic.     He has had a prior A. fib ablation.  He in the 2023 he started having a lot of ventricular tachycardia he underwent a VT ablation with Robby Ngo and then in 2023 was seen was doing great and I stopped his amiodarone.  His echo in 2023 also showed an EF of 35 to 40%.    For follow-up with us today.  He comes in for follow-up today time and he is not feeling that great he has had some  palpitations that he assumes are PVCs he had his device interrogated he has not had any VT or VF.  In December they stopped his amiodarone.  And he will have periods where it is like every other beat it seems like it is a PVC he just does not feel good when he is is not sad syncope take an extra metoprolol that seems to help but then later in the day when he does that he is his blood pressure is low he feels terrible.  Is not any angina he denies any syncope but he is a little depressed and I think a little scared      Past Medical History:   Diagnosis Date    Arthritis     CAD (coronary artery disease)     atheroclerosis    Cancer     prostate    Chest pain     CHF (congestive heart failure)     Elevated cholesterol     GERD (gastroesophageal reflux disease)     Hyperlipidemia     Implantable cardioverter-defibrillator (ICD) discharge     Myocardial infarction     inferior    PVC (premature ventricular contraction)     PVD (peripheral vascular disease)     Status post phlebectomy     varicose veins    Thyroid nodule 7/18/2014    VT (ventricular tachycardia)        Past Surgical History:   Procedure Laterality Date    CARDIAC CATHETERIZATION      CARDIAC CATHETERIZATION Left 5/10/2021    Procedure: Left Heart Cath;  Surgeon: Andrew Ball MD;  Location: [facility] INVASIVE LOCATION;  Service: Cardiology;  Laterality: Left;    CARDIAC CATHETERIZATION N/A 5/10/2021    Procedure: Coronary angiography;  Surgeon: Andrew Ball MD;  Location: [facility] INVASIVE LOCATION;  Service: Cardiology;  Laterality: N/A;    CARDIAC CATHETERIZATION N/A 5/10/2021    Procedure: Left ventriculography;  Surgeon: Andrew Ball MD;  Location: [facility] CATH INVASIVE LOCATION;  Service: Cardiology;  Laterality: N/A;    CARDIAC CATHETERIZATION  5/10/2021    Procedure: Saphenous Vein Graft;  Surgeon: Andrew Ball MD;  Location: [facility] INVASIVE LOCATION;  Service: Cardiology;;    CARDIAC DEFIBRILLATOR PLACEMENT      CARDIAC  ELECTROPHYSIOLOGY PROCEDURE N/A 8/2/2021    Procedure: Ablation atrial fibrillation;  Surgeon: Robby Ngo MD;  Location:  [facility] CATH INVASIVE LOCATION;  Service: Cardiovascular;  Laterality: N/A;    CARDIAC ELECTROPHYSIOLOGY PROCEDURE N/A 11/15/2023    Procedure: Ablation VT;  Surgeon: Robby Ngo MD;  Location:  [facility] CATH INVASIVE LOCATION;  Service: Cardiovascular;  Laterality: N/A;    CORONARY ARTERY BYPASS GRAFT      stents    CORONARY STENT PLACEMENT      HAND SURGERY Left     KNEE SURGERY Left     MICROAMBULATORY PHLEBECTOMY      SHOULDER SURGERY Right     THYROID BIOPSY      VARICOSE VEIN SURGERY         Social History     Socioeconomic History    Marital status:    Tobacco Use    Smoking status: Never    Smokeless tobacco: Never   Vaping Use    Vaping Use: Never used   Substance and Sexual Activity    Alcohol use: Yes     Comment: rareLY    Drug use: No    Sexual activity: Defer       Family History   Problem Relation Age of Onset    Coronary artery disease Other     Stroke Other     Hypertension Other     Heart disease Father     Heart failure Father     Stroke Father     No Known Problems Maternal Grandmother     No Known Problems Maternal Grandfather     No Known Problems Paternal Grandmother     No Known Problems Paternal Grandfather        Review of Systems   Constitutional: Negative for decreased appetite, fever, malaise/fatigue and weight loss.   HENT:  Negative for nosebleeds.    Eyes:  Negative for double vision.   Cardiovascular:  Negative for chest pain, claudication, cyanosis, dyspnea on exertion, irregular heartbeat, leg swelling, near-syncope, orthopnea, palpitations, paroxysmal nocturnal dyspnea and syncope.   Respiratory:  Negative for cough, hemoptysis and shortness of breath.    Hematologic/Lymphatic: Negative for bleeding problem.   Skin:  Negative for rash.   Musculoskeletal:  Negative for falls and myalgias.   Gastrointestinal:  Negative for hematochezia,  jaundice, melena, nausea and vomiting.   Genitourinary:  Negative for hematuria.   Neurological:  Negative for dizziness and seizures.   Psychiatric/Behavioral:  Negative for altered mental status and memory loss.        Allergies   Allergen Reactions    Contrast Dye (Echo Or Unknown Ct/Mr) Hives    Iodinated Contrast Media Hives     Tolerated with pre meds prior to heart cath in feb     Sudafed 12 Hour [Pseudoephedrine Hcl Er]     Pseudoephedrine Palpitations         Current Outpatient Medications:     apixaban (ELIQUIS) 5 MG tablet tablet, Take 1 tablet by mouth Every 12 (Twelve) Hours., Disp: 180 tablet, Rfl: 2    aspirin 81 MG EC tablet, Take 1 tablet by mouth Daily., Disp: , Rfl:     atorvastatin (LIPITOR) 80 MG tablet, Take 1 tablet by mouth Daily. 200001, Disp: 90 tablet, Rfl: 2    carboxymethylcellulose (REFRESH PLUS) 0.5 % solution, Apply 1 drop to eye(s) as directed by provider., Disp: , Rfl:     Cholecalciferol (VITAMIN D3) 5000 UNITS capsule capsule, Take 1 capsule by mouth Every Other Day., Disp: , Rfl:     clobetasol (TEMOVATE) 0.05 % cream, Apply  topically 2 (Two) Times a Day., Disp: , Rfl:     DICLOFENAC PO, Take 75 mg by mouth 2 (Two) Times a Day., Disp: , Rfl:     Empagliflozin (Jardiance) 10 MG tablet, Take 10 mg by mouth Daily., Disp: 90 tablet, Rfl: 2    ezetimibe (ZETIA) 10 MG tablet, Take 1 tablet by mouth Daily., Disp: , Rfl:     fluticasone (FLONASE) 50 MCG/ACT nasal spray, 1 spray into the nostril(s) as directed by provider 2 (Two) Times a Day As Needed., Disp: , Rfl:     furosemide (LASIX) 20 MG tablet, Take 1 tablet by mouth As Needed., Disp: , Rfl:     lisinopril (PRINIVIL,ZESTRIL) 2.5 MG tablet, Take 1 tablet by mouth Daily., Disp: , Rfl:     metaxalone (SKELAXIN) 800 MG tablet, Take 1 tablet by mouth As Needed for Muscle Spasms., Disp: , Rfl:     metoprolol succinate XL (TOPROL-XL) 100 MG 24 hr tablet, Take 1 tablet by mouth. In PM, Disp: , Rfl:     metoprolol succinate XL (TOPROL-XL)  "50 MG 24 hr tablet, Take 1 tablet by mouth. In AM, Disp: , Rfl:     Minoxidil 5 % foam, Apply  topically 2 (two) times a day., Disp: , Rfl:     nitroglycerin (NITROSTAT) 0.4 MG SL tablet, 1 under the tongue as needed for angina, may repeat q5mins for up three doses, Disp: 100 tablet, Rfl: 11    omeprazole (priLOSEC) 40 MG capsule, Take 1 capsule by mouth Daily., Disp: , Rfl:     pimecrolimus (ELIDEL) 1 % cream, Apply  topically 2 (Two) Times a Day., Disp: , Rfl:     spironolactone (ALDACTONE) 25 MG tablet, Take 0.5 tablets by mouth Daily., Disp: , Rfl:     cetirizine (ZyrTEC) 10 MG tablet, Take 1 tablet by mouth As Needed. Only Occasionally will use (Patient not taking: Reported on 2/6/2024), Disp: , Rfl:       Objective:     Vitals:    02/06/24 1448   BP: 110/68   Pulse: 71   Weight: 84.2 kg (185 lb 9.6 oz)   Height: 188 cm (74\")     Body mass index is 23.83 kg/m².    Physical Exam  Constitutional:       Appearance: He is well-developed.   HENT:      Head: Normocephalic.   Eyes:      General: No scleral icterus.  Neck:      Thyroid: No thyromegaly.      Vascular: No JVD.   Cardiovascular:      Rate and Rhythm: Normal rate and regular rhythm.      Heart sounds: Normal heart sounds. No murmur heard.     No friction rub. No gallop.   Pulmonary:      Effort: Pulmonary effort is normal.      Breath sounds: Normal breath sounds. No wheezing or rales.   Chest:       Abdominal:      Palpations: Abdomen is soft.      Tenderness: There is no abdominal tenderness.   Musculoskeletal:         General: Normal range of motion.   Lymphadenopathy:      Cervical: No cervical adenopathy.   Skin:     General: Skin is warm and dry.      Findings: No rash.   Neurological:      Mental Status: He is alert and oriented to person, place, and time.           ECG 12 Lead    Date/Time: 2/6/2024 3:59 PM  Performed by: Andrew Ball MD    Authorized by: Andrew Ball MD  Comparison: compared with previous ECG   Similar to previous " ECG  Rhythm: paced  Conduction: non-specific intraventricular conduction delay  Q waves: II, III and aVF      Clinical impression: abnormal EKG           Assessment:       Diagnosis Plan   1. Status post ablation of ventricular arrhythmia        2. Ischemic cardiomyopathy        3. Paroxysmal atrial fibrillation        4. Recurrent ventricular tachycardia                 Plan:       He has had longstanding history of PVCs but they seem to be pretty symptomatic right now and he now I think is nervous.  I think we need to get an M cot and see what his going on we did interrogate his device has not had any VT and I think need to consider low-dose amiodarone to help take care of it we will see what we find on the monitor I will have him come back and see me in 3 months he did have an echo recently no change EF 35 to 40% which is where it has been    As always, it has been a pleasure to participate in your patient's care.      Sincerely,       Andrew Ball MD

## 2024-02-16 ENCOUNTER — TELEPHONE (OUTPATIENT)
Age: 74
End: 2024-02-16
Payer: MEDICARE

## 2024-02-16 NOTE — TELEPHONE ENCOUNTER
Received a fax from [facility]. Pt is scheduled for a colonoscopy 2/22 with Dr Ahn. They are asking if pt can move forward and if so how long should he hold his apixaban prior to procedure. Pt has NO history of stroke or valve replacement...Brenda

## 2024-03-04 DIAGNOSIS — I83.12 VARICOSE VEINS OF LEFT LOWER EXTREMITY WITH INFLAMMATION: Primary | ICD-10-CM

## 2024-03-05 ENCOUNTER — TELEPHONE (OUTPATIENT)
Dept: CARDIOLOGY | Facility: CLINIC | Age: 74
End: 2024-03-05

## 2024-03-05 NOTE — TELEPHONE ENCOUNTER
"  Caller: Alden Pratt III \"Bill\"    Relationship: Self    Best call back number:[phone]    What is the best time to reach you: ANY        Do you know the name of the person who called: DR SNIDER    What was the call regarding: PATIENT RECEIVED AT NOTE SAYING THERE WAS SERVICE THAT WOULD READ HIS DEFIBULATOR AND SEND READINGS, HE DOESN'T WANT TO PARTICIPATE IN ANYTHING WITH PERSONALLY DISCUSSING WITH DR SNIDER FIRST.  HE IS OK IF THAT DISCUSSION IS AT HIS NEXT APPT IN MAY 2024    "

## 2024-03-21 ENCOUNTER — HOSPITAL ENCOUNTER (OUTPATIENT)
Facility: HOSPITAL | Age: 74
Discharge: HOME | End: 2024-03-21
Payer: MEDICARE

## 2024-03-21 ENCOUNTER — OFFICE VISIT (OUTPATIENT)
Age: 74
End: 2024-03-21
Payer: MEDICARE

## 2024-03-21 VITALS
WEIGHT: 180 LBS | HEIGHT: 74 IN | DIASTOLIC BLOOD PRESSURE: 80 MMHG | SYSTOLIC BLOOD PRESSURE: 128 MMHG | HEART RATE: 90 BPM | BODY MASS INDEX: 23.1 KG/M2

## 2024-03-21 DIAGNOSIS — I83.12 VARICOSE VEINS OF LEFT LOWER EXTREMITY WITH INFLAMMATION: ICD-10-CM

## 2024-03-21 DIAGNOSIS — I83.811 VARICOSE VEINS OF RIGHT LOWER EXTREMITY WITH PAIN: ICD-10-CM

## 2024-03-21 DIAGNOSIS — I87.2 VENOUS (PERIPHERAL) INSUFFICIENCY: ICD-10-CM

## 2024-03-21 DIAGNOSIS — I83.893 VARICOSE VEINS OF BILATERAL LOWER EXTREMITIES WITH OTHER COMPLICATIONS: Primary | ICD-10-CM

## 2024-03-21 PROCEDURE — 93971 EXTREMITY STUDY: CPT

## 2024-03-21 RX ORDER — AMIODARONE HYDROCHLORIDE 100 MG/1
TABLET ORAL
COMMUNITY
Start: 2024-02-08

## 2024-03-21 NOTE — PROGRESS NOTES
Patient Name: Alden Pratt III    MRN: [id number] Encounter Date: 03/21/2024      Consulting Service: Vascular Surgery    Referring Provider: Self Referring       CHIEF COMPLAINT:  Chief Complaint   Patient presents with    Varicose Veins    Chronic Venous Insufficiency       Subjective    HPI: Alden Pratt III is a 73 y.o. male is being seen for evaluation/management of follow up for vein mapping of the bilateral lower extremity.   Patient has been compliant with low-grade compression stocking use as well as elevation.   Despite best medical therapy symptoms persist.  At this point in time I discussed with the patient the options for intervention versus continued medical therapy.  Based on current anatomy and covered endovenous options I have recommended  bilateral ambulatory phlebectomies .    While the patient was then I discussed in detail at length the procedure itself as well as the risk benefits and complications thereof.  Risks include but are not limited to bleeding, infection, nerve injury, skin injury, failure to clear the veins, failure to clear the pain, deep vein thrombosis and associated pulmonary emboli.  Patient understands and will schedule at their convenience.       PAST MEDICAL HISTORY:   Past Medical History:   Diagnosis Date    Arthritis     CAD (coronary artery disease)     atheroclerosis    Cancer     prostate    Chest pain     CHF (congestive heart failure)     Elevated cholesterol     GERD (gastroesophageal reflux disease)     Hyperlipidemia     Implantable cardioverter-defibrillator (ICD) discharge     Myocardial infarction     inferior    PVC (premature ventricular contraction)     PVD (peripheral vascular disease)     Status post phlebectomy     varicose veins    Thyroid nodule 7/18/2014    VT (ventricular tachycardia)       PAST SURGICAL HISTORY:   Past Surgical History:   Procedure Laterality Date    CARDIAC CATHETERIZATION      CARDIAC CATHETERIZATION Left 05/10/2021     Procedure: Left Heart Cath;  Surgeon: Andrew Ball MD;  Location:  [facility] CATH INVASIVE LOCATION;  Service: Cardiology;  Laterality: Left;    CARDIAC CATHETERIZATION N/A 05/10/2021    Procedure: Coronary angiography;  Surgeon: Andrew Ball MD;  Location:  [facility] CATH INVASIVE LOCATION;  Service: Cardiology;  Laterality: N/A;    CARDIAC CATHETERIZATION N/A 05/10/2021    Procedure: Left ventriculography;  Surgeon: Andrew Ball MD;  Location:  [facility] CATH INVASIVE LOCATION;  Service: Cardiology;  Laterality: N/A;    CARDIAC CATHETERIZATION  05/10/2021    Procedure: Saphenous Vein Graft;  Surgeon: Andrew Ball MD;  Location:  [facility] CATH INVASIVE LOCATION;  Service: Cardiology;;    CARDIAC DEFIBRILLATOR PLACEMENT      CARDIAC ELECTROPHYSIOLOGY PROCEDURE N/A 08/02/2021    Procedure: Ablation atrial fibrillation;  Surgeon: Robby Ngo MD;  [facility] C[facility]VAS[facility] LOC[facility];  Service: Cardiovascular;  Laterality: N/A;    CARDIAC ELECTROPHYSIOLOGY PROCEDURE N/A 11/15/2023    Procedure: Ablation VT;  Surgeon: Robby Ngo MD;  Location: [facility] CATH INVASIVE LOCATION;  Service: Cardiovascular;  Laterality: N/A;    CORONARY ARTERY BYPASS GRAFT      stents    CORONARY STENT PLACEMENT      HAND SURGERY Left     KNEE SURGERY Left     MICROAMBULATORY PHLEBECTOMY      SHOULDER SURGERY Right     THYROID BIOPSY      VARICOSE VEIN SURGERY      VARICOSE VEIN SURGERY Right 01/29/2024    VV Varithena      FAMILY HISTORY:   Family History   Problem Relation Age of Onset    Coronary artery disease Other     Stroke Other     Hypertension Other     Heart disease Father     Heart failure Father     Stroke Father     No Known Problems Maternal Grandmother     No Known Problems Maternal Grandfather     No Known Problems Paternal Grandmother     No Known Problems Paternal Grandfather       SOCIAL HISTORY:   Social History     Tobacco Use    Smoking status: Never    Smokeless tobacco: Never   Vaping Use     Vaping status: Never Used   Substance Use Topics    Alcohol use: Yes     Comment: rareLY    Drug use: No      MEDICATIONS:   Current Outpatient Medications on File Prior to Visit   Medication Sig Dispense Refill    amiodarone (PACERONE) 100 MG tablet       apixaban (ELIQUIS) 5 MG tablet tablet Take 1 tablet by mouth Every 12 (Twelve) Hours. 180 tablet 2    aspirin 81 MG EC tablet Take 1 tablet by mouth Daily.      atorvastatin (LIPITOR) 80 MG tablet Take 1 tablet by mouth Daily. 200001 90 tablet 2    carboxymethylcellulose (REFRESH PLUS) 0.5 % solution Apply 1 drop to eye(s) as directed by provider.      cetirizine (ZyrTEC) 10 MG tablet Take 1 tablet by mouth As Needed. Only Occasionally will use (Patient not taking: Reported on 2/6/2024)      Cholecalciferol (VITAMIN D3) 5000 UNITS capsule capsule Take 1 capsule by mouth Every Other Day.      clobetasol (TEMOVATE) 0.05 % cream Apply  topically 2 (Two) Times a Day.      DICLOFENAC PO Take 75 mg by mouth 2 (Two) Times a Day.      Empagliflozin (Jardiance) 10 MG tablet Take 10 mg by mouth Daily. 90 tablet 2    ezetimibe (ZETIA) 10 MG tablet Take 1 tablet by mouth Daily.      fluticasone (FLONASE) 50 MCG/ACT nasal spray 1 spray into the nostril(s) as directed by provider 2 (Two) Times a Day As Needed.      furosemide (LASIX) 20 MG tablet Take 1 tablet by mouth As Needed.      lisinopril (PRINIVIL,ZESTRIL) 2.5 MG tablet Take 1 tablet by mouth Daily.      metaxalone (SKELAXIN) 800 MG tablet Take 1 tablet by mouth As Needed for Muscle Spasms.      metoprolol succinate XL (TOPROL-XL) 100 MG 24 hr tablet Take 1 tablet by mouth. In PM      metoprolol succinate XL (TOPROL-XL) 50 MG 24 hr tablet Take 1 tablet by mouth. In AM      Minoxidil 5 % foam Apply  topically 2 (two) times a day.      nitroglycerin (NITROSTAT) 0.4 MG SL tablet 1 under the tongue as needed for angina, may repeat q5mins for up three doses 100 tablet 11    omeprazole (priLOSEC) 40 MG capsule Take 1 capsule  "by mouth Daily.      pimecrolimus (ELIDEL) 1 % cream Apply  topically 2 (Two) Times a Day.      spironolactone (ALDACTONE) 25 MG tablet Take 0.5 tablets by mouth Daily.       No current facility-administered medications on file prior to visit.       ALLERGIES: Contrast dye (echo or unknown ct/mr), Iodinated contrast media, Sudafed 12 hour [pseudoephedrine hcl er], and Pseudoephedrine       Objective   Vitals:    03/21/24 1435   BP: 128/80   Pulse: 90   Weight: 81.6 kg (180 lb)   Height: 188 cm (74\")     Body mass index is 23.11 kg/m².  BMI is within normal parameters. No other follow-up for BMI required.      PHYSICAL EXAM:   Physical Exam  Constitutional:       Appearance: Normal appearance.   HENT:      Head: Normocephalic and atraumatic.      Nose: Nose normal.   Eyes:      Extraocular Movements: Extraocular movements intact.      Pupils: Pupils are equal, round, and reactive to light.   Cardiovascular:      Rate and Rhythm: Normal rate.      Pulses: Normal pulses.      Heart sounds: Normal heart sounds.   Pulmonary:      Effort: Pulmonary effort is normal.      Breath sounds: Normal breath sounds.   Abdominal:      General: Abdomen is flat. Bowel sounds are normal.      Palpations: Abdomen is soft.   Musculoskeletal:         General: Normal range of motion.      Cervical back: Normal range of motion and neck supple.      Right lower leg: Edema present.      Left lower leg: Edema present.   Skin:     General: Skin is warm and dry.   Neurological:      General: No focal deficit present.      Mental Status: He is alert and oriented to person, place, and time. Mental status is at baseline.   Psychiatric:         Mood and Affect: Mood normal.         Thought Content: Thought content normal.          Result Review   LABS:      Results Review:       I reviewed the patient's new clinical results.    The following radiologic or non-invasive studies have been reviewed by me: Vein mapping's reviewed  No radiology results " for the last 30 days.                ASSESSMENT/PLAN:   Diagnoses and all orders for this visit:    1. 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 Venous (peripheral) insufficiency       73 y.o. male with persistent left lower extremity venous reflux that he is actually scheduled for Varithena on but he is actually had great course after his right leg Varithena with some persistence of thigh veins and a large superficial phlebitic segment of the medial calf vein that is still bothering him.  After discussing situation regarding cancel his left leg Varithena and pursue a bilateral ambulatory phlebectomy under general anesthetic once we get approval from his excellent cardiologist Dr. Ball.  We may or may not resect some of the phlebitic segment in his right leg at that time.  Long-term results should be excellent.  He is aware risk benefits complications including but not limited to nerve injury bleeding infection failure to clear the veins and blood clots.  Will pursue this at his earliest convenience.  I discussed the plan with the patient who is agreeable to the plan of care at this point. Thank you for this consult.   Follow Up  Return for Next scheduled follow up.    Gerry Mckenzie MD   03/21/24

## 2024-03-24 LAB
BH CV LOWER VASCULAR LEFT COMMON FEMORAL AUGMENT: NORMAL
BH CV LOWER VASCULAR LEFT COMMON FEMORAL COMPETENT: NORMAL
BH CV LOWER VASCULAR LEFT COMMON FEMORAL COMPRESS: NORMAL
BH CV LOWER VASCULAR LEFT COMMON FEMORAL PHASIC: NORMAL
BH CV LOWER VASCULAR LEFT COMMON FEMORAL SPONT: NORMAL
BH CV LOWER VASCULAR RIGHT COMMON FEMORAL AUGMENT: NORMAL
BH CV LOWER VASCULAR RIGHT COMMON FEMORAL COMPETENT: NORMAL
BH CV LOWER VASCULAR RIGHT COMMON FEMORAL COMPRESS: NORMAL
BH CV LOWER VASCULAR RIGHT COMMON FEMORAL PHASIC: NORMAL
BH CV LOWER VASCULAR RIGHT COMMON FEMORAL SPONT: NORMAL
BH CV LOWER VASCULAR RIGHT DISTAL FEMORAL COMPRESS: NORMAL
BH CV LOWER VASCULAR RIGHT EXTERNAL ILIAC AUGMENT: NORMAL
BH CV LOWER VASCULAR RIGHT EXTERNAL ILIAC COMPETENT: NORMAL
BH CV LOWER VASCULAR RIGHT EXTERNAL ILIAC COMPRESS: NORMAL
BH CV LOWER VASCULAR RIGHT EXTERNAL ILIAC PHASIC: NORMAL
BH CV LOWER VASCULAR RIGHT EXTERNAL ILIAC SPONT: NORMAL
BH CV LOWER VASCULAR RIGHT GASTRONEMIUS COMPRESS: NORMAL
BH CV LOWER VASCULAR RIGHT LESSER SAPH AUGMENT: NORMAL
BH CV LOWER VASCULAR RIGHT LESSER SAPH COMPRESS: NORMAL
BH CV LOWER VASCULAR RIGHT MID FEMORAL AUGMENT: NORMAL
BH CV LOWER VASCULAR RIGHT MID FEMORAL COMPETENT: NORMAL
BH CV LOWER VASCULAR RIGHT MID FEMORAL COMPRESS: NORMAL
BH CV LOWER VASCULAR RIGHT MID FEMORAL PHASIC: NORMAL
BH CV LOWER VASCULAR RIGHT MID FEMORAL SPONT: NORMAL
BH CV LOWER VASCULAR RIGHT PERONEAL AUGMENT: NORMAL
BH CV LOWER VASCULAR RIGHT PERONEAL COMPRESS: NORMAL
BH CV LOWER VASCULAR RIGHT POPLITEAL AUGMENT: NORMAL
BH CV LOWER VASCULAR RIGHT POPLITEAL COMPETENT: NORMAL
BH CV LOWER VASCULAR RIGHT POPLITEAL COMPRESS: NORMAL
BH CV LOWER VASCULAR RIGHT POPLITEAL PHASIC: NORMAL
BH CV LOWER VASCULAR RIGHT POPLITEAL SPONT: NORMAL
BH CV LOWER VASCULAR RIGHT POSTERIOR TIBIAL AUGMENT: NORMAL
BH CV LOWER VASCULAR RIGHT POSTERIOR TIBIAL COMPRESS: NORMAL
BH CV LOWER VASCULAR RIGHT PROFUNDA FEMORAL COMPRESS: NORMAL
BH CV LOWER VASCULAR RIGHT PROXIMAL FEMORAL COMPRESS: NORMAL
BH CV LOWER VASCULAR RIGHT SAPHENOFEMORAL JUNCTION COMPRESS: NORMAL
BH CV LOWER VASCULAR RIGHT SOLEAL COMPRESS: NORMAL

## 2024-05-08 ENCOUNTER — OFFICE VISIT (OUTPATIENT)
Dept: CARDIOLOGY | Facility: CLINIC | Age: 74
End: 2024-05-08
Payer: MEDICARE

## 2024-05-08 VITALS
SYSTOLIC BLOOD PRESSURE: 130 MMHG | BODY MASS INDEX: 23.23 KG/M2 | HEIGHT: 74 IN | HEART RATE: 73 BPM | DIASTOLIC BLOOD PRESSURE: 88 MMHG | WEIGHT: 181 LBS

## 2024-05-08 DIAGNOSIS — Z45.02 IMPLANTABLE CARDIOVERTER-DEFIBRILLATOR (ICD) DISCHARGE: ICD-10-CM

## 2024-05-08 DIAGNOSIS — I47.20 VT (VENTRICULAR TACHYCARDIA): ICD-10-CM

## 2024-05-08 DIAGNOSIS — Z98.890 STATUS POST ABLATION OF VENTRICULAR ARRHYTHMIA: ICD-10-CM

## 2024-05-08 DIAGNOSIS — I25.5 ISCHEMIC CARDIOMYOPATHY: Primary | ICD-10-CM

## 2024-05-08 DIAGNOSIS — Z95.1 S/P CABG (CORONARY ARTERY BYPASS GRAFT): ICD-10-CM

## 2024-05-08 DIAGNOSIS — I49.3 PVC (PREMATURE VENTRICULAR CONTRACTION): ICD-10-CM

## 2024-05-08 DIAGNOSIS — I21.11 ST ELEVATION MYOCARDIAL INFARCTION INVOLVING RIGHT CORONARY ARTERY: ICD-10-CM

## 2024-05-08 DIAGNOSIS — I48.0 PAROXYSMAL ATRIAL FIBRILLATION: ICD-10-CM

## 2024-05-08 DIAGNOSIS — Z86.79 STATUS POST ABLATION OF VENTRICULAR ARRHYTHMIA: ICD-10-CM

## 2024-05-08 PROCEDURE — 99214 OFFICE O/P EST MOD 30 MIN: CPT | Performed by: INTERNAL MEDICINE

## 2024-05-08 PROCEDURE — 93000 ELECTROCARDIOGRAM COMPLETE: CPT | Performed by: INTERNAL MEDICINE

## 2024-05-08 RX ORDER — LOSARTAN POTASSIUM 25 MG/1
12.5 TABLET ORAL DAILY
Qty: 45 TABLET | Refills: 3 | Status: SHIPPED | OUTPATIENT
Start: 2024-05-08

## 2024-05-22 ENCOUNTER — OFFICE VISIT (OUTPATIENT)
Age: 74
End: 2024-05-22
Payer: MEDICARE

## 2024-05-22 VITALS
SYSTOLIC BLOOD PRESSURE: 118 MMHG | DIASTOLIC BLOOD PRESSURE: 74 MMHG | HEIGHT: 74 IN | WEIGHT: 181 LBS | BODY MASS INDEX: 23.23 KG/M2

## 2024-05-22 DIAGNOSIS — I87.2 VENOUS (PERIPHERAL) INSUFFICIENCY: Primary | ICD-10-CM

## 2024-05-22 DIAGNOSIS — I83.893 VARICOSE VEINS OF BILATERAL LOWER EXTREMITIES WITH OTHER COMPLICATIONS: ICD-10-CM

## 2024-05-22 NOTE — PROGRESS NOTES
Patient Name: Alden Pratt III    MRN: [id number] Encounter Date: 03/21/2024      Consulting Service: Vascular Surgery    Referring Provider: Gerry Mckenzie MD       CHIEF COMPLAINT:  Chief Complaint   Patient presents with    Follow-up     Pt would like to discuss possible surgery options.        Subjective    HPI: Alden Pratt III is a 73 y.o. male is being seen for evaluation/management of follow up for vein mapping of the bilateral lower extremity.   Patient has been compliant with low-grade compression stocking use as well as elevation.   Despite best medical therapy symptoms persist.  At this point in time I discussed with the patient the options for intervention versus continued medical therapy.  Based on current anatomy and covered endovenous options I have recommended  bilateral ambulatory phlebectomies .    While the patient was then I discussed in detail at length the procedure itself as well as the risk benefits and complications thereof.  Risks include but are not limited to bleeding, infection, nerve injury, skin injury, failure to clear the veins, failure to clear the pain, deep vein thrombosis and associated pulmonary emboli.  Patient understands and will schedule at their convenience.       As above discussion the patient is here to rediscuss the phlebectomies and his options.  We have gone back over everything in more detail this time as things are less hurried.  He does have a vacation scheduled at the end of the month of July and we will pursue this likely after that and his convenience    PAST MEDICAL HISTORY:   Past Medical History:   Diagnosis Date    Arthritis     CAD (coronary artery disease)     atheroclerosis    Cancer     prostate    Chest pain     CHF (congestive heart failure)     Elevated cholesterol     GERD (gastroesophageal reflux disease)     Hyperlipidemia     Implantable cardioverter-defibrillator (ICD) discharge     Myocardial infarction     inferior    PVC  (premature ventricular contraction)     PVD (peripheral vascular disease)     Status post phlebectomy     varicose veins    Thyroid nodule 07/18/2014    Varicose veins of bilateral lower extremities with pain 11/08/2023    and inflammation    Venous insufficiency (chronic) (peripheral) 11/08/2023    VT (ventricular tachycardia)       PAST SURGICAL HISTORY:   Past Surgical History:   Procedure Laterality Date    CARDIAC ABLATION      CARDIAC CATHETERIZATION      CARDIAC CATHETERIZATION Left 05/10/2021    Procedure: Left Heart Cath;  Surgeon: Andrew Ball MD;  Location[facility]AS[facility] LOC[facility];  Service: Cardiology;  Laterality: Left;    CARDIAC CATHETERIZATION N/A 05/10/2021    Procedure: Coronary angiography;  Surgeon: Andrew Ball MD;  [facility];  Service: Cardiology;  Laterality: N/A;    CARDIAC CATHETERIZATION N/A 05/10/2021    Procedure: Left ventriculography;  Surgeon: Andrew Ball MD;  [facility];  Service: Cardiology;  Laterality: N/A;    CARDIAC CATHETERIZATION  05/10/2021    Procedure: Saphenous Vein Graft;  Surgeon: Andrew Ball MD;  Location: [facility] CATH INVASIVE LOCATION;  Service: Cardiology;;    CARDIAC DEFIBRILLATOR PLACEMENT      CARDIAC DEFIBRILLATOR PLACEMENT      CARDIAC ELECTROPHYSIOLOGY PROCEDURE N/A 08/02/2021    Procedure: Ablation atrial fibrillation;  Surgeon: Robby Ngo MD;  [facility];  Service: Cardiovascular;  Laterality: N/A;    CARDIAC ELECTROPHYSIOLOGY PROCEDURE N/A 11/15/2023    Procedure: Ablation VT;  Surgeon: Robby Ngo MD;  Location: [facility] CATH INVASIVE LOCATION;  Service: Cardiovascular;  Laterality: N/A;    CORONARY ARTERY BYPASS GRAFT      x6 , stents    CORONARY STENT PLACEMENT      FINGER SURGERY      HAND SURGERY Left     KNEE SURGERY Left     MICROAMBULATORY PHLEBECTOMY      SHOULDER SURGERY Right     THYROID BIOPSY      VARICOSE VEIN SURGERY       VARICOSE VEIN SURGERY Right 01/29/2024    VV Varithena      FAMILY HISTORY:   Family History   Problem Relation Age of Onset    Cancer Mother     Heart disease Father     Heart failure Father     Stroke Father     Cancer Father     No Known Problems Maternal Grandmother     No Known Problems Maternal Grandfather     No Known Problems Paternal Grandmother     No Known Problems Paternal Grandfather     Coronary artery disease Other     Stroke Other     Hypertension Other       SOCIAL HISTORY:   Social History     Tobacco Use    Smoking status: Never    Smokeless tobacco: Never   Vaping Use    Vaping status: Never Used   Substance Use Topics    Alcohol use: Yes     Comment: rareLY    Drug use: No      MEDICATIONS:   Current Outpatient Medications on File Prior to Visit   Medication Sig Dispense Refill    amiodarone (PACERONE) 100 MG tablet       apixaban (ELIQUIS) 5 MG tablet tablet Take 1 tablet by mouth Every 12 (Twelve) Hours. 180 tablet 2    aspirin 81 MG EC tablet Take 1 tablet by mouth Daily.      atorvastatin (LIPITOR) 80 MG tablet Take 1 tablet by mouth Daily. 200001 90 tablet 2    carboxymethylcellulose (REFRESH PLUS) 0.5 % solution Apply 1 drop to eye(s) as directed by provider.      cetirizine (ZyrTEC) 10 MG tablet Take 1 tablet by mouth As Needed. Only Occasionally will use      Cholecalciferol (VITAMIN D3) 5000 UNITS capsule capsule Take 1 capsule by mouth Every Other Day.      clobetasol (TEMOVATE) 0.05 % cream Apply  topically 2 (Two) Times a Day.      DICLOFENAC PO Take 75 mg by mouth 2 (Two) Times a Day.      Empagliflozin (Jardiance) 10 MG tablet Take 10 mg by mouth Daily. 90 tablet 2    ezetimibe (ZETIA) 10 MG tablet Take 1 tablet by mouth Daily.      fluticasone (FLONASE) 50 MCG/ACT nasal spray 1 spray into the nostril(s) as directed by provider 2 (Two) Times a Day As Needed.      fluticasone (VERAMYST) 27.5 MCG/SPRAY nasal spray into the nostril(s) as directed by provider.      furosemide  "(LASIX) 20 MG tablet Take 1 tablet by mouth As Needed.      ADAMARIS-PECT-BIS SUBSAL-CARBOXYMET PO Take  by mouth.      losartan (COZAAR) 25 MG tablet Take 0.5 tablets by mouth Daily. 45 tablet 3    metaxalone (SKELAXIN) 800 MG tablet Take 1 tablet by mouth As Needed for Muscle Spasms.      metoprolol succinate XL (TOPROL-XL) 100 MG 24 hr tablet Take 1 tablet by mouth. In PM      metoprolol succinate XL (TOPROL-XL) 50 MG 24 hr tablet Take 1 tablet by mouth. In AM      Minoxidil 5 % foam Apply  topically 2 (two) times a day.      NAFTIFINE HCL EX Apply  topically.      nitroglycerin (NITROSTAT) 0.4 MG SL tablet 1 under the tongue as needed for angina, may repeat q5mins for up three doses 100 tablet 11    omeprazole (priLOSEC) 40 MG capsule Take 1 capsule by mouth Daily.      pimecrolimus (ELIDEL) 1 % cream Apply  topically 2 (Two) Times a Day.      spironolactone (ALDACTONE) 25 MG tablet Take 0.5 tablets by mouth Daily.      VITAMIN D PO Take  by mouth.      CLOBETASOL & CLOBETASOL EMUL EX Apply  topically. (Patient not taking: Reported on 5/8/2024)       No current facility-administered medications on file prior to visit.       ALLERGIES: Contrast dye (echo or unknown ct/mr), Ace inhibitors, Iodinated contrast media, Sudafed 12 hour [pseudoephedrine hcl er], and Pseudoephedrine       Objective   Vitals:    05/22/24 1032   BP: 118/74   Weight: 82.1 kg (181 lb)   Height: 188 cm (74.02\")     Body mass index is 23.23 kg/m².  BMI is within normal parameters. No other follow-up for BMI required.      PHYSICAL EXAM:   Physical Exam  Constitutional:       Appearance: Normal appearance.   HENT:      Head: Normocephalic and atraumatic.      Nose: Nose normal.   Eyes:      Extraocular Movements: Extraocular movements intact.      Pupils: Pupils are equal, round, and reactive to light.   Cardiovascular:      Rate and Rhythm: Normal rate.      Pulses: Normal pulses.      Heart sounds: Normal heart sounds.   Pulmonary:      Effort: " Pulmonary effort is normal.      Breath sounds: Normal breath sounds.   Abdominal:      General: Abdomen is flat. Bowel sounds are normal.      Palpations: Abdomen is soft.   Musculoskeletal:         General: Normal range of motion.      Cervical back: Normal range of motion and neck supple.      Right lower leg: Edema present.      Left lower leg: Edema present.   Skin:     General: Skin is warm and dry.   Neurological:      General: No focal deficit present.      Mental Status: He is alert and oriented to person, place, and time. Mental status is at baseline.   Psychiatric:         Mood and Affect: Mood normal.         Thought Content: Thought content normal.          Result Review   LABS:      Results Review:       I reviewed the patient's new clinical results.    The following radiologic or non-invasive studies have been reviewed by me: Vein mapping's reviewed  No radiology results for the last 30 days.                ASSESSMENT/PLAN:   Diagnoses and all orders for this visit:    1. Venous (peripheral) insufficiency (Primary)  -     Case Request; Standing  -     ceFAZolin (ANCEF) 2,000 mg in sodium chloride 0.9 % 100 mL IVPB  -     ceFAZolin (ANCEF) 3,000 mg in sodium chloride 0.9 % 100 mL IVPB    2. Varicose veins of bilateral lower extremities with other complications  -     Case Request; Standing  -     ceFAZolin (ANCEF) 2,000 mg in sodium chloride 0.9 % 100 mL IVPB  -     ceFAZolin (ANCEF) 3,000 mg in sodium chloride 0.9 % 100 mL IVPB    Other orders  -     Follow Anesthesia Guidelines / Protocol; Future  -     Follow Anesthesia Guidelines / Protocol; Standing  -     Provide Patient With Instructions on NPO Status; Future  -     Place Sequential Compression Device; Standing       73 y.o. male with persistent left leg varicosities and worsening right leg varicosities despite Varithena on that right side.  He also has persistence of phlebitic segment.  Several areas of the ankle are bulging but they are not  bothering him enough to take the risk of nerve injury.  At this point he wishes to pursue bilateral ambulatory phlebectomies and will do this after he is vacation likely in August.  He understands risk benefits complications of the procedure and agrees to the procedure.    I discussed the plan with the patient who is agreeable to the plan of care at this point. Thank you for this consult.   Follow Up  Return in about 3 months (around 8/22/2024).    Gerry Mckenzie MD   05/22/24

## 2024-06-13 ENCOUNTER — CLINICAL SUPPORT NO REQUIREMENTS (OUTPATIENT)
Age: 74
End: 2024-06-13
Payer: MEDICARE

## 2024-06-13 ENCOUNTER — OFFICE VISIT (OUTPATIENT)
Age: 74
End: 2024-06-13
Payer: MEDICARE

## 2024-06-13 VITALS
HEIGHT: 74 IN | BODY MASS INDEX: 23.61 KG/M2 | HEART RATE: 70 BPM | SYSTOLIC BLOOD PRESSURE: 124 MMHG | WEIGHT: 184 LBS | DIASTOLIC BLOOD PRESSURE: 78 MMHG

## 2024-06-13 DIAGNOSIS — I49.3 PVC (PREMATURE VENTRICULAR CONTRACTION): Primary | ICD-10-CM

## 2024-06-13 DIAGNOSIS — I47.20 VT (VENTRICULAR TACHYCARDIA): Primary | ICD-10-CM

## 2024-06-13 PROCEDURE — 93000 ELECTROCARDIOGRAM COMPLETE: CPT | Performed by: INTERNAL MEDICINE

## 2024-06-13 PROCEDURE — 99214 OFFICE O/P EST MOD 30 MIN: CPT | Performed by: INTERNAL MEDICINE

## 2024-06-13 NOTE — PROGRESS NOTES
Date of Office Visit: 2024  Encounter Provider: Robby Ngo MD  Place of Service: [facility] CARDIOLOGY  Patient Name: Alden Pratt III  :1950    Chief Complaint   Patient presents with    ventricular tachycardia     6 month f/u   :     HPI: Alden Pratt III is a 73 y.o. male who presents today for atrial fibrillation, ventricular tachycardia, and premature ventricular contractions.     He is really bothered by the PVCs.  I think the burden is low, but when they happen he is bigeminy.  I looked at his monitor and his symptoms correlate very well.     We discussed, I think it may be difficult due to there infrequency, but he would really like to try    He does not like taking amiodarone.           Past Medical History:   Diagnosis Date    Arthritis     CAD (coronary artery disease)     atheroclerosis    Cancer     prostate    Chest pain     CHF (congestive heart failure)     Elevated cholesterol     GERD (gastroesophageal reflux disease)     Hyperlipidemia     Implantable cardioverter-defibrillator (ICD) discharge     Myocardial infarction     inferior    PVC (premature ventricular contraction)     PVD (peripheral vascular disease)     Status post phlebectomy     varicose veins    Thyroid nodule 2014    Varicose veins of bilateral lower extremities with pain 2023    and inflammation    Venous insufficiency (chronic) (peripheral) 2023    VT (ventricular tachycardia)        Past Surgical History:   Procedure Laterality Date    CARDIAC ABLATION      CARDIAC CATHETERIZATION      CARDIAC CATHETERIZATION Left 05/10/2021    Procedure: Left Heart Cath;  Surgeon: Andrew Ball MD;  Location: [facility] CATH INVASIVE LOCATION;  Service: Cardiology;  Laterality: Left;    CARDIAC CATHETERIZATION N/A 05/10/2021    Procedure: Coronary angiography;  Surgeon: Andrew Ball MD;  Location: [facility] CATH INVASIVE LOCATION;  Service: Cardiology;  Laterality:  N/A;    CARDIAC CATHETERIZATION N/A 05/10/2021    Procedure: Left ventriculography;  Surgeon: Andrew Ball MD;  Location:  [facility] CATH INVASIVE LOCATION;  Service: Cardiology;  Laterality: N/A;    CARDIAC CATHETERIZATION  05/10/2021    Procedure: Saphenous Vein Graft;  Surgeon: Andrew Ball MD;  Location:  [facility] CATH INVASIVE LOCATION;  Service: Cardiology;;    CARDIAC DEFIBRILLATOR PLACEMENT      CARDIAC DEFIBRILLATOR PLACEMENT      CARDIAC ELECTROPHYSIOLOGY PROCEDURE N/A 08/02/2021    Procedure: Ablation atrial fibrillation;  Surgeon: Robby Ngo MD;  Location:  [facility] CATH INVASIVE LOCATION;  Service: Cardiovascular;  Laterality: N/A;    CARDIAC ELECTROPHYSIOLOGY PROCEDURE N/A 11/15/2023    Procedure: Ablation VT;  Surgeon: Robby Ngo MD;  Location:  [facility] CATH INVASIVE LOCATION;  Service: Cardiovascular;  Laterality: N/A;    CORONARY ARTERY BYPASS GRAFT      x6 , stents    CORONARY STENT PLACEMENT      FINGER SURGERY      HAND SURGERY Left     KNEE SURGERY Left     MICROAMBULATORY PHLEBECTOMY      SHOULDER SURGERY Right     THYROID BIOPSY      VARICOSE VEIN SURGERY      VARICOSE VEIN SURGERY Right 01/29/2024    VV Varithena       Social History     Socioeconomic History    Marital status:    Tobacco Use    Smoking status: Never    Smokeless tobacco: Never    Tobacco comments:     No caffeine use    Vaping Use    Vaping status: Never Used   Substance and Sexual Activity    Alcohol use: Yes     Comment: rareLY    Drug use: No    Sexual activity: Defer       Family History   Problem Relation Age of Onset    Cancer Mother     Heart disease Father     Heart failure Father     Stroke Father     Cancer Father     No Known Problems Maternal Grandmother     No Known Problems Maternal Grandfather     No Known Problems Paternal Grandmother     No Known Problems Paternal Grandfather     Coronary artery disease Other     Stroke Other     Hypertension Other        Review of Systems    Constitutional: Negative.   Cardiovascular: Negative.    Respiratory: Negative.     Gastrointestinal: Negative.        Allergies   Allergen Reactions    Contrast Dye (Echo Or Unknown Ct/Mr) Hives    Ace Inhibitors Cough    Iodinated Contrast Media Hives     Tolerated with pre meds prior to heart cath in feb     Sudafed 12 Hour [Pseudoephedrine Hcl Er]     Pseudoephedrine Palpitations         Current Outpatient Medications:     amiodarone (PACERONE) 100 MG tablet, Take 2 tablets by mouth As Needed., Disp: , Rfl:     apixaban (ELIQUIS) 5 MG tablet tablet, Take 1 tablet by mouth Every 12 (Twelve) Hours., Disp: 180 tablet, Rfl: 2    aspirin 81 MG EC tablet, Take 1 tablet by mouth Daily., Disp: , Rfl:     atorvastatin (LIPITOR) 80 MG tablet, Take 1 tablet by mouth Daily. 200001, Disp: 90 tablet, Rfl: 2    carboxymethylcellulose (REFRESH PLUS) 0.5 % solution, Apply 1 drop to eye(s) as directed by provider., Disp: , Rfl:     cetirizine (ZyrTEC) 10 MG tablet, Take 1 tablet by mouth As Needed. Only Occasionally will use, Disp: , Rfl:     Cholecalciferol (VITAMIN D3) 5000 UNITS capsule capsule, Take 1 capsule by mouth Every Other Day., Disp: , Rfl:     CLOBETASOL & CLOBETASOL EMUL EX, Apply  topically., Disp: , Rfl:     clobetasol (TEMOVATE) 0.05 % cream, Apply  topically 2 (Two) Times a Day., Disp: , Rfl:     DICLOFENAC PO, Take 75 mg by mouth 2 (Two) Times a Day., Disp: , Rfl:     Empagliflozin (Jardiance) 10 MG tablet, Take 10 mg by mouth Daily., Disp: 90 tablet, Rfl: 2    ezetimibe (ZETIA) 10 MG tablet, Take 1 tablet by mouth Daily., Disp: , Rfl:     fluticasone (FLONASE) 50 MCG/ACT nasal spray, 1 spray into the nostril(s) as directed by provider 2 (Two) Times a Day As Needed., Disp: , Rfl:     fluticasone (VERAMYST) 27.5 MCG/SPRAY nasal spray, into the nostril(s) as directed by provider., Disp: , Rfl:     furosemide (LASIX) 20 MG tablet, Take 1 tablet by mouth As Needed., Disp: , Rfl:     losartan (COZAAR) 25 MG  "tablet, Take 0.5 tablets by mouth Daily., Disp: 45 tablet, Rfl: 3    metaxalone (SKELAXIN) 800 MG tablet, Take 1 tablet by mouth As Needed for Muscle Spasms., Disp: , Rfl:     metoprolol succinate XL (TOPROL-XL) 100 MG 24 hr tablet, Take 1 tablet by mouth. In PM, Disp: , Rfl:     metoprolol succinate XL (TOPROL-XL) 50 MG 24 hr tablet, Take 1 tablet by mouth. In AM, Disp: , Rfl:     Minoxidil 5 % foam, Apply  topically 2 (two) times a day., Disp: , Rfl:     NAFTIFINE HCL EX, Apply  topically., Disp: , Rfl:     nitroglycerin (NITROSTAT) 0.4 MG SL tablet, 1 under the tongue as needed for angina, may repeat q5mins for up three doses, Disp: 100 tablet, Rfl: 11    omeprazole (priLOSEC) 40 MG capsule, Take 1 capsule by mouth Daily., Disp: , Rfl:     pimecrolimus (ELIDEL) 1 % cream, Apply  topically 2 (Two) Times a Day., Disp: , Rfl:     spironolactone (ALDACTONE) 25 MG tablet, Take 0.5 tablets by mouth Daily., Disp: , Rfl:     VITAMIN D PO, Take  by mouth., Disp: , Rfl:     ADAMARIS-PECT-BIS SUBSAL-CARBOXYMET PO, Take  by mouth., Disp: , Rfl:       Objective:     Vitals:    06/13/24 0944   BP: 124/78   BP Location: Left arm   Patient Position: Sitting   Pulse: 70   Weight: 83.5 kg (184 lb)   Height: 188 cm (74.02\")     Body mass index is 23.61 kg/m².    PHYSICAL EXAM:    Vitals and nursing note reviewed.   Constitutional:       General: Not in acute distress.  Pulmonary:      Effort: Pulmonary effort is normal. No respiratory distress.   Cardiovascular:      Normal rate. Regular rhythm.   Edema:     Peripheral edema absent.   Skin:     General: Skin is warm and dry.   Neurological:      Mental Status: Alert and oriented to person, place, and time.   Psychiatric:         Behavior: Behavior normal.         Thought Content: Thought content normal.         Judgment: Judgment normal.             ECG 12 Lead    Date/Time: 6/13/2024 10:45 AM  Performed by: Robby Ngo MD    Authorized by: Robby Ngo MD  " Comparison: compared with previous ECG from 2/6/2024  Similar to previous ECG  Rhythm: sinus rhythm            Assessment:       Diagnosis Plan   1. PVC (premature ventricular contraction)  Case Request EP Lab: Ablation PVC             Plan:       We are going to attempt ablation.     We discussed the difficulty, but he would like to try.     He is not going to take any more amiodarone until we attempt.     Possibly consider sotalol if not successful.     As always, it has been a pleasure to participate in your patient's care.      Sincerely,         Robby Ngo MD

## 2024-06-26 ENCOUNTER — TRANSCRIBE ORDERS (OUTPATIENT)
Dept: CARDIOLOGY | Facility: CLINIC | Age: 74
End: 2024-06-26
Payer: MEDICARE

## 2024-06-26 DIAGNOSIS — Z01.810 PRE-OPERATIVE CARDIOVASCULAR EXAMINATION: Primary | ICD-10-CM

## 2024-06-26 DIAGNOSIS — Z13.6 SCREENING FOR ISCHEMIC HEART DISEASE: ICD-10-CM

## 2024-08-01 ENCOUNTER — LAB (OUTPATIENT)
Facility: HOSPITAL | Age: 74
End: 2024-08-01
Payer: MEDICARE

## 2024-08-01 DIAGNOSIS — Z13.6 SCREENING FOR ISCHEMIC HEART DISEASE: ICD-10-CM

## 2024-08-01 DIAGNOSIS — Z01.810 PRE-OPERATIVE CARDIOVASCULAR EXAMINATION: ICD-10-CM

## 2024-08-01 LAB
ANION GAP SERPL CALCULATED.3IONS-SCNC: 4.2 MMOL/L (ref 5–15)
BASOPHILS # BLD AUTO: 0.02 10*3/MM3 (ref 0–0.2)
BASOPHILS NFR BLD AUTO: 0.5 % (ref 0–1.5)
BUN SERPL-MCNC: 11 MG/DL (ref 8–23)
BUN/CREAT SERPL: 13.1 (ref 7–25)
CALCIUM SPEC-SCNC: 9.1 MG/DL (ref 8.6–10.5)
CHLORIDE SERPL-SCNC: 105 MMOL/L (ref 98–107)
CO2 SERPL-SCNC: 28.8 MMOL/L (ref 22–29)
CREAT SERPL-MCNC: 0.84 MG/DL (ref 0.76–1.27)
DEPRECATED RDW RBC AUTO: 40.4 FL (ref 37–54)
EGFRCR SERPLBLD CKD-EPI 2021: 93.2 ML/MIN/1.73
EOSINOPHIL # BLD AUTO: 0.17 10*3/MM3 (ref 0–0.4)
EOSINOPHIL NFR BLD AUTO: 4.4 % (ref 0.3–6.2)
ERYTHROCYTE [DISTWIDTH] IN BLOOD BY AUTOMATED COUNT: 13.5 % (ref 12.3–15.4)
GLUCOSE SERPL-MCNC: 80 MG/DL (ref 65–99)
HCT VFR BLD AUTO: 41 % (ref 37.5–51)
HGB BLD-MCNC: 12.6 G/DL (ref 13–17.7)
IMM GRANULOCYTES # BLD AUTO: 0 10*3/MM3 (ref 0–0.05)
IMM GRANULOCYTES NFR BLD AUTO: 0 % (ref 0–0.5)
LYMPHOCYTES # BLD AUTO: 0.98 10*3/MM3 (ref 0.7–3.1)
LYMPHOCYTES NFR BLD AUTO: 25.5 % (ref 19.6–45.3)
MCH RBC QN AUTO: 25.5 PG (ref 26.6–33)
MCHC RBC AUTO-ENTMCNC: 30.7 G/DL (ref 31.5–35.7)
MCV RBC AUTO: 82.8 FL (ref 79–97)
MONOCYTES # BLD AUTO: 0.39 10*3/MM3 (ref 0.1–0.9)
MONOCYTES NFR BLD AUTO: 10.1 % (ref 5–12)
NEUTROPHILS NFR BLD AUTO: 2.29 10*3/MM3 (ref 1.7–7)
NEUTROPHILS NFR BLD AUTO: 59.5 % (ref 42.7–76)
NRBC BLD AUTO-RTO: 0 /100 WBC (ref 0–0.2)
PLATELET # BLD AUTO: 144 10*3/MM3 (ref 140–450)
PMV BLD AUTO: 11.1 FL (ref 6–12)
POTASSIUM SERPL-SCNC: 4.4 MMOL/L (ref 3.5–5.2)
RBC # BLD AUTO: 4.95 10*6/MM3 (ref 4.14–5.8)
SODIUM SERPL-SCNC: 138 MMOL/L (ref 136–145)
WBC NRBC COR # BLD AUTO: 3.85 10*3/MM3 (ref 3.4–10.8)

## 2024-08-01 PROCEDURE — 36415 COLL VENOUS BLD VENIPUNCTURE: CPT

## 2024-08-01 PROCEDURE — 80048 BASIC METABOLIC PNL TOTAL CA: CPT

## 2024-08-01 PROCEDURE — 85025 COMPLETE CBC W/AUTO DIFF WBC: CPT

## 2024-08-05 ENCOUNTER — HOSPITAL ENCOUNTER (OUTPATIENT)
Facility: HOSPITAL | Age: 74
Discharge: HOME | End: 2024-08-06
Attending: INTERNAL MEDICINE | Admitting: INTERNAL MEDICINE
Payer: MEDICARE

## 2024-08-05 DIAGNOSIS — I49.3 PVC (PREMATURE VENTRICULAR CONTRACTION): ICD-10-CM

## 2024-08-05 LAB
ACT BLD: 354 SECONDS (ref 82–152)
ACT BLD: 373 SECONDS (ref 82–152)
QT INTERVAL: 424 MS
QT INTERVAL: 426 MS
QT INTERVAL: 440 MS
QTC INTERVAL: 474 MS
QTC INTERVAL: 475 MS
QTC INTERVAL: 480 MS

## 2024-08-05 PROCEDURE — C1894 INTRO/SHEATH, NON-LASER: HCPCS | Performed by: INTERNAL MEDICINE

## 2024-08-05 PROCEDURE — 93623 PRGRMD STIMJ&PACG IV RX NFS: CPT | Performed by: INTERNAL MEDICINE

## 2024-08-05 PROCEDURE — 93462 L HRT CATH TRNSPTL PUNCTURE: CPT | Performed by: INTERNAL MEDICINE

## 2024-08-05 PROCEDURE — 93005 ELECTROCARDIOGRAM TRACING: CPT | Performed by: INTERNAL MEDICINE

## 2024-08-05 PROCEDURE — 25010000002 MIDAZOLAM PER 1 MG: Performed by: INTERNAL MEDICINE

## 2024-08-05 PROCEDURE — 25810000003 SODIUM CHLORIDE 0.9 % SOLUTION: Performed by: INTERNAL MEDICINE

## 2024-08-05 PROCEDURE — 93010 ELECTROCARDIOGRAM REPORT: CPT | Performed by: INTERNAL MEDICINE

## 2024-08-05 PROCEDURE — C1732 CATH, EP, DIAG/ABL, 3D/VECT: HCPCS | Performed by: INTERNAL MEDICINE

## 2024-08-05 PROCEDURE — 93662 INTRACARDIAC ECG (ICE): CPT | Performed by: INTERNAL MEDICINE

## 2024-08-05 PROCEDURE — 25010000002 FENTANYL CITRATE (PF) 50 MCG/ML SOLUTION: Performed by: INTERNAL MEDICINE

## 2024-08-05 PROCEDURE — 85347 COAGULATION TIME ACTIVATED: CPT

## 2024-08-05 PROCEDURE — C1760 CLOSURE DEV, VASC: HCPCS | Performed by: INTERNAL MEDICINE

## 2024-08-05 PROCEDURE — C1893 INTRO/SHEATH, FIXED,NON-PEEL: HCPCS | Performed by: INTERNAL MEDICINE

## 2024-08-05 PROCEDURE — 93654 COMPRE EP EVAL TX VT: CPT | Performed by: INTERNAL MEDICINE

## 2024-08-05 PROCEDURE — 25810000003 SODIUM CHLORIDE 0.9 % SOLUTION 250 ML FLEX CONT: Performed by: INTERNAL MEDICINE

## 2024-08-05 PROCEDURE — C1766 INTRO/SHEATH,STRBLE,NON-PEEL: HCPCS | Performed by: INTERNAL MEDICINE

## 2024-08-05 PROCEDURE — C1759 CATH, INTRA ECHOCARDIOGRAPHY: HCPCS | Performed by: INTERNAL MEDICINE

## 2024-08-05 PROCEDURE — 25010000002 HEPARIN (PORCINE) PER 1000 UNITS: Performed by: INTERNAL MEDICINE

## 2024-08-05 PROCEDURE — G0378 HOSPITAL OBSERVATION PER HR: HCPCS

## 2024-08-05 RX ORDER — ASPIRIN 81 MG/1
81 TABLET ORAL NIGHTLY
Status: DISCONTINUED | OUTPATIENT
Start: 2024-08-05 | End: 2024-08-06 | Stop reason: HOSPADM

## 2024-08-05 RX ORDER — ATORVASTATIN CALCIUM 80 MG/1
80 TABLET, FILM COATED ORAL NIGHTLY
Status: DISCONTINUED | OUTPATIENT
Start: 2024-08-05 | End: 2024-08-06 | Stop reason: HOSPADM

## 2024-08-05 RX ORDER — HYDROMORPHONE HYDROCHLORIDE 1 MG/ML
0.5 INJECTION, SOLUTION INTRAMUSCULAR; INTRAVENOUS; SUBCUTANEOUS
Status: DISCONTINUED | OUTPATIENT
Start: 2024-08-05 | End: 2024-08-06 | Stop reason: HOSPADM

## 2024-08-05 RX ORDER — CETIRIZINE HYDROCHLORIDE 10 MG/1
10 TABLET ORAL DAILY PRN
Status: DISCONTINUED | OUTPATIENT
Start: 2024-08-05 | End: 2024-08-06 | Stop reason: HOSPADM

## 2024-08-05 RX ORDER — SODIUM CHLORIDE 9 MG/ML
50 INJECTION, SOLUTION INTRAVENOUS CONTINUOUS
Status: DISCONTINUED | OUTPATIENT
Start: 2024-08-05 | End: 2024-08-06 | Stop reason: HOSPADM

## 2024-08-05 RX ORDER — METOPROLOL SUCCINATE 50 MG/1
50 TABLET, EXTENDED RELEASE ORAL
Status: DISCONTINUED | OUTPATIENT
Start: 2024-08-06 | End: 2024-08-06 | Stop reason: HOSPADM

## 2024-08-05 RX ORDER — PANTOPRAZOLE SODIUM 40 MG/1
40 TABLET, DELAYED RELEASE ORAL
Status: DISCONTINUED | OUTPATIENT
Start: 2024-08-06 | End: 2024-08-06 | Stop reason: HOSPADM

## 2024-08-05 RX ORDER — LOSARTAN POTASSIUM 25 MG/1
12.5 TABLET ORAL DAILY
Status: DISCONTINUED | OUTPATIENT
Start: 2024-08-05 | End: 2024-08-06 | Stop reason: HOSPADM

## 2024-08-05 RX ORDER — NALOXONE HCL 0.4 MG/ML
0.4 VIAL (ML) INJECTION
Status: DISCONTINUED | OUTPATIENT
Start: 2024-08-05 | End: 2024-08-06 | Stop reason: HOSPADM

## 2024-08-05 RX ORDER — FLUTICASONE PROPIONATE 50 MCG
1 SPRAY, SUSPENSION (ML) NASAL 2 TIMES DAILY PRN
Status: DISCONTINUED | OUTPATIENT
Start: 2024-08-05 | End: 2024-08-06 | Stop reason: HOSPADM

## 2024-08-05 RX ORDER — CHOLECALCIFEROL (VITAMIN D3) 25 MCG
5000 TABLET ORAL EVERY OTHER DAY
Status: DISCONTINUED | OUTPATIENT
Start: 2024-08-05 | End: 2024-08-06 | Stop reason: HOSPADM

## 2024-08-05 RX ORDER — METOPROLOL SUCCINATE 100 MG/1
100 TABLET, EXTENDED RELEASE ORAL NIGHTLY
Status: DISCONTINUED | OUTPATIENT
Start: 2024-08-05 | End: 2024-08-06 | Stop reason: HOSPADM

## 2024-08-05 RX ORDER — ONDANSETRON 2 MG/ML
4 INJECTION INTRAMUSCULAR; INTRAVENOUS EVERY 6 HOURS PRN
Status: DISCONTINUED | OUTPATIENT
Start: 2024-08-05 | End: 2024-08-06 | Stop reason: HOSPADM

## 2024-08-05 RX ORDER — ACETAMINOPHEN 325 MG/1
650 TABLET ORAL EVERY 4 HOURS PRN
Status: DISCONTINUED | OUTPATIENT
Start: 2024-08-05 | End: 2024-08-06 | Stop reason: HOSPADM

## 2024-08-05 RX ORDER — METHOHEXITAL IN WATER/PF 100MG/10ML
SYRINGE (ML) INTRAVENOUS
Status: DISCONTINUED | OUTPATIENT
Start: 2024-08-05 | End: 2024-08-05 | Stop reason: HOSPADM

## 2024-08-05 RX ORDER — MIDAZOLAM HYDROCHLORIDE 1 MG/ML
INJECTION INTRAMUSCULAR; INTRAVENOUS
Status: DISCONTINUED | OUTPATIENT
Start: 2024-08-05 | End: 2024-08-05 | Stop reason: HOSPADM

## 2024-08-05 RX ORDER — SODIUM CHLORIDE 0.9 % (FLUSH) 0.9 %
10 SYRINGE (ML) INJECTION EVERY 12 HOURS SCHEDULED
Status: DISCONTINUED | OUTPATIENT
Start: 2024-08-05 | End: 2024-08-05

## 2024-08-05 RX ORDER — ACETAMINOPHEN 650 MG/1
650 SUPPOSITORY RECTAL EVERY 4 HOURS PRN
Status: DISCONTINUED | OUTPATIENT
Start: 2024-08-05 | End: 2024-08-06 | Stop reason: HOSPADM

## 2024-08-05 RX ORDER — SODIUM CHLORIDE 9 MG/ML
INJECTION, SOLUTION INTRAVENOUS
Status: COMPLETED | OUTPATIENT
Start: 2024-08-05 | End: 2024-08-05

## 2024-08-05 RX ORDER — HEPARIN SODIUM 1000 [USP'U]/ML
INJECTION, SOLUTION INTRAVENOUS; SUBCUTANEOUS
Status: DISCONTINUED | OUTPATIENT
Start: 2024-08-05 | End: 2024-08-05 | Stop reason: HOSPADM

## 2024-08-05 RX ORDER — FENTANYL CITRATE 50 UG/ML
INJECTION, SOLUTION INTRAMUSCULAR; INTRAVENOUS
Status: DISCONTINUED | OUTPATIENT
Start: 2024-08-05 | End: 2024-08-05 | Stop reason: HOSPADM

## 2024-08-05 RX ADMIN — METOPROLOL SUCCINATE 100 MG: 100 TABLET, EXTENDED RELEASE ORAL at 21:34

## 2024-08-05 RX ADMIN — SODIUM CHLORIDE 50 ML/HR: 9 INJECTION, SOLUTION INTRAVENOUS at 10:19

## 2024-08-05 RX ADMIN — Medication 5000 UNITS: at 21:35

## 2024-08-05 RX ADMIN — LOSARTAN POTASSIUM 12.5 MG: 25 TABLET, FILM COATED ORAL at 21:34

## 2024-08-05 RX ADMIN — ASPIRIN 81 MG: 81 TABLET, COATED ORAL at 21:34

## 2024-08-05 RX ADMIN — APIXABAN 5 MG: 5 TABLET, FILM COATED ORAL at 21:34

## 2024-08-05 RX ADMIN — ATORVASTATIN CALCIUM 80 MG: 80 TABLET, FILM COATED ORAL at 21:35

## 2024-08-05 NOTE — Clinical Note
Fasting labs  Referral to pain management for possible epidural injections  Ear irrigation today  Call if any new concerns  Or if leg gets worse  F/U in 3 months for a PE    Current Outpatient Medications   Medication Sig Dispense Refill    atorvastatin (Lipitor) 10 mg tablet Take 1 tablet (10 mg) by mouth once daily. 90 tablet 1    biotin 5 mg tablet Take 1 tablet (5 mg) by mouth once daily.      losartan (Cozaar) 50 mg tablet Take 1 tablet (50 mg) by mouth once daily. 90 tablet 1    multivitamin tablet Take 1 tablet by mouth once daily.       No current facility-administered medications for this visit.     Dear Mr. Rojas:     To help you more effectively manage your high blood pressure, we would like to remind you of the following preventive measures you can take at home:    1) Eat right: Your diet should be rich in fruits, vegetables, potassium, and low-fat dairy products. You should also reduce your intake of sodium and fats, particularly saturated fats.    2) Maintain a healthy weight: Try to achieve and maintain a healthy weight. If you are unsure what this means for you, please contact our clinic.    3) Exercise: Try to get at least 30 minutes of aerobic exercise every day.    4) Moderate your alcohol consumption: Limit your alcohol intake to one drink per day.    5) Monitor your blood pressure: You should check your blood pressure regularly. Notify our clinic if your blood pressure readings are consistently higher than recommended.    We have included a personalized hypertension report card on the following page that lists your most recent blood pressure readings and lab results, along with your ideal values. If you have any questions or concerns about these instructions, your results, or your improving health, please don't hesitate to call.    Thank you for including us as members of your health care team.    [unfilled]    Sincerely,         Jocelyne Olivas MD    Enclosure      Hypertension Report Card  Report was  verbally given at bedside. . for Soto Rojas  May 29, 2024:     Below is a summary of recent tests related to your hypertension that can help you manage your health.     Blood Pressure:   Normal blood pressure values are 120/80 or lower. Your blood pressure values should be less than 140/90. If your readings at home are consistently higher than this, please contact me.     Your most recent blood pressure readings at our clinic were:   BP Readings from Last 3 Encounters:   05/29/24 128/82   01/29/24 118/76   11/27/23 138/80       Creatinine:   High blood pressure can cause a loss of kidney function. Creatinine is a measure of this kidney function.      Your most recent creatinine levels were:   Creatinine (mg/dL)   Date Value   11/21/2023 1.09   07/17/2023 1.04           Potassium:  Potassium is an electrolyte in your blood that can be affected by blood pressure treatment.     Your most recent potassium levels were:  Potassium (mmol/L)   Date Value   11/21/2023 4.4   07/17/2023 4.7

## 2024-08-05 NOTE — DISCHARGE INSTRUCTIONS
[facility]  [address] [place] [address]    Dr. Ngo's Discharge Instructions for Atrial Fibrillation or Atrial Flutter     Refer to this sheet in the next few weeks. These instructions provide you with information on caring for yourself after your procedure.     Make arrangements to have someone stay with you for 24 hours following the procedure.  This is due to the sedation you received and for your safety in the event of any complications.      About your Procedure:  You have had a procedure called a catheter ablation.  It is used to treat abnormal heartbeats (arrhythmia). The procedure destroyed (ablated) the cells in your heart that were causing your heart rhythm problem. During the procedure, the healthcare provider placed a thin, flexible wire (catheter) into a blood vessel in your groin.  The provider then threaded the catheter to your heart and destroyed the problematic cells.      Goal of Procedure:  The goal of this procedure is to regain a normal heart rhythm (sinus rhythm) and reduce the symptoms associated with your irregular heart rhythm. In many cases, one ablation is enough to treat the arrythmia, but sometimes the problem returns, and a second ablation may be necessary.      What to Expect After the Procedure:  After your procedure, it is typical to have the following sensations:  Minor discomfort or soreness in the chest or a small bump at the insertion site (groin). The bump should usually decrease in size and tenderness within 1 to 2 weeks.  Mild bruising which will usually fade within 2 to 4 weeks.  A mild sore throat  Sometimes the irregular heartbeat goes away immediately after the procedure.  Other times, it may take longer.  As your heart heals, it is common to have some Atrial Fibrillation symptoms and you may even go into atrial fibrillation up to a few months after the procedure.    Do not miss a dose of your blood thinner (Apixaban/Eliquis,  rivaroxaban/Xarelto, Warfarin/Coumadin).   You will have a follow up appointment in approximately 1 month.      Home Care Instructions:  Take your medications exactly as directed.  Do not skip doses unless directed to do so by your healthcare provider.   You should be able to return to most of your normal activities in the next 1-2 days. These include walking, climbing stairs, and doing household chores.   You may remove the dressings in your groin in 24 hours.    You may then shower and gently wash the area with plain soap and water after removing the dressings.     Do not apply powder or lotion to the site.  Do not take baths, swim, or use a hot tub until your health care provider approves and the site is completely healed.  Hold direct pressure to your groin sites any time you laugh, cough, sneeze or change positions.  Do this for the next 48 hours.   Do not bend, squat, or lift anything over 20 lb. (9 kg) for 7 days after your ablation. Do not do any other heavy physical activity for 7 days.  This allows your body time to heal properly.    Inspect the groin sites daily for signs of infection for 7 days. Those signs include: redness, swelling, drainage, or warmth at the groin sites.     Follow instructions about when you can drive or return to work as directed by your physician.    If you experience bleeding or swelling (larger than the size of a golf ball) at the groin sites after you go home, do not remove the dressing. Lie down flat and hold pressure over the sites for at last 10-15 minutes. You or the person with you should call the office at [phone] for further instructions. If the bleeding does not stop after 10-15 minutes, call [phone] and continue holding pressure.  You need to come to the emergency room for evaluation.     Call Your Doctor If:  You experience the symptoms you had before the procedure for more than 24 hours. You have drainage (other than a small amount of blood on the dressing).  You  have chills or a fever > 101.  You have redness, warmth, swelling (larger than a walnut), drainage or severe pain at the insertion site  You develop chest pain or shortness of breath, feel faint, or pass out.  You develop pain, discoloration, coldness, numbness, tingling, or severe bruising in the leg that held the catheter.  You develop bleeding from any other place, such as the bowels.  You have difficulty swallowing, excessive pain when swallowing, difficulty breathing or vomiting of blood  You have unstable vital signs such as blood pressure less than 90/60 or a heart rate greater than 130 beats per minute for more than 1 hour.   You have any symptoms of a stroke.  Remember BE FAST  B-balance. Sudden trouble walking or loss of balance.  E-eyes.  Sudden changes in how you see or a sudden onset of a very bad headache.   F-face. Sudden weakness or loss of feeling of the face or facial droop on one side.   A-arms Sudden weakness or numbness in one arm.  One arm drifts down if they are both held out in front of you. This happens suddenly and usually on one side of the body.  S-speech.  Sudden trouble speaking, slurred speech or trouble understanding what people are saying.   T-time  Time to call emergency services.  Write down the symptoms and the time they started.

## 2024-08-05 NOTE — PLAN OF CARE
Goal Outcome Evaluation:  Plan of Care Reviewed With: patient, spouse           Outcome Evaluation: Pt transferred from Cath Lab s/p PVC Ablation. Christopher groin sites clean, dry and intact. Apaced on tele, BP stable, on room air. No c/o pain. Continue with current POC and update as needed.                                25 week  male, anemia of prematurity, FTT, feeding problem, ventriculomegaly, left ROP Stage 0/Zone 2-3, right ROP Stage 2/Zone 2-3, GERD DOL #94.

## 2024-08-06 VITALS
OXYGEN SATURATION: 99 % | BODY MASS INDEX: 23.36 KG/M2 | SYSTOLIC BLOOD PRESSURE: 121 MMHG | WEIGHT: 182 LBS | TEMPERATURE: 98 F | DIASTOLIC BLOOD PRESSURE: 86 MMHG | HEART RATE: 72 BPM | RESPIRATION RATE: 16 BRPM | HEIGHT: 74 IN

## 2024-08-06 PROBLEM — I49.3 PVC'S (PREMATURE VENTRICULAR CONTRACTIONS): Status: ACTIVE | Noted: 2024-08-06

## 2024-08-06 PROCEDURE — G0378 HOSPITAL OBSERVATION PER HR: HCPCS

## 2024-08-06 PROCEDURE — 99238 HOSP IP/OBS DSCHRG MGMT 30/<: CPT | Performed by: PHYSICIAN ASSISTANT

## 2024-08-06 PROCEDURE — S0260 H&P FOR SURGERY: HCPCS | Performed by: INTERNAL MEDICINE

## 2024-08-06 RX ADMIN — EMPAGLIFLOZIN 10 MG: 10 TABLET, FILM COATED ORAL at 08:23

## 2024-08-06 RX ADMIN — METOPROLOL SUCCINATE 50 MG: 50 TABLET, FILM COATED, EXTENDED RELEASE ORAL at 08:25

## 2024-08-06 RX ADMIN — APIXABAN 5 MG: 5 TABLET, FILM COATED ORAL at 08:23

## 2024-08-06 RX ADMIN — Medication 12.5 MG: at 08:23

## 2024-08-06 RX ADMIN — LOSARTAN POTASSIUM 12.5 MG: 25 TABLET, FILM COATED ORAL at 08:23

## 2024-08-06 NOTE — H&P
[facility]   HISTORY AND PHYSICAL    Patient Name:Alden Pratt III  : 1950  MRN: [id number]  Primary Care Physician: Jhoan Quezada MD  Date of admission: 2024    Subjective   Subjective     Chief Complaint:   PVC    History of Present Illness    Alden Pratt III is a 73 y.o. male who presents today for atrial fibrillation, ventricular tachycardia, and premature ventricular contractions.      He is really bothered by the PVCs.  I think the burden is low, but when they happen he is bigeminy.  I looked at his monitor and his symptoms correlate very well.      We discussed, I think it may be difficult due to there infrequency, but he would really like to try     He does not like taking amiodarone.     Review of Systems   Constitutional:  Negative for activity change and fatigue.   Eyes: Negative.    Respiratory:  Negative for chest tightness and shortness of breath.    Cardiovascular:  Negative for chest pain, palpitations and leg swelling.   Gastrointestinal: Negative.    Endocrine: Negative.    Genitourinary: Negative.    Musculoskeletal: Negative.    Skin: Negative.    Neurological:  Negative for dizziness and syncope.   Hematological: Negative.    Psychiatric/Behavioral: Negative.           Personal History     Past Medical History:   Diagnosis Date    Arthritis     CAD (coronary artery disease)     atheroclerosis    Cancer     prostate    Chest pain     CHF (congestive heart failure)     Elevated cholesterol     GERD (gastroesophageal reflux disease)     Hyperlipidemia     Implantable cardioverter-defibrillator (ICD) discharge     Myocardial infarction     inferior    PAF (paroxysmal atrial fibrillation)     PVC (premature ventricular contraction)     PVD (peripheral vascular disease)     Status post phlebectomy     varicose veins    Thyroid nodule 2014    Varicose veins of bilateral lower extremities with pain 2023    and inflammation    Venous insufficiency (chronic)  (peripheral) 11/08/2023    VT (ventricular tachycardia)        Past Surgical History:   Procedure Laterality Date    CARDIAC ABLATION  2021    atrial fibrillation    CARDIAC CATHETERIZATION  2006    CARDIAC CATHETERIZATION Left 05/10/2021    Procedure: Left Heart Cath;  Surgeon: Andrew Ball MD;  Location: [facility] LOC[facility];  Service: Cardiology;  Laterality: Left;    CARDIAC CATHETERIZATION N/A 05/10/2021    Procedure: Coronary angiography;  Surgeon: Andrew Ball MD;  Location: [facility];  Service: Cardiology;  Laterality: N/A;    CARDIAC CATHETERIZATION N/A 05/10/2021    Procedure: Left ventriculography;  Surgeon: Andrew Ball MD;  [facility]: [facility];  Service: Cardiology;  Laterality: N/A;    CARDIAC CATHETERIZATION  05/10/2021    Procedure: Saphenous Vein Graft;  Surgeon: Andrew Ball MD;  Location: [facility];  Service: Cardiology;;    CARDIAC DEFIBRILLATOR PLACEMENT  02/2007    CARDIAC ELECTROPHYSIOLOGY MAPPING AND ABLATION  2023    v-tach    CARDIAC ELECTROPHYSIOLOGY PROCEDURE N/A 08/02/2021    Procedure: Ablation atrial fibrillation;  Surgeon: Robby Ngo MD;  Location: [facility] CATH INVASIVE LOCATION;  Service: Cardiovascular;  Laterality: N/A;    CARDIAC ELECTROPHYSIOLOGY PROCEDURE N/A 11/15/2023    Procedure: Ablation VT;  Surgeon: Robby Ngo MD;  Location: [facility] CATH INVASIVE LOCATION;  Service: Cardiovascular;  Laterality: N/A;    CORONARY ARTERY BYPASS GRAFT  11/02/2006    x6    CORONARY STENT PLACEMENT  10/2006    FINGER SURGERY      HAND SURGERY Left     KNEE SURGERY Left     MICROAMBULATORY PHLEBECTOMY      SHOULDER SURGERY Right     THYROID BIOPSY      VARICOSE VEIN SURGERY Right 01/29/2024    VV Varithena       Family History: His family history includes Cancer in his father and mother; Coronary artery disease in an other family member; Heart disease in his father; Heart failure in his  father; Hypertension in an other family member; No Known Problems in his maternal grandfather, maternal grandmother, paternal grandfather, and paternal grandmother; Stroke in his father and another family member.     Social History: He  reports that he has never smoked. He has never used smokeless tobacco. He reports that he does not currently use alcohol. He reports that he does not use drugs.    Home Medications:  Clobetasol & Clobetasol Emul, Diclofenac, Minoxidil, apixaban, aspirin, atorvastatin, carboxymethylcellulose, cetirizine, empagliflozin, ezetimibe, fluticasone, furosemide, losartan, metaxalone, metoprolol succinate XL, nitroglycerin, omeprazole, spironolactone, and vitamin D3    Allergies:  He is allergic to contrast dye (echo or unknown ct/mr), ace inhibitors, iodinated contrast media, sudafed 12 hour [pseudoephedrine hcl er], and pseudoephedrine.    Objective    Objective     Vitals:    Temp:  [97.1 °F (36.2 °C)-98.2 °F (36.8 °C)] 98 °F (36.7 °C)  Heart Rate:  [70-79] 72  Resp:  [0-18] 16  BP: ()/(52-86) 121/86    Physical Exam  Vitals and nursing note reviewed.   Constitutional:       General: He is not in acute distress.     Appearance: He is not diaphoretic.   HENT:      Mouth/Throat:      Pharynx: No oropharyngeal exudate.   Eyes:      General: No scleral icterus.  Neck:      Trachea: No tracheal deviation.   Cardiovascular:      Rate and Rhythm: Normal rate and regular rhythm.   Pulmonary:      Effort: Pulmonary effort is normal. No respiratory distress.      Breath sounds: Normal breath sounds.   Abdominal:      General: There is no distension.      Palpations: Abdomen is soft.   Skin:     General: Skin is warm and dry.   Neurological:      Mental Status: He is alert and oriented to person, place, and time.   Psychiatric:         Behavior: Behavior normal.         Thought Content: Thought content normal.         Judgment: Judgment normal.          Result Review    Result Review:  I have  personally reviewed the results from the time of this admission to 8/6/2024 10:00 EDT and agree with these findings:  []  Laboratory list / accordion  []  Microbiology  []  Radiology  []  EKG/Telemetry   []  Cardiology/Vascular   []  Pathology  []  Old records  []  Other:  Most notable findings include: sinus rhythm      Assessment & Plan   Assessment / Plan     Brief Patient Summary:  Alden Pratt III is a 73 y.o. male with symptomatic PVC    Active Hospital Problems:  Active Hospital Problems    Diagnosis     **PVC (premature ventricular contraction)     PVC's (premature ventricular contractions)      Plan:   Proceed with ablation      Robby Ngo MD

## 2024-08-06 NOTE — PLAN OF CARE
Goal Outcome Evaluation:  Plan of Care Reviewed With: patient        Progress: no change  Outcome Evaluation: A&O, no complaints of pain, BL groin sites CDI, Apaced on the monitor, up ad annia, VSS, possible dc today, continue plan of care.

## 2024-08-06 NOTE — DISCHARGE SUMMARY
73 year old male seen POD #1 from PVC ablation. He had multiple morphologies and the two main morphologies were able to be mapped to be ablate.     Doing well this AM, bilateral groin sites with hemostasis and no surrounding hematoma.     His wife at bedside.     He will be discharged home on his home meds with follow up in the office in 4 weeks.

## 2024-08-15 ENCOUNTER — TELEPHONE (OUTPATIENT)
Age: 74
End: 2024-08-15
Payer: MEDICARE

## 2024-08-15 DIAGNOSIS — I49.3 PVC (PREMATURE VENTRICULAR CONTRACTION): Primary | ICD-10-CM

## 2024-08-15 NOTE — TELEPHONE ENCOUNTER
Patient with DDD/ICD remote transmission reviewed today documented RRT reached on 8/15/24. Patient with history of PAF on apixaban. AF burden 0 % on this remote. He is scheduled to see Dr. Raya on 9/10 and has f/u with RETA Braden on 9/11 post PVC ablation.  I spoke with patient today and let him known device has reached RRT and he will receive a call to have procedure scheduled.    Device, leads and thresholds:

## 2024-08-26 ENCOUNTER — TRANSCRIBE ORDERS (OUTPATIENT)
Dept: CARDIOLOGY | Facility: CLINIC | Age: 74
End: 2024-08-26
Payer: MEDICARE

## 2024-08-26 DIAGNOSIS — Z13.6 SCREENING FOR ISCHEMIC HEART DISEASE: ICD-10-CM

## 2024-08-26 DIAGNOSIS — Z01.810 PRE-OPERATIVE CARDIOVASCULAR EXAMINATION: Primary | ICD-10-CM

## 2024-08-27 ENCOUNTER — LAB (OUTPATIENT)
Facility: HOSPITAL | Age: 74
End: 2024-08-27
Payer: MEDICARE

## 2024-08-27 DIAGNOSIS — Z13.6 SCREENING FOR ISCHEMIC HEART DISEASE: ICD-10-CM

## 2024-08-27 DIAGNOSIS — Z01.810 PRE-OPERATIVE CARDIOVASCULAR EXAMINATION: ICD-10-CM

## 2024-08-27 LAB
ANION GAP SERPL CALCULATED.3IONS-SCNC: 6.9 MMOL/L (ref 5–15)
BASOPHILS # BLD AUTO: 0.02 10*3/MM3 (ref 0–0.2)
BASOPHILS NFR BLD AUTO: 0.5 % (ref 0–1.5)
BUN SERPL-MCNC: 10 MG/DL (ref 8–23)
BUN/CREAT SERPL: 12.8 (ref 7–25)
CALCIUM SPEC-SCNC: 9.5 MG/DL (ref 8.6–10.5)
CHLORIDE SERPL-SCNC: 104 MMOL/L (ref 98–107)
CO2 SERPL-SCNC: 27.1 MMOL/L (ref 22–29)
CREAT SERPL-MCNC: 0.78 MG/DL (ref 0.76–1.27)
DEPRECATED RDW RBC AUTO: 40.7 FL (ref 37–54)
EGFRCR SERPLBLD CKD-EPI 2021: 95.3 ML/MIN/1.73
EOSINOPHIL # BLD AUTO: 0.13 10*3/MM3 (ref 0–0.4)
EOSINOPHIL NFR BLD AUTO: 3.4 % (ref 0.3–6.2)
ERYTHROCYTE [DISTWIDTH] IN BLOOD BY AUTOMATED COUNT: 13.7 % (ref 12.3–15.4)
GLUCOSE SERPL-MCNC: 112 MG/DL (ref 65–99)
HCT VFR BLD AUTO: 41.6 % (ref 37.5–51)
HGB BLD-MCNC: 12.6 G/DL (ref 13–17.7)
IMM GRANULOCYTES # BLD AUTO: 0 10*3/MM3 (ref 0–0.05)
IMM GRANULOCYTES NFR BLD AUTO: 0 % (ref 0–0.5)
LYMPHOCYTES # BLD AUTO: 0.97 10*3/MM3 (ref 0.7–3.1)
LYMPHOCYTES NFR BLD AUTO: 25.4 % (ref 19.6–45.3)
MCH RBC QN AUTO: 25 PG (ref 26.6–33)
MCHC RBC AUTO-ENTMCNC: 30.3 G/DL (ref 31.5–35.7)
MCV RBC AUTO: 82.7 FL (ref 79–97)
MONOCYTES # BLD AUTO: 0.42 10*3/MM3 (ref 0.1–0.9)
MONOCYTES NFR BLD AUTO: 11 % (ref 5–12)
NEUTROPHILS NFR BLD AUTO: 2.28 10*3/MM3 (ref 1.7–7)
NEUTROPHILS NFR BLD AUTO: 59.7 % (ref 42.7–76)
NRBC BLD AUTO-RTO: 0 /100 WBC (ref 0–0.2)
PLATELET # BLD AUTO: 146 10*3/MM3 (ref 140–450)
PMV BLD AUTO: 10.9 FL (ref 6–12)
POTASSIUM SERPL-SCNC: 4.7 MMOL/L (ref 3.5–5.2)
RBC # BLD AUTO: 5.03 10*6/MM3 (ref 4.14–5.8)
SODIUM SERPL-SCNC: 138 MMOL/L (ref 136–145)
WBC NRBC COR # BLD AUTO: 3.82 10*3/MM3 (ref 3.4–10.8)

## 2024-08-27 PROCEDURE — 36415 COLL VENOUS BLD VENIPUNCTURE: CPT

## 2024-08-27 PROCEDURE — 80048 BASIC METABOLIC PNL TOTAL CA: CPT

## 2024-08-27 PROCEDURE — 85025 COMPLETE CBC W/AUTO DIFF WBC: CPT

## 2024-09-04 ENCOUNTER — HOSPITAL ENCOUNTER (OUTPATIENT)
Facility: HOSPITAL | Age: 74
Setting detail: HOSPITAL OUTPATIENT SURGERY
Discharge: HOME | End: 2024-09-04
Attending: INTERNAL MEDICINE | Admitting: INTERNAL MEDICINE
Payer: MEDICARE

## 2024-09-04 VITALS
OXYGEN SATURATION: 99 % | RESPIRATION RATE: 16 BRPM | HEIGHT: 74 IN | WEIGHT: 180 LBS | DIASTOLIC BLOOD PRESSURE: 76 MMHG | SYSTOLIC BLOOD PRESSURE: 120 MMHG | HEART RATE: 70 BPM | TEMPERATURE: 97.3 F | BODY MASS INDEX: 23.1 KG/M2

## 2024-09-04 DIAGNOSIS — I49.3 PVC (PREMATURE VENTRICULAR CONTRACTION): ICD-10-CM

## 2024-09-04 LAB
QT INTERVAL: 427 MS
QTC INTERVAL: 461 MS

## 2024-09-04 PROCEDURE — 25010000002 LIDOCAINE 1 % SOLUTION: Performed by: INTERNAL MEDICINE

## 2024-09-04 PROCEDURE — 93005 ELECTROCARDIOGRAM TRACING: CPT | Performed by: INTERNAL MEDICINE

## 2024-09-04 PROCEDURE — 25010000002 MIDAZOLAM PER 1 MG: Performed by: INTERNAL MEDICINE

## 2024-09-04 PROCEDURE — C1721 AICD, DUAL CHAMBER: HCPCS | Performed by: INTERNAL MEDICINE

## 2024-09-04 PROCEDURE — 25010000002 CEFAZOLIN PER 500 MG: Performed by: INTERNAL MEDICINE

## 2024-09-04 PROCEDURE — 25810000003 SODIUM CHLORIDE 0.9 % SOLUTION: Performed by: INTERNAL MEDICINE

## 2024-09-04 PROCEDURE — 25010000002 FENTANYL CITRATE (PF) 50 MCG/ML SOLUTION: Performed by: INTERNAL MEDICINE

## 2024-09-04 PROCEDURE — 33263 RMVL & RPLCMT DFB GEN 2 LEAD: CPT | Performed by: INTERNAL MEDICINE

## 2024-09-04 DEVICE — IMPLANTABLE DEVICE: Type: IMPLANTABLE DEVICE | Status: FUNCTIONAL

## 2024-09-04 RX ORDER — METHOHEXITAL IN WATER/PF 100MG/10ML
SYRINGE (ML) INTRAVENOUS
Status: DISCONTINUED | OUTPATIENT
Start: 2024-09-04 | End: 2024-09-04 | Stop reason: HOSPADM

## 2024-09-04 RX ORDER — LIDOCAINE HYDROCHLORIDE 10 MG/ML
INJECTION, SOLUTION INFILTRATION; PERINEURAL
Status: DISCONTINUED | OUTPATIENT
Start: 2024-09-04 | End: 2024-09-04 | Stop reason: HOSPADM

## 2024-09-04 RX ORDER — MIDAZOLAM HYDROCHLORIDE 1 MG/ML
INJECTION INTRAMUSCULAR; INTRAVENOUS
Status: DISCONTINUED | OUTPATIENT
Start: 2024-09-04 | End: 2024-09-04 | Stop reason: HOSPADM

## 2024-09-04 RX ORDER — SODIUM CHLORIDE 0.9 % (FLUSH) 0.9 %
10 SYRINGE (ML) INJECTION AS NEEDED
Status: DISCONTINUED | OUTPATIENT
Start: 2024-09-04 | End: 2024-09-04 | Stop reason: HOSPADM

## 2024-09-04 RX ORDER — FENTANYL CITRATE 50 UG/ML
INJECTION, SOLUTION INTRAMUSCULAR; INTRAVENOUS
Status: DISCONTINUED | OUTPATIENT
Start: 2024-09-04 | End: 2024-09-04 | Stop reason: HOSPADM

## 2024-09-04 RX ORDER — NITROGLYCERIN 0.4 MG/1
0.4 TABLET SUBLINGUAL
Status: DISCONTINUED | OUTPATIENT
Start: 2024-09-04 | End: 2024-09-04 | Stop reason: HOSPADM

## 2024-09-04 RX ORDER — SODIUM CHLORIDE 9 MG/ML
75 INJECTION, SOLUTION INTRAVENOUS CONTINUOUS
Status: DISCONTINUED | OUTPATIENT
Start: 2024-09-04 | End: 2024-09-04 | Stop reason: HOSPADM

## 2024-09-04 RX ADMIN — SODIUM CHLORIDE 75 ML/HR: 9 INJECTION, SOLUTION INTRAVENOUS at 07:36

## 2024-09-04 NOTE — DISCHARGE INSTRUCTIONS
"RESUME YOUR ELIQUIS ON 9/6      [facility]   [phone]    Post Pacemaker / Defibrillator Implant Instructions      1.  The dressing may be removed the next day.    2. If steri-strips were used, they should not be removed. Allow them to \"fall off\".      3. You may shower after the dressing is removed. Do not allow shower water to hit directly on incision.    4. No lotion/powder/ointment/cream on incision until it is healed.    5. Gently wash incision daily with soap and water and pat dry.    6. You may reapply a dressing if there is drainage, otherwise leave your incision open to air. If you reapply a dressing, please notify the pacemaker clinic.    7. No heavy lifting, pulling, or pushing.    8. Do not raise the affected arm over your head for a minimum of 1 month.    9. The pacemaker clinic will contact you (usually within 1 business day) to schedule a pacemaker/incision check. The check is usually done 7-10 days post-implant. If you have not heard from the pacemaker clinic within 3 days, please call the office.    10.  No driving until seen at your follow up appointment    11. Please call the office if you experience any of the following:   bleeding or drainage from your incision   swelling, redness, or opening of your incision   fever or chills   pain not relieved with medication   chest pain or difficulty breathing   lightheadness    12. For defibrillator patients only: If you receive a shock from your device, please call the office. If you receive 2 or more shocks within a 24 hour period OR if you receive 1 shock and feel poorly, you should be evaluated in the emergency room. Please DO NOT DRIVE if you have received a shock until your device has been checked.  "

## 2024-09-04 NOTE — Clinical Note
Pocket irrigated with: Vancomycin antibiotic solution. JENNIFER DAILY ; 2019 ; NPP Ped Gastro 1991 JENNIFER Osborne ; 01/08/2020 ; NPP PED  [facility] JENNIFER Boone ; 01/09/2020 ; NPP Ped Neonat 1991 JENNIFER Laurent ; 01/15/2020 ; NPP OtoLaryng 430 OP JENNIFER DAILY ; 2019 ; NPP Ped Gastro 1991 JENNIFER Osborne ; 01/08/2020 ; NPP PED  [facility] JENNIFER Boone ; 01/09/2020 ; NPP Ped Neonat 1991 JENNIFER Laurent ; 01/15/2020 ; NPP OtoLaryng 430 OP JENNIFER DAILY ; 2019 ; NPP Ped Gastro 1991 JENNIFER Osborne ; 01/08/2020 ; NPP PED  On license of [facility] JENNIFER Boone ; 01/09/2020 ; NPP Ped Neonat 1991 JENNIFER Laurent ; 01/15/2020 ; NPP OtoLaryng 430 OP JENNIFER DAILY ; 2019 ; NPP Ped Gastro 1991 JENNIFER Osborne ; 01/08/2020 ; NPP PED  [facility] JENNIFER Boone ; 01/09/2020 ; NPP Ped Neonat 1991 JENNIFER Laurent ; 01/15/2020 ; NPP OtoLaryng 430 OP JENNIFER DAILY ; 2019 ; NPP Ped Gastro 1991 JENNIFER Osborne ; 01/08/2020 ; NPP PED  [facility] JENNIFER Boone ; 01/09/2020 ; NPP Ped Neonat 1991 JENNIFER Laurent ; 01/15/2020 ; NPP OtoLaryng 430 OP JENNIFER DAILY ; 01/08/2020 ; [facility] P[facility]  [facility] Boone ; 01/09/2020 ; NPP Ped Neonat 1991 JENNIFER Laurent ; 01/15/2020 ; NPP OtoLaryng 430 OP JENNIFER DAILY ; 2019 ; NPP Ped Cardio 1111 JENNIFER Osborne ; 2019 ; NPP PED CT SURG 1111 JENNIFER Sosa ; 01/06/2020 ; NPP Ped Cardio 1111 JENNIFER Osborne ; 01/08/2020 ; NPP PED  [facility] JENNIFER Boone ; 01/09/2020 ; NPP Ped Neonat 1991 JENNIFER Laurent ; 01/15/2020 ; NPP OtoLaryng 430 OP

## 2024-09-05 NOTE — H&P
[facility]   HISTORY AND PHYSICAL    Patient Name:Alden Pratt III  : 1950  MRN: [id number]  Primary Care Physician: Jhoan Quezada MD  Date of admission: 2024    Subjective   Subjective     Chief Complaint:     History of Present Illness   Alden Pratt III is a 73 y.o. male with a history of ischemic cardiomyopathy, atrial fibrillation, and ventricular tachycardia.      He has a dual chamber ICD and he has received appropriate therapy in the past.     We reached the shared decision to proceed with generator replacement.     Review of Systems   Constitutional:  Negative for activity change and fatigue.   Eyes: Negative.    Respiratory:  Negative for chest tightness and shortness of breath.    Cardiovascular:  Positive for palpitations. Negative for chest pain and leg swelling.   Gastrointestinal: Negative.    Endocrine: Negative.  Negative for polyphagia.   Genitourinary: Negative.    Musculoskeletal: Negative.    Skin: Negative.    Neurological:  Negative for dizziness and syncope.   Hematological: Negative.    Psychiatric/Behavioral: Negative.           Personal History     Past Medical History:   Diagnosis Date    Arthritis     CAD (coronary artery disease)     atheroclerosis    Cancer     prostate    Chest pain     CHF (congestive heart failure)     Elevated cholesterol     GERD (gastroesophageal reflux disease)     Hyperlipidemia     Implantable cardioverter-defibrillator (ICD) discharge     Myocardial infarction     inferior    PAF (paroxysmal atrial fibrillation)     PVC (premature ventricular contraction)     PVD (peripheral vascular disease)     Status post phlebectomy     varicose veins    Thyroid nodule 2014    Varicose veins of bilateral lower extremities with pain 2023    and inflammation    Venous insufficiency (chronic) (peripheral) 2023    VT (ventricular tachycardia)        Past Surgical History:   Procedure Laterality Date    CARDIAC ABLATION       atrial fibrillation    CARDIAC CATHETERIZATION  2006    CARDIAC CATHETERIZATION Left 05/10/2021    Procedure: Left Heart Cath;  Surgeon: Andrew Ball MD;  [facility];  Service: Cardiology;  Laterality: Left;    CARDIAC CATHETERIZATION N/A 05/10/2021    Procedure: Coronary angiography;  Surgeon: Andrew Ball MD;  Location:  [facility] CATH INVASIVE LOCATION;  Service: Cardiology;  Laterality: N/A;    CARDIAC CATHETERIZATION N/A 05/10/2021    Procedure: Left ventriculography;  Surgeon: Andrew Ball MD;  Location:  [facility] CATH INVASIVE LOCATION;  Service: Cardiology;  Laterality: N/A;    CARDIAC CATHETERIZATION  05/10/2021    Procedure: Saphenous Vein Graft;  Surgeon: Andrew Ball MD;  [facility];  Service: Cardiology;;    CARDIAC DEFIBRILLATOR PLACEMENT  02/2007    CARDIAC ELECTROPHYSIOLOGY MAPPING AND ABLATION  2023    v-tach    CARDIAC ELECTROPHYSIOLOGY PROCEDURE N/A 08/02/2021    Procedure: Ablation atrial fibrillation;  Surgeon: Robby Ngo MD;  [facility];  Service: Cardiovascular;  Laterality: N/A;    CARDIAC ELECTROPHYSIOLOGY PROCEDURE N/A 11/15/2023    Procedure: Ablation VT;  Surgeon: Robby Ngo MD;  Location:  [facility] CATH INVASIVE LOCATION;  Service: Cardiovascular;  Laterality: N/A;    CARDIAC ELECTROPHYSIOLOGY PROCEDURE N/A 8/5/2024    Procedure: Ablation PVC;  Surgeon: Robby Ngo MD;  [facility];  Service: Cardiovascular;  Laterality: N/A;    CORONARY ARTERY BYPASS GRAFT  11/02/2006    x6    CORONARY STENT PLACEMENT  10/2006    FINGER SURGERY      HAND SURGERY Left     KNEE SURGERY Left     MICROAMBULATORY PHLEBECTOMY      SHOULDER SURGERY Right     THYROID BIOPSY      VARICOSE VEIN SURGERY Right 01/29/2024    VV Varithena       Family History: His family history includes Cancer in his father and mother; Coronary artery disease in an other family  member; Heart disease in his father; Heart failure in his father; Hypertension in an other family member; No Known Problems in his maternal grandfather, maternal grandmother, paternal grandfather, and paternal grandmother; Stroke in his father and another family member.     Social History: He  reports that he has never smoked. He has never used smokeless tobacco. He reports that he does not currently use alcohol. He reports that he does not use drugs.    Home Medications:  Clobetasol & Clobetasol Emul, Diclofenac, Minoxidil, apixaban, aspirin, atorvastatin, carboxymethylcellulose, cetirizine, empagliflozin, ezetimibe, fluticasone, furosemide, losartan, metaxalone, metoprolol succinate XL, nitroglycerin, omeprazole, spironolactone, and vitamin D3    Allergies:  He is allergic to contrast dye (echo or unknown ct/mr), ace inhibitors, iodinated contrast media, sudafed 12 hour [pseudoephedrine hcl er], and pseudoephedrine.    Objective    Objective     Vitals:    Temp:  [97.3 °F (36.3 °C)] 97.3 °F (36.3 °C)  Heart Rate:  [61-71] 70  Resp:  [12-20] 16  BP: (112-129)/(63-77) 120/76    Physical Exam  Vitals and nursing note reviewed.   Constitutional:       General: He is not in acute distress.     Appearance: He is not diaphoretic.   HENT:      Mouth/Throat:      Pharynx: No oropharyngeal exudate.   Eyes:      General: No scleral icterus.  Neck:      Trachea: No tracheal deviation.   Cardiovascular:      Rate and Rhythm: Normal rate and regular rhythm.   Pulmonary:      Effort: Pulmonary effort is normal. No respiratory distress.      Breath sounds: Normal breath sounds.   Chest:      Comments: ICD looks normal on left chest  Abdominal:      General: There is no distension.      Palpations: Abdomen is soft.   Skin:     General: Skin is warm and dry.   Neurological:      Mental Status: He is alert and oriented to person, place, and time.   Psychiatric:         Behavior: Behavior normal.         Thought Content: Thought  content normal.         Judgment: Judgment normal.          Result Review    Result Review:  I have personally reviewed the results from the time of this admission to 9/4/2024 21:55 EDT and agree with these findings:  []  Laboratory list / accordion  []  Microbiology  []  Radiology  []  EKG/Telemetry   []  Cardiology/Vascular   []  Pathology  []  Old records  []  Other:  Most notable findings include: sinus rhythm with occasional PVC      Assessment & Plan   Assessment / Plan     Brief Patient Summary:  Alden Pratt III is a 73 y.o. male presenting today for generator exchange of dual chamber ICD    Active Hospital Problems:  Active Hospital Problems    Diagnosis     **PVC (premature ventricular contraction)      Plan:   Proceed with ICD change    VTE Prophylaxis:  Pharmacologic VTE prophylaxis orders are present.        Robby Ngo MD

## 2024-09-10 ENCOUNTER — OFFICE VISIT (OUTPATIENT)
Dept: CARDIOLOGY | Facility: CLINIC | Age: 74
End: 2024-09-10
Payer: MEDICARE

## 2024-09-10 VITALS
HEART RATE: 75 BPM | SYSTOLIC BLOOD PRESSURE: 120 MMHG | HEIGHT: 74 IN | WEIGHT: 187.2 LBS | DIASTOLIC BLOOD PRESSURE: 62 MMHG | BODY MASS INDEX: 24.02 KG/M2

## 2024-09-10 DIAGNOSIS — I47.20 VT (VENTRICULAR TACHYCARDIA): ICD-10-CM

## 2024-09-10 DIAGNOSIS — I49.3 PVC (PREMATURE VENTRICULAR CONTRACTION): ICD-10-CM

## 2024-09-10 DIAGNOSIS — I48.0 PAROXYSMAL ATRIAL FIBRILLATION: ICD-10-CM

## 2024-09-10 DIAGNOSIS — Z95.1 S/P CABG (CORONARY ARTERY BYPASS GRAFT): Primary | ICD-10-CM

## 2024-09-10 DIAGNOSIS — R06.09 DYSPNEA ON EXERTION: ICD-10-CM

## 2024-09-10 DIAGNOSIS — I25.5 ISCHEMIC CARDIOMYOPATHY: ICD-10-CM

## 2024-09-10 RX ORDER — LOSARTAN POTASSIUM 25 MG/1
25 TABLET ORAL DAILY
Qty: 90 TABLET | Refills: 3 | Status: SHIPPED | OUTPATIENT
Start: 2024-09-10

## 2024-09-10 NOTE — PROGRESS NOTES
Date of Office Visit: 09/10/24  Encounter Provider: Andrew Ball MD  Place of Service: [facility] CARDIOLOGY  Patient Name: Alden Pratt III  :1950  [id number]    Chief Complaint   Patient presents with    Coronary Artery Disease   :     HPI: Alden Pratt III is a 73 y.o. male  he had an MI in .  At that time we did an angioplasty and stenting to his RCA but he had other significant disease his stents ended up renarrowing down and he had an ischemic myopathy.  He had an ICD placed we did a bypass on him with a LIMA to his LAD, a vein to his distal LAD, a vein to an OM 1, a vein to the PDA, vein to the KIERAN, a vein to the RV branch.  He is done really pretty well he has had chronic PVCs even before the infarct that have persisted some.  He had pretty emotionally dramatic reunion with his brother he ended up having VF and got a shock from his defibrillator.  The shocked knocked him into A. Fib.   We had to anticoagulate him and then cardiovert him out.  So earlier in  he went into atrial fibrillation again and did not feel good.  We cardioverted him out of it.  He is actually seen the arrhythmia service they put him on Multaq.  He does not feel any better.      We were concerned that he was having angina and so we cathed him and May 2021 and this showed 6 of 6 bypasses were patent.  He had a severe cardiomyopathy we added some Jardiance to his regimen.  He has followed up with the [facility] heart failure clinic.     He has had a prior A. fib ablation.  He in the 2023 he started having a lot of ventricular tachycardia he underwent a VT ablation with Robby Ngo and then in 2023 was seen was doing great and I stopped his amiodarone.  His echo in 2023 also showed an EF of 35 to 40%.    He started feeling his ventricular ectopy a little bit more he is always been sensitive to it he underwent a PVC ablation in 2024.  He is  here for follow-up today.  He feels like the PVC ablation helped him but he does not have nearly as much ectopy.  Otherwise he is doing well no heart failure symptoms no angina no syncope blood pressures have been a little bit higher he is wondering about a little bit more losartan      Past Medical History:   Diagnosis Date    Arthritis     CAD (coronary artery disease)     atheroclerosis    Cancer     prostate    Chest pain     CHF (congestive heart failure)     Elevated cholesterol     GERD (gastroesophageal reflux disease)     Hyperlipidemia     Implantable cardioverter-defibrillator (ICD) discharge     Myocardial infarction     inferior    PAF (paroxysmal atrial fibrillation)     PVC (premature ventricular contraction)     PVD (peripheral vascular disease)     Status post phlebectomy     varicose veins    Thyroid nodule 07/18/2014    Varicose veins of bilateral lower extremities with pain 11/08/2023    and inflammation    Venous insufficiency (chronic) (peripheral) 11/08/2023    VT (ventricular tachycardia)        Past Surgical History:   Procedure Laterality Date    CARDIAC ABLATION  2021    atrial fibrillation    CARDIAC CATHETERIZATION  2006    CARDIAC CATHETERIZATION Left 05/10/2021    Procedure: Left Heart Cath;  Surgeon: Andrew Ball MD;  Location: [facility] CATH INVASIVE LOCATION;  Service: Cardiology;  Laterality: Left;    CARDIAC CATHETERIZATION N/A 05/10/2021    Procedure: Coronary angiography;  Surgeon: Andrew Ball MD;  Location: [facility] CATH INVASIVE LOCATION;  Service: Cardiology;  Laterality: N/A;    CARDIAC CATHETERIZATION N/A 05/10/2021    Procedure: Left ventriculography;  Surgeon: Andrew Ball MD;  Location: [facility] CATH INVASIVE LOCATION;  Service: Cardiology;  Laterality: N/A;    CARDIAC CATHETERIZATION  05/10/2021    Procedure: Saphenous Vein Graft;  Surgeon: Andrew Ball MD;  Location: [facility] CATH INVASIVE LOCATION;  Service: Cardiology;;    CARDIAC DEFIBRILLATOR PLACEMENT   02/2007    CARDIAC ELECTROPHYSIOLOGY MAPPING AND ABLATION  2023    v-tach    CARDIAC ELECTROPHYSIOLOGY PROCEDURE N/A 08/02/2021    Procedure: Ablation atrial fibrillation;  Surgeon: Robby Ngo MD;  Location:  [facility] CATH INVASIVE LOCATION;  Service: Cardiovascular;  Laterality: N/A;    CARDIAC ELECTROPHYSIOLOGY PROCEDURE N/A 11/15/2023    Procedure: Ablation VT;  Surgeon: Robby Ngo MD;  Location:  [facility] CATH INVASIVE LOCATION;  Service: Cardiovascular;  Laterality: N/A;    CARDIAC ELECTROPHYSIOLOGY PROCEDURE N/A 8/5/2024    Procedure: Ablation PVC;  Surgeon: Robby Ngo MD;  Location:  [facility] CATH INVASIVE LOCATION;  Service: Cardiovascular;  Laterality: N/A;    CORONARY ARTERY BYPASS GRAFT  11/02/2006    x6    CORONARY STENT PLACEMENT  10/2006    FINGER SURGERY      HAND SURGERY Left     ICD GENERATOR REPLACEMENT N/A 9/4/2024    Procedure: Defibrillator Generator Change - DC [employer];  Surgeon: Robby Ngo MD;  Location:  [facility] CATH INVASIVE LOCATION;  Service: Cardiovascular;  Laterality: N/A;    KNEE SURGERY Left     MICROAMBULATORY PHLEBECTOMY      SHOULDER SURGERY Right     THYROID BIOPSY      VARICOSE VEIN SURGERY Right 01/29/2024    VV Varithena       Social History     Socioeconomic History    Marital status:    Tobacco Use    Smoking status: Never    Smokeless tobacco: Never    Tobacco comments:     No caffeine use    Vaping Use    Vaping status: Never Used   Substance and Sexual Activity    Alcohol use: Not Currently    Drug use: No    Sexual activity: Defer       Family History   Problem Relation Age of Onset    Cancer Mother     Heart disease Father     Heart failure Father     Stroke Father     Cancer Father     No Known Problems Maternal Grandmother     No Known Problems Maternal Grandfather     No Known Problems Paternal Grandmother     No Known Problems Paternal Grandfather     Coronary artery disease Other     Stroke Other     Hypertension Other         Review of Systems   Constitutional: Negative for decreased appetite, fever, malaise/fatigue and weight loss.   HENT:  Negative for nosebleeds.    Eyes:  Negative for double vision.   Cardiovascular:  Negative for chest pain, claudication, cyanosis, dyspnea on exertion, irregular heartbeat, leg swelling, near-syncope, orthopnea, palpitations, paroxysmal nocturnal dyspnea and syncope.   Respiratory:  Negative for cough, hemoptysis and shortness of breath.    Hematologic/Lymphatic: Negative for bleeding problem.   Skin:  Negative for rash.   Musculoskeletal:  Negative for falls and myalgias.   Gastrointestinal:  Negative for hematochezia, jaundice, melena, nausea and vomiting.   Genitourinary:  Negative for hematuria.   Neurological:  Negative for dizziness and seizures.   Psychiatric/Behavioral:  Negative for altered mental status and memory loss.        Allergies   Allergen Reactions    Contrast Dye (Echo Or Unknown Ct/Mr) Hives    Ace Inhibitors Cough    Iodinated Contrast Media Hives     Tolerated with pre meds prior to heart cath in feb     Sudafed 12 Hour [Pseudoephedrine Hcl Er]     Pseudoephedrine Palpitations         Current Outpatient Medications:     apixaban (ELIQUIS) 5 MG tablet tablet, Take 1 tablet by mouth Every 12 (Twelve) Hours. Resume on 9/7, Disp: , Rfl:     aspirin 81 MG EC tablet, Take 1 tablet by mouth Every Night., Disp: , Rfl:     atorvastatin (LIPITOR) 80 MG tablet, Take 1 tablet by mouth Daily. 200001 (Patient taking differently: Take 1 tablet by mouth Every Night. 200001), Disp: 90 tablet, Rfl: 2    carboxymethylcellulose (REFRESH PLUS) 0.5 % solution, Apply 1 drop to eye(s) as directed by provider., Disp: , Rfl:     cetirizine (ZyrTEC) 10 MG tablet, Take 1 tablet by mouth As Needed. Only Occasionally will use, Disp: , Rfl:     Cholecalciferol (VITAMIN D3) 5000 UNITS capsule capsule, Take 1 capsule by mouth Every Other Day., Disp: , Rfl:     CLOBETASOL & CLOBETASOL EMUL EX, Apply 1  "Application topically Daily., Disp: , Rfl:     DICLOFENAC PO, Take 75 mg by mouth 2 (Two) Times a Day As Needed (back pain)., Disp: , Rfl:     Empagliflozin (Jardiance) 10 MG tablet, Take 10 mg by mouth Daily., Disp: 90 tablet, Rfl: 2    ezetimibe (ZETIA) 10 MG tablet, Take 1 tablet by mouth Every Night., Disp: , Rfl:     fluticasone (FLONASE) 50 MCG/ACT nasal spray, 1 spray into the nostril(s) as directed by provider 2 (Two) Times a Day As Needed., Disp: , Rfl:     furosemide (LASIX) 20 MG tablet, Take 1 tablet by mouth As Needed., Disp: , Rfl:     losartan (COZAAR) 25 MG tablet, Take 0.5 tablets by mouth Daily., Disp: 45 tablet, Rfl: 3    metaxalone (SKELAXIN) 800 MG tablet, Take 1 tablet by mouth As Needed for Muscle Spasms., Disp: , Rfl:     metoprolol succinate XL (TOPROL-XL) 100 MG 24 hr tablet, Take 1 tablet by mouth. In PM, Disp: , Rfl:     metoprolol succinate XL (TOPROL-XL) 50 MG 24 hr tablet, Take 1 tablet by mouth. In AM, Disp: , Rfl:     Minoxidil 5 % foam, Apply  topically 2 (two) times a day., Disp: , Rfl:     nitroglycerin (NITROSTAT) 0.4 MG SL tablet, 1 under the tongue as needed for angina, may repeat q5mins for up three doses, Disp: 100 tablet, Rfl: 11    omeprazole (priLOSEC) 40 MG capsule, Take 1 capsule by mouth Daily., Disp: , Rfl:     spironolactone (ALDACTONE) 25 MG tablet, Take 0.5 tablets by mouth Daily., Disp: , Rfl:       Objective:     Vitals:    09/10/24 1249   BP: 120/62   Pulse: 75   Weight: 84.9 kg (187 lb 3.2 oz)   Height: 188 cm (74\")     Body mass index is 24.04 kg/m².    Physical Exam  Constitutional:       Appearance: He is well-developed.   HENT:      Head: Normocephalic.   Eyes:      General: No scleral icterus.  Neck:      Thyroid: No thyromegaly.      Vascular: No JVD.   Cardiovascular:      Rate and Rhythm: Normal rate and regular rhythm.      Heart sounds: Normal heart sounds. No murmur heard.     No friction rub. No gallop.   Pulmonary:      Effort: Pulmonary effort is " normal.      Breath sounds: Normal breath sounds. No wheezing or rales.   Chest:       Abdominal:      Palpations: Abdomen is soft.      Tenderness: There is no abdominal tenderness.   Musculoskeletal:         General: Normal range of motion.   Lymphadenopathy:      Cervical: No cervical adenopathy.   Skin:     General: Skin is warm and dry.      Findings: No rash.   Neurological:      Mental Status: He is alert and oriented to person, place, and time.           ECG 12 Lead    Date/Time: 9/10/2024 1:15 PM  Performed by: Andrew Ball MD    Authorized by: Andrew Ball MD  Previous ECG: no previous ECG available  Rhythm: paced  Q waves: II, III and aVF    Other findings: non-specific ST-T wave changes    Clinical impression: abnormal EKG           Assessment:       Diagnosis Plan   1. S/P CABG (coronary artery bypass graft)        2. Ischemic cardiomyopathy        3. Paroxysmal atrial fibrillation        4. Dyspnea on exertion        5. PVC (premature ventricular contraction)        6. VT (ventricular tachycardia)                 Plan:       Well I think he is doing well I am going to increase his losartan to 25 a day and see if he can tolerate it.  He is having less PVCs at least today he is and I think he had the ablations helped him I looked at his ICD incision I feel like it is okay.  I am not can to change anything else I have been taking care of him for 18 years and he gave me an apple pie today I will have him come back and see me in 6 months    As always, it has been a pleasure to participate in your patient's care.      Sincerely,       Andrew Ball MD

## 2024-09-11 ENCOUNTER — CLINICAL SUPPORT NO REQUIREMENTS (OUTPATIENT)
Age: 74
End: 2024-09-11
Payer: MEDICARE

## 2024-09-11 ENCOUNTER — OFFICE VISIT (OUTPATIENT)
Age: 74
End: 2024-09-11
Payer: MEDICARE

## 2024-09-11 VITALS — HEART RATE: 84 BPM | BODY MASS INDEX: 24 KG/M2 | HEIGHT: 74 IN | WEIGHT: 187 LBS

## 2024-09-11 DIAGNOSIS — I25.5 ISCHEMIC CARDIOMYOPATHY: ICD-10-CM

## 2024-09-11 DIAGNOSIS — I25.2 HISTORY OF MI (MYOCARDIAL INFARCTION): ICD-10-CM

## 2024-09-11 DIAGNOSIS — I25.10 CORONARY ARTERY DISEASE INVOLVING NATIVE CORONARY ARTERY OF NATIVE HEART WITHOUT ANGINA PECTORIS: ICD-10-CM

## 2024-09-11 DIAGNOSIS — I49.3 PVC (PREMATURE VENTRICULAR CONTRACTION): Primary | ICD-10-CM

## 2024-09-11 DIAGNOSIS — I47.29 NSVT (NONSUSTAINED VENTRICULAR TACHYCARDIA): Primary | ICD-10-CM

## 2024-09-11 NOTE — PROGRESS NOTES
Date of Office Visit: 2024  Encounter Provider: BRANDON Irvin  Place of Service: [facility] CARDIOLOGY  Patient Name: Alden Pratt III  :1950    Chief Complaint   Patient presents with    PVC    s/p ablation     Pacemaker Check   :     HPI: Alden Pratt III is a 73 y.o. male who follows with Dr. Ball and Dr. Ngo----MI , CAD, s/p stents, s/p CABG, ICM, s/p ICD.      Has had chronic PVC's has had VF treated with shock that sent him into AF, cardioverted then had recurrence ---was on dronedarone at one point and the had PVI 2021 and has not had recurrence.      He also follows with the [facility].      He saw Dr. Ball in Feb with complaints of palpitations that he reported were really fast and really bothered him---device showed short runs of NSVT, he was started on low dose amiodarone 100 mg BID.       Presented to ED 10/21 with palpitations and syncope. He was mowing grass and started to feel the PVC's and then felt the rapid palpitations and had syncope.      Device interrogation shows VT that spontaneously converted just prior to meeting criteria for therapy.      Echo shows EF 36-40% which is down from prior which was about 45% but per Dr. Liu's noted he thinks it is really unchanged and just reader variability.      We increased his amiodarone to 200mg BID.      Over the next couple of weeks. He had recurrent VT. Episode was shorter lasting 2-3 minutes.      Saw Dr. Ngo in the office on . Discussed VT ablation and this was scheduled for .     On  he presented to ED with syncope. Device showed NSVT.      He ended up undergoing VT ablation/substrate mapping on 11/15     He saw Dr. Ngo in follow up and felt like he was having more symptoms related to PVCs. He was in bigeminy. Discussed and elected to pursue ablation.     He had ablation of multifocal PVCs and underwent substrate  modification on 8/05/2024.     He then had generator change on ICD on 9/04/2024.                   He presents today for follow up appt.     Since PVC ablation he has been doing well.     He says much better than before. He does not really notice the PVCs anymore.     From device report they are significantly less. Was 200 a hour, now ~24.     He has less dyspnea and dizziness.     Normal device testing and function. No events.     He has small hematoma from gen change but it is soft. No signs of infection. No drainage.      Past Medical History:   Diagnosis Date    Arthritis     CAD (coronary artery disease)     atheroclerosis    Cancer     prostate    Chest pain     CHF (congestive heart failure)     Elevated cholesterol     GERD (gastroesophageal reflux disease)     Hyperlipidemia     Implantable cardioverter-defibrillator (ICD) discharge     Myocardial infarction     inferior    PAF (paroxysmal atrial fibrillation)     PVC (premature ventricular contraction)     PVD (peripheral vascular disease)     Status post phlebectomy     varicose veins    Thyroid nodule 07/18/2014    Varicose veins of bilateral lower extremities with pain 11/08/2023    and inflammation    Venous insufficiency (chronic) (peripheral) 11/08/2023    VT (ventricular tachycardia)        Past Surgical History:   Procedure Laterality Date    CARDIAC ABLATION  2021    atrial fibrillation    CARDIAC CATHETERIZATION  2006    CARDIAC CATHETERIZATION Left 05/10/2021    Procedure: Left Heart Cath;  Surgeon: Andrew Ball MD;  Location: [facility] CATH INVASIVE LOCATION;  Service: Cardiology;  Laterality: Left;    CARDIAC CATHETERIZATION N/A 05/10/2021    Procedure: Coronary angiography;  Surgeon: Andrew Ball MD;  [facility];  Service: Cardiology;  Laterality: N/A;    CARDIAC CATHETERIZATION N/A 05/10/2021    Procedure: Left ventriculography;  Surgeon: Andrew Ball MD;  Location: [facility] INVASIVE LOCATION;   Service: Cardiology;  Laterality: N/A;    CARDIAC CATHETERIZATION  05/10/2021    Procedure: Saphenous Vein Graft;  Surgeon: Andrew Ball MD;  Location:  [facility] CATH INVASIVE LOCATION;  Service: Cardiology;;    CARDIAC DEFIBRILLATOR PLACEMENT  02/2007    CARDIAC ELECTROPHYSIOLOGY MAPPING AND ABLATION  2023    v-tach    CARDIAC ELECTROPHYSIOLOGY PROCEDURE N/A 08/02/2021    Procedure: Ablation atrial fibrillation;  Surgeon: Robby Ngo MD;  Location:  [facility] CATH INVASIVE LOCATION;  Service: Cardiovascular;  Laterality: N/A;    CARDIAC ELECTROPHYSIOLOGY PROCEDURE N/A 11/15/2023    Procedure: Ablation VT;  Surgeon: Robby Ngo MD;  Location:  [facility] CATH INVASIVE LOCATION;  Service: Cardiovascular;  Laterality: N/A;    CARDIAC ELECTROPHYSIOLOGY PROCEDURE N/A 8/5/2024    Procedure: Ablation PVC;  Surgeon: Robby Ngo MD;  Location:  [facility] CATH INVASIVE LOCATION;  Service: Cardiovascular;  Laterality: N/A;    CORONARY ARTERY BYPASS GRAFT  11/02/2006    x6    CORONARY STENT PLACEMENT  10/2006    FINGER SURGERY      HAND SURGERY Left     ICD GENERATOR REPLACEMENT N/A 9/4/2024    Procedure: Defibrillator Generator Change - DC [employer];  Surgeon: Robby Ngo MD;  Location:  [facility] CATH INVASIVE LOCATION;  Service: Cardiovascular;  Laterality: N/A;    KNEE SURGERY Left     MICROAMBULATORY PHLEBECTOMY      SHOULDER SURGERY Right     THYROID BIOPSY      VARICOSE VEIN SURGERY Right 01/29/2024    VV Varithena       Social History     Socioeconomic History    Marital status:    Tobacco Use    Smoking status: Never    Smokeless tobacco: Never    Tobacco comments:     No caffeine use    Vaping Use    Vaping status: Never Used   Substance and Sexual Activity    Alcohol use: Not Currently    Drug use: No    Sexual activity: Defer       Family History   Problem Relation Age of Onset    Cancer Mother     Heart disease Father     Heart failure Father     Stroke Father     Cancer Father      No Known Problems Maternal Grandmother     No Known Problems Maternal Grandfather     No Known Problems Paternal Grandmother     No Known Problems Paternal Grandfather     Coronary artery disease Other     Stroke Other     Hypertension Other        Review of Systems   Constitutional: Negative for chills, fever and malaise/fatigue.   Cardiovascular:  Negative for chest pain, dyspnea on exertion, leg swelling, near-syncope, orthopnea, palpitations, paroxysmal nocturnal dyspnea and syncope.   Respiratory:  Negative for cough and shortness of breath.    Hematologic/Lymphatic: Negative.    Musculoskeletal:  Negative for joint pain, joint swelling and myalgias.   Gastrointestinal:  Negative for abdominal pain, diarrhea, melena, nausea and vomiting.   Genitourinary:  Negative for frequency and hematuria.   Neurological:  Negative for light-headedness, numbness, paresthesias and seizures.   Allergic/Immunologic: Negative.    All other systems reviewed and are negative.      Allergies   Allergen Reactions    Contrast Dye (Echo Or Unknown Ct/Mr) Hives    Ace Inhibitors Cough    Iodinated Contrast Media Hives     Tolerated with pre meds prior to heart cath in feb     Sudafed 12 Hour [Pseudoephedrine Hcl Er]     Pseudoephedrine Palpitations         Current Outpatient Medications:     apixaban (ELIQUIS) 5 MG tablet tablet, Take 1 tablet by mouth Every 12 (Twelve) Hours. Resume on 9/7, Disp: , Rfl:     aspirin 81 MG EC tablet, Take 1 tablet by mouth Every Night., Disp: , Rfl:     atorvastatin (LIPITOR) 80 MG tablet, Take 1 tablet by mouth Daily. 200001 (Patient taking differently: Take 1 tablet by mouth Every Night. 200001), Disp: 90 tablet, Rfl: 2    carboxymethylcellulose (REFRESH PLUS) 0.5 % solution, Apply 1 drop to eye(s) as directed by provider., Disp: , Rfl:     cetirizine (ZyrTEC) 10 MG tablet, Take 1 tablet by mouth As Needed. Only Occasionally will use, Disp: , Rfl:     Cholecalciferol (VITAMIN D3) 5000 UNITS capsule  "capsule, Take 1 capsule by mouth Every Other Day., Disp: , Rfl:     CLOBETASOL & CLOBETASOL EMUL EX, Apply 1 Application topically Daily., Disp: , Rfl:     DICLOFENAC PO, Take 75 mg by mouth 2 (Two) Times a Day As Needed (back pain)., Disp: , Rfl:     Empagliflozin (Jardiance) 10 MG tablet, Take 10 mg by mouth Daily., Disp: 90 tablet, Rfl: 2    ezetimibe (ZETIA) 10 MG tablet, Take 1 tablet by mouth Every Night., Disp: , Rfl:     fluticasone (FLONASE) 50 MCG/ACT nasal spray, 1 spray into the nostril(s) as directed by provider 2 (Two) Times a Day As Needed., Disp: , Rfl:     furosemide (LASIX) 20 MG tablet, Take 1 tablet by mouth As Needed., Disp: , Rfl:     losartan (COZAAR) 25 MG tablet, Take 1 tablet by mouth Daily., Disp: 90 tablet, Rfl: 3    metaxalone (SKELAXIN) 800 MG tablet, Take 1 tablet by mouth As Needed for Muscle Spasms., Disp: , Rfl:     metoprolol succinate XL (TOPROL-XL) 100 MG 24 hr tablet, Take 1 tablet by mouth. In PM, Disp: , Rfl:     metoprolol succinate XL (TOPROL-XL) 50 MG 24 hr tablet, Take 1 tablet by mouth. In AM, Disp: , Rfl:     Minoxidil 5 % foam, Apply  topically 2 (two) times a day., Disp: , Rfl:     nitroglycerin (NITROSTAT) 0.4 MG SL tablet, 1 under the tongue as needed for angina, may repeat q5mins for up three doses, Disp: 100 tablet, Rfl: 11    omeprazole (priLOSEC) 40 MG capsule, Take 1 capsule by mouth Daily., Disp: , Rfl:     spironolactone (ALDACTONE) 25 MG tablet, Take 0.5 tablets by mouth Daily., Disp: , Rfl:       Objective:     Vitals:    09/11/24 1327   Pulse: 84   Weight: 84.8 kg (187 lb)   Height: 188 cm (74\")     Body mass index is 24.01 kg/m².    PHYSICAL EXAM:    Vitals Reviewed.   General Appearance: No acute distress, well developed and well nourished.   Eyes: Conjunctiva and lids: No erythema, swelling, or discharge. Sclera non-icteric.   HENT: Atraumatic, normocephalic. External eyes, ears, and nose normal.   Respiratory: No signs of respiratory distress. " Respiration rhythm and depth normal.   Clear to auscultation. No rales, crackles, rhonchi, or wheezing auscultated.   Cardiovascular:  Heart Rate and Rhythm: Normal, Heart Sounds: Normal S1 and S2. No S3 or S4 noted.  Gastrointestinal:  Abdomen soft, non-distended, non-tender.   Musculoskeletal: Normal movement of extremities  Skin: Warm and dry.   Psychiatric: Patient alert and oriented to person, place, and time. Speech and behavior appropriate. Normal mood and affect.       ECG 12 Lead    Date/Time: 9/11/2024 2:37 PM  Performed by: Galo Braden APRN    Authorized by: Galo Braden APRN  Comparison: compared with previous ECG   Similar to previous ECG  Rhythm: sinus rhythm            Assessment:       Diagnosis Plan   1. PVC (premature ventricular contraction)        2. Ischemic cardiomyopathy        3. History of MI (myocardial infarction)        4. Coronary artery disease involving native coronary artery of native heart without angina pectoris               Plan:   PVCs-- since PVC ablation he feels much better. He is not having any on EKG today. His dyspnea and awareness of PVCs is better. By counter on his device he went from 200/hr to 20/hr.       2-4. CAD, VT--s/p ICD--- he has recent generator change. He does have small hematoma but it is soft. No evidence of infection or drainage. I advised him to call for worsening signs/symptoms. Device testing and function is normal.         Follow up in 6 months.             As always, it has been a pleasure to participate in your patient's care.      Sincerely,         BRANDON Irvin

## 2024-11-13 PROCEDURE — 93297 REM INTERROG DEV EVAL ICPMS: CPT | Performed by: INTERNAL MEDICINE

## 2025-01-08 ENCOUNTER — OFFICE VISIT (OUTPATIENT)
Age: 75
End: 2025-01-08
Payer: MEDICARE

## 2025-01-08 VITALS
TEMPERATURE: 97.7 F | HEART RATE: 77 BPM | BODY MASS INDEX: 24.13 KG/M2 | SYSTOLIC BLOOD PRESSURE: 128 MMHG | DIASTOLIC BLOOD PRESSURE: 74 MMHG | WEIGHT: 188 LBS | HEIGHT: 74 IN | OXYGEN SATURATION: 97 %

## 2025-01-08 DIAGNOSIS — I83.893 SYMPTOMATIC VARICOSE VEINS, BILATERAL: ICD-10-CM

## 2025-01-08 DIAGNOSIS — I83.893 VARICOSE VEINS OF BILATERAL LOWER EXTREMITIES WITH OTHER COMPLICATIONS: Primary | ICD-10-CM

## 2025-01-08 DIAGNOSIS — I48.19 ATRIAL FIBRILLATION, PERSISTENT: ICD-10-CM

## 2025-01-08 DIAGNOSIS — I87.2 VENOUS (PERIPHERAL) INSUFFICIENCY: ICD-10-CM

## 2025-01-08 RX ORDER — LINACLOTIDE 72 UG/1
72 CAPSULE, GELATIN COATED ORAL DAILY
COMMUNITY
Start: 2024-09-18

## 2025-01-08 NOTE — H&P (VIEW-ONLY)
Patient Name: Alden Pratt III    MRN: [id number] Encounter Date: 01/08/2025      Consulting Service: Vascular Surgery    Referring Provider: No ref. provider found       CHIEF COMPLAINT:  Chief Complaint   Patient presents with    vein removal     Surgery Feb 4th       Subjective    HPI: Alden Pratt III is a 74 y.o. male is being seen for evaluation/management of follow up for vein mapping of the bilateral lower extremity.   Patient has been compliant with medical grade compression stocking use as well as elevation.   Despite best medical therapy symptoms persist.  At this point in time I discussed with the patient the options for intervention versus continued medical therapy.  Based on current anatomy and covered endovenous options I have recommended  bilateral ambulatory phlebectomy. .    While the patient was then I discussed in detail at length the procedure itself as well as the risk benefits and complications thereof.  Risks include but are not limited to bleeding, infection, nerve injury, skin injury, failure to clear the veins, failure to clear the pain, deep vein thrombosis and associated pulmonary emboli.  Patient understands and will schedule at their convenience.      PAST MEDICAL HISTORY:   Past Medical History:   Diagnosis Date    Arthritis     CAD (coronary artery disease)     atheroclerosis    Cancer     prostate    Chest pain     CHF (congestive heart failure)     Elevated cholesterol     GERD (gastroesophageal reflux disease)     Hyperlipidemia     Implantable cardioverter-defibrillator (ICD) discharge     Myocardial infarction     inferior    PAF (paroxysmal atrial fibrillation)     PVC (premature ventricular contraction)     PVD (peripheral vascular disease)     Status post phlebectomy     varicose veins    Thyroid nodule 07/18/2014    Varicose veins of bilateral lower extremities with pain 11/08/2023    and inflammation    Venous insufficiency (chronic) (peripheral) 11/08/2023     VT (ventricular tachycardia)       PAST SURGICAL HISTORY:   Past Surgical History:   Procedure Laterality Date    CARDIAC ABLATION  2021    atrial fibrillation    CARDIAC CATHETERIZATION  2006    CARDIAC CATHETERIZATION Left 05/10/2021    Procedure: Left Heart Cath;  Surgeon: Andrew Ball MD;  Location: [facility] CATH INVASIVE LOCATION;  Service: Cardiology;  Laterality: Left;    CARDIAC CATHETERIZATION N/A 05/10/2021    Procedure: Coronary angiography;  Surgeon: Andrew Ball MD;  Location: [facility] CATH INVASIVE LOCATION;  Service: Cardiology;  Laterality: N/A;    CARDIAC CATHETERIZATION N/A 05/10/2021    Procedure: Left ventriculography;  Surgeon: Andrew Ball MD;  Location: [facility] CATH INVASIVE LOCATION;  Service: Cardiology;  Laterality: N/A;    CARDIAC CATHETERIZATION  05/10/2021    Procedure: Saphenous Vein Graft;  Surgeon: Andrew Ball MD;  Location: [facility] CATH INVASIVE LOCATION;  Service: Cardiology;;    CARDIAC DEFIBRILLATOR PLACEMENT  02/2007    CARDIAC ELECTROPHYSIOLOGY MAPPING AND ABLATION  2023    v-tach    CARDIAC ELECTROPHYSIOLOGY PROCEDURE N/A 08/02/2021    Procedure: Ablation atrial fibrillation;  Surgeon: Robby Ngo MD;  Location: [facility] INVASIVE LOCATION;  Service: Cardiovascular;  Laterality: N/A;    CARDIAC ELECTROPHYSIOLOGY PROCEDURE N/A 11/15/2023    Procedure: Ablation VT;  Surgeon: Robby Ngo MD;  Location: [facility] CATH INVASIVE LOCATION;  Service: Cardiovascular;  Laterality: N/A;    CARDIAC ELECTROPHYSIOLOGY PROCEDURE N/A 8/5/2024    Procedure: Ablation PVC;  Surgeon: Robby Ngo MD;  Location: [facility] CATH INVASIVE LOCATION;  Service: Cardiovascular;  Laterality: N/A;    CORONARY ARTERY BYPASS GRAFT  11/02/2006    x6    CORONARY STENT PLACEMENT  10/2006    FINGER SURGERY      HAND SURGERY Left     ICD GENERATOR REPLACEMENT N/A 9/4/2024    Procedure: Defibrillator Generator Change - DC [employer];  Surgeon: Robby Ngo MD;  Location:  [facility] INVASIVE LOCATION;  Service: Cardiovascular;  Laterality: N/A;    KNEE SURGERY Left     MICROAMBULATORY PHLEBECTOMY      SHOULDER SURGERY Right     THYROID BIOPSY      VARICOSE VEIN SURGERY Right 01/29/2024    VV Varithena      FAMILY HISTORY:   Family History   Problem Relation Age of Onset    Cancer Mother     Heart disease Father     Heart failure Father     Stroke Father     Cancer Father     No Known Problems Maternal Grandmother     No Known Problems Maternal Grandfather     No Known Problems Paternal Grandmother     No Known Problems Paternal Grandfather     Coronary artery disease Other     Stroke Other     Hypertension Other       SOCIAL HISTORY:   Social History     Tobacco Use    Smoking status: Never    Smokeless tobacco: Never    Tobacco comments:     No caffeine use    Vaping Use    Vaping status: Never Used   Substance Use Topics    Alcohol use: Not Currently    Drug use: No      MEDICATIONS:   Current Outpatient Medications on File Prior to Visit   Medication Sig Dispense Refill    apixaban (ELIQUIS) 5 MG tablet tablet Take 1 tablet by mouth Every 12 (Twelve) Hours. Resume on 9/7      aspirin 81 MG EC tablet Take 1 tablet by mouth Every Night.      atorvastatin (LIPITOR) 80 MG tablet Take 1 tablet by mouth Daily. 200001 (Patient taking differently: Take 1 tablet by mouth Every Night. 200001) 90 tablet 2    carboxymethylcellulose (REFRESH PLUS) 0.5 % solution Apply 1 drop to eye(s) as directed by provider.      cetirizine (ZyrTEC) 10 MG tablet Take 1 tablet by mouth As Needed. Only Occasionally will use      Cholecalciferol (VITAMIN D3) 5000 UNITS capsule capsule Take 1 capsule by mouth Every Other Day.      CLOBETASOL & CLOBETASOL EMUL EX Apply 1 Application topically Daily.      DICLOFENAC PO Take 75 mg by mouth 2 (Two) Times a Day As Needed (back pain).      Empagliflozin (Jardiance) 10 MG tablet Take 10 mg by mouth Daily. 90 tablet 2    ezetimibe (ZETIA) 10 MG tablet Take 1 tablet by  "mouth Every Night.      fluticasone (FLONASE) 50 MCG/ACT nasal spray Administer 1 spray into the nostril(s) as directed by provider 2 (Two) Times a Day As Needed.      furosemide (LASIX) 20 MG tablet Take 1 tablet by mouth As Needed.      Linzess 72 MCG capsule capsule Take 1 capsule by mouth Daily.      losartan (COZAAR) 25 MG tablet Take 1 tablet by mouth Daily. 90 tablet 3    metaxalone (SKELAXIN) 800 MG tablet Take 1 tablet by mouth As Needed for Muscle Spasms.      metoprolol succinate XL (TOPROL-XL) 100 MG 24 hr tablet Take 1 tablet by mouth. In PM      metoprolol succinate XL (TOPROL-XL) 50 MG 24 hr tablet Take 1 tablet by mouth. In AM      Minoxidil 5 % foam Apply  topically 2 (two) times a day.      nitroglycerin (NITROSTAT) 0.4 MG SL tablet 1 under the tongue as needed for angina, may repeat q5mins for up three doses 100 tablet 11    omeprazole (priLOSEC) 40 MG capsule Take 1 capsule by mouth Daily.      spironolactone (ALDACTONE) 25 MG tablet Take 0.5 tablets by mouth Daily.       No current facility-administered medications on file prior to visit.       ALLERGIES: Contrast dye (echo or unknown ct/mr), Ace inhibitors, Iodinated contrast media, Sudafed 12 hour [pseudoephedrine hcl er], and Pseudoephedrine       Objective   Vitals:    01/08/25 1114   BP: 128/74   Pulse: 77   Temp: 97.7 °F (36.5 °C)   TempSrc: Temporal   SpO2: 97%   Weight: 85.3 kg (188 lb)   Height: 188 cm (74.02\")     Body mass index is 24.13 kg/m².  BMI is within normal parameters. No other follow-up for BMI required.      PHYSICAL EXAM:   Physical Exam   Right and left varicosities with improvement of the right Varithena segment to the point where no drainage is needed for that but ambulatory phlebectomy on the right and the left be pursued.    Result Review   LABS:    CBC          8/1/2024    10:52 8/27/2024    10:08 9/12/2024    11:36   CBC   WBC 3.85  3.82  4.22       RBC 4.95  5.03  5.15       Hemoglobin 12.6  12.6  12.9     "   Hematocrit 41.0  41.6  41.9       MCV 82.8  82.7  81.4       MCH 25.5  25.0  25       MCHC 30.7  30.3  30.8       RDW 13.5  13.7  14.6       Platelets 144  146  148          Details          This result is from an external source.             BMP          8/1/2024    10:52 8/27/2024    10:08   BMP   BUN 11  10    Creatinine 0.84  0.78    Sodium 138  138    Potassium 4.4  4.7    Chloride 105  104    CO2 28.8  27.1    Calcium 9.1  9.5                 Results Review:       I reviewed the patient's new clinical results.    The following radiologic or non-invasive studies have been reviewed by me:   Duplex Venous Lower Extremity - Right CAR 03/21/2024    Interpretation Summary    Successful Varithena at the medial calf with superficial phlebitis at the medial calf and knee noted.  No deep vein thrombosis noted with persistence of multiple varicosities with size greater than 5 mm at the thigh and calf noted.  All other right sided veins appeared normal.     No radiology results for the last 30 days.                ASSESSMENT/PLAN:   Diagnoses and all orders for this visit:    1. Varicose veins of bilateral lower extremities with other complications (Primary)  -     Case Request; Standing  -     ceFAZolin (ANCEF) 2,000 mg in sodium chloride 0.9 % 100 mL IVPB  -     ceFAZolin (ANCEF) 3,000 mg in sodium chloride 0.9 % 100 mL IVPB    2. Venous (peripheral) insufficiency  -     Case Request; Standing  -     ceFAZolin (ANCEF) 2,000 mg in sodium chloride 0.9 % 100 mL IVPB  -     ceFAZolin (ANCEF) 3,000 mg in sodium chloride 0.9 % 100 mL IVPB    3. Symptomatic varicose veins, bilateral  -     Case Request; Standing  -     ceFAZolin (ANCEF) 2,000 mg in sodium chloride 0.9 % 100 mL IVPB  -     ceFAZolin (ANCEF) 3,000 mg in sodium chloride 0.9 % 100 mL IVPB    4. Atrial fibrillation, persistent    Other orders  -     Follow Anesthesia Guidelines / Protocol; Future  -     Follow Anesthesia Guidelines / Protocol; Standing  -      Provide Patient With Instructions on NPO Status; Future       74 y.o. male with persistent right leg varicosities now worsening in the posterior calf.  Ambulatory phlebectomy of the right and left legs now indicated.  Patient will hold his Eliquis for 48 hours preop.  Will pursue this in a supine position for a total 90 minutes under general anesthetic and he is already got verbal consent approval from Dr. Ball and will get written consent.  LMA anesthetic with outpatient procedure will be scheduled.    I discussed the plan with the patient who is agreeable to the plan of care at this point. Thank you for this consult.   Follow Up  Return in about 6 weeks (around 2/19/2025).    Gerry Mckenzie MD   01/08/25

## 2025-01-08 NOTE — PROGRESS NOTES
Patient Name: Alden Pratt III    MRN: [id number] Encounter Date: 01/08/2025      Consulting Service: Vascular Surgery    Referring Provider: No ref. provider found       CHIEF COMPLAINT:  Chief Complaint   Patient presents with    vein removal     Surgery Feb 4th       Subjective    HPI: Alden Pratt III is a 74 y.o. male is being seen for evaluation/management of follow up for vein mapping of the bilateral lower extremity.   Patient has been compliant with medical grade compression stocking use as well as elevation.   Despite best medical therapy symptoms persist.  At this point in time I discussed with the patient the options for intervention versus continued medical therapy.  Based on current anatomy and covered endovenous options I have recommended  bilateral ambulatory phlebectomy. .    While the patient was then I discussed in detail at length the procedure itself as well as the risk benefits and complications thereof.  Risks include but are not limited to bleeding, infection, nerve injury, skin injury, failure to clear the veins, failure to clear the pain, deep vein thrombosis and associated pulmonary emboli.  Patient understands and will schedule at their convenience.      PAST MEDICAL HISTORY:   Past Medical History:   Diagnosis Date    Arthritis     CAD (coronary artery disease)     atheroclerosis    Cancer     prostate    Chest pain     CHF (congestive heart failure)     Elevated cholesterol     GERD (gastroesophageal reflux disease)     Hyperlipidemia     Implantable cardioverter-defibrillator (ICD) discharge     Myocardial infarction     inferior    PAF (paroxysmal atrial fibrillation)     PVC (premature ventricular contraction)     PVD (peripheral vascular disease)     Status post phlebectomy     varicose veins    Thyroid nodule 07/18/2014    Varicose veins of bilateral lower extremities with pain 11/08/2023    and inflammation    Venous insufficiency (chronic) (peripheral) 11/08/2023     VT (ventricular tachycardia)       PAST SURGICAL HISTORY:   Past Surgical History:   Procedure Laterality Date    CARDIAC ABLATION  2021    atrial fibrillation    CARDIAC CATHETERIZATION  2006    CARDIAC CATHETERIZATION Left 05/10/2021    Procedure: Left Heart Cath;  Surgeon: Andrew Ball MD;  Location: [facility] CATH INVASIVE LOCATION;  Service: Cardiology;  Laterality: Left;    CARDIAC CATHETERIZATION N/A 05/10/2021    Procedure: Coronary angiography;  Surgeon: Andrew Ball MD;  Location: [facility] CATH INVASIVE LOCATION;  Service: Cardiology;  Laterality: N/A;    CARDIAC CATHETERIZATION N/A 05/10/2021    Procedure: Left ventriculography;  Surgeon: Andrew Ball MD;  Location: [facility] CATH INVASIVE LOCATION;  Service: Cardiology;  Laterality: N/A;    CARDIAC CATHETERIZATION  05/10/2021    Procedure: Saphenous Vein Graft;  Surgeon: Andrew Ball MD;  Location: [facility] CATH INVASIVE LOCATION;  Service: Cardiology;;    CARDIAC DEFIBRILLATOR PLACEMENT  02/2007    CARDIAC ELECTROPHYSIOLOGY MAPPING AND ABLATION  2023    v-tach    CARDIAC ELECTROPHYSIOLOGY PROCEDURE N/A 08/02/2021    Procedure: Ablation atrial fibrillation;  Surgeon: Robby Ngo MD;  Location: [facility] INVASIVE LOCATION;  Service: Cardiovascular;  Laterality: N/A;    CARDIAC ELECTROPHYSIOLOGY PROCEDURE N/A 11/15/2023    Procedure: Ablation VT;  Surgeon: Robby Ngo MD;  Location: [facility] CATH INVASIVE LOCATION;  Service: Cardiovascular;  Laterality: N/A;    CARDIAC ELECTROPHYSIOLOGY PROCEDURE N/A 8/5/2024    Procedure: Ablation PVC;  Surgeon: Robby Ngo MD;  Location: [facility] CATH INVASIVE LOCATION;  Service: Cardiovascular;  Laterality: N/A;    CORONARY ARTERY BYPASS GRAFT  11/02/2006    x6    CORONARY STENT PLACEMENT  10/2006    FINGER SURGERY      HAND SURGERY Left     ICD GENERATOR REPLACEMENT N/A 9/4/2024    Procedure: Defibrillator Generator Change - DC [employer];  Surgeon: Robby Ngo MD;  Location:  [facility] INVASIVE LOCATION;  Service: Cardiovascular;  Laterality: N/A;    KNEE SURGERY Left     MICROAMBULATORY PHLEBECTOMY      SHOULDER SURGERY Right     THYROID BIOPSY      VARICOSE VEIN SURGERY Right 01/29/2024    VV Varithena      FAMILY HISTORY:   Family History   Problem Relation Age of Onset    Cancer Mother     Heart disease Father     Heart failure Father     Stroke Father     Cancer Father     No Known Problems Maternal Grandmother     No Known Problems Maternal Grandfather     No Known Problems Paternal Grandmother     No Known Problems Paternal Grandfather     Coronary artery disease Other     Stroke Other     Hypertension Other       SOCIAL HISTORY:   Social History     Tobacco Use    Smoking status: Never    Smokeless tobacco: Never    Tobacco comments:     No caffeine use    Vaping Use    Vaping status: Never Used   Substance Use Topics    Alcohol use: Not Currently    Drug use: No      MEDICATIONS:   Current Outpatient Medications on File Prior to Visit   Medication Sig Dispense Refill    apixaban (ELIQUIS) 5 MG tablet tablet Take 1 tablet by mouth Every 12 (Twelve) Hours. Resume on 9/7      aspirin 81 MG EC tablet Take 1 tablet by mouth Every Night.      atorvastatin (LIPITOR) 80 MG tablet Take 1 tablet by mouth Daily. 200001 (Patient taking differently: Take 1 tablet by mouth Every Night. 200001) 90 tablet 2    carboxymethylcellulose (REFRESH PLUS) 0.5 % solution Apply 1 drop to eye(s) as directed by provider.      cetirizine (ZyrTEC) 10 MG tablet Take 1 tablet by mouth As Needed. Only Occasionally will use      Cholecalciferol (VITAMIN D3) 5000 UNITS capsule capsule Take 1 capsule by mouth Every Other Day.      CLOBETASOL & CLOBETASOL EMUL EX Apply 1 Application topically Daily.      DICLOFENAC PO Take 75 mg by mouth 2 (Two) Times a Day As Needed (back pain).      Empagliflozin (Jardiance) 10 MG tablet Take 10 mg by mouth Daily. 90 tablet 2    ezetimibe (ZETIA) 10 MG tablet Take 1 tablet by  "mouth Every Night.      fluticasone (FLONASE) 50 MCG/ACT nasal spray Administer 1 spray into the nostril(s) as directed by provider 2 (Two) Times a Day As Needed.      furosemide (LASIX) 20 MG tablet Take 1 tablet by mouth As Needed.      Linzess 72 MCG capsule capsule Take 1 capsule by mouth Daily.      losartan (COZAAR) 25 MG tablet Take 1 tablet by mouth Daily. 90 tablet 3    metaxalone (SKELAXIN) 800 MG tablet Take 1 tablet by mouth As Needed for Muscle Spasms.      metoprolol succinate XL (TOPROL-XL) 100 MG 24 hr tablet Take 1 tablet by mouth. In PM      metoprolol succinate XL (TOPROL-XL) 50 MG 24 hr tablet Take 1 tablet by mouth. In AM      Minoxidil 5 % foam Apply  topically 2 (two) times a day.      nitroglycerin (NITROSTAT) 0.4 MG SL tablet 1 under the tongue as needed for angina, may repeat q5mins for up three doses 100 tablet 11    omeprazole (priLOSEC) 40 MG capsule Take 1 capsule by mouth Daily.      spironolactone (ALDACTONE) 25 MG tablet Take 0.5 tablets by mouth Daily.       No current facility-administered medications on file prior to visit.       ALLERGIES: Contrast dye (echo or unknown ct/mr), Ace inhibitors, Iodinated contrast media, Sudafed 12 hour [pseudoephedrine hcl er], and Pseudoephedrine       Objective   Vitals:    01/08/25 1114   BP: 128/74   Pulse: 77   Temp: 97.7 °F (36.5 °C)   TempSrc: Temporal   SpO2: 97%   Weight: 85.3 kg (188 lb)   Height: 188 cm (74.02\")     Body mass index is 24.13 kg/m².  BMI is within normal parameters. No other follow-up for BMI required.      PHYSICAL EXAM:   Physical Exam   Right and left varicosities with improvement of the right Varithena segment to the point where no drainage is needed for that but ambulatory phlebectomy on the right and the left be pursued.    Result Review   LABS:    CBC          8/1/2024    10:52 8/27/2024    10:08 9/12/2024    11:36   CBC   WBC 3.85  3.82  4.22       RBC 4.95  5.03  5.15       Hemoglobin 12.6  12.6  12.9     "   Hematocrit 41.0  41.6  41.9       MCV 82.8  82.7  81.4       MCH 25.5  25.0  25       MCHC 30.7  30.3  30.8       RDW 13.5  13.7  14.6       Platelets 144  146  148          Details          This result is from an external source.             BMP          8/1/2024    10:52 8/27/2024    10:08   BMP   BUN 11  10    Creatinine 0.84  0.78    Sodium 138  138    Potassium 4.4  4.7    Chloride 105  104    CO2 28.8  27.1    Calcium 9.1  9.5                 Results Review:       I reviewed the patient's new clinical results.    The following radiologic or non-invasive studies have been reviewed by me:   Duplex Venous Lower Extremity - Right CAR 03/21/2024    Interpretation Summary    Successful Varithena at the medial calf with superficial phlebitis at the medial calf and knee noted.  No deep vein thrombosis noted with persistence of multiple varicosities with size greater than 5 mm at the thigh and calf noted.  All other right sided veins appeared normal.     No radiology results for the last 30 days.                ASSESSMENT/PLAN:   Diagnoses and all orders for this visit:    1. Varicose veins of bilateral lower extremities with other complications (Primary)  -     Case Request; Standing  -     ceFAZolin (ANCEF) 2,000 mg in sodium chloride 0.9 % 100 mL IVPB  -     ceFAZolin (ANCEF) 3,000 mg in sodium chloride 0.9 % 100 mL IVPB    2. Venous (peripheral) insufficiency  -     Case Request; Standing  -     ceFAZolin (ANCEF) 2,000 mg in sodium chloride 0.9 % 100 mL IVPB  -     ceFAZolin (ANCEF) 3,000 mg in sodium chloride 0.9 % 100 mL IVPB    3. Symptomatic varicose veins, bilateral  -     Case Request; Standing  -     ceFAZolin (ANCEF) 2,000 mg in sodium chloride 0.9 % 100 mL IVPB  -     ceFAZolin (ANCEF) 3,000 mg in sodium chloride 0.9 % 100 mL IVPB    4. Atrial fibrillation, persistent    Other orders  -     Follow Anesthesia Guidelines / Protocol; Future  -     Follow Anesthesia Guidelines / Protocol; Standing  -      Provide Patient With Instructions on NPO Status; Future       74 y.o. male with persistent right leg varicosities now worsening in the posterior calf.  Ambulatory phlebectomy of the right and left legs now indicated.  Patient will hold his Eliquis for 48 hours preop.  Will pursue this in a supine position for a total 90 minutes under general anesthetic and he is already got verbal consent approval from Dr. Ball and will get written consent.  LMA anesthetic with outpatient procedure will be scheduled.    I discussed the plan with the patient who is agreeable to the plan of care at this point. Thank you for this consult.   Follow Up  Return in about 6 weeks (around 2/19/2025).    Gerry Mckenzie MD   01/08/25

## 2025-01-28 ENCOUNTER — PRE-ADMISSION TESTING (OUTPATIENT)
Dept: PREADMISSION TESTING | Facility: HOSPITAL | Age: 75
End: 2025-01-28
Payer: MEDICARE

## 2025-01-28 VITALS
BODY MASS INDEX: 25.33 KG/M2 | HEIGHT: 72 IN | TEMPERATURE: 96.9 F | SYSTOLIC BLOOD PRESSURE: 125 MMHG | RESPIRATION RATE: 18 BRPM | OXYGEN SATURATION: 99 % | WEIGHT: 187 LBS | HEART RATE: 75 BPM | DIASTOLIC BLOOD PRESSURE: 74 MMHG

## 2025-01-28 LAB
ANION GAP SERPL CALCULATED.3IONS-SCNC: 9.1 MMOL/L (ref 5–15)
BUN SERPL-MCNC: 10 MG/DL (ref 8–23)
BUN/CREAT SERPL: 11.9 (ref 7–25)
CALCIUM SPEC-SCNC: 8.7 MG/DL (ref 8.6–10.5)
CHLORIDE SERPL-SCNC: 104 MMOL/L (ref 98–107)
CO2 SERPL-SCNC: 26.9 MMOL/L (ref 22–29)
CREAT SERPL-MCNC: 0.84 MG/DL (ref 0.76–1.27)
DEPRECATED RDW RBC AUTO: 40.9 FL (ref 37–54)
EGFRCR SERPLBLD CKD-EPI 2021: 92.7 ML/MIN/1.73
ERYTHROCYTE [DISTWIDTH] IN BLOOD BY AUTOMATED COUNT: 14 % (ref 12.3–15.4)
GLUCOSE SERPL-MCNC: 73 MG/DL (ref 65–99)
HCT VFR BLD AUTO: 41.4 % (ref 37.5–51)
HGB BLD-MCNC: 12.3 G/DL (ref 13–17.7)
MCH RBC QN AUTO: 24.2 PG (ref 26.6–33)
MCHC RBC AUTO-ENTMCNC: 29.7 G/DL (ref 31.5–35.7)
MCV RBC AUTO: 81.5 FL (ref 79–97)
PLATELET # BLD AUTO: 151 10*3/MM3 (ref 140–450)
PMV BLD AUTO: 10.6 FL (ref 6–12)
POTASSIUM SERPL-SCNC: 4.3 MMOL/L (ref 3.5–5.2)
RBC # BLD AUTO: 5.08 10*6/MM3 (ref 4.14–5.8)
SODIUM SERPL-SCNC: 140 MMOL/L (ref 136–145)
WBC NRBC COR # BLD AUTO: 4.32 10*3/MM3 (ref 3.4–10.8)

## 2025-01-28 PROCEDURE — 36415 COLL VENOUS BLD VENIPUNCTURE: CPT

## 2025-01-28 PROCEDURE — 85027 COMPLETE CBC AUTOMATED: CPT

## 2025-01-28 PROCEDURE — 80048 BASIC METABOLIC PNL TOTAL CA: CPT

## 2025-01-28 NOTE — DISCHARGE INSTRUCTIONS
Take the following medications the morning of surgery:      METOPROLOL OMEPRAZOLE    If you are on prescription narcotic pain medication to control your pain you may also take that medication the morning of surgery.      General Instructions:     Do not eat solid food after midnight the night before surgery.  Clear liquids day of surgery are allowed but must be stopped at least two hours before your hospital arrival time.       Allowed clear liquids      Water, sodas, and tea or coffee with no cream or milk added.       12 to 20 ounces of a clear liquid that contains carbohydrates is recommended.  If non-diabetic, have Gatorade or Powerade.  If diabetic, have G2 or Powerade Zero.     Do not have liquids red in color.  Do not consume chicken, beef, pork or vegetable broth or bouillon cubes of any variety as they are not considered clear liquids and are not allowed.      Infants may have breast milk up to four hours before surgery.  Infants drinking formula may drink formula up to six hours before surgery.   Patients who avoid smoking, chewing tobacco and alcohol for 4 weeks prior to surgery have a reduced risk of post-operative complications.  Quit smoking as many days before surgery as you can.  Do not smoke, use chewing tobacco or drink alcohol the day of surgery.   If applicable bring your C-PAP/ BI-PAP machine in with you to preop day of surgery.  Bring any papers given to you in the doctor’s office.  Wear clean comfortable clothes.  Do not wear contact lenses, false eyelashes or make-up.  Bring a case for your glasses.   Bring crutches or walker if applicable.  Remove all piercings.  Leave jewelry and any other valuables at home.  Hair extensions with metal clips must be removed prior to surgery.  The Pre-Admission Testing nurse will instruct you to bring medications if unable to obtain an accurate list in Pre-Admission Testing.            Preventing a Surgical Site Infection:  For 2 to 3 days before surgery,  avoid shaving with a razor because the razor can irritate skin and make it easier to develop an infection.    Any areas of open skin can increase the risk of a post-operative wound infection by allowing bacteria to enter and travel throughout the body.  Notify your surgeon if you have any skin wounds / rashes even if it is not near the expected surgical site.  The area will need assessed to determine if surgery should be delayed until it is healed.  The night prior to surgery shower using a fresh bar of anti-bacterial soap (such as Dial) and clean washcloth.  Sleep in a clean bed with clean clothing.  Do not allow pets to sleep with you.  Shower on the morning of surgery using a fresh bar of anti-bacterial soap (such as Dial) and clean washcloth.  Dry with a clean towel and dress in clean clothing.  Ask your surgeon if you will be receiving antibiotics prior to surgery.  Make sure you, your family, and all healthcare providers clean their hands with soap and water or an alcohol based hand  before caring for you or your wound.    Day of surgery:  Your arrival time is approximately two hours before your scheduled surgery time.  Please note if you have an early arrival time the surgery doors do not open before 5:00 AM.  Upon arrival, a Pre-op nurse and Anesthesiologist will review your health history, obtain vital signs, and answer questions you may have.  The only belongings needed at this time will be a list of your home medications and if applicable your C-PAP/BI-PAP machine.  A Pre-op nurse will start an IV and you may receive medication in preparation for surgery, including something to help you relax.     Please be aware that surgery does come with discomfort.  We want to make every effort to control your discomfort so please discuss any uncontrolled symptoms with your nurse.   Your doctor will most likely have prescribed pain medications.      If you are going home after surgery you will receive  individualized written care instructions before being discharged.  A responsible adult must drive you to and from the hospital on the day of your surgery and ideally stay with you through the night.   .  Discharge prescriptions can be filled by the hospital pharmacy during regular pharmacy hours.  If you are having surgery late in the day/evening your prescription may be e-prescribed to your pharmacy.  Please verify your pharmacy hours or chose a 24 hour pharmacy to avoid not having access to your prescription because your pharmacy has closed for the day.    If you are staying overnight following surgery, you will be transported to your hospital room following the recovery period.  [facility] has all private rooms.    If you have any questions please call Pre-Admission Testing at [phone].  Deductibles and co-payments are collected on the day of service. Please be prepared to pay the required co-pay, deductible or deposit on the day of service as defined by your plan.    Call your surgeon immediately if you experience any of the following symptoms:  Sore Throat  Shortness of Breath or difficulty breathing  Cough  Chills  Body soreness or muscle pain  Headache  Fever  New loss of taste or smell  Do not arrive for your surgery ill.  Your procedure will need to be rescheduled to another time.  You will need to call your physician before the day of surgery to avoid any unnecessary exposure to hospital staff as well as other patients.

## 2025-02-04 ENCOUNTER — HOSPITAL ENCOUNTER (OUTPATIENT)
Facility: HOSPITAL | Age: 75
Setting detail: HOSPITAL OUTPATIENT SURGERY
Discharge: HOME | End: 2025-02-04
Attending: SURGERY | Admitting: SURGERY
Payer: MEDICARE

## 2025-02-04 ENCOUNTER — ANESTHESIA (OUTPATIENT)
Dept: PERIOP | Facility: HOSPITAL | Age: 75
End: 2025-02-04
Payer: MEDICARE

## 2025-02-04 ENCOUNTER — ANESTHESIA EVENT (OUTPATIENT)
Dept: PERIOP | Facility: HOSPITAL | Age: 75
End: 2025-02-04
Payer: MEDICARE

## 2025-02-04 VITALS
RESPIRATION RATE: 16 BRPM | TEMPERATURE: 97.6 F | OXYGEN SATURATION: 100 % | DIASTOLIC BLOOD PRESSURE: 73 MMHG | SYSTOLIC BLOOD PRESSURE: 125 MMHG | HEART RATE: 70 BPM

## 2025-02-04 DIAGNOSIS — I83.893 SYMPTOMATIC VARICOSE VEINS, BILATERAL: ICD-10-CM

## 2025-02-04 DIAGNOSIS — I87.2 VENOUS (PERIPHERAL) INSUFFICIENCY: ICD-10-CM

## 2025-02-04 DIAGNOSIS — I83.893 VARICOSE VEINS OF BILATERAL LOWER EXTREMITIES WITH OTHER COMPLICATIONS: ICD-10-CM

## 2025-02-04 PROCEDURE — 25010000002 FENTANYL CITRATE (PF) 50 MCG/ML SOLUTION

## 2025-02-04 PROCEDURE — 25010000002 LIDOCAINE 2% SOLUTION

## 2025-02-04 PROCEDURE — 25010000002 SUGAMMADEX 200 MG/2ML SOLUTION

## 2025-02-04 PROCEDURE — 25010000002 HYDROMORPHONE PER 4 MG

## 2025-02-04 PROCEDURE — 25810000003 LACTATED RINGERS PER 1000 ML: Performed by: STUDENT IN AN ORGANIZED HEALTH CARE EDUCATION/TRAINING PROGRAM

## 2025-02-04 PROCEDURE — 25010000002 PROPOFOL 10 MG/ML EMULSION

## 2025-02-04 PROCEDURE — 25010000002 CEFAZOLIN PER 500 MG: Performed by: SURGERY

## 2025-02-04 PROCEDURE — 25010000002 ONDANSETRON PER 1 MG

## 2025-02-04 RX ORDER — LIDOCAINE HYDROCHLORIDE 20 MG/ML
INJECTION, SOLUTION INFILTRATION; PERINEURAL AS NEEDED
Status: DISCONTINUED | OUTPATIENT
Start: 2025-02-04 | End: 2025-02-04 | Stop reason: SURG

## 2025-02-04 RX ORDER — FAMOTIDINE 10 MG/ML
20 INJECTION, SOLUTION INTRAVENOUS ONCE
Status: COMPLETED | OUTPATIENT
Start: 2025-02-04 | End: 2025-02-04

## 2025-02-04 RX ORDER — LIDOCAINE HYDROCHLORIDE 10 MG/ML
0.5 INJECTION, SOLUTION INFILTRATION; PERINEURAL ONCE AS NEEDED
Status: DISCONTINUED | OUTPATIENT
Start: 2025-02-04 | End: 2025-02-04 | Stop reason: HOSPADM

## 2025-02-04 RX ORDER — HYDROCODONE BITARTRATE AND ACETAMINOPHEN 5; 325 MG/1; MG/1
1 TABLET ORAL EVERY 6 HOURS PRN
Qty: 30 TABLET | Refills: 0 | Status: SHIPPED | OUTPATIENT
Start: 2025-02-04

## 2025-02-04 RX ORDER — HYDROCODONE BITARTRATE AND ACETAMINOPHEN 5; 325 MG/1; MG/1
1 TABLET ORAL ONCE AS NEEDED
Status: COMPLETED | OUTPATIENT
Start: 2025-02-04 | End: 2025-02-04

## 2025-02-04 RX ORDER — FENTANYL CITRATE 50 UG/ML
INJECTION, SOLUTION INTRAMUSCULAR; INTRAVENOUS AS NEEDED
Status: DISCONTINUED | OUTPATIENT
Start: 2025-02-04 | End: 2025-02-04 | Stop reason: SURG

## 2025-02-04 RX ORDER — PROMETHAZINE HYDROCHLORIDE 25 MG/1
25 SUPPOSITORY RECTAL ONCE AS NEEDED
Status: DISCONTINUED | OUTPATIENT
Start: 2025-02-04 | End: 2025-02-04 | Stop reason: HOSPADM

## 2025-02-04 RX ORDER — BUPIVACAINE HCL/0.9 % NACL/PF 0.125 %
PLASTIC BAG, INJECTION (ML) EPIDURAL AS NEEDED
Status: DISCONTINUED | OUTPATIENT
Start: 2025-02-04 | End: 2025-02-04 | Stop reason: SURG

## 2025-02-04 RX ORDER — PROMETHAZINE HYDROCHLORIDE 25 MG/1
25 TABLET ORAL ONCE AS NEEDED
Status: DISCONTINUED | OUTPATIENT
Start: 2025-02-04 | End: 2025-02-04 | Stop reason: HOSPADM

## 2025-02-04 RX ORDER — ROCURONIUM BROMIDE 10 MG/ML
INJECTION, SOLUTION INTRAVENOUS AS NEEDED
Status: DISCONTINUED | OUTPATIENT
Start: 2025-02-04 | End: 2025-02-04 | Stop reason: SURG

## 2025-02-04 RX ORDER — DIPHENHYDRAMINE HYDROCHLORIDE 50 MG/ML
12.5 INJECTION INTRAMUSCULAR; INTRAVENOUS
Status: DISCONTINUED | OUTPATIENT
Start: 2025-02-04 | End: 2025-02-04 | Stop reason: HOSPADM

## 2025-02-04 RX ORDER — FLUMAZENIL 0.1 MG/ML
0.2 INJECTION INTRAVENOUS AS NEEDED
Status: DISCONTINUED | OUTPATIENT
Start: 2025-02-04 | End: 2025-02-04 | Stop reason: HOSPADM

## 2025-02-04 RX ORDER — HYDROCODONE BITARTRATE AND ACETAMINOPHEN 7.5; 325 MG/1; MG/1
1 TABLET ORAL EVERY 4 HOURS PRN
Status: DISCONTINUED | OUTPATIENT
Start: 2025-02-04 | End: 2025-02-04 | Stop reason: HOSPADM

## 2025-02-04 RX ORDER — ATROPINE SULFATE 0.4 MG/ML
0.4 INJECTION, SOLUTION INTRAMUSCULAR; INTRAVENOUS; SUBCUTANEOUS ONCE AS NEEDED
Status: DISCONTINUED | OUTPATIENT
Start: 2025-02-04 | End: 2025-02-04 | Stop reason: HOSPADM

## 2025-02-04 RX ORDER — IPRATROPIUM BROMIDE AND ALBUTEROL SULFATE 2.5; .5 MG/3ML; MG/3ML
3 SOLUTION RESPIRATORY (INHALATION) ONCE AS NEEDED
Status: DISCONTINUED | OUTPATIENT
Start: 2025-02-04 | End: 2025-02-04 | Stop reason: HOSPADM

## 2025-02-04 RX ORDER — LABETALOL HYDROCHLORIDE 5 MG/ML
5 INJECTION, SOLUTION INTRAVENOUS
Status: DISCONTINUED | OUTPATIENT
Start: 2025-02-04 | End: 2025-02-04 | Stop reason: HOSPADM

## 2025-02-04 RX ORDER — ONDANSETRON 2 MG/ML
4 INJECTION INTRAMUSCULAR; INTRAVENOUS ONCE AS NEEDED
Status: DISCONTINUED | OUTPATIENT
Start: 2025-02-04 | End: 2025-02-04 | Stop reason: HOSPADM

## 2025-02-04 RX ORDER — MIDAZOLAM HYDROCHLORIDE 1 MG/ML
0.5 INJECTION, SOLUTION INTRAMUSCULAR; INTRAVENOUS
Status: DISCONTINUED | OUTPATIENT
Start: 2025-02-04 | End: 2025-02-04 | Stop reason: HOSPADM

## 2025-02-04 RX ORDER — MAGNESIUM HYDROXIDE 1200 MG/15ML
LIQUID ORAL AS NEEDED
Status: DISCONTINUED | OUTPATIENT
Start: 2025-02-04 | End: 2025-02-04 | Stop reason: HOSPADM

## 2025-02-04 RX ORDER — FENTANYL CITRATE 50 UG/ML
50 INJECTION, SOLUTION INTRAMUSCULAR; INTRAVENOUS ONCE AS NEEDED
Status: DISCONTINUED | OUTPATIENT
Start: 2025-02-04 | End: 2025-02-04 | Stop reason: HOSPADM

## 2025-02-04 RX ORDER — ONDANSETRON 2 MG/ML
INJECTION INTRAMUSCULAR; INTRAVENOUS AS NEEDED
Status: DISCONTINUED | OUTPATIENT
Start: 2025-02-04 | End: 2025-02-04 | Stop reason: SURG

## 2025-02-04 RX ORDER — SODIUM CHLORIDE 0.9 % (FLUSH) 0.9 %
3 SYRINGE (ML) INJECTION EVERY 12 HOURS SCHEDULED
Status: DISCONTINUED | OUTPATIENT
Start: 2025-02-04 | End: 2025-02-04 | Stop reason: HOSPADM

## 2025-02-04 RX ORDER — SODIUM CHLORIDE, SODIUM LACTATE, POTASSIUM CHLORIDE, CALCIUM CHLORIDE 600; 310; 30; 20 MG/100ML; MG/100ML; MG/100ML; MG/100ML
9 INJECTION, SOLUTION INTRAVENOUS CONTINUOUS
Status: DISCONTINUED | OUTPATIENT
Start: 2025-02-04 | End: 2025-02-04 | Stop reason: HOSPADM

## 2025-02-04 RX ORDER — SODIUM CHLORIDE 0.9 % (FLUSH) 0.9 %
3-10 SYRINGE (ML) INJECTION AS NEEDED
Status: DISCONTINUED | OUTPATIENT
Start: 2025-02-04 | End: 2025-02-04 | Stop reason: HOSPADM

## 2025-02-04 RX ORDER — HYDRALAZINE HYDROCHLORIDE 20 MG/ML
5 INJECTION INTRAMUSCULAR; INTRAVENOUS
Status: DISCONTINUED | OUTPATIENT
Start: 2025-02-04 | End: 2025-02-04 | Stop reason: HOSPADM

## 2025-02-04 RX ORDER — FENTANYL CITRATE 50 UG/ML
25 INJECTION, SOLUTION INTRAMUSCULAR; INTRAVENOUS
Status: DISCONTINUED | OUTPATIENT
Start: 2025-02-04 | End: 2025-02-04 | Stop reason: HOSPADM

## 2025-02-04 RX ORDER — HYDROMORPHONE HYDROCHLORIDE 1 MG/ML
0.25 INJECTION, SOLUTION INTRAMUSCULAR; INTRAVENOUS; SUBCUTANEOUS
Status: DISCONTINUED | OUTPATIENT
Start: 2025-02-04 | End: 2025-02-04 | Stop reason: HOSPADM

## 2025-02-04 RX ORDER — PROPOFOL 10 MG/ML
VIAL (ML) INTRAVENOUS AS NEEDED
Status: DISCONTINUED | OUTPATIENT
Start: 2025-02-04 | End: 2025-02-04 | Stop reason: SURG

## 2025-02-04 RX ORDER — NALOXONE HCL 0.4 MG/ML
0.2 VIAL (ML) INJECTION AS NEEDED
Status: DISCONTINUED | OUTPATIENT
Start: 2025-02-04 | End: 2025-02-04 | Stop reason: HOSPADM

## 2025-02-04 RX ADMIN — SODIUM CHLORIDE, POTASSIUM CHLORIDE, SODIUM LACTATE AND CALCIUM CHLORIDE 9 ML/HR: 600; 310; 30; 20 INJECTION, SOLUTION INTRAVENOUS at 06:32

## 2025-02-04 RX ADMIN — Medication 50 MCG: at 08:14

## 2025-02-04 RX ADMIN — LIDOCAINE HYDROCHLORIDE 100 MG: 20 INJECTION, SOLUTION INFILTRATION; PERINEURAL at 07:48

## 2025-02-04 RX ADMIN — PROPOFOL 130 MG: 10 INJECTION, EMULSION INTRAVENOUS at 07:48

## 2025-02-04 RX ADMIN — FENTANYL CITRATE 50 MCG: 50 INJECTION, SOLUTION INTRAMUSCULAR; INTRAVENOUS at 07:48

## 2025-02-04 RX ADMIN — ONDANSETRON 4 MG: 2 INJECTION INTRAMUSCULAR; INTRAVENOUS at 09:27

## 2025-02-04 RX ADMIN — HYDROCODONE BITARTRATE AND ACETAMINOPHEN 1 TABLET: 5; 325 TABLET ORAL at 10:26

## 2025-02-04 RX ADMIN — ROCURONIUM BROMIDE 50 MG: 10 INJECTION, SOLUTION INTRAVENOUS at 07:48

## 2025-02-04 RX ADMIN — FAMOTIDINE 20 MG: 10 INJECTION INTRAVENOUS at 06:39

## 2025-02-04 RX ADMIN — Medication 100 MCG: at 07:59

## 2025-02-04 RX ADMIN — SUGAMMADEX 200 MG: 100 INJECTION, SOLUTION INTRAVENOUS at 09:29

## 2025-02-04 RX ADMIN — HYDROMORPHONE HYDROCHLORIDE 0.25 MG: 1 INJECTION, SOLUTION INTRAMUSCULAR; INTRAVENOUS; SUBCUTANEOUS at 10:02

## 2025-02-04 RX ADMIN — HYDROMORPHONE HYDROCHLORIDE 0.25 MG: 1 INJECTION, SOLUTION INTRAMUSCULAR; INTRAVENOUS; SUBCUTANEOUS at 09:57

## 2025-02-04 RX ADMIN — SODIUM CHLORIDE 2000 MG: 900 INJECTION INTRAVENOUS at 07:19

## 2025-02-04 NOTE — ANESTHESIA PROCEDURE NOTES
Airway  Urgency: elective    Date/Time: 2/4/2025 7:52 AM  Airway not difficult    General Information and Staff    Patient location during procedure: OR  Anesthesiologist: Agapito Chaparro MD  CRNA/CAA: Beth Yuan CRNA    Indications and Patient Condition  Indications for airway management: airway protection    Preoxygenated: yes  MILS maintained throughout  Mask difficulty assessment: 1 - vent by mask    Final Airway Details  Final airway type: endotracheal airway      Successful airway: ETT  Cuffed: yes   Successful intubation technique: direct laryngoscopy  Facilitating devices/methods: intubating stylet  Endotracheal tube insertion site: oral  Blade: Brianna  Blade size: 4  ETT size (mm): 7.5  Cormack-Lehane Classification: grade IIa - partial view of glottis  Placement verified by: chest auscultation and capnometry   Cuff volume (mL): 7  Measured from: teeth  ETT/EBT  to teeth (cm): 22  Number of attempts at approach: 1  Assessment: lips, teeth, and gum same as pre-op and atraumatic intubation

## 2025-02-04 NOTE — ANESTHESIA PREPROCEDURE EVALUATION
Anesthesia Evaluation     Patient summary reviewed and Nursing notes reviewed                Airway   Mallampati: II  TM distance: >3 FB  Neck ROM: limited  Dental - normal exam     Pulmonary    (+) ,shortness of breath  Cardiovascular     ECG reviewed    (+) CAD, CABG, dysrhythmias Atrial Fib, PVD, hyperlipidemia    ROS comment: ·  The left ventricular cavity is mild to moderately dilated.  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 There is akinesis of the basal inferolateral wall and basal anterolateral wall. There is severe hypokinesis of the basal inferoseptum  ·  Left ventricular systolic function is moderately decreased. Left ventricular ejection fraction appears to be 36 - 40%.  ·  Left ventricular diastolic function is consistent with (grade I) impaired relaxation.  ·  The right ventricular cavity is mild to moderately dilated. Normal right ventricular systolic function noted.  ·  The left atrial cavity is mild to moderately dilated.  ·  Mild mitral valve regurgitation is present.  ·  Mild tricuspid valve regurgitation is present.  ·  Calculated right ventricular systolic pressure from tricuspid regurgitation is 26 mmHg.  ·  There is no evidence of pericardial effusion      Neuro/Psych  GI/Hepatic/Renal/Endo    (+) obesity, GERD, thyroid problem thyroid nodules    Musculoskeletal     Abdominal    Substance History      OB/GYN          Other   arthritis,                       Anesthesia Plan    ASA 3     general     (I have reviewed the patient's history with the patient and the chart, including all pertinent laboratory results and imaging. I have explained the risks of anesthesia including but not limited to dental damage, corneal abrasion, nerve injury, MI, stroke, and death. Questions asked and answered. Anesthetic plan discussed with patient and team as indicated. Patient expressed understanding of the above.  )  intravenous induction     Anesthetic plan, risks, benefits, and alternatives have  been provided, discussed and informed consent has been obtained with: patient.        CODE STATUS:

## 2025-02-04 NOTE — OP NOTE
Operative Note  Date of Admission:  2/4/2025  OR Date: 2/4/2025    Pre-op Diagnosis:   Symptomatic bilateral lower extremity varicose veins with inflammation and pain    Post-Op Diagnosis Codes:     * Varicose veins of bilateral lower extremities with other complications [I83.893]    Procedure:   1) right leg ambulatory phlebectomy greater than 20 sites  2) left leg ambulatory phlebectomy 10-20 sites    Surgeon: Peña Mckenzie MD    Assistant:  Melisa LIND CSA and they provided critical assistance during the case including suctioning, exposure, retraction, and reduction of blood loss.    Anesthesia: General    Staff:   Circulator: Rosetta aMrtinez RN; Tracy Delarosa RN  Scrub Person: Leslee Todd  Assistant: Marlin Vincent CSA    Estimated Blood Loss: 15 mL    Specimens: * No orders in the log *     Complications: None    Findings: 67 sites on the right leg, 16 sites on the left leg    Indications:    The patient is an 74 y.o. male seen for evaluation severe lower extremity venous insufficiency right worse than left leg with need for ambulatory phlebectomy as he has failed Varithena.  He has had successful ablations both sides.  He understands risk benefits complications of the procedure include including but not limited to bleeding infection nerve injury skin injury blood clots and failure to clear the veins.  He is agreeable to proceeding.       Procedure:    Patient was prepped and draped in sterile manner using stab phlebectomy technique using 14-gauge needles and VArys vein hooks we perform stab phlebectomies with greater than 67 sites on the right leg and 16 sites on the left leg removing pieces of vein or disrupting vein at multiple sites throughout the leg including the right thigh and medial thigh calf and both anterior shins.  Near the ankle we used scapulectomy with 11 blade scalpel.  Care was taken to avoid cutaneous nerve injury and no cutaneous nerves were identified proper.   Hemostasis was obtained with pressure and Steri-Strips.  Appropriate dressings were placed and all bony prominences and nerve points were padded.  Patient tolerated the procedure well          Active Hospital Problems    Diagnosis  POA    Venous (peripheral) insufficiency [I87.2]  Unknown    Varicose veins of bilateral lower extremities with other complications [I83.893]  Unknown      Resolved Hospital Problems   No resolved problems to display.      Gerry Mckenzie MD     Date: 2/4/2025  Time: 10:05 EST

## 2025-02-04 NOTE — ANESTHESIA POSTPROCEDURE EVALUATION
Patient: Alden Pratt III    Procedure Summary       Date: 02/04/25 Room / Location: [facility] OR 09 / [facility] MAIN OR    Anesthesia Start: 0740 Anesthesia Stop: 0951    Procedure: MICROAMBULATORY PHLEBECTOMY bilateral (Bilateral) Diagnosis:       Varicose veins of bilateral lower extremities with other complications      (Varicose veins of bilateral lower extremities with other complications [I83.893])    Surgeons: Gerry Mckenzie MD Provider: Agapito Chaparro MD    Anesthesia Type: general ASA Status: 3            Anesthesia Type: general    Vitals  Vitals Value Taken Time   /77 02/04/25 1030   Temp 36.4 °C (97.6 °F) 02/04/25 1030   Pulse 70 02/04/25 1032   Resp 16 02/04/25 1030   SpO2 100 % 02/04/25 1032   Vitals shown include unfiled device data.        Post Anesthesia Care and Evaluation    Patient location during evaluation: PACU  Patient participation: complete - patient participated  Level of consciousness: awake and alert  Pain management: adequate    Airway patency: patent  Anesthetic complications: No anesthetic complications  PONV Status: controlled  Cardiovascular status: acceptable and hemodynamically stable  Respiratory status: acceptable  Hydration status: acceptable    Comments: /77   Pulse 70   Temp 36.4 °C (97.6 °F) (Oral)   Resp 16   SpO2 98%

## 2025-02-14 ENCOUNTER — TELEPHONE (OUTPATIENT)
Age: 75
End: 2025-02-14
Payer: MEDICARE

## 2025-02-14 PROCEDURE — 93297 REM INTERROG DEV EVAL ICPMS: CPT | Performed by: INTERNAL MEDICINE

## 2025-02-14 NOTE — TELEPHONE ENCOUNTER
Patient called stating that one of his steri strips came off the other day, and he noticed a tread sticking out about 1/4 of an inch. He stated that it does not seem to be infected, but was not sure if this needed to be addressed prior to his follow up on 2.19.25.     Call back is [phone]

## 2025-02-14 NOTE — TELEPHONE ENCOUNTER
"*Number listed was incorrect. Pt correct contact info is [phone]*  Spoke with pt, informed him that after phlebectomy, fibrous tissue may come out of some of the puncture sites, this looks similarly to a \"thread\". This is not a problem and not an abnormal finding. He verbalizes understanding.   "

## 2025-02-19 ENCOUNTER — OFFICE VISIT (OUTPATIENT)
Age: 75
End: 2025-02-19
Payer: MEDICARE

## 2025-02-19 VITALS
SYSTOLIC BLOOD PRESSURE: 127 MMHG | HEIGHT: 72 IN | DIASTOLIC BLOOD PRESSURE: 70 MMHG | HEART RATE: 80 BPM | BODY MASS INDEX: 25.36 KG/M2

## 2025-02-19 DIAGNOSIS — I83.893 VARICOSE VEINS OF BILATERAL LOWER EXTREMITIES WITH OTHER COMPLICATIONS: Primary | ICD-10-CM

## 2025-02-19 DIAGNOSIS — I87.2 VENOUS (PERIPHERAL) INSUFFICIENCY: ICD-10-CM

## 2025-02-19 NOTE — PROGRESS NOTES
Looking great after ambulatory phlebectomy of the right leg still having a lot of bruising and some hematomas under the skin and they are not infected and will get better.  We trimmed to 1 piece of tissue and otherwise he is doing great and will continue to wear stockings still there is no more tenderness bruising until the lumps are tender at all.  We will see him back in 2 months if he feels like he needs to be seen we will make that appointment he can cancel if he is doing great.

## 2025-03-05 RX ORDER — LOSARTAN POTASSIUM 25 MG/1
25 TABLET ORAL DAILY
Qty: 90 TABLET | Refills: 1 | Status: SHIPPED | OUTPATIENT
Start: 2025-03-05

## 2025-03-07 ENCOUNTER — TELEPHONE (OUTPATIENT)
Age: 75
End: 2025-03-07

## 2025-03-07 NOTE — TELEPHONE ENCOUNTER
Report received    1 NST-VT event 3/1/25 for 8 beats with no therapies as appropriate    1 NST and 1 VT event with therapies 3/7/25.    First event listed 11:08 showing true NST-VT lasting 18 beats before dropping out of detection, strip does not show return to sinus so could be ongoing.     Event 3/7/25 @ 11:09 shows 65 beats VT before ATP x1, VT continues for 13 beats before second round ATP, pt continues in VT receiving total of 5 rounds of ATP and after fifth ATP returns to AP/ bpm.     Avg V rate during this event 158 bpm and total event time 51 seconds.     Presenting at time of transmission 14:48 shows AP/VS 70s with occ PVC    I spoke with pt and he says he feels ok but can tell when he is having PVCs. Agreed to seek care for urgent/emergent symptoms.

## 2025-03-07 NOTE — TELEPHONE ENCOUNTER
"Pt called today with concerns of having some VT around 10 am this morning.   He does not believe he was shocked but that may have been paced out of it as he felt a \"vibration\" in his chest. He states he was driving at this time. Denies dizziness, chest pain, SOA, or other symptoms and has felt well since.   Notes he may be having some PVCs as he feels an some \"flips\" in his chest.    He is not currently at home but will be home by 2:30 pm and is going to send us a download.     The only recent medication change is he started taking 25mg losartan per Dr. Ball's last rec, he started this on [place] 3/2 and took 25 mg in the morning Sun, Mon, Tues, and then began taking 12.5mg in the AM and 12.5mg in the PM Wed, Thur, Fri of this week.     I will update once transmission is received. He agreed to call with any changes and seek care for urgent/emergent symptoms or if he feels a shock and poorly afterwards.  "

## 2025-03-18 ENCOUNTER — OFFICE VISIT (OUTPATIENT)
Age: 75
End: 2025-03-18
Payer: MEDICARE

## 2025-03-18 ENCOUNTER — CLINICAL SUPPORT NO REQUIREMENTS (OUTPATIENT)
Age: 75
End: 2025-03-18
Payer: MEDICARE

## 2025-03-18 VITALS
DIASTOLIC BLOOD PRESSURE: 84 MMHG | HEIGHT: 72 IN | HEART RATE: 77 BPM | WEIGHT: 186 LBS | SYSTOLIC BLOOD PRESSURE: 122 MMHG | BODY MASS INDEX: 25.19 KG/M2

## 2025-03-18 DIAGNOSIS — I47.20 VT (VENTRICULAR TACHYCARDIA): Primary | ICD-10-CM

## 2025-03-18 DIAGNOSIS — I25.5 ISCHEMIC CARDIOMYOPATHY: ICD-10-CM

## 2025-03-18 DIAGNOSIS — I47.29 NSVT (NONSUSTAINED VENTRICULAR TACHYCARDIA): ICD-10-CM

## 2025-03-18 DIAGNOSIS — I49.3 PVC (PREMATURE VENTRICULAR CONTRACTION): ICD-10-CM

## 2025-03-18 DIAGNOSIS — Z95.810 ICD (IMPLANTABLE CARDIOVERTER-DEFIBRILLATOR), DUAL, IN SITU: Primary | ICD-10-CM

## 2025-03-18 DIAGNOSIS — I48.0 PAROXYSMAL ATRIAL FIBRILLATION: ICD-10-CM

## 2025-03-18 PROCEDURE — 99214 OFFICE O/P EST MOD 30 MIN: CPT

## 2025-03-18 NOTE — PROGRESS NOTES
Date of Office Visit: 2025  Encounter Provider: BRANDON Irvin  Place of Service: [facility] CARDIOLOGY  Patient Name: Alden Pratt III  :1950    Chief Complaint   Patient presents with    Pacemaker Check     6 mnth follow up   :     HPI: Alden Pratt III is a 74 y.o. male who follows with Dr. Ball and Dr. Ngo----MI , CAD, s/p stents, s/p CABG, ICM, s/p ICD.      Has had chronic PVC's has had VF treated with shock that sent him into AF, cardioverted then had recurrence ---was on dronedarone at one point and the had PVI 2021 and has not had recurrence.      He also follows with the [facility].      He saw Dr. Ball in Feb with complaints of palpitations that he reported were really fast and really bothered him---device showed short runs of NSVT, he was started on low dose amiodarone 100 mg BID.       Presented to ED 10/21 with palpitations and syncope. He was mowing grass and started to feel the PVC's and then felt the rapid palpitations and had syncope.      Device interrogation shows VT that spontaneously converted just prior to meeting criteria for therapy.      Echo shows EF 36-40% which is down from prior which was about 45% but per Dr. Liu's noted he thinks it is really unchanged and just reader variability.      We increased his amiodarone to 200mg BID.      Over the next couple of weeks. He had recurrent VT. Episode was shorter lasting 2-3 minutes.      Saw Dr. Ngo in the office on . Discussed VT ablation and this was scheduled for .     On  he presented to ED with syncope. Device showed NSVT.      He ended up undergoing VT ablation/substrate mapping on 11/15     He saw Dr. Ngo in follow up and felt like he was having more symptoms related to PVCs. He was in bigeminy. Discussed and elected to pursue ablation.      He had ablation of multifocal PVCs and underwent substrate modification on  8/05/2024.      He then had generator change on ICD on 9/04/2024.    He had phlebectomy of the right leg with Dr. Mckenzie on 2/19/2025.    We got an alert from his device on 3/7/2025 that he had received ATP.  We called the patient.  He did notice the event but overall afterwards was feeling okay.  He had upcoming follow-up so we just recommended to keep that appointment.                He presents today for follow-up appointment.    Since the event on 3/7.  He has not had any recurrent episodes of NSVT.    This is his first event since his PVC ablation in August 2024.    He says that he does feel like his PVCs are a little worse right now than they were a few months ago.    Normal device testing and function in office today.  Device interrogation confirms no further events since 3/7.    On his device.  His PVCs are actually much less.  He was having around 90/h prior to his PVC ablation.  Now its about 30/h.    He was on amiodarone at 1 point but elected to stop this due to concern for long-term use.    He has history of VT ablation as well as PVC ablation.     Past Medical History:   Diagnosis Date    Arthritis     CAD (coronary artery disease)     atheroclerosis    Cancer     prostate    Chest pain     CHF (congestive heart failure)     Elevated cholesterol     GERD (gastroesophageal reflux disease)     Hyperlipidemia     Implantable cardioverter-defibrillator (ICD) discharge     Ischemic cardiomyopathy     Myocardial infarction     inferior    PAF (paroxysmal atrial fibrillation)     PVC (premature ventricular contraction)     PVD (peripheral vascular disease)     Status post phlebectomy     varicose veins    Thyroid nodule 07/18/2014    Varicose veins of bilateral lower extremities with pain 11/08/2023    and inflammation    Venous insufficiency (chronic) (peripheral) 11/08/2023    VT (ventricular tachycardia)        Past Surgical History:   Procedure Laterality Date    CARDIAC ABLATION  2021    atrial  fibrillation    CARDIAC CATHETERIZATION  2006    CARDIAC CATHETERIZATION Left 05/10/2021    Procedure: Left Heart Cath;  Surgeon: Andrew Ball MD;  [facility] C[facility] LOC[facility];  Service: Cardiology;  Laterality: Left;    CARDIAC CATHETERIZATION N/A 05/10/2021    Procedure: Coronary angiography;  Surgeon: Andrew Ball MD;  [facility];  Service: Cardiology;  Laterality: N/A;    CARDIAC CATHETERIZATION N/A 05/10/2021    Procedure: Left ventriculography;  Surgeon: Andrew Ball MD;  [facility];  Service: Cardiology;  Laterality: N/A;    CARDIAC CATHETERIZATION  05/10/2021    Procedure: Saphenous Vein Graft;  Surgeon: Andrew Ball MD;  [facility];  Service: Cardiology;;    CARDIAC DEFIBRILLATOR PLACEMENT  02/2007    CARDIAC ELECTROPHYSIOLOGY MAPPING AND ABLATION  2023    v-tach    CARDIAC ELECTROPHYSIOLOGY PROCEDURE N/A 08/02/2021    Procedure: Ablation atrial fibrillation;  Surgeon: Robby Ngo MD;  Location: [facility] CATH INVASIVE LOCATION;  Service: Cardiovascular;  Laterality: N/A;    CARDIAC ELECTROPHYSIOLOGY PROCEDURE N/A 11/15/2023    Procedure: Ablation VT;  Surgeon: Robby Ngo MD;  [facility];  Service: Cardiovascular;  Laterality: N/A;    CARDIAC ELECTROPHYSIOLOGY PROCEDURE N/A 08/05/2024    Procedure: Ablation PVC;  Surgeon: Robby Ngo MD;  Location: [facility] CATH INVASIVE LOCATION;  Service: Cardiovascular;  Laterality: N/A;    CARDIOVERSION  2021    CATARACT EXTRACTION, BILATERAL      COLONOSCOPY      CORONARY ARTERY BYPASS GRAFT  11/02/2006    x6    CORONARY STENT PLACEMENT  10/2006    HAND SURGERY Left     ICD GENERATOR REPLACEMENT N/A 09/04/2024    Procedure: Defibrillator Generator Change - DC [employer];  Surgeon: Robby Ngo MD;  Location: [facility] CATH INVASIVE LOCATION;  Service: Cardiovascular;  Laterality: N/A;    KNEE  SURGERY Left     KNEE ARHTROSCOPY    MICROAMBULATORY PHLEBECTOMY      SHOULDER SURGERY Right     ARTHROSCOPY    THYROID BIOPSY      VARICOSE VEIN SURGERY Right 01/29/2024    VV Varithena    VEIN LIGATION AND STRIPPING Bilateral 2/4/2025    Procedure: MICROAMBULATORY PHLEBECTOMY bilateral;  Surgeon: Gerry Mckenzie MD;  Location: [facility] OR;  Service: Vascular;  Laterality: Bilateral;       Social History     Socioeconomic History    Marital status:    Tobacco Use    Smoking status: Never    Smokeless tobacco: Never    Tobacco comments:     No caffeine use    Vaping Use    Vaping status: Never Used   Substance and Sexual Activity    Alcohol use: Not Currently    Drug use: No    Sexual activity: Defer       Family History   Problem Relation Age of Onset    Cancer Mother     Heart disease Father     Heart failure Father     Stroke Father     Cancer Father     No Known Problems Maternal Grandmother     No Known Problems Maternal Grandfather     No Known Problems Paternal Grandmother     No Known Problems Paternal Grandfather     Coronary artery disease Other     Stroke Other     Hypertension Other     Malig Hyperthermia Neg Hx        Review of Systems   Constitutional: Negative for chills, fever and malaise/fatigue.   Cardiovascular:  Negative for chest pain, dyspnea on exertion, leg swelling, near-syncope, orthopnea, palpitations, paroxysmal nocturnal dyspnea and syncope.   Respiratory:  Negative for cough and shortness of breath.    Hematologic/Lymphatic: Negative.    Musculoskeletal:  Negative for joint pain, joint swelling and myalgias.   Gastrointestinal:  Negative for abdominal pain, diarrhea, melena, nausea and vomiting.   Genitourinary:  Negative for frequency and hematuria.   Neurological:  Negative for light-headedness, numbness, paresthesias and seizures.   Allergic/Immunologic: Negative.    All other systems reviewed and are negative.      Allergies   Allergen Reactions    Contrast Dye (Echo Or  Unknown Ct/Mr) Hives    Ace Inhibitors Cough    Iodinated Contrast Media Hives     Tolerated with pre meds prior to heart cath in feb     Sudafed 12 Hour [Pseudoephedrine Hcl Er] Other (See Comments)     IRREGULAR HEART RATE    Pseudoephedrine Palpitations         Current Outpatient Medications:     apixaban (ELIQUIS) 5 MG tablet tablet, Take 1 tablet by mouth Every 12 (Twelve) Hours. Resume on 9/7, Disp: , Rfl:     aspirin 81 MG EC tablet, Take 1 tablet by mouth Every Night. INSTRUCTED BY DR DURAN NOT TO HOLD FOR SURGERY, Disp: , Rfl:     atorvastatin (LIPITOR) 80 MG tablet, Take 1 tablet by mouth Daily. 200001, Disp: 90 tablet, Rfl: 2    carboxymethylcellulose (REFRESH PLUS) 0.5 % solution, Apply 1 drop to eye(s) as directed by provider 3 times a day., Disp: , Rfl:     cetirizine (ZyrTEC) 10 MG tablet, Take 1 tablet by mouth As Needed. Only Occasionally will use, Disp: , Rfl:     Cholecalciferol (VITAMIN D3) 5000 UNITS capsule capsule, Take 1 capsule by mouth Every Other Day. PT HOLDING FOR SURGERY, Disp: , Rfl:     CLOBETASOL & CLOBETASOL EMUL EX, Apply 1 Application topically Every Other Day., Disp: , Rfl:     DICLOFENAC PO, Take 75 mg by mouth 2 (Two) Times a Day As Needed (back pain). PT HOLDING FOR SURGERY, Disp: , Rfl:     Empagliflozin (Jardiance) 10 MG tablet, Take 10 mg by mouth Daily., Disp: 90 tablet, Rfl: 2    ezetimibe (ZETIA) 10 MG tablet, Take 1 tablet by mouth Every Night., Disp: , Rfl:     fluticasone (FLONASE) 50 MCG/ACT nasal spray, Administer 1 spray into the nostril(s) as directed by provider 2 (Two) Times a Day As Needed., Disp: , Rfl:     furosemide (LASIX) 20 MG tablet, Take 1 tablet by mouth As Needed. TAKES FOR WEIGHT GAIN OF 2 TO 3 POUNDS, Disp: , Rfl:     Linzess 72 MCG capsule capsule, Take 1 capsule by mouth Daily., Disp: , Rfl:     losartan (COZAAR) 25 MG tablet, Take 1 tablet by mouth Daily. (Patient taking differently: Take 0.5 tablets by mouth Daily.), Disp: 90 tablet, Rfl: 1     "metaxalone (SKELAXIN) 800 MG tablet, Take 1 tablet by mouth As Needed for Muscle Spasms., Disp: , Rfl:     metoprolol succinate XL (TOPROL-XL) 100 MG 24 hr tablet, Take 1 tablet by mouth Every Evening. In PM, Disp: , Rfl:     metoprolol succinate XL (TOPROL-XL) 50 MG 24 hr tablet, Take 1 tablet by mouth Every Morning. In AM, Disp: , Rfl:     Minoxidil 5 % foam, Apply  topically 2 (two) times a day., Disp: , Rfl:     nitroglycerin (NITROSTAT) 0.4 MG SL tablet, 1 under the tongue as needed for angina, may repeat q5mins for up three doses (Patient taking differently: Place 1 tablet under the tongue Every 5 (Five) Minutes As Needed. 1 under the tongue as needed for angina, may repeat q5mins for up three doses), Disp: 100 tablet, Rfl: 11    omeprazole (priLOSEC) 40 MG capsule, Take 1 capsule by mouth Daily As Needed., Disp: , Rfl:     spironolactone (ALDACTONE) 25 MG tablet, Take 0.5 tablets by mouth Daily., Disp: , Rfl:     HYDROcodone-acetaminophen (NORCO) 5-325 MG per tablet, Take 1 tablet by mouth Every 6 (Six) Hours As Needed (Pain)., Disp: 30 tablet, Rfl: 0      Objective:     Vitals:    03/18/25 1048   BP: 122/84   BP Location: Right arm   Patient Position: Sitting   Pulse: 77   Weight: 84.4 kg (186 lb)   Height: 182.9 cm (72\")     Body mass index is 25.23 kg/m².    PHYSICAL EXAM:    Vitals Reviewed.   General Appearance: No acute distress, well developed and well nourished.   Respiratory: No signs of respiratory distress. Respiration rhythm and depth normal.   Cardiovascular:  Heart Rate and Rhythm: Normal  Skin: Warm and dry.   Psychiatric: Patient alert and oriented to person, place, and time. Speech and behavior appropriate. Normal mood and affect.       ECG 12 Lead    Date/Time: 3/19/2025 9:46 AM  Performed by: Galo Braden APRN    Authorized by: Galo Braden APRN  Comparison: compared with previous ECG   Similar to previous ECG  Rhythm: sinus rhythm            Assessment:       Diagnosis Plan   1. VT " (ventricular tachycardia)        2. NSVT (nonsustained ventricular tachycardia)        3. Ischemic cardiomyopathy        4. PVC (premature ventricular contraction)        5. Paroxysmal atrial fibrillation               Plan:   1-2. Ventricular tachycardia, NSVT---s/p ICD---s/p VT ablation--- he recently received ATP for ventricular tachycardia.  He was aware of palpitations and dizziness at the time of the episode.  This would be his first episode since his PVC ablation in August.  We discussed whether or not to start amiodarone now but ultimately elected to defer unless he has more events.  We can reassess amiodarone versus additional ablation if he has more events.    3.  Ischemic cardiomyopathy--- he also follows with Dr. Ball.  He has history of PCI.  His last LVEF was around 25%.    4. PVC---s/p ablation--- he feels like his PVCs are little worse right now.  On his device.  There is significantly better than they were in August prior to his PVC ablation.  I recommended we monitor for now.    5. PAF---s/p PVI--- no recurrent AF since ablation.           Follow up in 6 months or sooner if he has any issues.                     I spent at least 30 minutes reviewing previous notes, labs, EKGs, device reports and/or time with the patient.           As always, it has been a pleasure to participate in your patient's care.      Sincerely,         BRANDON Irvin

## 2025-03-19 ENCOUNTER — OFFICE VISIT (OUTPATIENT)
Dept: CARDIOLOGY | Facility: CLINIC | Age: 75
End: 2025-03-19
Payer: MEDICARE

## 2025-03-19 VITALS
OXYGEN SATURATION: 97 % | HEIGHT: 72 IN | SYSTOLIC BLOOD PRESSURE: 125 MMHG | HEART RATE: 76 BPM | DIASTOLIC BLOOD PRESSURE: 80 MMHG | BODY MASS INDEX: 25.41 KG/M2 | WEIGHT: 187.6 LBS

## 2025-03-19 DIAGNOSIS — I25.10 CORONARY ARTERY DISEASE INVOLVING NATIVE CORONARY ARTERY OF NATIVE HEART WITHOUT ANGINA PECTORIS: Primary | ICD-10-CM

## 2025-03-19 DIAGNOSIS — Z45.02 IMPLANTABLE CARDIOVERTER-DEFIBRILLATOR (ICD) DISCHARGE: ICD-10-CM

## 2025-03-19 DIAGNOSIS — I25.5 ISCHEMIC CARDIOMYOPATHY: ICD-10-CM

## 2025-03-19 DIAGNOSIS — I48.0 PAROXYSMAL ATRIAL FIBRILLATION: ICD-10-CM

## 2025-03-19 DIAGNOSIS — I49.3 PVC (PREMATURE VENTRICULAR CONTRACTION): ICD-10-CM

## 2025-03-19 DIAGNOSIS — E78.5 HYPERLIPIDEMIA WITH TARGET LDL LESS THAN 70: ICD-10-CM

## 2025-03-19 DIAGNOSIS — I47.29 NSVT (NONSUSTAINED VENTRICULAR TACHYCARDIA): ICD-10-CM

## 2025-03-19 PROBLEM — R07.9 CHEST PAIN: Status: RESOLVED | Noted: 2018-10-18 | Resolved: 2025-03-19

## 2025-03-19 PROBLEM — I20.0 UNSTABLE ANGINA PECTORIS: Status: RESOLVED | Noted: 2021-05-05 | Resolved: 2025-03-19

## 2025-03-19 PROBLEM — I48.19 ATRIAL FIBRILLATION, PERSISTENT: Status: RESOLVED | Noted: 2021-07-08 | Resolved: 2025-03-19

## 2025-03-19 PROBLEM — R94.31 ABNORMAL EKG: Status: RESOLVED | Noted: 2018-10-19 | Resolved: 2025-03-19

## 2025-03-19 PROBLEM — I47.20 RECURRENT VENTRICULAR TACHYCARDIA: Status: RESOLVED | Noted: 2023-11-12 | Resolved: 2025-03-19

## 2025-03-19 PROCEDURE — 93000 ELECTROCARDIOGRAM COMPLETE: CPT | Performed by: INTERNAL MEDICINE

## 2025-03-19 PROCEDURE — 99214 OFFICE O/P EST MOD 30 MIN: CPT | Performed by: INTERNAL MEDICINE

## 2025-03-19 PROCEDURE — 93000 ELECTROCARDIOGRAM COMPLETE: CPT

## 2025-03-19 RX ORDER — AMIODARONE HYDROCHLORIDE 100 MG/1
100 TABLET ORAL DAILY
Qty: 90 TABLET | Refills: 3 | Status: SHIPPED | OUTPATIENT
Start: 2025-03-19

## 2025-03-19 NOTE — PROGRESS NOTES
Date of Office Visit: 25  Encounter Provider: Andrew Ball MD  Place of Service: [facility] CARDIOLOGY  Patient Name: Alden Pratt III  :1950  [id number]    Chief Complaint   Patient presents with    Palpitations   :     HPI: Alden Pratt III is a 74 y.o. male  he had an MI in .  At that time we did an angioplasty and stenting to his RCA but he had other significant disease his stents ended up renarrowing down and he had an ischemic myopathy.  He had an ICD placed we did a bypass on him with a LIMA to his LAD, a vein to his distal LAD, a vein to an OM 1, a vein to the PDA, vein to the KIERAN, a vein to the RV branch.  He is done really pretty well he has had chronic PVCs even before the infarct that have persisted some.  He had pretty emotionally dramatic reunion with his brother he ended up having VF and got a shock from his defibrillator.  The shocked knocked him into A. Fib.   We had to anticoagulate him and then cardiovert him out.  So earlier in  he went into atrial fibrillation again and did not feel good.  We cardioverted him out of it.  He is actually seen the arrhythmia service they put him on Multaq.  He does not feel any better.      We were concerned that he was having angina and so we cathed him and May 2021 and this showed 6 of 6 bypasses were patent.  He had a severe cardiomyopathy we added some Jardiance to his regimen.  He has followed up with the [facility] heart failure clinic.     He has had a prior A. fib ablation.  He in the 2023 he started having a lot of ventricular tachycardia he underwent a VT ablation with Robby Ngo and then in 2023 was seen was doing great and I stopped his amiodarone.  His echo in 2023 also showed an EF of 35 to 40%.    He started feeling his ventricular ectopy a little bit more he is always been sensitive to it he underwent a PVC ablation in 2024.  He had treatment  delivered from his ICD about 2 weeks ago.  At that time he went into VT and he had antitachycardia pacing a total of 5 times to try and get the VT to stop.  And it eventually did it lasted less than a minute but he did feel it.  And of course that made him nervous that he is not having chest pain his breathing is the same nothing else is really changed much we had him on amiodarone at 1 time and stopped it because we were worried and about keeping him on it for a long period of time plus he had a VT ablation    Past Medical History:   Diagnosis Date    Arthritis     CAD (coronary artery disease)     atheroclerosis    Cancer     prostate    Chest pain     CHF (congestive heart failure)     Elevated cholesterol     GERD (gastroesophageal reflux disease)     Hyperlipidemia     Implantable cardioverter-defibrillator (ICD) discharge     Ischemic cardiomyopathy     Myocardial infarction     inferior    PAF (paroxysmal atrial fibrillation)     PVC (premature ventricular contraction)     PVD (peripheral vascular disease)     Status post phlebectomy     varicose veins    Thyroid nodule 07/18/2014    Varicose veins of bilateral lower extremities with pain 11/08/2023    and inflammation    Venous insufficiency (chronic) (peripheral) 11/08/2023    VT (ventricular tachycardia)        Past Surgical History:   Procedure Laterality Date    CARDIAC ABLATION  2021    atrial fibrillation    CARDIAC CATHETERIZATION  2006    CARDIAC CATHETERIZATION Left 05/10/2021    Procedure: Left Heart Cath;  Surgeon: Andrew Ball MD;  Location: [facility] INVASIVE LOCATION;  Service: Cardiology;  Laterality: Left;    CARDIAC CATHETERIZATION N/A 05/10/2021    Procedure: Coronary angiography;  Surgeon: Andrew Ball MD;  Location: [facility] CATH INVASIVE LOCATION;  Service: Cardiology;  Laterality: N/A;    CARDIAC CATHETERIZATION N/A 05/10/2021    Procedure: Left ventriculography;  Surgeon: Andrew Ball MD;  Location: [facility] INVASIVE  LOCATION;  Service: Cardiology;  Laterality: N/A;    CARDIAC CATHETERIZATION  05/10/2021    Procedure: Saphenous Vein Graft;  Surgeon: Andrew Ball MD;  Location: [facility] CATH INVASIVE LOCATION;  Service: Cardiology;;    CARDIAC DEFIBRILLATOR PLACEMENT  02/2007    CARDIAC ELECTROPHYSIOLOGY MAPPING AND ABLATION  2023    v-tach    CARDIAC ELECTROPHYSIOLOGY PROCEDURE N/A 08/02/2021    Procedure: Ablation atrial fibrillation;  Surgeon: Robby Ngo MD;  Location: [facility] CATH INVASIVE LOCATION;  Service: Cardiovascular;  Laterality: N/A;    CARDIAC ELECTROPHYSIOLOGY PROCEDURE N/A 11/15/2023    Procedure: Ablation VT;  Surgeon: Robby Ngo MD;  Location[facility] CATH INVASIVE LOCATION;  Service: Cardiovascular;  Laterality: N/A;    CARDIAC ELECTROPHYSIOLOGY PROCEDURE N/A 08/05/2024    Procedure: Ablation PVC;  Surgeon: Robby Ngo MD;  Location: [facility] CATH INVASIVE LOCATION;  Service: Cardiovascular;  Laterality: N/A;    CARDIOVERSION  2021    CATARACT EXTRACTION, BILATERAL      COLONOSCOPY      CORONARY ARTERY BYPASS GRAFT  11/02/2006    x6    CORONARY STENT PLACEMENT  10/2006    HAND SURGERY Left     ICD GENERATOR REPLACEMENT N/A 09/04/2024    Procedure: Defibrillator Generator Change - DC [employer];  Surgeon: Robby Ngo MD;  Location: [facility] CATH INVASIVE LOCATION;  Service: Cardiovascular;  Laterality: N/A;    KNEE SURGERY Left     KNEE ARHTROSCOPY    MICROAMBULATORY PHLEBECTOMY      SHOULDER SURGERY Right     ARTHROSCOPY    THYROID BIOPSY      VARICOSE VEIN SURGERY Right 01/29/2024    VV Varithena    VEIN LIGATION AND STRIPPING Bilateral 2/4/2025    Procedure: MICROAMBULATORY PHLEBECTOMY bilateral;  Surgeon: Gerry Mckenzie MD;  Location: [facility] OR;  Service: Vascular;  Laterality: Bilateral;       Social History     Socioeconomic History    Marital status:    Tobacco Use    Smoking status: Never    Smokeless tobacco: Never    Tobacco comments:     No caffeine  use    Vaping Use    Vaping status: Never Used   Substance and Sexual Activity    Alcohol use: Not Currently    Drug use: No    Sexual activity: Defer       Family History   Problem Relation Age of Onset    Cancer Mother     Heart disease Father     Heart failure Father     Stroke Father     Cancer Father     No Known Problems Maternal Grandmother     No Known Problems Maternal Grandfather     No Known Problems Paternal Grandmother     No Known Problems Paternal Grandfather     Coronary artery disease Other     Stroke Other     Hypertension Other     Malig Hyperthermia Neg Hx        Review of Systems   Constitutional: Negative for decreased appetite, fever, malaise/fatigue and weight loss.   HENT:  Negative for nosebleeds.    Eyes:  Negative for double vision.   Cardiovascular:  Negative for chest pain, claudication, cyanosis, dyspnea on exertion, irregular heartbeat, leg swelling, near-syncope, orthopnea, palpitations, paroxysmal nocturnal dyspnea and syncope.   Respiratory:  Negative for cough, hemoptysis and shortness of breath.    Hematologic/Lymphatic: Negative for bleeding problem.   Skin:  Negative for rash.   Musculoskeletal:  Negative for falls and myalgias.   Gastrointestinal:  Negative for hematochezia, jaundice, melena, nausea and vomiting.   Genitourinary:  Negative for hematuria.   Neurological:  Negative for dizziness and seizures.   Psychiatric/Behavioral:  Negative for altered mental status and memory loss.        Allergies   Allergen Reactions    Contrast Dye (Echo Or Unknown Ct/Mr) Hives    Ace Inhibitors Cough    Iodinated Contrast Media Hives     Tolerated with pre meds prior to heart cath in feb     Sudafed 12 Hour [Pseudoephedrine Hcl Er] Other (See Comments)     IRREGULAR HEART RATE    Pseudoephedrine Palpitations         Current Outpatient Medications:     apixaban (ELIQUIS) 5 MG tablet tablet, Take 1 tablet by mouth Every 12 (Twelve) Hours. Resume on 9/7, Disp: , Rfl:     aspirin 81 MG EC  tablet, Take 1 tablet by mouth Every Night. INSTRUCTED BY DR DURAN NOT TO HOLD FOR SURGERY, Disp: , Rfl:     atorvastatin (LIPITOR) 80 MG tablet, Take 1 tablet by mouth Daily. 200001, Disp: 90 tablet, Rfl: 2    carboxymethylcellulose (REFRESH PLUS) 0.5 % solution, Apply 1 drop to eye(s) as directed by provider 3 times a day., Disp: , Rfl:     cetirizine (ZyrTEC) 10 MG tablet, Take 1 tablet by mouth As Needed. Only Occasionally will use, Disp: , Rfl:     Cholecalciferol (VITAMIN D3) 5000 UNITS capsule capsule, Take 1 capsule by mouth Every Other Day. PT HOLDING FOR SURGERY, Disp: , Rfl:     CLOBETASOL & CLOBETASOL EMUL EX, Apply 1 Application topically Every Other Day., Disp: , Rfl:     DICLOFENAC PO, Take 75 mg by mouth 2 (Two) Times a Day As Needed (back pain). PT HOLDING FOR SURGERY, Disp: , Rfl:     Empagliflozin (Jardiance) 10 MG tablet, Take 10 mg by mouth Daily., Disp: 90 tablet, Rfl: 2    ezetimibe (ZETIA) 10 MG tablet, Take 1 tablet by mouth Every Night., Disp: , Rfl:     fluticasone (FLONASE) 50 MCG/ACT nasal spray, Administer 1 spray into the nostril(s) as directed by provider 2 (Two) Times a Day As Needed., Disp: , Rfl:     furosemide (LASIX) 20 MG tablet, Take 1 tablet by mouth As Needed. TAKES FOR WEIGHT GAIN OF 2 TO 3 POUNDS, Disp: , Rfl:     Linzess 72 MCG capsule capsule, Take 1 capsule by mouth Daily., Disp: , Rfl:     losartan (COZAAR) 25 MG tablet, Take 1 tablet by mouth Daily. (Patient taking differently: Take 0.5 tablets by mouth Daily.), Disp: 90 tablet, Rfl: 1    metaxalone (SKELAXIN) 800 MG tablet, Take 1 tablet by mouth As Needed for Muscle Spasms., Disp: , Rfl:     metoprolol succinate XL (TOPROL-XL) 100 MG 24 hr tablet, Take 1 tablet by mouth Every Evening. In PM, Disp: , Rfl:     metoprolol succinate XL (TOPROL-XL) 50 MG 24 hr tablet, Take 1 tablet by mouth Every Morning. In AM, Disp: , Rfl:     Minoxidil 5 % foam, Apply  topically 2 (two) times a day., Disp: , Rfl:     nitroglycerin  "(NITROSTAT) 0.4 MG SL tablet, 1 under the tongue as needed for angina, may repeat q5mins for up three doses (Patient taking differently: Place 1 tablet under the tongue Every 5 (Five) Minutes As Needed. 1 under the tongue as needed for angina, may repeat q5mins for up three doses), Disp: 100 tablet, Rfl: 11    omeprazole (priLOSEC) 40 MG capsule, Take 1 capsule by mouth Daily As Needed., Disp: , Rfl:     spironolactone (ALDACTONE) 25 MG tablet, Take 0.5 tablets by mouth Daily., Disp: , Rfl:       Objective:     Vitals:    03/19/25 1427   BP: 125/80   BP Location: Left arm   Patient Position: Sitting   Cuff Size: Adult   Pulse: 76   SpO2: 97%   Weight: 85.1 kg (187 lb 9.6 oz)   Height: 182.9 cm (72\")     Body mass index is 25.44 kg/m².    Physical Exam  Constitutional:       Appearance: He is well-developed.   HENT:      Head: Normocephalic.   Eyes:      General: No scleral icterus.  Neck:      Thyroid: No thyromegaly.      Vascular: No JVD.   Cardiovascular:      Rate and Rhythm: Normal rate and regular rhythm.      Heart sounds: Normal heart sounds. No murmur heard.     No friction rub. No gallop.   Pulmonary:      Effort: Pulmonary effort is normal.      Breath sounds: Normal breath sounds. No wheezing or rales.   Chest:       Abdominal:      Palpations: Abdomen is soft.      Tenderness: There is no abdominal tenderness.   Musculoskeletal:         General: Normal range of motion.   Lymphadenopathy:      Cervical: No cervical adenopathy.   Skin:     General: Skin is warm and dry.      Findings: No rash.   Neurological:      Mental Status: He is alert and oriented to person, place, and time.           ECG 12 Lead    Date/Time: 3/19/2025 3:08 PM  Performed by: Andrew Ball MD    Authorized by: Andrew Ball MD  Rhythm: paced  T inversion: II, III and aVF    Clinical impression: abnormal EKG           Assessment:       Diagnosis Plan   1. Coronary artery disease involving native coronary artery of native heart " without angina pectoris        2. Hyperlipidemia with target LDL less than 70        3. Implantable cardioverter-defibrillator (ICD) discharge        4. Ischemic cardiomyopathy        5. NSVT (nonsustained ventricular tachycardia)        6. Paroxysmal atrial fibrillation        7. PVC (premature ventricular contraction)                 Plan:       He had another episode of VT is a little bit concerning he was not really doing anything when it happened I think he was driving.  I talked with him and his wife I just cannot feel like hoping that it does not come back is not a great plan I think we had to put him on a low-dose of amiodarone 100 mg a day and see if it will suppress the VT as well as his PVCs he tolerated it fine the last time the issue is going to be a long-term Amio for him he will need a chest film and blood work every 6 months.  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  I will have him come see Nati in 3 months just for check and he will see me again in 6 months    As always, it has been a pleasure to participate in your patient's care.      Sincerely,       Andrew Ball MD

## 2025-06-19 ENCOUNTER — OFFICE VISIT (OUTPATIENT)
Age: 75
End: 2025-06-19
Payer: MEDICARE

## 2025-06-19 VITALS
HEIGHT: 72 IN | HEART RATE: 72 BPM | WEIGHT: 186.3 LBS | SYSTOLIC BLOOD PRESSURE: 112 MMHG | BODY MASS INDEX: 25.23 KG/M2 | DIASTOLIC BLOOD PRESSURE: 70 MMHG

## 2025-06-19 DIAGNOSIS — I25.10 CORONARY ARTERY DISEASE INVOLVING NATIVE CORONARY ARTERY OF NATIVE HEART WITHOUT ANGINA PECTORIS: Primary | ICD-10-CM

## 2025-06-19 DIAGNOSIS — I49.3 PVC (PREMATURE VENTRICULAR CONTRACTION): ICD-10-CM

## 2025-06-19 DIAGNOSIS — I25.5 ISCHEMIC CARDIOMYOPATHY: ICD-10-CM

## 2025-06-19 DIAGNOSIS — Z45.02 IMPLANTABLE CARDIOVERTER-DEFIBRILLATOR (ICD) DISCHARGE: ICD-10-CM

## 2025-06-19 DIAGNOSIS — I48.0 PAROXYSMAL ATRIAL FIBRILLATION: ICD-10-CM

## 2025-06-19 DIAGNOSIS — I47.29 NSVT (NONSUSTAINED VENTRICULAR TACHYCARDIA): ICD-10-CM

## 2025-06-19 DIAGNOSIS — Z95.1 S/P CABG (CORONARY ARTERY BYPASS GRAFT): ICD-10-CM

## 2025-06-19 PROCEDURE — 99214 OFFICE O/P EST MOD 30 MIN: CPT | Performed by: NURSE PRACTITIONER

## 2025-06-19 PROCEDURE — 1160F RVW MEDS BY RX/DR IN RCRD: CPT | Performed by: NURSE PRACTITIONER

## 2025-06-19 PROCEDURE — 93000 ELECTROCARDIOGRAM COMPLETE: CPT | Performed by: NURSE PRACTITIONER

## 2025-06-19 PROCEDURE — 1159F MED LIST DOCD IN RCRD: CPT | Performed by: NURSE PRACTITIONER

## 2025-06-19 RX ORDER — METOPROLOL SUCCINATE 50 MG/1
50 TABLET, EXTENDED RELEASE ORAL 2 TIMES DAILY
Start: 2025-06-19

## 2025-06-19 NOTE — PROGRESS NOTES
Date of Office Visit: 2025  Encounter Provider: BRANDON Webster  Place of Service: [facility] CARDIOLOGY  Patient Name: Alden Pratt III  :1950    Chief Complaint   Patient presents with    Coronary artery disease involving native coronary artery of     6 month follow up    :     HPI: Alden Pratt III is a 74 y.o. male patient of Dr. Ball's with coronary artery disease (history of CABG), ischemic cardiomyopathy (status post ICD), chronic PVCs (status post ablation), paroxysmal atrial fibrillation (status post PVI ablation in 2024), and ventricular tachycardia (status post VT ablation in 2023).  He also follows with EP.    On 3/7, we received an alert from his device that he had received ATP.  This was his first event since his PVC ablation in 2024.  Device interrogation confirmed no further events since 3/7.  His PVCs were much less.  EP considered restarting amiodarone.  Ultimately, continued monitoring was recommended unless he had recurrent events.    He was last seen in the office by Dr. Ball on 3/19 at which time he was overall feeling well.  He did report increased ectopy.  Dr. Ball recommended initiating low-dose amiodarone.  He was advised to follow-up in 3 months.    Overall, he is doing about the same.  He is able to walk on the treadmill 3 days a week for 55 minutes at a time without any issue.  However, he says about 30 to 40% of the time he just does not feel well.  He reports fatigue, exercise intolerance, and leg weakness.  He has noted intermittent low blood pressure in the 90s systolic.  He denies any chest pain, edema, dizziness, syncope, bleeding difficulties or melena.    Past Medical History:   Diagnosis Date    Arthritis     CAD (coronary artery disease)     atheroclerosis    Cancer     prostate    Chest pain     CHF (congestive heart failure)     Elevated cholesterol     GERD (gastroesophageal reflux  disease)     Hyperlipidemia     Implantable cardioverter-defibrillator (ICD) discharge     Ischemic cardiomyopathy     Myocardial infarction     inferior    PAF (paroxysmal atrial fibrillation)     PVC (premature ventricular contraction)     PVD (peripheral vascular disease)     Status post phlebectomy     varicose veins    Thyroid nodule 07/18/2014    Varicose veins of bilateral lower extremities with pain 11/08/2023    and inflammation    Venous insufficiency (chronic) (peripheral) 11/08/2023    VT (ventricular tachycardia)        Past Surgical History:   Procedure Laterality Date    CARDIAC ABLATION  2021    atrial fibrillation    CARDIAC CATHETERIZATION  2006    CARDIAC CATHETERIZATION Left 05/10/2021    Procedure: Left Heart Cath;  Surgeon: Andrew Ball MD;  Location: [facility] CATH INVASIVE LOCATION;  Service: Cardiology;  Laterality: Left;    CARDIAC CATHETERIZATION N/A 05/10/2021    Procedure: Coronary angiography;  Surgeon: Andrew Ball MD;  [facility] C[facility]VASIVE LOC[facility];  Service: Cardiology;  Laterality: N/A;    CARDIAC CATHETERIZATION N/A 05/10/2021    Procedure: Left ventriculography;  Surgeon: Andrew Ball MD;  [facility] C[facility] LOC[facility];  Service: Cardiology;  Laterality: N/A;    CARDIAC CATHETERIZATION  05/10/2021    Procedure: Saphenous Vein Graft;  Surgeon: Andrew Ball MD;  Location: [facility] CATH INVASIVE LOCATION;  Service: Cardiology;;    CARDIAC DEFIBRILLATOR PLACEMENT  02/2007    CARDIAC ELECTROPHYSIOLOGY MAPPING AND ABLATION  2023    v-tach    CARDIAC ELECTROPHYSIOLOGY PROCEDURE N/A 08/02/2021    Procedure: Ablation atrial fibrillation;  Surgeon: Robby Ngo MD;  Location: [facility] CATH INVASIVE LOCATION;  Service: Cardiovascular;  Laterality: N/A;    CARDIAC ELECTROPHYSIOLOGY PROCEDURE N/A 11/15/2023    Procedure: Ablation VT;  Surgeon: Robby Ngo MD;  Location: [facility] CATH INVASIVE LOCATION;  Service: Cardiovascular;  Laterality: N/A;     CARDIAC ELECTROPHYSIOLOGY PROCEDURE N/A 08/05/2024    Procedure: Ablation PVC;  Surgeon: Robby Ngo MD;  Location:  [facility] CATH INVASIVE LOCATION;  Service: Cardiovascular;  Laterality: N/A;    CARDIOVERSION  2021    CATARACT EXTRACTION, BILATERAL      COLONOSCOPY      CORONARY ARTERY BYPASS GRAFT  11/02/2006    x6    CORONARY STENT PLACEMENT  10/2006    HAND SURGERY Left     ICD GENERATOR REPLACEMENT N/A 09/04/2024    Procedure: Defibrillator Generator Change - DC [employer];  Surgeon: Robby Ngo MD;  Location:  [facility] CATH INVASIVE LOCATION;  Service: Cardiovascular;  Laterality: N/A;    KNEE SURGERY Left     KNEE ARHTROSCOPY    MICROAMBULATORY PHLEBECTOMY      SHOULDER SURGERY Right     ARTHROSCOPY    THYROID BIOPSY      VARICOSE VEIN SURGERY Right 01/29/2024    VV Varithena    VEIN LIGATION AND STRIPPING Bilateral 2/4/2025    Procedure: MICROAMBULATORY PHLEBECTOMY bilateral;  Surgeon: Gerry Mckenzie MD;  Location: [facility] MAIN OR;  Service: Vascular;  Laterality: Bilateral;       Social History     Socioeconomic History    Marital status:    Tobacco Use    Smoking status: Never    Smokeless tobacco: Never    Tobacco comments:     No caffeine use    Vaping Use    Vaping status: Never Used   Substance and Sexual Activity    Alcohol use: Not Currently    Drug use: No    Sexual activity: Defer       Family History   Problem Relation Age of Onset    Cancer Mother     Heart disease Father     Heart failure Father     Stroke Father     Cancer Father     No Known Problems Maternal Grandmother     No Known Problems Maternal Grandfather     No Known Problems Paternal Grandmother     No Known Problems Paternal Grandfather     Coronary artery disease Other     Stroke Other     Hypertension Other     Malig Hyperthermia Neg Hx        Review of Systems   Constitutional: Positive for malaise/fatigue.   Cardiovascular:  Positive for palpitations. Negative for chest pain, dyspnea on exertion, leg  swelling, orthopnea, paroxysmal nocturnal dyspnea and syncope.   Respiratory: Negative.     Hematologic/Lymphatic: Negative for bleeding problem.   Musculoskeletal:  Negative for falls.   Gastrointestinal:  Negative for melena.   Neurological:  Negative for dizziness and light-headedness.       Allergies   Allergen Reactions    Contrast Dye (Echo Or Unknown Ct/Mr) Hives    Ace Inhibitors Cough    Iodinated Contrast Media Hives     Tolerated with pre meds prior to heart cath in feb     Sudafed 12 Hour [Pseudoephedrine Hcl Er] Other (See Comments)     IRREGULAR HEART RATE    Pseudoephedrine Palpitations         Current Outpatient Medications:     amiodarone (PACERONE) 100 MG tablet, Take 1 tablet by mouth Daily., Disp: 90 tablet, Rfl: 3    apixaban (ELIQUIS) 5 MG tablet tablet, Take 1 tablet by mouth Every 12 (Twelve) Hours. Resume on 9/7, Disp: , Rfl:     aspirin 81 MG EC tablet, Take 1 tablet by mouth Every Night. INSTRUCTED BY DR DURAN NOT TO HOLD FOR SURGERY, Disp: , Rfl:     atorvastatin (LIPITOR) 80 MG tablet, Take 1 tablet by mouth Daily. 200001, Disp: 90 tablet, Rfl: 2    carboxymethylcellulose (REFRESH PLUS) 0.5 % solution, Apply 1 drop to eye(s) as directed by provider 3 times a day., Disp: , Rfl:     cetirizine (ZyrTEC) 10 MG tablet, Take 1 tablet by mouth As Needed. Only Occasionally will use, Disp: , Rfl:     Cholecalciferol (VITAMIN D3) 5000 UNITS capsule capsule, Take 1 capsule by mouth Every Other Day. PT HOLDING FOR SURGERY, Disp: , Rfl:     CLOBETASOL & CLOBETASOL EMUL EX, Apply 1 Application topically Every Other Day., Disp: , Rfl:     DICLOFENAC PO, Take 75 mg by mouth 2 (Two) Times a Day As Needed (back pain). PT HOLDING FOR SURGERY, Disp: , Rfl:     Empagliflozin (Jardiance) 10 MG tablet, Take 10 mg by mouth Daily., Disp: 90 tablet, Rfl: 2    ezetimibe (ZETIA) 10 MG tablet, Take 1 tablet by mouth Every Night., Disp: , Rfl:     fluticasone (FLONASE) 50 MCG/ACT nasal spray, Administer 1 spray into  "the nostril(s) as directed by provider 2 (Two) Times a Day As Needed., Disp: , Rfl:     furosemide (LASIX) 20 MG tablet, Take 1 tablet by mouth As Needed. TAKES FOR WEIGHT GAIN OF 2 TO 3 POUNDS, Disp: , Rfl:     Linzess 72 MCG capsule capsule, Take 1 capsule by mouth Daily., Disp: , Rfl:     losartan (COZAAR) 25 MG tablet, Take 1 tablet by mouth Daily. (Patient taking differently: Take 0.5 tablets by mouth Daily.), Disp: 90 tablet, Rfl: 1    metaxalone (SKELAXIN) 800 MG tablet, Take 1 tablet by mouth As Needed for Muscle Spasms., Disp: , Rfl:     metoprolol succinate XL (TOPROL-XL) 50 MG 24 hr tablet, Take 1 tablet by mouth 2 (Two) Times a Day. In PM, Disp: , Rfl:     Minoxidil 5 % foam, Apply  topically 2 (two) times a day., Disp: , Rfl:     nitroglycerin (NITROSTAT) 0.4 MG SL tablet, 1 under the tongue as needed for angina, may repeat q5mins for up three doses (Patient taking differently: Place 1 tablet under the tongue Every 5 (Five) Minutes As Needed. 1 under the tongue as needed for angina, may repeat q5mins for up three doses), Disp: 100 tablet, Rfl: 11    omeprazole (priLOSEC) 40 MG capsule, Take 1 capsule by mouth Daily As Needed., Disp: , Rfl:     spironolactone (ALDACTONE) 25 MG tablet, Take 0.5 tablets by mouth Daily., Disp: , Rfl:       Objective:     Vitals:    06/19/25 0929   BP: 112/70   Pulse: 72   Weight: 84.5 kg (186 lb 4.8 oz)   Height: 182.9 cm (72.01\")     Body mass index is 25.26 kg/m².    PHYSICAL EXAM:    Neck:      Vascular: No JVD.   Pulmonary:      Effort: Pulmonary effort is normal.      Breath sounds: Normal breath sounds.   Cardiovascular:      Normal rate. Regular rhythm.      Murmurs: There is no murmur.      No gallop.  No click. No rub.   Pulses:     Intact distal pulses.           ECG 12 Lead    Date/Time: 6/19/2025 9:47 AM  Performed by: Nati Norris APRN    Authorized by: Nati Norris, BRANDON  Comparison: compared with previous ECG from 3/19/2025  Similar to previous " ECG  Rhythm: paced  Rate: normal  BPM: 72            Assessment:       Diagnosis Plan   1. Coronary artery disease involving native coronary artery of native heart without angina pectoris  ECG 12 Lead      2. S/P CABG (coronary artery bypass graft)        3. Ischemic cardiomyopathy        4. Implantable cardioverter-defibrillator (ICD) discharge        5. NSVT (nonsustained ventricular tachycardia)        6. PVC (premature ventricular contraction)        7. Paroxysmal atrial fibrillation          Orders Placed This Encounter   Procedures    ECG 12 Lead     This order was created via procedure documentation     Release to patient:   Routine Release [[id number]]          Plan:       1.  Coronary artery disease.  He denies any symptoms of angina.  Continue aspirin and atorvastatin.      2.  Ischemic cardiomyopathy.  Echocardiogram in 2023 demonstrated an EF of 36 to 40%.  He is status post ICD.  No symptoms of CHF.  He is on GDMT including Toprol, losartan, spironolactone, and Jardiance he follows with a [facility].       3.  NSVT/PVCs.  Continue amiodarone.  He is due for amiodarone labs and chest x-ray in 3 months      4.  Paroxysmal atrial fibrillation.  He is rate controlled with metoprolol and anticoagulated with Eliquis.  I suspect the fatigue is multifactorial.  Certainly the low blood pressure is contributing.  Recommended reducing the Toprol to 50 mg twice daily.  He will send me an update in 2 weeks.      Overall I think he is stable.  We will touch base in 2 weeks regarding his symptoms and make further adjustments if necessary.  Otherwise, he will follow-up with Dr. Ball in September.      As always, it has been a pleasure to participate in your patient's care.      Sincerely,         BRANDON Cueto

## 2025-07-02 ENCOUNTER — OFFICE VISIT (OUTPATIENT)
Age: 75
End: 2025-07-02
Payer: MEDICARE

## 2025-07-02 VITALS
DIASTOLIC BLOOD PRESSURE: 60 MMHG | WEIGHT: 186.4 LBS | BODY MASS INDEX: 25.25 KG/M2 | HEIGHT: 72 IN | SYSTOLIC BLOOD PRESSURE: 110 MMHG

## 2025-07-02 DIAGNOSIS — I83.893 SYMPTOMATIC VARICOSE VEINS, BILATERAL: ICD-10-CM

## 2025-07-02 DIAGNOSIS — I87.2 VENOUS (PERIPHERAL) INSUFFICIENCY: ICD-10-CM

## 2025-07-02 DIAGNOSIS — I83.893 VARICOSE VEINS OF BILATERAL LOWER EXTREMITIES WITH OTHER COMPLICATIONS: Primary | ICD-10-CM

## 2025-07-02 NOTE — PROGRESS NOTES
Patient Name: Alden Pratt III    MRN: [id number] Encounter Date: 07/02/2025      Consulting Service: Vascular Surgery    Referring Provider: No ref. provider found       CHIEF COMPLAINT:  Chief Complaint   Patient presents with    Varicose Veins       Subjective    HPI: Alden Pratt III is a 74 y.o. male being seen for evaluation/management of vein intervention.  Patient presents today for 3 month follow-up of  ambulatory phlebectomy procedure bilaterally.  The patient's symptoms have improved status post procedure.  Current varicose veins have diminished in diameter.  Patient denies issues such as numbness, phlebitis or brown staining.  Based on current condition it appears the patient is possibly in need of further intervention.  Going forward Varithena would be our likely next course if needed    PAST MEDICAL HISTORY:   Past Medical History:   Diagnosis Date    Arthritis     CAD (coronary artery disease)     atheroclerosis    Cancer     prostate    Chest pain     CHF (congestive heart failure)     Elevated cholesterol     GERD (gastroesophageal reflux disease)     Hyperlipidemia     Implantable cardioverter-defibrillator (ICD) discharge     Ischemic cardiomyopathy     Myocardial infarction     inferior    PAF (paroxysmal atrial fibrillation)     PVC (premature ventricular contraction)     PVD (peripheral vascular disease)     Status post phlebectomy     varicose veins    Thyroid nodule 07/18/2014    Varicose veins of bilateral lower extremities with pain 11/08/2023    and inflammation    Venous insufficiency (chronic) (peripheral) 11/08/2023    VT (ventricular tachycardia)       PAST SURGICAL HISTORY:   Past Surgical History:   Procedure Laterality Date    CARDIAC ABLATION  2021    atrial fibrillation    CARDIAC CATHETERIZATION  2006    CARDIAC CATHETERIZATION Left 05/10/2021    Procedure: Left Heart Cath;  Surgeon: Andrew Ball MD;  Location: [facility] CATH INVASIVE LOCATION;  Service:  Cardiology;  Laterality: Left;    CARDIAC CATHETERIZATION N/A 05/10/2021    Procedure: Coronary angiography;  Surgeon: Andrew Ball MD;  Location:  [facility] CATH INVASIVE LOCATION;  Service: Cardiology;  Laterality: N/A;    CARDIAC CATHETERIZATION N/A 05/10/2021    Procedure: Left ventriculography;  Surgeon: Andrew Ball MD;  Location: [facility] C[facility] LOC[facility];  Service: Cardiology;  Laterality: N/A;    CARDIAC CATHETERIZATION  05/10/2021    Procedure: Saphenous Vein Graft;  Surgeon: Andrew Ball MD;  Location: [facility] C[facility] LOC[facility];  Service: Cardiology;;    CARDIAC DEFIBRILLATOR PLACEMENT  02/2007    CARDIAC ELECTROPHYSIOLOGY MAPPING AND ABLATION  2023    v-tach    CARDIAC ELECTROPHYSIOLOGY PROCEDURE N/A 08/02/2021    Procedure: Ablation atrial fibrillation;  Surgeon: Robby Ngo MD;  Location: [facility] CATH INVASIVE LOC[facility];  Service: Cardiovascular;  Laterality: N/A;    CARDIAC ELECTROPHYSIOLOGY PROCEDURE N/A 11/15/2023    Procedure: Ablation VT;  Surgeon: Robby Ngo MD;  Location: [facility] CATH INVASIVE LOCATION;  Service: Cardiovascular;  Laterality: N/A;    CARDIAC ELECTROPHYSIOLOGY PROCEDURE N/A 08/05/2024    Procedure: Ablation PVC;  Surgeon: Robby Ngo MD;  Location: [facility] CATH INVASIVE LOCATION;  Service: Cardiovascular;  Laterality: N/A;    CARDIOVERSION  2021    CATARACT EXTRACTION, BILATERAL      COLONOSCOPY      CORONARY ARTERY BYPASS GRAFT  11/02/2006    x6    CORONARY STENT PLACEMENT  10/2006    HAND SURGERY Left     ICD GENERATOR REPLACEMENT N/A 09/04/2024    Procedure: Defibrillator Generator Change - DC [employer];  Surgeon: Robby Ngo MD;  Location: [facility] CATH INVASIVE LOCATION;  Service: Cardiovascular;  Laterality: N/A;    KNEE SURGERY Left     KNEE ARHTROSCOPY    MICROAMBULATORY PHLEBECTOMY      SHOULDER SURGERY Right     ARTHROSCOPY    THYROID BIOPSY      VARICOSE VEIN SURGERY Right 01/29/2024    VV Varithena    VEIN LIGATION AND  STRIPPING Bilateral 2/4/2025    Procedure: MICROAMBULATORY PHLEBECTOMY bilateral;  Surgeon: Gerry Duran MD;  Location: [facility] MAIN OR;  Service: Vascular;  Laterality: Bilateral;      FAMILY HISTORY:   Family History   Problem Relation Age of Onset    Cancer Mother     Heart disease Father     Heart failure Father     Stroke Father     Cancer Father     No Known Problems Maternal Grandmother     No Known Problems Maternal Grandfather     No Known Problems Paternal Grandmother     No Known Problems Paternal Grandfather     Coronary artery disease Other     Stroke Other     Hypertension Other     Malig Hyperthermia Neg Hx       SOCIAL HISTORY:   Social History     Tobacco Use    Smoking status: Never     Passive exposure: Never    Smokeless tobacco: Never    Tobacco comments:     No caffeine use    Vaping Use    Vaping status: Never Used   Substance Use Topics    Alcohol use: Not Currently    Drug use: No      MEDICATIONS:   Current Outpatient Medications on File Prior to Visit   Medication Sig Dispense Refill    amiodarone (PACERONE) 100 MG tablet Take 1 tablet by mouth Daily. 90 tablet 3    apixaban (ELIQUIS) 5 MG tablet tablet Take 1 tablet by mouth Every 12 (Twelve) Hours. Resume on 9/7      aspirin 81 MG EC tablet Take 1 tablet by mouth Every Night. INSTRUCTED BY DR DURAN NOT TO HOLD FOR SURGERY      atorvastatin (LIPITOR) 80 MG tablet Take 1 tablet by mouth Daily. 200001 90 tablet 2    carboxymethylcellulose (REFRESH PLUS) 0.5 % solution Apply 1 drop to eye(s) as directed by provider 3 times a day.      cetirizine (ZyrTEC) 10 MG tablet Take 1 tablet by mouth As Needed. Only Occasionally will use      Cholecalciferol (VITAMIN D3) 5000 UNITS capsule capsule Take 1 capsule by mouth Every Other Day. PT HOLDING FOR SURGERY      CLOBETASOL & CLOBETASOL EMUL EX Apply 1 Application topically Every Other Day.      DICLOFENAC PO Take 75 mg by mouth 2 (Two) Times a Day As Needed (back pain). PT HOLDING FOR SURGERY       "Empagliflozin (Jardiance) 10 MG tablet Take 10 mg by mouth Daily. 90 tablet 2    ezetimibe (ZETIA) 10 MG tablet Take 1 tablet by mouth Every Night.      fluticasone (FLONASE) 50 MCG/ACT nasal spray Administer 1 spray into the nostril(s) as directed by provider 2 (Two) Times a Day As Needed.      furosemide (LASIX) 20 MG tablet Take 1 tablet by mouth As Needed. TAKES FOR WEIGHT GAIN OF 2 TO 3 POUNDS      Linzess 72 MCG capsule capsule Take 1 capsule by mouth Daily.      losartan (COZAAR) 25 MG tablet Take 1 tablet by mouth Daily. (Patient taking differently: Take 0.5 tablets by mouth Daily.) 90 tablet 1    metaxalone (SKELAXIN) 800 MG tablet Take 1 tablet by mouth As Needed for Muscle Spasms.      metoprolol succinate XL (TOPROL-XL) 50 MG 24 hr tablet Take 1 tablet by mouth 2 (Two) Times a Day. In PM      Minoxidil 5 % foam Apply  topically 2 (two) times a day.      nitroglycerin (NITROSTAT) 0.4 MG SL tablet 1 under the tongue as needed for angina, may repeat q5mins for up three doses (Patient taking differently: Place 1 tablet under the tongue Every 5 (Five) Minutes As Needed. 1 under the tongue as needed for angina, may repeat q5mins for up three doses) 100 tablet 11    omeprazole (priLOSEC) 40 MG capsule Take 1 capsule by mouth Daily As Needed.      spironolactone (ALDACTONE) 25 MG tablet Take 0.5 tablets by mouth Daily.       No current facility-administered medications on file prior to visit.       ALLERGIES: Contrast dye (echo or unknown ct/mr), Ace inhibitors, Iodinated contrast media, Sudafed 12 hour [pseudoephedrine hcl er], and Pseudoephedrine       Objective   Vitals:    07/02/25 1415   BP: 110/60   BP Location: Right arm   Weight: 84.6 kg (186 lb 6.4 oz)   Height: 182.9 cm (72.01\")     Body mass index is 25.27 kg/m².  BMI is >= 25 and <30. (Overweight) The following options were offered after discussion;: weight loss educational material (shared in after visit summary), exercise " counseling/recommendations, and Information on healthy weight added to patient's after visit summary.      PHYSICAL EXAM:   Physical Exam     Result Review   LABS:    CBC          8/27/2024    10:08 9/12/2024    11:36 1/28/2025    13:10   CBC   WBC 3.82  4.22     4.32    RBC 5.03  5.15     5.08    Hemoglobin 12.6  12.9     12.3    Hematocrit 41.6  41.9     41.4    MCV 82.7  81.4     81.5    MCH 25.0  25     24.2    MCHC 30.3  30.8     29.7    RDW 13.7  14.6     14.0    Platelets 146  148     151       Details          This result is from an external source.             BMP          8/1/2024    10:52 8/27/2024    10:08 1/28/2025    13:10   BMP   BUN 11  10  10    Creatinine 0.84  0.78  0.84    Sodium 138  138  140    Potassium 4.4  4.7  4.3    Chloride 105  104  104    CO2 28.8  27.1  26.9    Calcium 9.1  9.5  8.7                 Results Review:       I reviewed the patient's new clinical results.    The following radiologic or non-invasive studies have been reviewed by me:   No results found for this or any previous visit from the past 365 days.     No radiology results for the last 30 days.                ASSESSMENT/PLAN:   Diagnoses and all orders for this visit:    1. 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 Venous (peripheral) insufficiency    3. Symptomatic varicose veins, bilateral       74 y.o. male with persistence of veins in the right worse than the left despite successful ambulatory phlebectomy.  If he needs anything at all the next round would be Varithena but right now is not bothered by them and I think things will stay stable he thinks that he will likely be back in if he does need to come in they will call and set things up.  Going forward I believe that he will do well.  Keep his legs covered from the sun and we will see him back now as needed.    I discussed the plan with the patient who is agreeable to the plan of care at this point. Thank you for this  consult.   Follow Up  Return if symptoms worsen or fail to improve.    Gerry Mckenzie MD   07/02/25

## 2025-07-07 ENCOUNTER — TELEPHONE (OUTPATIENT)
Age: 75
End: 2025-07-07

## 2025-07-08 LAB
MC_CV_MDC_IDC_RATE_1: 140
MC_CV_MDC_IDC_RATE_1: 171
MC_CV_MDC_IDC_RATE_1: 176
MC_CV_MDC_IDC_RATE_1: 214
MC_CV_MDC_IDC_RATE_2: 176
MC_CV_MDC_IDC_RATE_2: 214
MC_CV_MDC_IDC_SVC_MEASURED_IMPEDANCE: 37
MC_CV_MDC_IDC_THERAPIES: NORMAL
MC_CV_MDC_IDC_ZONE_ID: 2
MC_CV_MDC_IDC_ZONE_ID: 3
MC_CV_MDC_IDC_ZONE_ID: 4
MC_CV_MDC_IDC_ZONE_ID: 5
MC_CV_MDC_IDC_ZONE_ID: 6
MDC_IDC_MSMT_BATTERY_REMAINING_LONGEVITY: 123 MO
MDC_IDC_MSMT_BATTERY_RRT_TRIGGER: 2.8
MDC_IDC_MSMT_BATTERY_VOLTAGE: 3.01
MDC_IDC_MSMT_CAP_CHARGE_TIME: 3.7
MDC_IDC_MSMT_LEADCHNL_RA_DTM: NORMAL
MDC_IDC_MSMT_LEADCHNL_RA_IMPEDANCE_VALUE: 418
MDC_IDC_MSMT_LEADCHNL_RA_PACING_THRESHOLD_AMPLITUDE: 0.75
MDC_IDC_MSMT_LEADCHNL_RA_PACING_THRESHOLD_POLARITY: NORMAL
MDC_IDC_MSMT_LEADCHNL_RA_PACING_THRESHOLD_PULSEWIDTH: 0.4
MDC_IDC_MSMT_LEADCHNL_RA_SENSING_INTR_AMPL: 1.4
MDC_IDC_MSMT_LEADCHNL_RV_DTM: NORMAL
MDC_IDC_MSMT_LEADCHNL_RV_IMPEDANCE_VALUE: 399
MDC_IDC_MSMT_LEADCHNL_RV_PACING_THRESHOLD_AMPLITUDE: 1
MDC_IDC_MSMT_LEADCHNL_RV_PACING_THRESHOLD_POLARITY: NORMAL
MDC_IDC_MSMT_LEADCHNL_RV_PACING_THRESHOLD_PULSEWIDTH: 0.4
MDC_IDC_MSMT_LEADCHNL_RV_SENSING_INTR_AMPL: 9.4
MDC_IDC_PG_IMPLANT_DTM: NORMAL
MDC_IDC_PG_MFG: NORMAL
MDC_IDC_PG_MODEL: NORMAL
MDC_IDC_PG_SERIAL: NORMAL
MDC_IDC_PG_TYPE: NORMAL
MDC_IDC_SESS_DTM: NORMAL
MDC_IDC_SESS_TYPE: NORMAL
MDC_IDC_SET_BRADY_AT_MODE_SWITCH_RATE: 171
MDC_IDC_SET_BRADY_LOWRATE: 70
MDC_IDC_SET_BRADY_MAX_SENSOR_RATE: 120
MDC_IDC_SET_BRADY_MAX_TRACKING_RATE: 130
MDC_IDC_SET_BRADY_MODE: NORMAL
MDC_IDC_SET_BRADY_PAV_DELAY: 180
MDC_IDC_SET_BRADY_SAV_DELAY: 150
MDC_IDC_SET_LEADCHNL_RA_PACING_AMPLITUDE: 1.5
MDC_IDC_SET_LEADCHNL_RA_PACING_POLARITY: NORMAL
MDC_IDC_SET_LEADCHNL_RA_PACING_PULSEWIDTH: 0.4
MDC_IDC_SET_LEADCHNL_RA_SENSING_POLARITY: NORMAL
MDC_IDC_SET_LEADCHNL_RA_SENSING_SENSITIVITY: 0.15
MDC_IDC_SET_LEADCHNL_RV_PACING_AMPLITUDE: 2
MDC_IDC_SET_LEADCHNL_RV_PACING_POLARITY: NORMAL
MDC_IDC_SET_LEADCHNL_RV_PACING_PULSEWIDTH: 0.4
MDC_IDC_SET_LEADCHNL_RV_SENSING_POLARITY: NORMAL
MDC_IDC_SET_LEADCHNL_RV_SENSING_SENSITIVITY: 0.3
MDC_IDC_SET_ZONE_STATUS: NORMAL
MDC_IDC_SET_ZONE_TYPE: NORMAL
MDC_IDC_STAT_BRADY_RA_PERCENT_PACED: 96.6
MDC_IDC_STAT_BRADY_RV_PERCENT_PACED: 0.1
MDC_IDC_STAT_TACHYTHERAPY_ATP_DELIVERED_RECENT: 0
MDC_IDC_STAT_TACHYTHERAPY_SHOCKS_ABORTED_RECENT: 0
MDC_IDC_STAT_TACHYTHERAPY_SHOCKS_DELIVERED_RECENT: 0

## 2025-07-30 LAB
MC_CV_MDC_IDC_RATE_1: 140
MC_CV_MDC_IDC_RATE_1: 171
MC_CV_MDC_IDC_RATE_1: 176
MC_CV_MDC_IDC_RATE_1: 214
MC_CV_MDC_IDC_RATE_2: 176
MC_CV_MDC_IDC_RATE_2: 214
MC_CV_MDC_IDC_SVC_MEASURED_IMPEDANCE: 38
MC_CV_MDC_IDC_THERAPIES: NORMAL
MC_CV_MDC_IDC_ZONE_ID: 2
MC_CV_MDC_IDC_ZONE_ID: 3
MC_CV_MDC_IDC_ZONE_ID: 4
MC_CV_MDC_IDC_ZONE_ID: 5
MC_CV_MDC_IDC_ZONE_ID: 6
MDC_IDC_MSMT_BATTERY_REMAINING_LONGEVITY: 123 MO
MDC_IDC_MSMT_BATTERY_RRT_TRIGGER: 2.8
MDC_IDC_MSMT_BATTERY_VOLTAGE: 3.01
MDC_IDC_MSMT_CAP_CHARGE_TIME: 3.7
MDC_IDC_MSMT_LEADCHNL_RA_DTM: NORMAL
MDC_IDC_MSMT_LEADCHNL_RA_IMPEDANCE_VALUE: 437
MDC_IDC_MSMT_LEADCHNL_RA_PACING_THRESHOLD_AMPLITUDE: 0.75
MDC_IDC_MSMT_LEADCHNL_RA_PACING_THRESHOLD_POLARITY: NORMAL
MDC_IDC_MSMT_LEADCHNL_RA_PACING_THRESHOLD_PULSEWIDTH: 0.4
MDC_IDC_MSMT_LEADCHNL_RA_SENSING_INTR_AMPL: 1.1
MDC_IDC_MSMT_LEADCHNL_RV_DTM: NORMAL
MDC_IDC_MSMT_LEADCHNL_RV_IMPEDANCE_VALUE: 380
MDC_IDC_MSMT_LEADCHNL_RV_PACING_THRESHOLD_AMPLITUDE: 1.12
MDC_IDC_MSMT_LEADCHNL_RV_PACING_THRESHOLD_POLARITY: NORMAL
MDC_IDC_MSMT_LEADCHNL_RV_PACING_THRESHOLD_PULSEWIDTH: 0.4
MDC_IDC_MSMT_LEADCHNL_RV_SENSING_INTR_AMPL: 10.8
MDC_IDC_PG_IMPLANT_DTM: NORMAL
MDC_IDC_PG_MFG: NORMAL
MDC_IDC_PG_MODEL: NORMAL
MDC_IDC_PG_SERIAL: NORMAL
MDC_IDC_PG_TYPE: NORMAL
MDC_IDC_SESS_DTM: NORMAL
MDC_IDC_SESS_TYPE: NORMAL
MDC_IDC_SET_BRADY_AT_MODE_SWITCH_RATE: 171
MDC_IDC_SET_BRADY_LOWRATE: 70
MDC_IDC_SET_BRADY_MAX_SENSOR_RATE: 120
MDC_IDC_SET_BRADY_MAX_TRACKING_RATE: 130
MDC_IDC_SET_BRADY_MODE: NORMAL
MDC_IDC_SET_BRADY_PAV_DELAY: 180
MDC_IDC_SET_BRADY_SAV_DELAY: 150
MDC_IDC_SET_LEADCHNL_RA_PACING_AMPLITUDE: 1.5
MDC_IDC_SET_LEADCHNL_RA_PACING_POLARITY: NORMAL
MDC_IDC_SET_LEADCHNL_RA_PACING_PULSEWIDTH: 0.4
MDC_IDC_SET_LEADCHNL_RA_SENSING_POLARITY: NORMAL
MDC_IDC_SET_LEADCHNL_RA_SENSING_SENSITIVITY: 0.15
MDC_IDC_SET_LEADCHNL_RV_PACING_AMPLITUDE: 2
MDC_IDC_SET_LEADCHNL_RV_PACING_POLARITY: NORMAL
MDC_IDC_SET_LEADCHNL_RV_PACING_PULSEWIDTH: 0.4
MDC_IDC_SET_LEADCHNL_RV_SENSING_POLARITY: NORMAL
MDC_IDC_SET_LEADCHNL_RV_SENSING_SENSITIVITY: 0.3
MDC_IDC_SET_ZONE_STATUS: NORMAL
MDC_IDC_SET_ZONE_TYPE: NORMAL
MDC_IDC_STAT_BRADY_RA_PERCENT_PACED: 94.8
MDC_IDC_STAT_BRADY_RV_PERCENT_PACED: 0.2
MDC_IDC_STAT_TACHYTHERAPY_ATP_DELIVERED_RECENT: 0
MDC_IDC_STAT_TACHYTHERAPY_SHOCKS_ABORTED_RECENT: 0
MDC_IDC_STAT_TACHYTHERAPY_SHOCKS_DELIVERED_RECENT: 0

## 2025-08-01 LAB
MC_CV_MDC_IDC_RATE_1: 140
MC_CV_MDC_IDC_RATE_1: 171
MC_CV_MDC_IDC_RATE_1: 176
MC_CV_MDC_IDC_RATE_1: 214
MC_CV_MDC_IDC_RATE_2: 176
MC_CV_MDC_IDC_RATE_2: 214
MC_CV_MDC_IDC_SVC_MEASURED_IMPEDANCE: 38
MC_CV_MDC_IDC_THERAPIES: NORMAL
MC_CV_MDC_IDC_ZONE_ID: 2
MC_CV_MDC_IDC_ZONE_ID: 3
MC_CV_MDC_IDC_ZONE_ID: 4
MC_CV_MDC_IDC_ZONE_ID: 5
MC_CV_MDC_IDC_ZONE_ID: 6
MDC_IDC_MSMT_BATTERY_REMAINING_LONGEVITY: 123 MO
MDC_IDC_MSMT_BATTERY_RRT_TRIGGER: 2.8
MDC_IDC_MSMT_BATTERY_VOLTAGE: 3.01
MDC_IDC_MSMT_CAP_CHARGE_TIME: 3.7
MDC_IDC_MSMT_LEADCHNL_RA_DTM: NORMAL
MDC_IDC_MSMT_LEADCHNL_RA_IMPEDANCE_VALUE: 437
MDC_IDC_MSMT_LEADCHNL_RA_PACING_THRESHOLD_AMPLITUDE: 0.75
MDC_IDC_MSMT_LEADCHNL_RA_PACING_THRESHOLD_POLARITY: NORMAL
MDC_IDC_MSMT_LEADCHNL_RA_PACING_THRESHOLD_PULSEWIDTH: 0.4
MDC_IDC_MSMT_LEADCHNL_RA_SENSING_INTR_AMPL: 1.1
MDC_IDC_MSMT_LEADCHNL_RV_DTM: NORMAL
MDC_IDC_MSMT_LEADCHNL_RV_IMPEDANCE_VALUE: 380
MDC_IDC_MSMT_LEADCHNL_RV_PACING_THRESHOLD_AMPLITUDE: 1.12
MDC_IDC_MSMT_LEADCHNL_RV_PACING_THRESHOLD_POLARITY: NORMAL
MDC_IDC_MSMT_LEADCHNL_RV_PACING_THRESHOLD_PULSEWIDTH: 0.4
MDC_IDC_MSMT_LEADCHNL_RV_SENSING_INTR_AMPL: 10.8
MDC_IDC_PG_IMPLANT_DTM: NORMAL
MDC_IDC_PG_MFG: NORMAL
MDC_IDC_PG_MODEL: NORMAL
MDC_IDC_PG_SERIAL: NORMAL
MDC_IDC_PG_TYPE: NORMAL
MDC_IDC_SESS_DTM: NORMAL
MDC_IDC_SESS_TYPE: NORMAL
MDC_IDC_SET_BRADY_AT_MODE_SWITCH_RATE: 171
MDC_IDC_SET_BRADY_LOWRATE: 70
MDC_IDC_SET_BRADY_MAX_SENSOR_RATE: 120
MDC_IDC_SET_BRADY_MAX_TRACKING_RATE: 130
MDC_IDC_SET_BRADY_MODE: NORMAL
MDC_IDC_SET_BRADY_PAV_DELAY: 180
MDC_IDC_SET_BRADY_SAV_DELAY: 150
MDC_IDC_SET_LEADCHNL_RA_PACING_AMPLITUDE: 1.5
MDC_IDC_SET_LEADCHNL_RA_PACING_POLARITY: NORMAL
MDC_IDC_SET_LEADCHNL_RA_PACING_PULSEWIDTH: 0.4
MDC_IDC_SET_LEADCHNL_RA_SENSING_POLARITY: NORMAL
MDC_IDC_SET_LEADCHNL_RA_SENSING_SENSITIVITY: 0.15
MDC_IDC_SET_LEADCHNL_RV_PACING_AMPLITUDE: 2
MDC_IDC_SET_LEADCHNL_RV_PACING_POLARITY: NORMAL
MDC_IDC_SET_LEADCHNL_RV_PACING_PULSEWIDTH: 0.4
MDC_IDC_SET_LEADCHNL_RV_SENSING_POLARITY: NORMAL
MDC_IDC_SET_LEADCHNL_RV_SENSING_SENSITIVITY: 0.3
MDC_IDC_SET_ZONE_STATUS: NORMAL
MDC_IDC_SET_ZONE_TYPE: NORMAL
MDC_IDC_STAT_BRADY_RA_PERCENT_PACED: 94.9
MDC_IDC_STAT_BRADY_RV_PERCENT_PACED: 0.2
MDC_IDC_STAT_TACHYTHERAPY_ATP_DELIVERED_RECENT: 0
MDC_IDC_STAT_TACHYTHERAPY_SHOCKS_ABORTED_RECENT: 0
MDC_IDC_STAT_TACHYTHERAPY_SHOCKS_DELIVERED_RECENT: 0

## 2025-08-05 LAB
MC_CV_MDC_IDC_RATE_1: 140
MC_CV_MDC_IDC_RATE_1: 171
MC_CV_MDC_IDC_RATE_1: 176
MC_CV_MDC_IDC_RATE_1: 214
MC_CV_MDC_IDC_RATE_2: 176
MC_CV_MDC_IDC_RATE_2: 214
MC_CV_MDC_IDC_SVC_MEASURED_IMPEDANCE: 38
MC_CV_MDC_IDC_THERAPIES: NORMAL
MC_CV_MDC_IDC_ZONE_ID: 2
MC_CV_MDC_IDC_ZONE_ID: 3
MC_CV_MDC_IDC_ZONE_ID: 4
MC_CV_MDC_IDC_ZONE_ID: 5
MC_CV_MDC_IDC_ZONE_ID: 6
MDC_IDC_MSMT_BATTERY_REMAINING_LONGEVITY: 123 MO
MDC_IDC_MSMT_BATTERY_RRT_TRIGGER: 2.8
MDC_IDC_MSMT_BATTERY_VOLTAGE: 3.01
MDC_IDC_MSMT_CAP_CHARGE_TIME: 3.7
MDC_IDC_MSMT_LEADCHNL_RA_DTM: NORMAL
MDC_IDC_MSMT_LEADCHNL_RA_IMPEDANCE_VALUE: 437
MDC_IDC_MSMT_LEADCHNL_RA_PACING_THRESHOLD_AMPLITUDE: 0.75
MDC_IDC_MSMT_LEADCHNL_RA_PACING_THRESHOLD_POLARITY: NORMAL
MDC_IDC_MSMT_LEADCHNL_RA_PACING_THRESHOLD_PULSEWIDTH: 0.4
MDC_IDC_MSMT_LEADCHNL_RA_SENSING_INTR_AMPL: 1.1
MDC_IDC_MSMT_LEADCHNL_RV_DTM: NORMAL
MDC_IDC_MSMT_LEADCHNL_RV_IMPEDANCE_VALUE: 380
MDC_IDC_MSMT_LEADCHNL_RV_PACING_THRESHOLD_AMPLITUDE: 1.12
MDC_IDC_MSMT_LEADCHNL_RV_PACING_THRESHOLD_POLARITY: NORMAL
MDC_IDC_MSMT_LEADCHNL_RV_PACING_THRESHOLD_PULSEWIDTH: 0.4
MDC_IDC_MSMT_LEADCHNL_RV_SENSING_INTR_AMPL: 10.8
MDC_IDC_PG_IMPLANT_DTM: NORMAL
MDC_IDC_PG_MFG: NORMAL
MDC_IDC_PG_MODEL: NORMAL
MDC_IDC_PG_SERIAL: NORMAL
MDC_IDC_PG_TYPE: NORMAL
MDC_IDC_SESS_DTM: NORMAL
MDC_IDC_SESS_TYPE: NORMAL
MDC_IDC_SET_BRADY_AT_MODE_SWITCH_RATE: 171
MDC_IDC_SET_BRADY_LOWRATE: 70
MDC_IDC_SET_BRADY_MAX_SENSOR_RATE: 120
MDC_IDC_SET_BRADY_MAX_TRACKING_RATE: 130
MDC_IDC_SET_BRADY_MODE: NORMAL
MDC_IDC_SET_BRADY_PAV_DELAY: 180
MDC_IDC_SET_BRADY_SAV_DELAY: 150
MDC_IDC_SET_LEADCHNL_RA_PACING_AMPLITUDE: 1.5
MDC_IDC_SET_LEADCHNL_RA_PACING_POLARITY: NORMAL
MDC_IDC_SET_LEADCHNL_RA_PACING_PULSEWIDTH: 0.4
MDC_IDC_SET_LEADCHNL_RA_SENSING_POLARITY: NORMAL
MDC_IDC_SET_LEADCHNL_RA_SENSING_SENSITIVITY: 0.15
MDC_IDC_SET_LEADCHNL_RV_PACING_AMPLITUDE: 2
MDC_IDC_SET_LEADCHNL_RV_PACING_POLARITY: NORMAL
MDC_IDC_SET_LEADCHNL_RV_PACING_PULSEWIDTH: 0.4
MDC_IDC_SET_LEADCHNL_RV_SENSING_POLARITY: NORMAL
MDC_IDC_SET_LEADCHNL_RV_SENSING_SENSITIVITY: 0.3
MDC_IDC_SET_ZONE_STATUS: NORMAL
MDC_IDC_SET_ZONE_TYPE: NORMAL
MDC_IDC_STAT_BRADY_RA_PERCENT_PACED: 94.8
MDC_IDC_STAT_BRADY_RV_PERCENT_PACED: 0.2
MDC_IDC_STAT_TACHYTHERAPY_ATP_DELIVERED_RECENT: 0
MDC_IDC_STAT_TACHYTHERAPY_SHOCKS_ABORTED_RECENT: 0
MDC_IDC_STAT_TACHYTHERAPY_SHOCKS_DELIVERED_RECENT: 0

## (undated) DEVICE — SYR LL TP 10ML STRL

## (undated) DEVICE — Device: Brand: VIZIGO

## (undated) DEVICE — Device: Brand: REFERENCE PATCH CARTO 3

## (undated) DEVICE — CATH4F INF PIG 145Â° MOD 110CM: Brand: INFINITI

## (undated) DEVICE — PK CATH CARD 40

## (undated) DEVICE — Device: Brand: SMARTABLATE

## (undated) DEVICE — Device: Brand: SOUNDSTAR

## (undated) DEVICE — PINNACLE INTRODUCER SHEATH: Brand: PINNACLE

## (undated) DEVICE — APPL DURAPREP IODOPHOR APL 26ML

## (undated) DEVICE — 1 X VERSACROSS STEERABLE SHEATH (INCLUDING  1 X DILATOR AND 1 X J-TIP GUIDEWIRE); 1 X VERSACROSS RF WIRE (INCLUDING 1 X CONNECTOR CABLE (SINGLE USE)); 1 X DISPERSIVE ELECTRODE: Brand: VERSACROSS STEERABLE ACCESS SOLUTION

## (undated) DEVICE — PLASMABLADE PS210-030S 3.0S LOCK: Brand: PLASMABLADE™

## (undated) DEVICE — 1 X VERSACROSS TRANSSEPTAL SHEATH; 1 X VERSACROSS TRANSSEPTAL DILATOR; 1 X J-TIP GUIDEWIRE: Brand: VERSACROSS TRANSSEPTAL SHEATH

## (undated) DEVICE — PK UNIV COMPL 40

## (undated) DEVICE — Device

## (undated) DEVICE — LOU EP: Brand: MEDLINE INDUSTRIES, INC.

## (undated) DEVICE — SYS CLS VASC/VENI VASCADE MVP 6TO12F

## (undated) DEVICE — BANDAGE,GAUZE,BULKEE II,4.5"X4.1YD,STRL: Brand: MEDLINE

## (undated) DEVICE — BI-DIRECTIONAL NAVIGATION CATHETER, NAV, F-J: Brand: QDOT MICRO

## (undated) DEVICE — PROB S-CATH TEMP ESOPH 10F

## (undated) DEVICE — CATH DIAG IMPULSE IMT 5F 100CM

## (undated) DEVICE — INTENDED FOR TISSUE SEPARATION, AND OTHER PROCEDURES THAT REQUIRE A SHARP SURGICAL BLADE TO PUNCTURE OR CUT.: Brand: BARD-PARKER ® CARBON RIB-BACK BLADES

## (undated) DEVICE — CATH DIAG IMPULSE AL1 5F 100CM

## (undated) DEVICE — GLV SURG BIOGEL LTX PF 7 1/2

## (undated) DEVICE — Device: Brand: WEBSTER CS

## (undated) DEVICE — OPTRELL-36 F-J CURVE: Brand: OPTRELL MAPPING CATHETER WITH TRUEREF TECHNOLOGY

## (undated) DEVICE — SUT MNCRYL 5/0 P3 18 IN Y493G

## (undated) DEVICE — CATH DIAG IMPULSE MPA2 SH 5F 100CM

## (undated) DEVICE — 1 X VERSACROSS TRANSSEPTAL SHEATH (INCLUDING  1 X J-TIP GUIDEWIRE); 1 X VERSACROSS RF WIRE (INCLUDING 1 X CONNECTOR CABLE (SINGLE USE)); 1 X DISPERSIVE ELECTRODE: Brand: VERSACROSS ACCESS SOLUTION

## (undated) DEVICE — CLEARSIGHT FINGER CUFF LARGE MULTI PACK: Brand: CLEARSIGHT

## (undated) DEVICE — LOU PACE DEFIB: Brand: MEDLINE INDUSTRIES, INC.

## (undated) DEVICE — BG ISOL DRWSTR INVISISHIELD 20X20IN

## (undated) DEVICE — Device: Brand: DECANAV

## (undated) DEVICE — SUT ANTIBAC VICRYL/PLS ABS TIE COAT SZ3/0 12X18IN UD

## (undated) DEVICE — SOUNDSTAR ECO 8F G CATHETER: Brand: SOUNDSTAR

## (undated) DEVICE — BLANKT WARM UNDER/BDY FUL/ACC A/ 90X206CM

## (undated) DEVICE — CLEARSIGHT FINGER CUFF MEDIUM MULTI PACK: Brand: CLEARSIGHT

## (undated) DEVICE — GLIDESHEATH BASIC HYDROPHILIC COATED INTRODUCER SHEATH: Brand: GLIDESHEATH

## (undated) DEVICE — CATH IV INSYTE AUTOGARD 14G 1 1/2IN ORNG

## (undated) DEVICE — INTRO SHEATH ART/FEM ENGAGE .035 4F12CM

## (undated) DEVICE — PERCLOSE PROGLIDE™ SUTURE-MEDIATED CLOSURE SYSTEM: Brand: PERCLOSE PROGLIDE™

## (undated) DEVICE — KT MANIFLD CARDIAC

## (undated) DEVICE — SUT PROLN 5/0 C1 D/A 36IN 8720H

## (undated) DEVICE — Device: Brand: THERMOCOOL SMARTTOUCH

## (undated) DEVICE — DRAPE,REIN 53X77,STERILE: Brand: MEDLINE

## (undated) DEVICE — CATH DIAG IMPULSE FR4 5F 100CM

## (undated) DEVICE — Device: Brand: PENTARAY NAV

## (undated) DEVICE — CATH DIAG IMPULSE FL4 5F 100CM

## (undated) DEVICE — BI-DIRECTIONAL NAVIGATION CATHETER, NAV, D-F: Brand: QDOT MICRO

## (undated) DEVICE — GW EMR FIX EXCHG J STD .035 3MM 260CM